# Patient Record
Sex: FEMALE | Race: WHITE | Employment: OTHER | URBAN - METROPOLITAN AREA
[De-identification: names, ages, dates, MRNs, and addresses within clinical notes are randomized per-mention and may not be internally consistent; named-entity substitution may affect disease eponyms.]

---

## 2016-12-06 LAB — PAP SMEAR, EXTERNAL: NORMAL

## 2017-01-17 ENCOUNTER — PATIENT OUTREACH (OUTPATIENT)
Dept: INTERNAL MEDICINE CLINIC | Age: 57
End: 2017-01-17

## 2017-01-17 NOTE — PROGRESS NOTES
[facility]  Patient identified as high risk. Assessed chart for need of chronic complex care management. Being followed by Dr. Curly Severs for pain management; Dr. Amanda Owens for interstitial cystitis. Last PCP visit: 11/28/16   Next PCP appointment: 5/30/17  ED admission in past 6 months: [facility] admissions in past 6 months: 0     No apparent need for complex care management at this time.

## 2017-01-18 ENCOUNTER — PATIENT OUTREACH (OUTPATIENT)
Dept: INTERNAL MEDICINE CLINIC | Age: 57
End: 2017-01-18

## 2017-01-18 ENCOUNTER — DOCUMENTATION ONLY (OUTPATIENT)
Dept: INTERNAL MEDICINE CLINIC | Age: 57
End: 2017-01-18

## 2017-01-18 NOTE — PROGRESS NOTES
Contacted Dr. Katina Yu office to request most recent PAP results. Introduced self and reason for call. Provided 2 patient identifiers. Provided office fax number. Document received . Forwarded to central scanning.

## 2017-01-20 ENCOUNTER — OFFICE VISIT (OUTPATIENT)
Dept: PAIN MANAGEMENT | Age: 57
End: 2017-01-20

## 2017-01-20 ENCOUNTER — TELEPHONE (OUTPATIENT)
Dept: PAIN MANAGEMENT | Age: 57
End: 2017-01-20

## 2017-01-20 VITALS
DIASTOLIC BLOOD PRESSURE: 85 MMHG | HEART RATE: 104 BPM | SYSTOLIC BLOOD PRESSURE: 142 MMHG | WEIGHT: 164 LBS | BODY MASS INDEX: 29.05 KG/M2

## 2017-01-20 DIAGNOSIS — G89.4 CHRONIC PAIN SYNDROME: ICD-10-CM

## 2017-01-20 DIAGNOSIS — Z79.899 ENCOUNTER FOR LONG-TERM (CURRENT) USE OF HIGH-RISK MEDICATION: ICD-10-CM

## 2017-01-20 DIAGNOSIS — K58.9 IRRITABLE BOWEL SYNDROME, UNSPECIFIED TYPE: ICD-10-CM

## 2017-01-20 DIAGNOSIS — M79.7 FIBROMYALGIA: Primary | ICD-10-CM

## 2017-01-20 DIAGNOSIS — F32.A CHRONIC DEPRESSION: ICD-10-CM

## 2017-01-20 DIAGNOSIS — N30.10 CHRONIC INTERSTITIAL CYSTITIS: ICD-10-CM

## 2017-01-20 DIAGNOSIS — M25.50 PAIN IN JOINT, MULTIPLE SITES: ICD-10-CM

## 2017-01-20 DIAGNOSIS — M46.1 SACROILIITIS, NOT ELSEWHERE CLASSIFIED (HCC): ICD-10-CM

## 2017-01-20 DIAGNOSIS — E55.9 VITAMIN D DEFICIENCY: ICD-10-CM

## 2017-01-20 RX ORDER — GABAPENTIN 400 MG/1
800 CAPSULE ORAL 3 TIMES DAILY
Qty: 180 CAP | Refills: 11 | Status: SHIPPED | OUTPATIENT
Start: 2017-01-20 | End: 2017-02-19

## 2017-01-20 RX ORDER — OXCARBAZEPINE 150 MG/1
150 TABLET, FILM COATED ORAL 4 TIMES DAILY
Qty: 120 TAB | Refills: 11 | Status: SHIPPED | OUTPATIENT
Start: 2017-01-20 | End: 2017-02-19

## 2017-01-20 NOTE — PROGRESS NOTES
Nursing Notes    Patient presents to the office today in follow-up. Patient rates her pain at 8/10 on the numerical pain scale. Reviewed medications with counts as follows:    Rx Date filled Qty Dispensed Pill Count Last Dose Short   provigil 200 11/4/16 60 11 1/20/17 no       Comments:     POC UDS was not performed in office today    Any new labs or imaging since last appointment? YES    Have you been to an emergency room (ER) or urgent care clinic since your last visit? NO            Have you been hospitalized since your last visit? NO     If yes, where, when, and reason for visit? Have you seen or consulted any other health care providers outside of the [address] [place] [address]  since your last visit? NO     If yes, where, when, and reason for visit? Ms. Melinda Lawson has a reminder for a \"due or due soon\" health maintenance. I have asked that she contact her primary care provider for follow-up on this health maintenance.

## 2017-01-20 NOTE — PROGRESS NOTES
HISTORY OF PRESENT ILLNESS  Cait Servin is a 55 y.o. female. HPI she returns for follow-up of chronic, severe pain which is widespread and associated with fatigue and related to fibromyalgia, interstitial cystitis, temporomandibular joint dysfunction, as well as bilateral carpal tunnel syndrome and generalized osteoarthritis. Since last seen, she underwent an x-ray of the right foot because of pain localized along the lateral aspect. X-ray was negative and she will be followed up by her podiatrist for further suggestions. She does note that the Mobic seems to alleviate the pain in her foot and that, when she does not take the Mobic, it causes the pain in the foot to flare. She did not increase her Provigil as recommended at the last visit. This will be allowed in 200 mg in the morning and 200 mg in the early afternoon for better alleviation of her fatigue. Neurontin will also be increased to 800 mg 3 times daily for better neuropathic pain control. Pain level today 8 out of 10, outcome 16/28,(The lower the upper number, the better the outcome)  Physical activity and mobility as well as sleep are poor, mood is fair. No reported side effects. Review of Systems   Constitutional: Positive for malaise/fatigue. HENT:        Jaw pain     Respiratory: Positive for cough (occasional) and wheezing (wheezing). Asthma   Gastrointestinal: Positive for diarrhea and heartburn (nexium). Negative for abdominal pain (comes and goes), constipation and vomiting. Genitourinary: Positive for dysuria (Interstitial Cystitis), frequency and urgency. Musculoskeletal: Positive for back pain, falls (trips often/unchanged/balance issues/no recent ED visit or injury requiring medical attention), joint pain, myalgias and neck pain. Skin: Negative. Neurological: Positive for weakness (generalized) and headaches. Issues with balance   Endo/Heme/Allergies: Negative.     Psychiatric/Behavioral: Positive for depression. Negative for suicidal ideas. The patient is nervous/anxious and has insomnia. All other systems reviewed and are negative. Physical Exam   Constitutional: She is oriented to person, place, and time. She appears well-developed and well-nourished. No distress. HENT:   Head: Normocephalic. Painful ROM jaw   Eyes: EOM are normal. Pupils are equal, round, and reactive to light. Neck: No thyromegaly present. Tender throughout/painful ROM/tight musculature   Pulmonary/Chest: Effort normal.   Musculoskeletal: She exhibits tenderness (throughout back/tight musculature). Left shoulder: She exhibits decreased range of motion, tenderness and pain. Right wrist: She exhibits no tenderness. Left wrist: She exhibits no tenderness. Right knee: She exhibits decreased range of motion and swelling. Tenderness found. Left knee: Tenderness found. Cervical back: She exhibits tenderness (tight musculature) and pain. Lumbar back: She exhibits decreased range of motion (painful), tenderness (especially over left SIJ), pain and spasm. Back:         Hands:       Right foot: There is tenderness. Left foot: There is tenderness. Neurological: She is alert and oriented to person, place, and time. Psychiatric: She has a normal mood and affect. Her behavior is normal. Thought content normal.   Nursing note and vitals reviewed. ASSESSMENT and PLAN  Encounter Diagnoses   Name Primary?  Fibromyalgia Yes    Chronic interstitial cystitis     Chronic depression     Irritable bowel syndrome, unspecified type     Sacroiliitis, not elsewhere classified ([facility] [address]     Pain in joint, multiple sites     Encounter for long-term (current) use of high-risk medication     Vitamin D deficiency     Chronic pain syndrome      Treatment plan as noted above. 3 month reassess her    No concerns are raised for misuse, abuse, or diversion.     1. Pain medications are prescribed with the objective of pain relief and improved physical and psychosocial function in this patient. 2. Counseled patient on proper use of prescribed medications and reviewed opioid contract. 3. Counseled patient about chronic medical conditions and their relationship to anxiety and depression and recommended mental health support as needed. 4. Reviewed with patient self-help tools, home exercise, and lifestyle changes to assist the patient in self-management of symptoms. 5. Advised patient to have a primary care provider to continue care for health maintenance and general medical conditions and support for referral to specialty care as needed. 6. Reviewed with patient the treatment plan, goals of treatment plan, and limitations of treatment plan, to include the potential for side effects from medications and procedures. If side effects occur, it is the responsibility of the patient to inform the clinic so that a change in the treatment plan can be made in a safe manner. The patient is advised that stopping prescribed medication may cause an increase in symptoms and possible medication withdrawal symptoms. The patient is informed an emergency room evaluation may be necessary if this occurs. DISPOSITION: The patients condition and plan were discussed at length and all questions were answered. The patient agrees with the plan.     Counseling occupied > 50% of visit:  Total time: 40 minutes

## 2017-01-20 NOTE — MR AVS SNAPSHOT
Visit Information Date & Time Provider Department Dept. Phone Encounter #  
 1/20/2017  9:40 AM Lisa Garvin MD [facility] [phone] Follow-up Instructions Return in about 3 months (around 4/20/2017). Your Appointments 1/24/2017 11:15 AM  
ESTABLISHED PATIENT with Valeria Faustin MD  
[facility] ([address]) Appt Note: 6 month follow up [address] [place] [address]  
[address] [place] [address] 5/22/2017  7:45 AM  
LAB with [facility] NURSE VISIT Internist of [facility] ([facility]) Appt Note: ov lab  
 [address] [facility], [address]or[address] V[address] [place]  
  
   
 [address], [address]  
  
    
 5/30/2017 10:00 AM  
Office Visit with Omar Story MD  
Internist of [address] [place] [address]) Appt Note: 6 mth f/u with lab  
 [address] [facility], [address] Georgen[address] Vick [address] [place]  
  
   
 [address], [address] Upcoming Health Maintenance Date Due DTaP/Tdap/Td series (1 - Tdap) 7/18/1982 BREAST CANCER SCRN MAMMOGRAM 8/24/2017 PAP AKA CERVICAL CYTOLOGY 12/6/2019 COLONOSCOPY 2/19/2023 Allergies as of 1/20/2017  Review Complete On: 1/20/2017 By: Lisa Garvin MD  
  
 Severity Noted Reaction Type Reactions Codeine    Rash, Itching Other reaction(s): other/intolerance Tussionex  01/09/2012   Systemic Itching Current Immunizations  Reviewed on 11/28/2016 Name Date Influenza Vaccine 10/10/2014, 10/17/2013 Influenza Vaccine (Quad) PF 11/28/2016, 10/23/2015 Influenza Vaccine Split 10/23/2012 Influenza Vaccine Whole 9/17/2010 Pneumococcal Conjugate (PCV-13) 11/28/2016 Not reviewed this visit Vitals BP Pulse Weight(growth percentile) BMI OB Status Smoking Status 142/85 (!) 104 164 lb (74.4 kg) 29.05 kg/m2 Postmenopausal Never Smoker BMI and BSA Data Body Mass Index Body Surface Area 29.05 kg/m 2 1.82 m 2 Preferred Pharmacy Pharmacy Name Phone [facility] [address] [phone] Your Updated Medication List  
  
   
This list is accurate as of: 1/20/17  9:57 AM.  Always use your most recent med list.  
  
  
  
  
 albuterol 90 mcg/actuation inhaler Commonly known as:  PROVENTIL HFA, VENTOLIN HFA, PROAIR HFA Take 2 Puffs by inhalation every six (6) hours as needed for Wheezing. b complex vitamins tablet Take 1 Tab by mouth daily. Citracal + D tablet Generic drug:  calcium citrate-vitamin D3 Take  by mouth two (2) times a day. CYMBALTA 60 mg capsule Generic drug:  DULoxetine Take 120 mg by mouth daily. denosumab 60 mg/mL injection Commonly known as:  Renetta Garcia 60 mg by SubCUTAneous route every 6 months. DIGESTIVE ENZYMES (PLANT) PO Take  by mouth.  
  
 esomeprazole 40 mg capsule Commonly known as:  NexIUM Take 1 Cap by mouth daily. Indications: GASTROESOPHAGEAL REFLUX  
  
 gabapentin 400 mg capsule Commonly known as:  NEURONTIN Take 2 Caps by mouth three (3) times daily for 30 days. Taking 5 times a day HYOPHEN tablet Generic drug:  Methen-Hyos-M Blue-BA-PhSal  
take 1 tablet by mouth four times a day LONOX PO Take  by mouth. LORazepam 2 mg tablet Commonly known as:  ATIVAN Take  by mouth every eight (8) hours as needed for Anxiety. meloxicam 15 mg tablet Commonly known as:  MOBIC Take 1 Tab by mouth daily. modafinil 200 mg tablet Commonly known as:  PROVIGIL  
  
 mometasone 220 mcg (60 doses) inhaler Commonly known as:  Marchia Brochure Take 2 Puffs by inhalation two (2) times a day. montelukast 10 mg tablet Commonly known as:  SINGULAIR Take 1 Tab by mouth daily. ondansetron 4 mg disintegrating tablet Commonly known as:  ZOFRAN ODT Take 1 Tab by mouth every eight (8) hours as needed for Nausea. OTHER Indications: Compound cream for inflammation OXcarbazepine 150 mg tablet Commonly known as:  TRILEPTAL Take 1 Tab by mouth four (4) times daily for 30 days. pentosan polysulfate sodium 100 mg capsule Commonly known as:  Karel Chilton Take 1 Cap by mouth two (2) times a day. PHAZYME 180 mg Cap Generic drug:  simethicone Take  by mouth as needed. PROBIOTIC 4X 10-15 mg Tbec Generic drug:  B.infantis-B.ani-B.long-B.bifi Take  by mouth. tiotropium bromide 2.5 mcg/actuation inhaler Commonly known as:  Verlon Corwith Take 2 Puffs by inhalation daily. Indications: PREVENTION OF BRONCHOSPASM WITH CHRONIC BRONCHITIS  
  
 traZODone 100 mg tablet Commonly known as:  Cantrell Emiliano Take 100 mg by mouth nightly. vit B Cmplx 3-FA-Vit C-Biotin 1- mg-mg-mcg tablet Commonly known as:  NEPHRO DEBRA RX Take 1 Tab by mouth daily. VITAMIN D2 50,000 unit capsule Generic drug:  ergocalciferol Take 50,000 Units by mouth every seven (7) days. Prescriptions Sent to Pharmacy Refills  
 gabapentin (NEURONTIN) 400 mg capsule 11 Sig: Take 2 Caps by mouth three (3) times daily for 30 days. Taking 5 times a day Class: Normal  
 Pharmacy: [facility]OU[facility][id number] [address] Ph #: [phone] Route: Oral  
 OXcarbazepine (TRILEPTAL) 150 mg tablet 11 Sig: Take 1 Tab by mouth four (4) times daily for 30 days. Class: Normal  
 Pharmacy: [id number] [address], [address] [place] [address] Ph #: [phone] Route: Oral  
  
Follow-up Instructions Return in about 3 months (around 4/20/2017). Patient Instructions Current health maintenance issues were reviewed and the patient was advised to followup with his/her PCP for completion of these items. Introducing [facility]! Tressa Hidalgo introduces Legend[employer] patient portal. Now you can access parts of your medical record, email your doctor's office, and request medication refills online. 1. In your internet browser, go to [email or web address] 2. Click on the First Time User? Click Here link in the Sign In box. You will see the New Member Sign Up page. 3. Enter your Legend3D Access Code exactly as it appears below. You will not need to use this code after youve completed the sign-up process. If you do not sign up before the expiration date, you must request a new code. · Legend3D Access Code: [email or web address] Expires: 2/26/2017 10:13 AM 
 
4. Enter the last four digits of your Social Security Number (xxxx) and Date of Birth (mm/dd/yyyy) as indicated and click Submit. You will be taken to the next sign-up page. 5. Create a Legend3D ID. This will be your Legend3D login ID and cannot be changed, so think of one that is secure and easy to remember. 6. Create a Legend3D password. You can change your password at any time. 7. Enter your Password Reset Question and Answer. This can be used at a later time if you forget your password. 8. Enter your e-mail address. You will receive e-mail notification when new information is available in [address] 9. Click Sign Up. You can now view and download portions of your medical record. 10. Click the Download Summary menu link to download a portable copy of your medical information. If you have questions, please visit the Frequently Asked Questions section of the Legend3D website. Remember, Legend3D is NOT to be used for urgent needs. For medical emergencies, dial 911. Now available from your iPhone and Android! Please provide this summary of care documentation to your next provider. Your primary care clinician is listed as Zach Haile. If you have any questions after today's visit, please call [phone].

## 2017-01-20 NOTE — TELEPHONE ENCOUNTER
Called [facility] re prolia delivery. Spoke with Mellisa Morin - said delivery was scheduled for today - then put me on hold and came back and said no we have to schedule delivery. Told her it needs to be scheduled asap - delivery date 1/24/17. Maribell Dyson delivery is set up - pt had told her she was told last week prolia would be her for today's appt. Sparkle Conley will let pt know of 1/24/17 delivery.

## 2017-02-13 ENCOUNTER — OFFICE VISIT (OUTPATIENT)
Dept: PAIN MANAGEMENT | Age: 57
End: 2017-02-13

## 2017-02-13 DIAGNOSIS — M81.0 OSTEOPOROSIS: Primary | ICD-10-CM

## 2017-02-13 NOTE — PROGRESS NOTES
Pt received prolia injection to left deltoid. Site free from signs of infection and pt tolerated well. Lot# [id number], exp date of 4/19.

## 2017-02-16 ENCOUNTER — TELEPHONE (OUTPATIENT)
Dept: PULMONOLOGY | Age: 57
End: 2017-02-16

## 2017-02-16 RX ORDER — AZITHROMYCIN 250 MG/1
250 TABLET, FILM COATED ORAL DAILY
Qty: 6 TAB | Refills: 0 | Status: SHIPPED | OUTPATIENT
Start: 2017-02-16 | End: 2017-02-21

## 2017-02-16 RX ORDER — DEXTROMETHORPHAN POLISTIREX 30 MG/5ML
60 SUSPENSION ORAL 2 TIMES DAILY
COMMUNITY
End: 2017-12-08 | Stop reason: ALTCHOICE

## 2017-02-16 NOTE — TELEPHONE ENCOUNTER
Pt states that she's not feeling well that she can't inhale her inhalers and having some SOB.  pls call pt @[phone]

## 2017-02-16 NOTE — TELEPHONE ENCOUNTER
The pt. was exposed to her 's cold. Since Monday she is having chest tightness, non prod. cough, chills one am but, no elevated temp., nasal drainage is clear. Her SOB has increased to the point she cannot inhale fully to use her inhalers with good effect. Using Delsym and Mucinex with little effect. She feels she needs a Prednisone taper.

## 2017-02-20 ENCOUNTER — OFFICE VISIT (OUTPATIENT)
Dept: INTERNAL MEDICINE CLINIC | Age: 57
End: 2017-02-20

## 2017-02-20 VITALS
DIASTOLIC BLOOD PRESSURE: 82 MMHG | TEMPERATURE: 98.2 F | WEIGHT: 165 LBS | OXYGEN SATURATION: 100 % | SYSTOLIC BLOOD PRESSURE: 140 MMHG | BODY MASS INDEX: 29.23 KG/M2 | HEART RATE: 88 BPM | HEIGHT: 63 IN

## 2017-02-20 DIAGNOSIS — J20.9 BRONCHITIS WITH BRONCHOSPASM: Primary | ICD-10-CM

## 2017-02-20 RX ORDER — PREDNISONE 20 MG/1
TABLET ORAL
Qty: 30 TAB | Refills: 0 | Status: SHIPPED | OUTPATIENT
Start: 2017-02-20 | End: 2017-05-30 | Stop reason: ALTCHOICE

## 2017-02-20 NOTE — MR AVS SNAPSHOT
Visit Information Date & Time Provider Department Dept. Phone Encounter #  
 2/20/2017  4:30 PM Xiao Dillon MD Internist of [address] [id number] Your Appointments 3/10/2017  9:30 AM  
Follow Up with Rebekah Fraser MD  
[address] ([facility]) Appt Note: from 8/25/16  
 [address] [place] [address], [address] [address]  
[phone]  
  
   
 [address] [place] [address], [address] [facility] [place] [address]  
  
    
 4/11/2017 10:20 AM  
Follow Up with KANDY Chamorro [address] [facility] (RUPINDER SCHEDULING) Appt Note: Return in about 2 months (around 4/14/2017) per RC. ..mom  
 [address] [facility] [address]  
[phone] [phone] 4/18/2017  9:40 AM  
Follow Up with Codie Polanco MD  
[address] [facility]) Appt Note: return in [address] [facility] [address]  
[phone] [phone] 5/22/2017  7:45 AM  
LAB with [facility] NURSE VISIT Internist of [facility] ([facility]) Appt Note: ov lab  
 [address] [address] [address] [place]  
  
   
 [address], [address]  
  
    
 5/30/2017 10:00 AM  
Office Visit with Xiao Dillon MD  
Internist of [address] [place] [address] Appt Note: 6 mth f/u with lab  
 [address] [address] [address] [place]  
  
   
 [address], [address] Upcoming Health Maintenance Date Due DTaP/Tdap/Td series (1 - Tdap) 7/18/1982 BREAST CANCER SCRN MAMMOGRAM 8/24/2017 PAP AKA CERVICAL CYTOLOGY 12/8/2019 COLONOSCOPY 2/19/2023 Allergies as of 2/20/2017  Review Complete On: 2/20/2017 By: Xiao Dillon MD  
  
 Severity Noted Reaction Type Reactions Codeine    Rash, Itching Other reaction(s): other/intolerance Tussionex  01/09/2012   Systemic Itching Current Immunizations  Reviewed on 11/28/2016 Name Date Influenza Vaccine 10/10/2014, 10/17/2013 Influenza Vaccine (Quad) PF 11/28/2016, 10/23/2015 Influenza Vaccine Split 10/23/2012 Influenza Vaccine Whole 9/17/2010 Pneumococcal Conjugate (PCV-13) 11/28/2016 Not reviewed this visit You Were Diagnosed With   
  
 Codes Comments Bronchitis with bronchospasm    -  Primary ICD-10-CM: J20.9 ICD-9-CM: 567 Vitals BP Pulse Temp Height(growth percentile) Weight(growth percentile) SpO2  
 140/82 88 98.2 °F (36.8 °C) (Oral) 5' 3\" (1.6 m) 165 lb (74.8 kg) 100% BMI OB Status Smoking Status 29.23 kg/m2 Postmenopausal Never Smoker Vitals History BMI and BSA Data Body Mass Index Body Surface Area  
 29.23 kg/m 2 1.82 m 2 Preferred Pharmacy Pharmacy Name Phone [facility] [address] [facility], [address] [place] [address] [phone] Your Updated Medication List  
  
   
This list is accurate as of: 2/20/17  4:37 PM.  Always use your most recent med list.  
  
  
  
  
 albuterol 90 mcg/actuation inhaler Commonly known as:  PROVENTIL HFA, VENTOLIN HFA, PROAIR HFA Take 2 Puffs by inhalation every six (6) hours as needed for Wheezing. azithromycin 250 mg tablet Commonly known as:  Otisco Owen Take 1 Tab by mouth daily for 5 days. b complex vitamins tablet Take 1 Tab by mouth daily. Citracal + D tablet Generic drug:  calcium citrate-vitamin D3 Take  by mouth two (2) times a day. CYMBALTA 60 mg capsule Generic drug:  DULoxetine Take 120 mg by mouth daily. Delsym 30 mg/5 mL liquid Generic drug:  dextromethorphan Take 60 mg by mouth two (2) times a day. denosumab 60 mg/mL injection Commonly known as:  Manda Medrano 60 mg by SubCUTAneous route every 6 months. DIGESTIVE ENZYMES (PLANT) PO Take  by mouth.  
  
 esomeprazole 40 mg capsule Commonly known as:  NexIUM Take 1 Cap by mouth daily. Indications: GASTROESOPHAGEAL REFLUX  
  
 HYOPHEN tablet Generic drug:  Methen-Hyos-M Blue-BA-PhSal  
take 1 tablet by mouth four times a day LONOX PO Take  by mouth. LORazepam 2 mg tablet Commonly known as:  ATIVAN Take  by mouth every eight (8) hours as needed for Anxiety. meloxicam 15 mg tablet Commonly known as:  MOBIC Take 1 Tab by mouth daily. modafinil 200 mg tablet Commonly known as:  PROVIGIL  
  
 mometasone 220 mcg (60 doses) inhaler Commonly known as:  Otis Sovereign Take 2 Puffs by inhalation two (2) times a day. montelukast 10 mg tablet Commonly known as:  SINGULAIR Take 1 Tab by mouth daily. MUCINEX 1,200 mg Ta12 ER tablet Generic drug:  guaiFENesin Take 1,200 mg by mouth two (2) times a day. ondansetron 4 mg disintegrating tablet Commonly known as:  ZOFRAN ODT Take 1 Tab by mouth every eight (8) hours as needed for Nausea. OTHER Indications: Compound cream for inflammation  
  
 pentosan polysulfate sodium 100 mg capsule Commonly known as:  Salvadore Alexis Take 1 Cap by mouth two (2) times a day. PHAZYME 180 mg Cap Generic drug:  simethicone Take  by mouth as needed. PROBIOTIC 4X 10-15 mg Tbec Generic drug:  B.infantis-B.ani-B.long-B.bifi Take  by mouth. tiotropium bromide 2.5 mcg/actuation inhaler Commonly known as:  Alethea Plush Take 2 Puffs by inhalation daily. Indications: PREVENTION OF BRONCHOSPASM WITH CHRONIC BRONCHITIS  
  
 traZODone 100 mg tablet Commonly known as:  Kirt Bump Take 100 mg by mouth nightly. vit B Cmplx 3-FA-Vit C-Biotin 1- mg-mg-mcg tablet Commonly known as:  NEPHRO DEBRA RX Take 1 Tab by mouth daily. VITAMIN D2 50,000 unit capsule Generic drug:  ergocalciferol Take 50,000 Units by mouth every seven (7) days. Introducing [facility]! Edith Kim introduces [employer]ient patient portal. Now you can access parts of your medical record, email your doctor's office, and request medication refills online. 1. In your internet browser, go to [email or web address] 2. Click on the First Time User? Click Here link in the Sign In box. You will see the New Member Sign Up page. 3. Enter your Brainient Access Code exactly as it appears below. You will not need to use this code after youve completed the sign-up process. If you do not sign up before the expiration date, you must request a new code. · Brainient Access Code: [email or web address] Expires: 2/26/2017 10:13 AM 
 
4. Enter the last four digits of your Social Security Number (xxxx) and Date of Birth (mm/dd/yyyy) as indicated and click Submit. You will be taken to the next sign-up page. 5. Create a Brainient ID. This will be your Brainient login ID and cannot be changed, so think of one that is secure and easy to remember. 6. Create a Brainient password. You can change your password at any time. 7. Enter your Password Reset Question and Answer. This can be used at a later time if you forget your password. 8. Enter your e-mail address. You will receive e-mail notification when new information is available in [address] 9. Click Sign Up. You can now view and download portions of your medical record. 10. Click the Download Summary menu link to download a portable copy of your medical information. If you have questions, please visit the Frequently Asked Questions section of the Brainient website. Remember, Brainient is NOT to be used for urgent needs. For medical emergencies, dial 911. Now available from your iPhone and Android! Please provide this summary of care documentation to your next provider. Your primary care clinician is listed as Jennifer Colorado. Patsy Humphrey.  If you have any questions after today's visit, please call [phone].

## 2017-02-20 NOTE — PROGRESS NOTES
Omid Griffin 1960, is a 55 y.o. female, who is seen today for a one-week history of very frequent nonproductive coughing. She started with a scratchy throat a week ago and then soon thereafter developed cough. She has had this scenario about once a year for many years but not for the last few years. She is always required prednisone in the past to knock it out. She called her pulmonary specialist 5 days ago and was given Z-Catarino, she could not see the patient. She is no better today than she was last week coughing day and night. She has been using Delsym and Mucinex without benefit. No fever or chills.     Past Medical History   Diagnosis Date    Allergy     Anxiety     Arthritis     Asthma      mild    Atypical ductal hyperplasia of breast     Atypical hyperplasia of breast 2003     right    Chronic depression     Chronic fatigue syndrome     Chronic interstitial cystitis     Chronic interstitial cystitis     Chronic pain      fibromyalgia    Constipation     Depression     Diarrhea     Difficulty walking     Dizziness     Dry eyes     Dry mouth     Dyspnea     Fibromyalgia     Fibromyalgia     Generalized stiffness     GERD (gastroesophageal reflux disease)     Headache(784.0)     Heartburn     Hyperlipidemia     IBS (irritable bowel syndrome)     IC (interstitial cystitis)     Imbalance     Incoordination     Insomnia     Irregular periods/menstrual cycles     Joint pain     Microscopic hematuria     Muscle pain     Myofascial pain syndrome     Numbness and tingling in hands     Ovarian cyst, left     Painful sex     Pneumonia     Psychiatric disorder      anxiety - depression    RLS (restless legs syndrome)     Stress     TMJ (temporomandibular joint syndrome)     Transient total loss of muscle tone     Uterine fibroid     Weakness generalized      Current Outpatient Prescriptions   Medication Sig Dispense Refill    dextromethorphan (DELSYM) 30 mg/5 mL liquid Take 60 mg by mouth two (2) times a day.  guaiFENesin (MUCINEX) 1,200 mg Ta12 ER tablet Take 1,200 mg by mouth two (2) times a day.  azithromycin (ZITHROMAX) 250 mg tablet Take 1 Tab by mouth daily for 5 days. 6 Tab 0    OTHER Indications: Compound cream for inflammation      b complex vitamins tablet Take 1 Tab by mouth daily.  pentosan polysulfate sodium (ELMIRON) 100 mg capsule Take 1 Cap by mouth two (2) times a day. 90 Cap 3    HYOPHEN tablet take 1 tablet by mouth four times a day 120 Tab 11    vit B Cmplx 3-FA-Vit C-Biotin (NEPHRO DEBRA RX) 1- mg-mg-mcg tablet Take 1 Tab by mouth daily.  B.infantis-B.ani-B.long-B.bifi (PROBIOTIC 4X) 10-15 mg TbEC Take  by mouth.  ENZYMES,DIGESTIVE, PLANT, (DIGESTIVE ENZYMES, PLANT, PO) Take  by mouth.  tiotropium bromide (SPIRIVA RESPIMAT) 2.5 mcg/actuation inhaler Take 2 Puffs by inhalation daily. Indications: PREVENTION OF BRONCHOSPASM WITH CHRONIC BRONCHITIS 3 Inhaler 6    albuterol (PROVENTIL HFA, VENTOLIN HFA, PROAIR HFA) 90 mcg/actuation inhaler Take 2 Puffs by inhalation every six (6) hours as needed for Wheezing. 3 Inhaler 6    esomeprazole (NEXIUM) 40 mg capsule Take 1 Cap by mouth daily. Indications: GASTROESOPHAGEAL REFLUX 90 Cap 3    modafinil (PROVIGIL) 200 mg tablet   0    DIPHENOXYLATE HCL/ATROPINE (LONOX PO) Take  by mouth.  denosumab (PROLIA) 60 mg/mL injection 60 mg by SubCUTAneous route every 6 months.  ondansetron (ZOFRAN ODT) 4 mg disintegrating tablet Take 1 Tab by mouth every eight (8) hours as needed for Nausea. 20 Tab 1    meloxicam (MOBIC) 15 mg tablet Take 1 Tab by mouth daily. 30 Tab 11    mometasone (ASMANEX TWISTHALER) 220 mcg (60 doses) inhaler Take 2 Puffs by inhalation two (2) times a day. 120 Cap 6    montelukast (SINGULAIR) 10 mg tablet Take 1 Tab by mouth daily. 90 Tab 6    calcium citrate-vitamin D3 (CITRACAL + D) tablet Take  by mouth two (2) times a day.       LORazepam (ATIVAN) 2 mg tablet Take  by mouth every eight (8) hours as needed for Anxiety.  ergocalciferol (VITAMIN D2) 50,000 unit capsule Take 50,000 Units by mouth every seven (7) days.  DULoxetine (CYMBALTA) 60 mg capsule Take 120 mg by mouth daily.  simethicone (PHAZYME) 180 mg cap Take  by mouth as needed.  trazodone (DESYREL) 100 mg tablet Take 100 mg by mouth nightly. Visit Vitals    /82    Pulse 88    Temp 98.2 °F (36.8 °C) (Oral)    Ht 5' 3\" (1.6 m)    Wt 165 lb (74.8 kg)    SpO2 100%    BMI 29.23 kg/m2     Pharynx reveals no redness exudate or drainage. Neck reveals no adenopathy. Lungs are clear to percussion. With forced expiration I hear no wheezing and there are no crackles but she does cough briefly with forced expiration. Perhaps very slight prolongation of expiratory phase. Assessment: Bronchitis with bronchospasm, probably viral to start with. We will treat with tapering prednisone. Follow-up as previously planned    Jennifer [place]. Elio Ndiaye MD FACP    Please note: This document has been produced using voice recognition software. Unrecognized errors in transcription may be present.

## 2017-03-03 DIAGNOSIS — J45.40 MODERATE PERSISTENT ASTHMA WITHOUT COMPLICATION: ICD-10-CM

## 2017-03-03 RX ORDER — MONTELUKAST SODIUM 10 MG/1
10 TABLET ORAL DAILY
Qty: 90 TAB | Refills: 6 | Status: SHIPPED | OUTPATIENT
Start: 2017-03-03 | End: 2018-03-01 | Stop reason: SDUPTHER

## 2017-03-06 ENCOUNTER — TELEPHONE (OUTPATIENT)
Dept: PULMONOLOGY | Age: 57
End: 2017-03-06

## 2017-03-10 ENCOUNTER — OFFICE VISIT (OUTPATIENT)
Dept: PULMONOLOGY | Age: 57
End: 2017-03-10

## 2017-03-10 VITALS
OXYGEN SATURATION: 98 % | WEIGHT: 168 LBS | SYSTOLIC BLOOD PRESSURE: 104 MMHG | HEIGHT: 63 IN | TEMPERATURE: 98.6 F | HEART RATE: 106 BPM | BODY MASS INDEX: 29.77 KG/M2 | RESPIRATION RATE: 16 BRPM | DIASTOLIC BLOOD PRESSURE: 62 MMHG

## 2017-03-10 DIAGNOSIS — G47.33 OSA (OBSTRUCTIVE SLEEP APNEA): ICD-10-CM

## 2017-03-10 DIAGNOSIS — K21.00 GASTROESOPHAGEAL REFLUX DISEASE WITH ESOPHAGITIS: ICD-10-CM

## 2017-03-10 DIAGNOSIS — M79.7 FIBROMYALGIA: ICD-10-CM

## 2017-03-10 DIAGNOSIS — J45.40 MODERATE PERSISTENT ASTHMA WITHOUT COMPLICATION: Primary | ICD-10-CM

## 2017-03-10 RX ORDER — OXCARBAZEPINE 150 MG/1
TABLET, FILM COATED ORAL
COMMUNITY
End: 2017-10-30

## 2017-03-10 RX ORDER — ESOMEPRAZOLE MAGNESIUM 40 MG/1
40 CAPSULE, DELAYED RELEASE ORAL DAILY
Qty: 90 CAP | Refills: 3 | Status: SHIPPED | OUTPATIENT
Start: 2017-03-10 | End: 2017-05-30

## 2017-03-10 NOTE — PROGRESS NOTES
[facility]  Pulmonary, Critical Care, and Sleep Medicine      Pulmonary Office Progress Notes    Name: Harry Torres     : 1960     Date: 3/10/2017        Subjective:     Patient is a 55 y.o. female with history of asthma and reactive airways presents for follow up.    03/10/17  Feeling better with breathing. No coughing at present but did have some coughing this season with the change of weather. States that she has a little sore throat. Needs refills:  Astmanex  Nexium Esomprazole    She maria elena while putting on her shoes this am and hit her left chest wall against a dresser- hurts when moves/ coughs. C/o hand arthritis symptoms. -on mobic  She reports increased symptoms of chest discomfort and some cough with the change of weather. Some congestion which improved with claritan. She had started water exercises. -swimming laps but not very regular now. She felt improvement with Respimet . She is under the care of pain management for fibromyalgia.     Past Medical History:   Diagnosis Date    Allergy     Anxiety     Arthritis     Asthma     mild    Atypical ductal hyperplasia of breast     Atypical hyperplasia of breast 2003    right    Chronic depression     Chronic fatigue syndrome     Chronic interstitial cystitis     Chronic interstitial cystitis     Chronic pain     fibromyalgia    Constipation     Depression     Diarrhea     Difficulty walking     Dizziness     Dry eyes     Dry mouth     Dyspnea     Fibromyalgia     Fibromyalgia     Generalized stiffness     GERD (gastroesophageal reflux disease)     Headache(784.0)     Heartburn     Hyperlipidemia     IBS (irritable bowel syndrome)     IC (interstitial cystitis)     Imbalance     Incoordination     Insomnia     Irregular periods/menstrual cycles     Joint pain     Microscopic hematuria     Muscle pain     Myofascial pain syndrome     Numbness and tingling in hands     Ovarian cyst, left     Painful sex     Pneumonia     Psychiatric disorder     anxiety - depression    RLS (restless legs syndrome)     Stress     TMJ (temporomandibular joint syndrome)     Transient total loss of muscle tone     Uterine fibroid     Weakness generalized        Allergies   Allergen Reactions    Codeine Rash and Itching     Other reaction(s): other/intolerance    Tussionex Itching       Current Outpatient Prescriptions   Medication Sig Dispense Refill    OXcarbazepine (TRILEPTAL) 150 mg tablet Take  by mouth.  gabapentin (NEURONTIN) 400 mg capsule Take  by mouth three (3) times daily. Takes 2 TID      pentosan polysulfate sodium (ELMIRON) 100 mg capsule Take 1 Cap by mouth two (2) times a day. Indications: Bladder Pain, Interstitial Cystitis 120 Cap 3    montelukast (SINGULAIR) 10 mg tablet Take 1 Tab by mouth daily. 90 Tab 6    dextromethorphan (DELSYM) 30 mg/5 mL liquid Take 60 mg by mouth two (2) times a day.  OTHER Indications: Compound cream for inflammation      HYOPHEN tablet take 1 tablet by mouth four times a day 120 Tab 11    B.infantis-B.ani-B.long-B.bifi (PROBIOTIC 4X) 10-15 mg TbEC Take  by mouth.  ENZYMES,DIGESTIVE, PLANT, (DIGESTIVE ENZYMES, PLANT, PO) Take  by mouth.  tiotropium bromide (SPIRIVA RESPIMAT) 2.5 mcg/actuation inhaler Take 2 Puffs by inhalation daily. Indications: PREVENTION OF BRONCHOSPASM WITH CHRONIC BRONCHITIS 3 Inhaler 6    albuterol (PROVENTIL HFA, VENTOLIN HFA, PROAIR HFA) 90 mcg/actuation inhaler Take 2 Puffs by inhalation every six (6) hours as needed for Wheezing. 3 Inhaler 6    esomeprazole (NEXIUM) 40 mg capsule Take 1 Cap by mouth daily. Indications: GASTROESOPHAGEAL REFLUX 90 Cap 3    modafinil (PROVIGIL) 200 mg tablet   0    DIPHENOXYLATE HCL/ATROPINE (LONOX PO) Take  by mouth.  denosumab (PROLIA) 60 mg/mL injection 60 mg by SubCUTAneous route every 6 months.       ondansetron (ZOFRAN ODT) 4 mg disintegrating tablet Take 1 Tab by mouth every eight (8) hours as needed for Nausea. 20 Tab 1    meloxicam (MOBIC) 15 mg tablet Take 1 Tab by mouth daily. 30 Tab 11    mometasone (ASMANEX TWISTHALER) 220 mcg (60 doses) inhaler Take 2 Puffs by inhalation two (2) times a day. 120 Cap 6    calcium citrate-vitamin D3 (CITRACAL + D) tablet Take  by mouth two (2) times a day.  LORazepam (ATIVAN) 2 mg tablet Take  by mouth every eight (8) hours as needed for Anxiety.  ergocalciferol (VITAMIN D2) 50,000 unit capsule Take 50,000 Units by mouth every seven (7) days.  DULoxetine (CYMBALTA) 60 mg capsule Take 120 mg by mouth daily.  simethicone (PHAZYME) 180 mg cap Take  by mouth as needed.  trazodone (DESYREL) 100 mg tablet Take 100 mg by mouth nightly.  predniSONE (DELTASONE) 20 mg tablet 3 tablets daily for 5 days, then 2 tablets daily for 5 days, then 1 tablet daily for 5 days. 30 Tab 0    guaiFENesin (MUCINEX) 1,200 mg Ta12 ER tablet Take 1,200 mg by mouth two (2) times a day.  b complex vitamins tablet Take 1 Tab by mouth daily.  vit B Cmplx 3-FA-Vit C-Biotin (NEPHRO DEBRA RX) 1- mg-mg-mcg tablet Take 1 Tab by mouth daily.          Review of Systems:  HEENT: No epistaxis, no nasal drainage, no difficulty in swallowing, no redness in eyes  Respiratory: as above  Cardiovascular: no chest pain, no palpitations, no chronic leg edema, no syncope  Gastrointestinal:  Retrosternal pain+ no abd pain, no vomiting, no diarrhea, no bleeding symptoms  Genitourinary: No urinary symptoms or hematuria  Integument/breast: No ulcers or rashes  Musculoskeletal:Neg  Neurological: No focal weakness, no seizures, no headaches  Behvioral/Psych: No anxiety, no depression  Constitutional: No fever, no chills, no weight loss, no night sweats     Objective:     Visit Vitals    /62 (BP 1 Location: Left arm, BP Patient Position: Sitting)    Pulse (!) 106    Temp 98.6 °F (37 °C) (Oral)    Resp 16    Ht 5' 3\" (1.6 m)    Ami Group 76.2 kg (168 lb)    SpO2 98%  Comment: RA Rest    BMI 29.76 kg/m2        Physical Exam:   General: comfortable, no acute distress  HEENT: pupils reactive, sclera anicteric, EOM intact  Neck: No adenopathy or thyroid swelling, no lymphadenopathy or JVD, supple  CVS: S1S2 no murmurs  RS: Mod AE bilaterally, no tactile fremitus or egophony, no accessory muscle use  Neuro: non focal, awake, alert  Extrm: no leg edema, clubbing or cyanosis  Skin: no rash    Data review:     Office Visit on 03/09/2017   Component Date Value Ref Range Status    Color (UA POC) 03/09/2017 Green/Blue   Final    Clarity (UA POC) 03/09/2017 Cloudy   Final    Glucose (UA POC) 03/09/2017 Negative  Negative Final    Bilirubin (UA POC) 03/09/2017 3+  Negative Final    Ketones (UA POC) 03/09/2017 Trace  Negative Final    Specific gravity (UA POC) 03/09/2017 1.020  1.001 - 1.035 Final    Blood (UA POC) 03/09/2017 Trace  Negative Final    pH (UA POC) 03/09/2017 7.0  4.6 - 8.0 Final    Protein (UA POC) 03/09/2017 1+  Negative mg/dL Final    Urobilinogen (UA POC) 03/09/2017 0.2 mg/dL  0.2 - 1 Final    Nitrites (UA POC) 03/09/2017 Negative  Negative Final    Leukocyte esterase (UA POC) 03/09/2017 Negative  Negative Final   Documentation Only on 01/18/2017   Component Date Value Ref Range Status    Pap Smear, External 12/06/2016 Negative for intraepithelial lesion or malignancy; other findings atrophy   Final   Hospital Outpatient Visit on 11/15/2016   Component Date Value Ref Range Status    Vitamin D 25-Hydroxy 11/15/2016 54.7  30 - 100 ng/mL Final    Comment: (NOTE)  Deficiency               <20 ng/mL  Insufficiency          20-30 ng/mL  Sufficient             ng/mL  Possible toxicity       >100 ng/mL    The Method used is Siemens Advia Alma Johnsaur currently standardized to a   Center of Disease Control and Prevention (CDC) certified reference   method.  Samples containing fluorescein dye can produce falsely   elevated values when tested with the ADVIA Centaur Vitamin D Assay. It is recommended that results in the toxic range, >100 ng/mL, be   retested 72 hours post fluorescein exposure.  WBC 11/15/2016 5.6  4.6 - 13.2 K/uL Final    RBC 11/15/2016 5.04  4.20 - 5.30 M/uL Final    HGB 11/15/2016 14.0  12.0 - 16.0 g/dL Final    HCT 11/15/2016 42.3  35.0 - 45.0 % Final    MCV 11/15/2016 83.9  74.0 - 97.0 FL Final    MCH 11/15/2016 27.8  24.0 - 34.0 PG Final    MCHC 11/15/2016 33.1  31.0 - 37.0 g/dL Final    RDW 11/15/2016 12.9  11.6 - 14.5 % Final    PLATELET 67/84/7230 284  135 - 420 K/uL Final    MPV 11/15/2016 9.4  9.2 - 11.8 FL Final    NEUTROPHILS 11/15/2016 67  40 - 73 % Final    LYMPHOCYTES 11/15/2016 26  21 - 52 % Final    MONOCYTES 11/15/2016 5  3 - 10 % Final    EOSINOPHILS 11/15/2016 2  0 - 5 % Final    BASOPHILS 11/15/2016 0  0 - 2 % Final    ABS. NEUTROPHILS 11/15/2016 3.7  1.8 - 8.0 K/UL Final    ABS. LYMPHOCYTES 11/15/2016 1.5  0.9 - 3.6 K/UL Final    ABS. MONOCYTES 11/15/2016 0.3  0.05 - 1.2 K/UL Final    ABS. EOSINOPHILS 11/15/2016 0.1  0.0 - 0.4 K/UL Final    ABS.  BASOPHILS 11/15/2016 0.0  0.0 - 0.06 K/UL Final    DF 11/15/2016 AUTOMATED    Final    LIPID PROFILE 11/15/2016        Final    Cholesterol, total 11/15/2016 349* <200 MG/DL Final    Triglyceride 11/15/2016 154* <150 MG/DL Final    HDL Cholesterol 11/15/2016 75* 40 - 60 MG/DL Final    LDL, calculated 11/15/2016 243.2* 0 - 100 MG/DL Final    VLDL, calculated 11/15/2016 30.8  MG/DL Final    CHOL/HDL Ratio 11/15/2016 4.7  0 - 5.0   Final    Sodium 11/15/2016 134* 136 - 145 mmol/L Final    Potassium 11/15/2016 4.0  3.5 - 5.5 mmol/L Final    Chloride 11/15/2016 96* 100 - 108 mmol/L Final    CO2 11/15/2016 26  21 - 32 mmol/L Final    Anion gap 11/15/2016 12  3.0 - 18 mmol/L Final    Glucose 11/15/2016 96  74 - 99 mg/dL Final    BUN 11/15/2016 12  7.0 - 18 MG/DL Final    Creatinine 11/15/2016 0.67  0.6 - 1.3 MG/DL Final    BUN/Creatinine ratio 11/15/2016 18  12 - 20   Final    GFR est AA 11/15/2016 >60  >60 ml/min/1.73m2 Final    GFR est non-AA 11/15/2016 >60  >60 ml/min/1.73m2 Final    Comment: (NOTE)  Estimated GFR is calculated using the Modification of Diet in Renal   Disease (MDRD) Study equation, reported for both  Americans   (GFRAA) and non- Americans (GFRNA), and normalized to 1.73m2   body surface area. The physician must decide which value applies to   the patient. The MDRD study equation should only be used in   individuals age 25 or older. It has not been validated for the   following: pregnant women, patients with serious comorbid conditions,   or on certain medications, or persons with extremes of body size,   muscle mass, or nutritional status.  Calcium 11/15/2016 8.8  8.5 - 10.1 MG/DL Final    Bilirubin, total 11/15/2016 0.4  0.2 - 1.0 MG/DL Final    ALT (SGPT) 11/15/2016 57* 13 - 56 U/L Final    AST (SGOT) 11/15/2016 32  15 - 37 U/L Final    Alk. phosphatase 11/15/2016 45  45 - 117 U/L Final    Protein, total 11/15/2016 7.0  6.4 - 8.2 g/dL Final    Albumin 11/15/2016 4.2  3.4 - 5.0 g/dL Final    Globulin 11/15/2016 2.8  2.0 - 4.0 g/dL Final    A-G Ratio 11/15/2016 1.5  0.8 - 1.7   Final    TSH 11/15/2016 2.12  0.36 - 3.74 uIU/mL Final       Imaging:  I have personally reviewed the patients radiographs and have reviewed the reports:  No recent CXR     IMPRESSION:   Asthma- fairly well controlled with recent symptomatic exacerbations relate to environmental temperature and also some increased GERD symptoms  Reactive airways- periodic flare ups related to environmental changes  Left chest wall pain- after falling and hitting chest wall against a dresser.   Anxiety/Depression  Stress  IBS  GERD- on Nexium        RECOMMENDATIONS:   · Pain control for blunt chest wall injury- instructed to brace while coughing  · Continue Stepped up GERD treatment- encouraged to take antacids when symptomatic  · Continue Spiriva Respimet, Asmanex  · Will continue current treatment and encourage airway humidification , ambient air temp control and continue antiinflammatory control  · If there are any intentions to work in the yard to use a facial mask to help prevent triggering of asthmathic episodes.   · Continue bronchodilators and Singulair   · Prescriptions reviewed and renewed  · Continue treatment per pain management  · Reassurance provided  · Encouraged activity        Lora Medina MD

## 2017-03-10 NOTE — PROGRESS NOTES
Ms. Mitesh Rausch has a reminder for a \"due or due soon\" health maintenance. I have asked that she contact her primary care provider for follow-up on this health maintenance.

## 2017-03-10 NOTE — MR AVS SNAPSHOT
Visit Information Date & Time Provider Department Dept. Phone Encounter #  
 3/10/2017  9:30 AM Kash Espinal  [facility] [id number] Your Appointments 4/11/2017 10:20 AM  
Follow Up with KANDY Cavanaugh [facility] ([facility] SCHEDULING) Appt Note: Return in about 2 months (around 4/14/2017) per RC. ..mom  
 [address] [facility] [address]  
[phone] [id number] 4/18/2017  9:40 AM  
Follow Up with Lisa Garvin MD  
[facility]) Appt Note: return in [address] [place] [address]  
[phone] [id number] 5/22/2017  7:45 AM  
LAB with [facility] NURSE VISIT Internist of [facility] ([facility]) Appt Note: ov lab  
 [address] [facility], [address] [address] [address] [place]  
  
   
 [address], [address]  
  
    
 5/30/2017 10:00 AM  
Office Visit with Omar Story MD  
Internist of [address] [place] [address]) Appt Note: 6 mth f/u with lab  
 [address] [facility], [address] [address] [address] [place]  
  
   
 [address], [address] 9/12/2017 10:00 AM  
Any with Valeria Faustin MD  
Urology of [facility] ([facility]) Appt Note: 6m fu  
 [address] 
[address] [facility] [address]  
[address] [facility] [address] [facility] [address] Upcoming Health Maintenance Date Due DTaP/Tdap/Td series (1 - Tdap) 7/18/1982 BREAST CANCER SCRN MAMMOGRAM 8/24/2017 PAP AKA CERVICAL CYTOLOGY 12/8/2019 COLONOSCOPY 2/19/2023 Allergies as of 3/10/2017  Review Complete On: 3/10/2017 By: Yvette Ochoa LPN Severity Noted Reaction Type Reactions Codeine    Rash, Itching Other reaction(s): other/intolerance Tussionex  01/09/2012   Systemic Itching Current Immunizations  Reviewed on 11/28/2016 Name Date Influenza Vaccine 10/10/2014, 10/17/2013 Influenza Vaccine (Quad) PF 11/28/2016, 10/23/2015 Influenza Vaccine Split 10/23/2012 Influenza Vaccine Whole 9/17/2010 Pneumococcal Conjugate (PCV-13) 11/28/2016 Not reviewed this visit Vitals BP Pulse Temp Resp Height(growth percentile) Weight(growth percentile) 104/62 (BP 1 Location: Left arm, BP Patient Position: Sitting) (!) 106 98.6 °F (37 °C) (Oral) 16 5' 3\" (1.6 m) 168 lb (76.2 kg) SpO2 BMI OB Status Smoking Status 98% 29.76 kg/m2 Postmenopausal Never Smoker BMI and BSA Data Body Mass Index Body Surface Area  
 29.76 kg/m 2 1.84 m 2 Preferred Pharmacy Pharmacy Name Phone [facility], [address] [place] [address] [phone] Your Updated Medication List  
  
   
This list is accurate as of: 3/10/17 10:01 AM.  Always use your most recent med list.  
  
  
  
  
 albuterol 90 mcg/actuation inhaler Commonly known as:  PROVENTIL HFA, VENTOLIN HFA, PROAIR HFA Take 2 Puffs by inhalation every six (6) hours as needed for Wheezing. b complex vitamins tablet Take 1 Tab by mouth daily. Citracal + D tablet Generic drug:  calcium citrate-vitamin D3 Take  by mouth two (2) times a day. CYMBALTA 60 mg capsule Generic drug:  DULoxetine Take 120 mg by mouth daily. Delsym 30 mg/5 mL liquid Generic drug:  dextromethorphan Take 60 mg by mouth two (2) times a day. denosumab 60 mg/mL injection Commonly known as:  Dollene Caulk 60 mg by SubCUTAneous route every 6 months. DIGESTIVE ENZYMES (PLANT) PO Take  by mouth.  
  
 esomeprazole 40 mg capsule Commonly known as:  NexIUM Take 1 Cap by mouth daily. Indications: GASTROESOPHAGEAL REFLUX gabapentin 400 mg capsule Commonly known as:  NEURONTIN Take  by mouth three (3) times daily. Takes 2 TID HYOPHEN tablet Generic drug:  Methen-Hyos-M Blue-BA-PhSal  
take 1 tablet by mouth four times a day LONOX PO Take  by mouth. LORazepam 2 mg tablet Commonly known as:  ATIVAN Take  by mouth every eight (8) hours as needed for Anxiety. meloxicam 15 mg tablet Commonly known as:  MOBIC Take 1 Tab by mouth daily. modafinil 200 mg tablet Commonly known as:  PROVIGIL  
  
 mometasone 220 mcg (60 doses) inhaler Commonly known as:  Garcia Stephanie Take 2 Puffs by inhalation two (2) times a day. montelukast 10 mg tablet Commonly known as:  SINGULAIR Take 1 Tab by mouth daily. MUCINEX 1,200 mg Ta12 ER tablet Generic drug:  guaiFENesin Take 1,200 mg by mouth two (2) times a day. ondansetron 4 mg disintegrating tablet Commonly known as:  ZOFRAN ODT Take 1 Tab by mouth every eight (8) hours as needed for Nausea. OTHER Indications: Compound cream for inflammation  
  
 pentosan polysulfate sodium 100 mg capsule Commonly known as:  Jamari Blush Take 1 Cap by mouth two (2) times a day. Indications: Bladder Pain, Interstitial Cystitis PHAZYME 180 mg Cap Generic drug:  simethicone Take  by mouth as needed. predniSONE 20 mg tablet Commonly known as:  DELTASONE  
3 tablets daily for 5 days, then 2 tablets daily for 5 days, then 1 tablet daily for 5 days. PROBIOTIC 4X 10-15 mg Tbec Generic drug:  B.infantis-B.ani-B.long-B.bifi Take  by mouth. tiotropium bromide 2.5 mcg/actuation inhaler Commonly known as:  Belle Gayer Take 2 Puffs by inhalation daily. Indications: PREVENTION OF BRONCHOSPASM WITH CHRONIC BRONCHITIS  
  
 traZODone 100 mg tablet Commonly known as:  Jemal Irvine Take 100 mg by mouth nightly. TRILEPTAL 150 mg tablet Generic drug:  OXcarbazepine Take  by mouth. vit B Cmplx 3-FA-Vit C-Biotin 1- mg-mg-mcg tablet Commonly known as:  NEPHRO DEBRA RX Take 1 Tab by mouth daily. VITAMIN D2 50,000 unit capsule Generic drug:  ergocalciferol Take 50,000 Units by mouth every seven (7) days. Introducing [facility] & HEALTH SERVICES! Romayne Duster introduces [employer] patient portal. Now you can access parts of your medical record, email your doctor's office, and request medication refills online. 1. In your internet browser, go to [email or web address] 2. Click on the First Time User? Click Here link in the Sign In box. You will see the New Member Sign Up page. 3. Enter your [employer] Access Code exactly as it appears below. You will not need to use this code after youve completed the sign-up process. If you do not sign up before the expiration date, you must request a new code. · [employer] Access Code: [email or web address] Expires: 6/8/2017 10:01 AM 
 
4. Enter the last four digits of your Social Security Number (xxxx) and Date of Birth (mm/dd/yyyy) as indicated and click Submit. You will be taken to the next sign-up page. 5. Create a flux [employer] ID. This will be your flux [employer] login ID and cannot be changed, so think of one that is secure and easy to remember. 6. Create a [employer] password. You can change your password at any time. 7. Enter your Password Reset Question and Answer. This can be used at a later time if you forget your password. 8. Enter your e-mail address. You will receive e-mail notification when new information is available in [address] 9. Click Sign Up. You can now view and download portions of your medical record. 10. Click the Download Summary menu link to download a portable copy of your medical information. If you have questions, please visit the Frequently Asked Questions section of the flux [employer] website. Remember, [employer] is NOT to be used for urgent needs. For medical emergencies, dial 911. Now available from your iPhone and Android! Please provide this summary of care documentation to your next provider. Your primary care clinician is listed as Bean Melo. Kae Taylor. If you have any questions after today's visit, please call [phone].

## 2017-04-18 ENCOUNTER — OFFICE VISIT (OUTPATIENT)
Dept: PAIN MANAGEMENT | Age: 57
End: 2017-04-18

## 2017-04-18 VITALS
HEART RATE: 101 BPM | DIASTOLIC BLOOD PRESSURE: 84 MMHG | SYSTOLIC BLOOD PRESSURE: 136 MMHG | WEIGHT: 168 LBS | BODY MASS INDEX: 29.76 KG/M2

## 2017-04-18 DIAGNOSIS — G89.4 CHRONIC PAIN SYNDROME: ICD-10-CM

## 2017-04-18 DIAGNOSIS — M25.50 PAIN IN JOINT, MULTIPLE SITES: ICD-10-CM

## 2017-04-18 DIAGNOSIS — M46.1 SACROILIITIS, NOT ELSEWHERE CLASSIFIED (HCC): ICD-10-CM

## 2017-04-18 DIAGNOSIS — F32.A CHRONIC DEPRESSION: ICD-10-CM

## 2017-04-18 DIAGNOSIS — M79.7 FIBROMYALGIA: Primary | ICD-10-CM

## 2017-04-18 DIAGNOSIS — K58.9 IRRITABLE BOWEL SYNDROME, UNSPECIFIED TYPE: ICD-10-CM

## 2017-04-18 DIAGNOSIS — N30.10 CHRONIC INTERSTITIAL CYSTITIS: ICD-10-CM

## 2017-04-18 DIAGNOSIS — E55.9 VITAMIN D DEFICIENCY: ICD-10-CM

## 2017-04-18 DIAGNOSIS — G47.33 OSA (OBSTRUCTIVE SLEEP APNEA): ICD-10-CM

## 2017-04-18 DIAGNOSIS — Z79.899 ENCOUNTER FOR LONG-TERM (CURRENT) USE OF HIGH-RISK MEDICATION: ICD-10-CM

## 2017-04-18 RX ORDER — MODAFINIL 200 MG/1
200 TABLET ORAL 2 TIMES DAILY
Qty: 60 TAB | Refills: 5 | Status: SHIPPED | OUTPATIENT
Start: 2017-04-18 | End: 2017-05-08 | Stop reason: SDUPTHER

## 2017-04-18 RX ORDER — ERGOCALCIFEROL 1.25 MG/1
CAPSULE ORAL
Qty: 8 CAP | Refills: 11 | Status: SHIPPED | OUTPATIENT
Start: 2017-04-18 | End: 2022-08-02 | Stop reason: ALTCHOICE

## 2017-04-18 RX ORDER — GABAPENTIN 800 MG/1
800 TABLET ORAL 3 TIMES DAILY
Qty: 90 TAB | Refills: 11 | Status: SHIPPED | OUTPATIENT
Start: 2017-04-18 | End: 2017-10-30

## 2017-04-18 NOTE — PROGRESS NOTES
Nursing Notes    Patient presents to the office today in follow-up. Patient rates her pain at 8/10 on the numerical pain scale. Reviewed medications with counts as follows:    Rx Date filled Qty Dispensed Pill Count Last Dose Short   provigil 200 3/16/17 60 35 4/18/17 no         Comments:     POC UDS was not performed in office today    Any new labs or imaging since last appointment? NO    Have you been to an emergency room (ER) or urgent care clinic since your last visit? NO            Have you been hospitalized since your last visit? NO     If yes, where, when, and reason for visit? Have you seen or consulted any other health care providers outside of the [address] [place] [address]  since your last visit? NO     If yes, where, when, and reason for visit? Ms. Dru Wood has a reminder for a \"due or due soon\" health maintenance. I have asked that she contact her primary care provider for follow-up on this health maintenance.

## 2017-04-18 NOTE — MR AVS SNAPSHOT
Visit Information Date & Time Provider Department Dept. Phone Encounter #  
 4/18/2017  9:40 AM Mirta Rosario MD [facility] 21  Follow-up Instructions Return in about 3 months (around 7/18/2017). Your Appointments 5/22/2017  7:45 AM  
LAB with [facility] NURSE VISIT Internist of [facility] ([facility]) Appt Note: ov lab  
 [address] [facility], [address] [address] [address] [place]  
  
   
 [address], [facility]  
  
    
 5/30/2017 10:00 AM  
Office Visit with Meghan Robles MD  
Internist of [facility]) Appt Note: 6 mth f/u with lab  
 [address] [facility], [address] [address] [address] [place]  
  
   
 [address], [facility] 9/12/2017 10:00 AM  
Any with Alessandro Simmons MD  
Urology of [facility] ([facility]) Appt Note: 6m fu  
 [address] 
[address] [facility] [address]  
[address] [facility] [address] [place] [address] Upcoming Health Maintenance Date Due DTaP/Tdap/Td series (1 - Tdap) 7/18/1982 BREAST CANCER SCRN MAMMOGRAM 8/24/2017 PAP AKA CERVICAL CYTOLOGY 12/8/2019 COLONOSCOPY 2/19/2023 Allergies as of 4/18/2017  Review Complete On: 4/18/2017 By: Mirta Rosario MD  
  
 Severity Noted Reaction Type Reactions Codeine    Rash, Itching Other reaction(s): other/intolerance Tussionex  01/09/2012   Systemic Itching Current Immunizations  Reviewed on 11/28/2016 Name Date Influenza Vaccine 10/10/2014, 10/17/2013 Influenza Vaccine (Quad) PF 11/28/2016, 10/23/2015 Influenza Vaccine Split 10/23/2012 Influenza Vaccine Whole 9/17/2010 Pneumococcal Conjugate (PCV-13) 11/28/2016 Not reviewed this visit You Were Diagnosed With   
  
 Codes Comments LUCRECIA (obstructive sleep apnea)     ICD-10-CM: G47.33 
ICD-9-CM: 327.23 Vitals BP Pulse Weight(growth percentile) BMI OB Status Smoking Status 136/84 (!) 101 168 lb (76.2 kg) 29.76 kg/m2 Postmenopausal Never Smoker Vitals History BMI and BSA Data Body Mass Index Body Surface Area  
 29.76 kg/m 2 1.84 m 2 Preferred Pharmacy Pharmacy Name Phone [facility] [address] [phone] Your Updated Medication List  
  
   
This list is accurate as of: 4/18/17 10:10 AM.  Always use your most recent med list.  
  
  
  
  
 albuterol 90 mcg/actuation inhaler Commonly known as:  PROVENTIL HFA, VENTOLIN HFA, PROAIR HFA Take 2 Puffs by inhalation every six (6) hours as needed for Wheezing. b complex vitamins tablet Take 1 Tab by mouth daily. Citracal + D tablet Generic drug:  calcium citrate-vitamin D3 Take  by mouth two (2) times a day. CYMBALTA 60 mg capsule Generic drug:  DULoxetine Take 120 mg by mouth daily. Delsym 30 mg/5 mL liquid Generic drug:  dextromethorphan Take 60 mg by mouth two (2) times a day. denosumab 60 mg/mL injection Commonly known as:  Yumiko Croon 60 mg by SubCUTAneous route every 6 months. DIGESTIVE ENZYMES (PLANT) PO Take  by mouth.  
  
 esomeprazole 40 mg capsule Commonly known as:  NexIUM Take 1 Cap by mouth daily. Indications: GASTROESOPHAGEAL REFLUX  
  
 * gabapentin 400 mg capsule Commonly known as:  NEURONTIN Take  by mouth three (3) times daily. Takes 2 TID * gabapentin 800 mg tablet Commonly known as:  NEURONTIN Take 1 Tab by mouth three (3) times daily for 30 days. Indications: NEUROPATHIC PAIN  
  
 HYOPHEN tablet Generic drug:  Methen-Hyos-M Blue-BA-PhSal  
take 1 tablet by mouth four times a day LONOX PO Take  by mouth. LORazepam 2 mg tablet Commonly known as:  ATIVAN  
 Take  by mouth every eight (8) hours as needed for Anxiety. meloxicam 15 mg tablet Commonly known as:  MOBIC Take 1 Tab by mouth daily. * modafinil 200 mg tablet Commonly known as:  PROVIGIL  
  
 * modafinil 200 mg tablet Commonly known as:  PROVIGIL Take 1 Tab by mouth two (2) times a day for 30 days. Max Daily Amount: 400 mg. Indications: SLEEPINESS DUE TO OBSTRUCTIVE SLEEP APNEA  
  
 mometasone 220 mcg (60 doses) inhaler Commonly known as:  Erasto Kitty Take 2 Puffs by inhalation two (2) times a day. montelukast 10 mg tablet Commonly known as:  SINGULAIR Take 1 Tab by mouth daily. MUCINEX 1,200 mg Ta12 ER tablet Generic drug:  guaiFENesin Take 1,200 mg by mouth two (2) times a day. ondansetron 4 mg disintegrating tablet Commonly known as:  ZOFRAN ODT Take 1 Tab by mouth every eight (8) hours as needed for Nausea. OTHER Indications: Compound cream for inflammation  
  
 pentosan polysulfate sodium 100 mg capsule Commonly known as:  Kermit Parks Take 1 Cap by mouth two (2) times a day. Indications: Bladder Pain, Interstitial Cystitis PHAZYME 180 mg Cap Generic drug:  simethicone Take  by mouth as needed. predniSONE 20 mg tablet Commonly known as:  DELTASONE  
3 tablets daily for 5 days, then 2 tablets daily for 5 days, then 1 tablet daily for 5 days. PROBIOTIC 4X 10-15 mg Tbec Generic drug:  B.infantis-B.ani-B.long-B.bifi Take  by mouth. tiotropium bromide 2.5 mcg/actuation inhaler Commonly known as:  [place] Livers Take 2 Puffs by inhalation daily. Indications: PREVENTION OF BRONCHOSPASM WITH CHRONIC BRONCHITIS  
  
 traZODone 100 mg tablet Commonly known as:  Luellen Loft Take 100 mg by mouth nightly. TRILEPTAL 150 mg tablet Generic drug:  OXcarbazepine Take  by mouth. vit B Cmplx 3-FA-Vit C-Biotin 1- mg-mg-mcg tablet Commonly known as:  NEPHRO DEBRA RX  
 Take 1 Tab by mouth daily. * VITAMIN D2 50,000 unit capsule Generic drug:  ergocalciferol Take 50,000 Units by mouth every seven (7) days. * ergocalciferol 50,000 unit capsule Commonly known as:  ERGOCALCIFEROL  
take 1 capsule by mouth TWO (2) TIMES A WEEK  Indications: PREVENTION OF VITAMIN D DEFICIENCY * Notice: This list has 6 medication(s) that are the same as other medications prescribed for you. Read the directions carefully, and ask your doctor or other care provider to review them with you. Prescriptions Printed Refills  
 modafinil (PROVIGIL) 200 mg tablet 5 Sig: Take 1 Tab by mouth two (2) times a day for 30 days. Max Daily Amount: 400 mg. Indications: SLEEPINESS DUE TO OBSTRUCTIVE SLEEP APNEA Class: Print Route: Oral  
  
Prescriptions Sent to Pharmacy Refills  
 ergocalciferol (ERGOCALCIFEROL) 50,000 unit capsule 11 Sig: take 1 capsule by mouth TWO (2) TIMES A WEEK  Indications: PREVENTION OF VITAMIN D DEFICIENCY Class: Normal  
 [facility] [address] [place]ia Moser Ph #: [phone]  
 gabapentin (NEURONTIN) 800 mg tablet 11 Sig: Take 1 Tab by mouth three (3) times daily for 30 days. Indications: NEUROPATHIC PAIN Class: Normal  
 Pharmacy: [id number] [address] [place] [address] Ph #: [phone] Route: Oral  
  
Follow-up Instructions Return in about 3 months (around 7/18/2017). Patient Instructions Current health maintenance issues were reviewed and the patient was advised to followup with his/her PCP for completion of these items. Introducing [facility] & HEALTH SERVICES! Wojciech Shirley introduces [employer] patient portal. Now you can access parts of your medical record, email your doctor's office, and request medication refills online. 1. In your internet browser, go to [email or web address] 2. Click on the First Time User? Click Here link in the Sign In box. You will see the New Member Sign Up page. 3. Enter your [employer] Access Code exactly as it appears below. You will not need to use this code after youve completed the sign-up process. If you do not sign up before the expiration date, you must request a new code. · [employer] Access Code: [email or web address] Expires: 6/8/2017 11:01 AM 
 
4. Enter the last four digits of your Social Security Number (xxxx) and Date of Birth (mm/dd/yyyy) as indicated and click Submit. You will be taken to the next sign-up page. 5. Create a [employer] ID. This will be your [employer] login ID and cannot be changed, so think of one that is secure and easy to remember. 6. Create a [employer] password. You can change your password at any time. 7. Enter your Password Reset Question and Answer. This can be used at a later time if you forget your password. 8. Enter your e-mail address. You will receive e-mail notification when new information is available in [address] 9. Click Sign Up. You can now view and download portions of your medical record. 10. Click the Download Summary menu link to download a portable copy of your medical information. If you have questions, please visit the Frequently Asked Questions section of the [employer] website. Remember, [employer] is NOT to be used for urgent needs. For medical emergencies, dial 911. Now available from your iPhone and Android! Please provide this summary of care documentation to your next provider. Your primary care clinician is listed as Echo Cole. If you have any questions after today's visit, please call [phone].

## 2017-04-18 NOTE — PROGRESS NOTES
HISTORY OF PRESENT ILLNESS  Cait Martin is a 55 y.o. female. HPI  she returns for follow-up of chronic, severe pain which is widespread and associated with fatigue and related to fibromyalgia, interstitial cystitis, temporomandibular joint dysfunction, as well as bilateral carpal tunnel syndrome and generalized osteoarthritis. Since last seen, she has tried to increase her Provigil but has noted that even with half a pill in the afternoon it is interfering with her sleep. She is going to try to back down the time that she takes her second dose to see if this will maximize her alertness during the day which she did find was occurring. She has been doing quite well with the increased Neurontin and a prescription for the 800 mg tablets will be provided. Pain level today 8 out of 10, outcome 15/28,(The lower the upper number, the better the outcome)    Physical activity and mobility as well as mood are fair, sleep continues to be poor  No reported side effects    She reports beginning the Nutrisystem diet in an attempt to lose weight. She is just completing the first week. Review of Systems   Constitutional: Positive for malaise/fatigue. HENT:        Jaw pain     Respiratory: Positive for cough (occasional) and wheezing (wheezing). Asthma   Gastrointestinal: Positive for diarrhea and heartburn (nexium). Negative for abdominal pain (comes and goes), constipation and vomiting. Genitourinary: Positive for dysuria (Interstitial Cystitis), frequency and urgency. Musculoskeletal: Positive for back pain, falls (trips often/unchanged/balance issues/no recent ED visit or injury requiring medical attention), joint pain, myalgias and neck pain. Skin: Negative. Neurological: Positive for weakness (generalized) and headaches. Issues with balance   Endo/Heme/Allergies: Negative. Psychiatric/Behavioral: Positive for depression. Negative for suicidal ideas.  The patient is nervous/anxious and has insomnia. All other systems reviewed and are negative. Physical Exam   Constitutional: She is oriented to person, place, and time. She appears well-developed and well-nourished. No distress. HENT:   Head: Normocephalic. Painful ROM jaw   Eyes: EOM are normal. Pupils are equal, round, and reactive to light. Neck: No thyromegaly present. Tender throughout/painful ROM/tight musculature   Pulmonary/Chest: Effort normal.   Musculoskeletal: She exhibits tenderness (throughout back/tight musculature). Left shoulder: She exhibits decreased range of motion, tenderness and pain. Right wrist: She exhibits no tenderness. Left wrist: She exhibits no tenderness. Right knee: She exhibits decreased range of motion and swelling. Tenderness found. Left knee: Tenderness found. Cervical back: She exhibits tenderness (tight musculature) and pain. Lumbar back: She exhibits decreased range of motion (painful), tenderness (especially over left SIJ), pain and spasm. Back:         Hands:       Right foot: There is tenderness. Left foot: There is tenderness. Neurological: She is alert and oriented to person, place, and time. Psychiatric: She has a normal mood and affect. Her behavior is normal. Thought content normal.   Nursing note and vitals reviewed. ASSESSMENT and PLAN  Encounter Diagnoses   Name Primary?  Fibromyalgia Yes    LUCRECIA (obstructive sleep apnea)     Chronic interstitial cystitis     Chronic depression     Irritable bowel syndrome, unspecified type     Sacroiliitis, not elsewhere classified ([facility] [address]     Pain in joint, multiple sites     Encounter for long-term (current) use of high-risk medication     Chronic pain syndrome     Vitamin D deficiency      She will continue on her current regimen as this is providing improved functionality with minimal side effects.   3 month reassess her    Counseling occupied > 50% of visit:  Total time: 40 minutes

## 2017-05-08 DIAGNOSIS — G47.33 OSA (OBSTRUCTIVE SLEEP APNEA): ICD-10-CM

## 2017-05-08 RX ORDER — MODAFINIL 200 MG/1
TABLET ORAL
Qty: 60 TAB | Refills: 5 | Status: SHIPPED | OUTPATIENT
Start: 2017-05-08

## 2017-05-17 RX ORDER — MELOXICAM 15 MG/1
TABLET ORAL
Qty: 30 TAB | Refills: 11 | Status: SHIPPED | OUTPATIENT
Start: 2017-05-17 | End: 2021-04-07 | Stop reason: CLARIF

## 2017-05-22 ENCOUNTER — HOSPITAL ENCOUNTER (OUTPATIENT)
Dept: LAB | Age: 57
Discharge: HOME | End: 2017-05-22
Payer: MEDICARE

## 2017-05-22 DIAGNOSIS — E78.5 HYPERLIPIDEMIA LDL GOAL <160: ICD-10-CM

## 2017-05-22 LAB
ALBUMIN SERPL BCP-MCNC: 4.1 G/DL (ref 3.4–5)
ALBUMIN/GLOB SERPL: 1.6 {RATIO} (ref 0.8–1.7)
ALP SERPL-CCNC: 29 U/L (ref 45–117)
ALT SERPL-CCNC: 35 U/L (ref 13–56)
ANION GAP BLD CALC-SCNC: 7 MMOL/L (ref 3–18)
AST SERPL W P-5'-P-CCNC: 20 U/L (ref 15–37)
BILIRUB SERPL-MCNC: 0.3 MG/DL (ref 0.2–1)
BUN SERPL-MCNC: 15 MG/DL (ref 7–18)
BUN/CREAT SERPL: 22 (ref 12–20)
CALCIUM SERPL-MCNC: 8.7 MG/DL (ref 8.5–10.1)
CHLORIDE SERPL-SCNC: 99 MMOL/L (ref 100–108)
CHOLEST SERPL-MCNC: 287 MG/DL
CO2 SERPL-SCNC: 29 MMOL/L (ref 21–32)
CREAT SERPL-MCNC: 0.67 MG/DL (ref 0.6–1.3)
GLOBULIN SER CALC-MCNC: 2.6 G/DL (ref 2–4)
GLUCOSE SERPL-MCNC: 94 MG/DL (ref 74–99)
HDLC SERPL-MCNC: 64 MG/DL (ref 40–60)
HDLC SERPL: 4.5 {RATIO} (ref 0–5)
LDLC SERPL CALC-MCNC: 199.2 MG/DL (ref 0–100)
LIPID PROFILE,FLP: ABNORMAL
POTASSIUM SERPL-SCNC: 4.1 MMOL/L (ref 3.5–5.5)
PROT SERPL-MCNC: 6.7 G/DL (ref 6.4–8.2)
SODIUM SERPL-SCNC: 135 MMOL/L (ref 136–145)
TRIGL SERPL-MCNC: 119 MG/DL (ref ?–150)
VLDLC SERPL CALC-MCNC: 23.8 MG/DL

## 2017-05-22 PROCEDURE — 80061 LIPID PANEL: CPT | Performed by: INTERNAL MEDICINE

## 2017-05-22 PROCEDURE — 80053 COMPREHEN METABOLIC PANEL: CPT | Performed by: INTERNAL MEDICINE

## 2017-05-22 PROCEDURE — 36415 COLL VENOUS BLD VENIPUNCTURE: CPT | Performed by: INTERNAL MEDICINE

## 2017-05-30 ENCOUNTER — OFFICE VISIT (OUTPATIENT)
Dept: INTERNAL MEDICINE CLINIC | Age: 57
End: 2017-05-30

## 2017-05-30 VITALS
DIASTOLIC BLOOD PRESSURE: 76 MMHG | WEIGHT: 158.2 LBS | TEMPERATURE: 98.4 F | OXYGEN SATURATION: 98 % | HEIGHT: 63 IN | HEART RATE: 100 BPM | SYSTOLIC BLOOD PRESSURE: 124 MMHG | RESPIRATION RATE: 14 BRPM | BODY MASS INDEX: 28.03 KG/M2

## 2017-05-30 DIAGNOSIS — G89.4 CHRONIC PAIN SYNDROME: ICD-10-CM

## 2017-05-30 DIAGNOSIS — J45.20 MILD INTERMITTENT ASTHMA WITHOUT COMPLICATION: Primary | ICD-10-CM

## 2017-05-30 DIAGNOSIS — F32.A CHRONIC DEPRESSION: ICD-10-CM

## 2017-05-30 DIAGNOSIS — E55.9 HYPOVITAMINOSIS D: ICD-10-CM

## 2017-05-30 DIAGNOSIS — E78.5 HYPERLIPIDEMIA LDL GOAL <160: ICD-10-CM

## 2017-05-30 DIAGNOSIS — Z12.31 SCREENING MAMMOGRAM, ENCOUNTER FOR: ICD-10-CM

## 2017-05-30 RX ORDER — PANTOPRAZOLE SODIUM 40 MG/1
40 TABLET, DELAYED RELEASE ORAL DAILY
Qty: 90 TAB | Refills: 3 | Status: SHIPPED | OUTPATIENT
Start: 2017-05-30 | End: 2018-07-27 | Stop reason: SDUPTHER

## 2017-05-30 NOTE — PROGRESS NOTES
Carlos Leger 1960, is a 55 y.o. female, who is seen today for reevaluation of asthma, hyperlipidemia, chronic pain, chronic depression, GERD and overweight. She has been unable to lose weight so she started Nutrisystem's 2 months ago and has lost 8 pounds by her scales. She is getting used to the diet, eating lots more vegetables and drinking a little more water, 5-6 glasses per day. She no longer sees the breast surgeon and will get her next mammogram at [facility]. She says she is due now for mammogram.  She sees pain management. Her depression seems to be doing quite well on current medication. She is very high cholesterol as well and tries to eat a healthy diet and now is on Nutrisystem's.     Past Medical History:   Diagnosis Date    Allergy     Anxiety     Arthritis     Asthma     mild    Atypical ductal hyperplasia of breast     Atypical hyperplasia of breast 2003    right    Chronic depression     Chronic fatigue syndrome     Chronic interstitial cystitis     Chronic interstitial cystitis     Chronic pain     fibromyalgia    Constipation     Depression     Diarrhea     Difficulty walking     Dizziness     Dry eyes     Dry mouth     Dyspnea     Fibromyalgia     Fibromyalgia     Generalized stiffness     GERD (gastroesophageal reflux disease)     Headache     Heartburn     Hyperlipidemia     IBS (irritable bowel syndrome)     IC (interstitial cystitis)     Imbalance     Incoordination     Insomnia     Irregular periods/menstrual cycles     Joint pain     Microscopic hematuria     Muscle pain     Myofascial pain syndrome     Numbness and tingling in hands     Ovarian cyst, left     Painful sex     Pneumonia     Psychiatric disorder     anxiety - depression    RLS (restless legs syndrome)     Stress     TMJ (temporomandibular joint syndrome)     Transient total loss of muscle tone     Uterine fibroid     Weakness generalized      Current Outpatient Prescriptions   Medication Sig Dispense Refill    calcium glycerophosphate (PRELIEF) 65 mg tab Take  by mouth.  pantoprazole (PROTONIX) 40 mg tablet Take 1 Tab by mouth daily. 90 Tab 3    meloxicam (MOBIC) 15 mg tablet take 1 tablet by mouth once daily 30 Tab 11    modafinil (PROVIGIL) 200 mg tablet take 1 tablet by mouth twice a day for 30 DAYS maximum daily dose of 2 tablets 60 Tab 5    ergocalciferol (ERGOCALCIFEROL) 50,000 unit capsule take 1 capsule by mouth TWO (2) TIMES A WEEK  Indications: PREVENTION OF VITAMIN D DEFICIENCY 8 Cap 11    OXcarbazepine (TRILEPTAL) 150 mg tablet Take  by mouth.  mometasone (ASMANEX TWISTHALER) 220 mcg (60 doses) inhaler Take 2 Puffs by inhalation two (2) times a day. 120 Cap 6    gabapentin (NEURONTIN) 400 mg capsule Take  by mouth three (3) times daily. Takes 2 TID      pentosan polysulfate sodium (ELMIRON) 100 mg capsule Take 1 Cap by mouth two (2) times a day. Indications: Bladder Pain, Interstitial Cystitis 120 Cap 3    montelukast (SINGULAIR) 10 mg tablet Take 1 Tab by mouth daily. 90 Tab 6    dextromethorphan (DELSYM) 30 mg/5 mL liquid Take 60 mg by mouth two (2) times a day.  guaiFENesin (MUCINEX) 1,200 mg Ta12 ER tablet Take 1,200 mg by mouth two (2) times a day.  OTHER Indications: Compound cream for inflammation      b complex vitamins tablet Take 1 Tab by mouth daily.  HYOPHEN tablet take 1 tablet by mouth four times a day 120 Tab 11    vit B Cmplx 3-FA-Vit C-Biotin (NEPHRO DEBRA RX) 1- mg-mg-mcg tablet Take 1 Tab by mouth daily.  B.infantis-B.ani-B.long-B.bifi (PROBIOTIC 4X) 10-15 mg TbEC Take  by mouth.  ENZYMES,DIGESTIVE, PLANT, (DIGESTIVE ENZYMES, PLANT, PO) Take  by mouth.  tiotropium bromide (SPIRIVA RESPIMAT) 2.5 mcg/actuation inhaler Take 2 Puffs by inhalation daily.  Indications: PREVENTION OF BRONCHOSPASM WITH CHRONIC BRONCHITIS 3 Inhaler 6    albuterol (PROVENTIL HFA, VENTOLIN HFA, PROAIR HFA) 90 mcg/actuation inhaler Take 2 Puffs by inhalation every six (6) hours as needed for Wheezing. 3 Inhaler 6    modafinil (PROVIGIL) 200 mg tablet   0    DIPHENOXYLATE HCL/ATROPINE (LONOX PO) Take  by mouth.  denosumab (PROLIA) 60 mg/mL injection 60 mg by SubCUTAneous route every 6 months.  ondansetron (ZOFRAN ODT) 4 mg disintegrating tablet Take 1 Tab by mouth every eight (8) hours as needed for Nausea. 20 Tab 1    calcium citrate-vitamin D3 (CITRACAL + D) tablet Take  by mouth two (2) times a day.  LORazepam (ATIVAN) 2 mg tablet Take  by mouth every eight (8) hours as needed for Anxiety.  ergocalciferol (VITAMIN D2) 50,000 unit capsule Take 50,000 Units by mouth every seven (7) days.  DULoxetine (CYMBALTA) 60 mg capsule Take 120 mg by mouth daily.  simethicone (PHAZYME) 180 mg cap Take  by mouth as needed.  trazodone (DESYREL) 100 mg tablet Take 100 mg by mouth nightly. Visit Vitals    /76    Pulse 100    Temp 98.4 °F (36.9 °C) (Oral)    Resp 14    Ht 5' 3\" (1.6 m)    Wt 158 lb 3.2 oz (71.8 kg)    SpO2 98%    BMI 28.02 kg/m2     Carotids are 2+ without bruits. No adenopathy or thyromegaly. Lungs are clear to percussion. Good breath sounds with no wheezing or crackles. Heart reveals regular rhythm with normal S1 and S2 no murmur gallop click or rub. Apical impulse is nonpalpable. Abdomen is soft and nontender with no hepatosplenomegaly or masses and no bruits. Extremities reveal no clubbing cyanosis or edema. Pulses are 2+. Breasts reveal no masses no skin or nipple abnormalities and no axillary adenopathy.     Results for orders placed or performed during the hospital encounter of 05/22/17   LIPID PANEL   Result Value Ref Range    LIPID PROFILE          Cholesterol, total 287 (H) <200 MG/DL    Triglyceride 119 <150 MG/DL    HDL Cholesterol 64 (H) 40 - 60 MG/DL    LDL, calculated 199.2 (H) 0 - 100 MG/DL    VLDL, calculated 23.8 MG/DL CHOL/HDL Ratio 4.5 0 - 5.0     METABOLIC PANEL, COMPREHENSIVE   Result Value Ref Range    Sodium 135 (L) 136 - 145 mmol/L    Potassium 4.1 3.5 - 5.5 mmol/L    Chloride 99 (L) 100 - 108 mmol/L    CO2 29 21 - 32 mmol/L    Anion gap 7 3.0 - 18 mmol/L    Glucose 94 74 - 99 mg/dL    BUN 15 7.0 - 18 MG/DL    Creatinine 0.67 0.6 - 1.3 MG/DL    BUN/Creatinine ratio 22 (H) 12 - 20      GFR est AA >60 >60 ml/min/1.73m2    GFR est non-AA >60 >60 ml/min/1.73m2    Calcium 8.7 8.5 - 10.1 MG/DL    Bilirubin, total 0.3 0.2 - 1.0 MG/DL    ALT (SGPT) 35 13 - 56 U/L    AST (SGOT) 20 15 - 37 U/L    Alk. phosphatase 29 (L) 45 - 117 U/L    Protein, total 6.7 6.4 - 8.2 g/dL    Albumin 4.1 3.4 - 5.0 g/dL    Globulin 2.6 2.0 - 4.0 g/dL    A-G Ratio 1.6 0.8 - 1.7       Assessment: #1.  Marked hyperlipidemia improved with change in diet and weight loss. She will continue Nutrisystem's. #2.  Asthma doing well. Continue current medication. #3.  GERD particularly with symptoms during the night doing well but her insurance company so she needs to change to generic. She has failed omeprazole previously and at this point she will try pantoprazole 40 mg each morning or if she is having symptoms at night she could take 40 mg before supper instead. #4.  Asthma doing well. Will continue current medication. Follow-up in 6 months for reevaluation and I will schedule mammography for now. Gabriel Lopez MD FACP    Please note: This document has been produced using voice recognition software. Unrecognized errors in transcription may be present.

## 2017-05-30 NOTE — MR AVS SNAPSHOT
Visit Information Date & Time Provider Department Dept. Phone Encounter #  
 5/30/2017 10:00 AM Maria Fernanda Woo MD [facility] [id number] Your Appointments 6/14/2017 10:50 AM  
New Patient with Meme San MD  
[address] [facility], [address] [facility] ([address]) Appt Note: COURT WRIST CTS/THUMB PAIN/WILL SEND OVER RECORDS FROM DR. CARRASQUILLO/MEDICARE/BCBS FED  
 [facility] [address] [facility] [address]  
[phone] [address] [facility], [address] [id number]  
  
    
 7/3/2017 12:40 PM  
Follow Up with Sherice Magaña MD  
[address] for Pain Management [address] [place] [address]) Appt Note: return in 3 months [address] [address]  
[phone] [id number] 9/12/2017 10:00 AM  
Any with Мария Velazquez MD  
[facility] ([address]) Appt Note: 6m fu  
 [address] 
[address] [facility] [address]  
[address] [facility] [address] [facility] [address] Upcoming Health Maintenance Date Due DTaP/Tdap/Td series (1 - Tdap) 7/18/1982 BREAST CANCER SCRN MAMMOGRAM 8/24/2017 INFLUENZA AGE 9 TO ADULT 8/1/2017 PAP AKA CERVICAL CYTOLOGY 12/8/2019 COLONOSCOPY 2/19/2023 Allergies as of 5/30/2017  Review Complete On: 5/30/2017 By: Maria Fernanda Woo MD  
  
 Severity Noted Reaction Type Reactions Codeine    Rash, Itching Other reaction(s): other/intolerance Tussionex  01/09/2012   Systemic Itching Current Immunizations  Reviewed on 11/28/2016 Name Date Influenza Vaccine 10/10/2014, 10/17/2013 Influenza Vaccine (Quad) PF 11/28/2016, 10/23/2015 Influenza Vaccine Split 10/23/2012 Influenza Vaccine Whole 9/17/2010 Pneumococcal Conjugate (PCV-13) 11/28/2016 Not reviewed this visit You Were Diagnosed With   
  
 Codes Comments Mild intermittent asthma without complication    -  Primary ICD-10-CM: J45.20 ICD-9-CM: 493.90 Chronic pain syndrome     ICD-10-CM: G89.4 ICD-9-CM: 338.4 Hyperlipidemia LDL goal <160     ICD-10-CM: E78.5 ICD-9-CM: 272.4 Chronic depression     ICD-10-CM: F32.9 ICD-9-CM: 285 Screening mammogram, encounter for     ICD-10-CM: Z12.31 
ICD-9-CM: V76.12 Vitals BP Pulse Temp Resp Height(growth percentile) Weight(growth percentile) 124/76 100 98.4 °F (36.9 °C) (Oral) 14 5' 3\" (1.6 m) 158 lb 3.2 oz (71.8 kg) SpO2 BMI OB Status Smoking Status 98% 28.02 kg/m2 Postmenopausal Never Smoker Vitals History BMI and BSA Data Body Mass Index Body Surface Area 28.02 kg/m 2 1.79 m 2 Preferred Pharmacy Pharmacy Name [facility] [address] [facility], [address] [place] [address] [phone] Your Updated Medication List  
  
   
This list is accurate as of: 5/30/17 10:47 AM.  Always use your most recent med list.  
  
  
  
  
 albuterol 90 mcg/actuation inhaler Commonly known as:  PROVENTIL HFA, VENTOLIN HFA, PROAIR HFA Take 2 Puffs by inhalation every six (6) hours as needed for Wheezing. b complex vitamins tablet Take 1 Tab by mouth daily. Citracal + D tablet Generic drug:  calcium citrate-vitamin D3 Take  by mouth two (2) times a day. CYMBALTA 60 mg capsule Generic drug:  DULoxetine Take 120 mg by mouth daily. Delsym 30 mg/5 mL liquid Generic drug:  dextromethorphan Take 60 mg by mouth two (2) times a day. denosumab 60 mg/mL injection Commonly known as:  William Sella 60 mg by SubCUTAneous route every 6 months. DIGESTIVE ENZYMES (PLANT) PO Take  by mouth.  
  
 gabapentin 400 mg capsule Commonly known as:  NEURONTIN Take  by mouth three (3) times daily. Takes 2 TID HYOPHEN tablet Generic drug:  Methen-Hyos-M Blue-BA-PhSal  
take 1 tablet by mouth four times a day LONOX PO Take  by mouth. LORazepam 2 mg tablet Commonly known as:  ATIVAN Take  by mouth every eight (8) hours as needed for Anxiety. meloxicam 15 mg tablet Commonly known as:  MOBIC  
take 1 tablet by mouth once daily * modafinil 200 mg tablet Commonly known as:  PROVIGIL  
  
 * modafinil 200 mg tablet Commonly known as:  PROVIGIL  
take 1 tablet by mouth twice a day for 30 DAYS maximum daily dose of 2 tablets  
  
 mometasone 220 mcg (60 doses) inhaler Commonly known as:  Yadira Trenton Take 2 Puffs by inhalation two (2) times a day. montelukast 10 mg tablet Commonly known as:  SINGULAIR Take 1 Tab by mouth daily. MUCINEX 1,200 mg Ta12 ER tablet Generic drug:  guaiFENesin Take 1,200 mg by mouth two (2) times a day. ondansetron 4 mg disintegrating tablet Commonly known as:  ZOFRAN ODT Take 1 Tab by mouth every eight (8) hours as needed for Nausea. OTHER Indications: Compound cream for inflammation  
  
 pantoprazole 40 mg tablet Commonly known as:  PROTONIX Take 1 Tab by mouth daily. pentosan polysulfate sodium 100 mg capsule Commonly known as:  Terri Cone Take 1 Cap by mouth two (2) times a day. Indications: Bladder Pain, Interstitial Cystitis PHAZYME 180 mg Cap Generic drug:  simethicone Take  by mouth as needed. PRELIEF 65 mg Tab Generic drug:  calcium glycerophosphate Take  by mouth. PROBIOTIC 4X 10-15 mg Tbec Generic drug:  B.infantis-B.ani-B.long-B.bifi Take  by mouth. tiotropium bromide 2.5 mcg/actuation inhaler Commonly known as:  Asa Favor Take 2 Puffs by inhalation daily. Indications: PREVENTION OF BRONCHOSPASM WITH CHRONIC BRONCHITIS  
  
 traZODone 100 mg tablet Commonly known as:  Kathi Foil Take 100 mg by mouth nightly. TRILEPTAL 150 mg tablet Generic drug:  OXcarbazepine Take  by mouth. vit B Cmplx 3-FA-Vit C-Biotin 1- mg-mg-mcg tablet Commonly known as:  NEPHRO DEBRA RX Take 1 Tab by mouth daily. * VITAMIN D2 50,000 unit capsule Generic drug:  ergocalciferol Take 50,000 Units by mouth every seven (7) days. * ergocalciferol 50,000 unit capsule Commonly known as:  ERGOCALCIFEROL  
take 1 capsule by mouth TWO (2) TIMES A WEEK  Indications: PREVENTION OF VITAMIN D DEFICIENCY * Notice: This list has 4 medication(s) that are the same as other medications prescribed for you. Read the directions carefully, and ask your doctor or other care provider to review them with you. Prescriptions Printed Refills  
 pantoprazole (PROTONIX) 40 mg tablet 3 Sig: Take 1 Tab by mouth daily. Class: Print Route: Oral  
  
To-Do List   
 Around 05/30/2017 Imaging:  [facility] MAMMO BI SCREENING INCL CAD Introducing [facility] SERVICES! James De Jesus introduces [employer] patient portal. Now you can access parts of your medical record, email your doctor's office, and request medication refills online. 1. In your internet browser, go to [email or web address] 2. Click on the First Time User? Click Here link in the Sign In box. You will see the New Member Sign Up page. 3. Enter your [employer] Access Code exactly as it appears below. You will not need to use this code after youve completed the sign-up process. If you do not sign up before the expiration date, you must request a new code. · [employer] Access Code: [email or web address] Expires: 6/8/2017 11:01 AM 
 
4. Enter the last four digits of your Social Security Number (xxxx) and Date of Birth (mm/dd/yyyy) as indicated and click Submit. You will be taken to the next sign-up page. 5. Create a [employer] ID. This will be your [employer] login ID and cannot be changed, so think of one that is secure and easy to remember. 6. Create a [place] password. You can change your password at any time. 7. Enter your Password Reset Question and Answer. This can be used at a later time if you forget your password. 8. Enter your e-mail address. You will receive e-mail notification when new information is available in [address] 9. Click Sign Up. You can now view and download portions of your medical record. 10. Click the Download Summary menu link to download a portable copy of your medical information. If you have questions, please visit the Frequently Asked Questions section of the [employer] website. Remember, [employer] is NOT to be used for urgent needs. For medical emergencies, dial 911. Now available from your iPhone and Android! Please provide this summary of care documentation to your next provider. Your primary care clinician is listed as Tamiko Pena. Dave Javed. If you have any questions after today's visit, please call [phone].

## 2017-06-05 ENCOUNTER — TELEPHONE (OUTPATIENT)
Dept: PAIN MANAGEMENT | Age: 57
End: 2017-06-05

## 2017-06-05 NOTE — TELEPHONE ENCOUNTER
Had submitted demographics part of pa for prolia through cover my meds - the questions they sent back asked if medication being prescribed is a ppi. Called [employer] - spoke with Michael King - explained the problem - did pa over the phone. Prolia approved from 4/6/17 to 6/5/18 (2 doses).

## 2017-06-14 ENCOUNTER — OFFICE VISIT (OUTPATIENT)
Dept: ORTHOPEDIC SURGERY | Age: 57
End: 2017-06-14

## 2017-06-14 VITALS
WEIGHT: 158 LBS | SYSTOLIC BLOOD PRESSURE: 143 MMHG | TEMPERATURE: 98.4 F | HEIGHT: 63 IN | BODY MASS INDEX: 28 KG/M2 | HEART RATE: 91 BPM | DIASTOLIC BLOOD PRESSURE: 80 MMHG

## 2017-06-14 DIAGNOSIS — M19.041 PRIMARY OSTEOARTHRITIS OF RIGHT HAND: ICD-10-CM

## 2017-06-14 DIAGNOSIS — M19.042 PRIMARY OSTEOARTHRITIS OF LEFT HAND: ICD-10-CM

## 2017-06-14 DIAGNOSIS — M25.532 BILATERAL WRIST PAIN: ICD-10-CM

## 2017-06-14 DIAGNOSIS — M79.642 BILATERAL HAND PAIN: ICD-10-CM

## 2017-06-14 DIAGNOSIS — M79.7 FIBROMYALGIA: ICD-10-CM

## 2017-06-14 DIAGNOSIS — M65.4 DE QUERVAIN'S TENOSYNOVITIS, RIGHT: ICD-10-CM

## 2017-06-14 DIAGNOSIS — F32.A CHRONIC DEPRESSION: ICD-10-CM

## 2017-06-14 DIAGNOSIS — F41.9 ANXIETY: ICD-10-CM

## 2017-06-14 DIAGNOSIS — M25.531 BILATERAL WRIST PAIN: ICD-10-CM

## 2017-06-14 DIAGNOSIS — M81.0 OSTEOPOROSIS, UNSPECIFIED OSTEOPOROSIS TYPE, UNSPECIFIED PATHOLOGICAL FRACTURE PRESENCE: ICD-10-CM

## 2017-06-14 DIAGNOSIS — R20.0 NUMBNESS AND TINGLING IN BOTH HANDS: Primary | ICD-10-CM

## 2017-06-14 DIAGNOSIS — M18.11 PRIMARY OSTEOARTHRITIS OF FIRST CARPOMETACARPAL JOINT OF RIGHT HAND: ICD-10-CM

## 2017-06-14 DIAGNOSIS — M17.11 PRIMARY OSTEOARTHRITIS OF RIGHT KNEE: ICD-10-CM

## 2017-06-14 DIAGNOSIS — M79.641 BILATERAL HAND PAIN: ICD-10-CM

## 2017-06-14 DIAGNOSIS — M25.461 KNEE EFFUSION, RIGHT: ICD-10-CM

## 2017-06-14 DIAGNOSIS — R20.2 NUMBNESS AND TINGLING IN BOTH HANDS: Primary | ICD-10-CM

## 2017-06-14 NOTE — MR AVS SNAPSHOT
Visit Information Date & Time Provider Department Dept. Phone Encounter #  
 6/14/2017 10:50 AM Josefina Mccall MD [facility] [id number] Follow-up Instructions Return if symptoms worsen or fail to improve. Your Appointments 7/3/2017 12:40 PM  
Follow Up with Sarika Fernández MD  
[address] for Pain Management [address] [place] [address]) Appt Note: return in 3 months 30 [facility] [address]  
[phone] [address]  
  
    
 11/30/2017  7:55 AM  
LAB with [facility] NURSE VISIT Internist of [facility] ([address] [place] [address]) Appt Note: labs; labs [address], [facility] [address] [place]  
  
   
 [address], [address]  
  
    
 12/8/2017  9:15 AM  
PHYSICAL with Alley Fowler MD  
Internist of [address]) Appt Note: rpe rm  
 5445 [facility] [address] [address] [place]  
  
   
 [address], [address] 9/12/2017 10:00 AM  
Any with Tanner Walker MD  
Urology of [facility] ([address] [place] [address]) Appt Note: 6m fu  
 [address] 
[address] [facility] [address]  
[address] [facility] [address] [facility] [address] Upcoming Health Maintenance Date Due DTaP/Tdap/Td series (1 - Tdap) 7/18/1982 BREAST CANCER SCRN MAMMOGRAM 8/24/2017 INFLUENZA AGE 9 TO ADULT 8/1/2017 PAP AKA CERVICAL CYTOLOGY 12/8/2019 COLONOSCOPY 2/19/2023 Allergies as of 6/14/2017  Review Complete On: 6/14/2017 By: Darnell Edmond Severity Noted Reaction Type Reactions Codeine    Rash, Itching Other reaction(s): other/intolerance Tussionex  01/09/2012   Systemic Itching Current Immunizations  Reviewed on 11/28/2016 Name Date Influenza Vaccine 10/10/2014, 10/17/2013 Influenza Vaccine (Quad) PF 11/28/2016, 10/23/2015 Influenza Vaccine Split 10/23/2012 Influenza Vaccine Whole 9/17/2010 Pneumococcal Conjugate (PCV-13) 11/28/2016 Not reviewed this visit You Were Diagnosed With   
  
 Codes Comments Numbness and tingling in both hands    -  Primary ICD-10-CM: R20.0, R20.2 ICD-9-CM: 782.0 De Quervain's tenosynovitis, right     ICD-10-CM: M65.4 ICD-9-CM: 727.04 Bilateral hand pain     ICD-10-CM: M79.641, [id number] ICD-9-CM: 729.5 Bilateral wrist pain     ICD-10-CM: M25.531, M25.532 ICD-9-CM: 719.43 Osteoporosis, unspecified osteoporosis type, unspecified pathological fracture presence     ICD-10-CM: M81.0 ICD-9-CM: 733.00 Fibromyalgia     ICD-10-CM: M79.7 ICD-9-CM: 729.1 Chronic depression     ICD-10-CM: F32.9 ICD-9-CM: 809 Anxiety     ICD-10-CM: F41.9 ICD-9-CM: 300.00 Primary osteoarthritis of left hand     ICD-10-CM: M19.042 ICD-9-CM: 715.14 Fourth and fifth MCP joints Primary osteoarthritis of right hand     ICD-10-CM: M19.041 ICD-9-CM: 715.14 Fifth MCP joint Primary osteoarthritis of first carpometacarpal joint of right hand     ICD-10-CM: M18.11 ICD-9-CM: 715.14 Vitals BP Pulse Temp Height(growth percentile) Weight(growth percentile) BMI  
 143/80 91 98.4 °F (36.9 °C) (Oral) 5' 3\" (1.6 m) 158 lb (71.7 kg) 27.99 kg/m2 OB Status Smoking Status Postmenopausal Never Smoker BMI and BSA Data Body Mass Index Body Surface Area  
 27.99 kg/m 2 1.79 m 2 Preferred Pharmacy Pharmacy Name Phone [facility] [address] [facility], [facility] [phone] Your Updated Medication List  
  
   
This list is accurate as of: 6/14/17 11:27 AM.  Always use your most recent med list.  
  
  
  
  
 albuterol 90 mcg/actuation inhaler Commonly known as:  PROVENTIL HFA, VENTOLIN HFA, PROAIR HFA  
 Take 2 Puffs by inhalation every six (6) hours as needed for Wheezing. b complex vitamins tablet Take 1 Tab by mouth daily. Citracal + D tablet Generic drug:  calcium citrate-vitamin D3 Take  by mouth two (2) times a day. CYMBALTA 60 mg capsule Generic drug:  DULoxetine Take 120 mg by mouth daily. Delsym 30 mg/5 mL liquid Generic drug:  dextromethorphan Take 60 mg by mouth two (2) times a day. denosumab 60 mg/mL injection Commonly known as:  Ismael Blacksmith 60 mg by SubCUTAneous route every 6 months. DIGESTIVE ENZYMES (PLANT) PO Take  by mouth.  
  
 gabapentin 400 mg capsule Commonly known as:  NEURONTIN Take  by mouth three (3) times daily. Takes 2 TID HYOPHEN tablet Generic drug:  Methen-Hyos-M Blue-BA-PhSal  
take 1 tablet by mouth four times a day LONOX PO Take  by mouth. LORazepam 2 mg tablet Commonly known as:  ATIVAN Take  by mouth every eight (8) hours as needed for Anxiety. meloxicam 15 mg tablet Commonly known as:  MOBIC  
take 1 tablet by mouth once daily * modafinil 200 mg tablet Commonly known as:  PROVIGIL  
  
 * modafinil 200 mg tablet Commonly known as:  PROVIGIL  
take 1 tablet by mouth twice a day for 30 DAYS maximum daily dose of 2 tablets  
  
 mometasone 220 mcg (60 doses) inhaler Commonly known as:  [place] Coyer Take 2 Puffs by inhalation two (2) times a day. montelukast 10 mg tablet Commonly known as:  SINGULAIR Take 1 Tab by mouth daily. MUCINEX 1,200 mg Ta12 ER tablet Generic drug:  guaiFENesin Take 1,200 mg by mouth two (2) times a day. ondansetron 4 mg disintegrating tablet Commonly known as:  ZOFRAN ODT Take 1 Tab by mouth every eight (8) hours as needed for Nausea. OTHER Indications: Compound cream for inflammation  
  
 pantoprazole 40 mg tablet Commonly known as:  PROTONIX Take 1 Tab by mouth daily. pentosan polysulfate sodium 100 mg capsule Commonly known as:  Dorothyann Backer Take 1 Cap by mouth two (2) times a day. Indications: Bladder Pain, Interstitial Cystitis PHAZYME 180 mg Cap Generic drug:  simethicone Take  by mouth as needed. PRELIEF 65 mg Tab Generic drug:  calcium glycerophosphate Take  by mouth. PROBIOTIC 4X 10-15 mg Tbec Generic drug:  B.infantis-B.ani-B.long-B.bifi Take  by mouth. tiotropium bromide 2.5 mcg/actuation inhaler Commonly known as:  Arita Yosef Take 2 Puffs by inhalation daily. Indications: PREVENTION OF BRONCHOSPASM WITH CHRONIC BRONCHITIS  
  
 traZODone 100 mg tablet Commonly known as:  Guerra Horse Take 100 mg by mouth nightly. TRILEPTAL 150 mg tablet Generic drug:  OXcarbazepine Take  by mouth. vit B Cmplx 3-FA-Vit C-Biotin 1- mg-mg-mcg tablet Commonly known as:  NEPHRO DEBRA RX Take 1 Tab by mouth daily. * VITAMIN D2 50,000 unit capsule Generic drug:  ergocalciferol Take 50,000 Units by mouth every seven (7) days. * ergocalciferol 50,000 unit capsule Commonly known as:  ERGOCALCIFEROL  
take 1 capsule by mouth TWO (2) TIMES A WEEK  Indications: PREVENTION OF VITAMIN D DEFICIENCY * Notice: This list has 4 medication(s) that are the same as other medications prescribed for you. Read the directions carefully, and ask your doctor or other care provider to review them with you. Follow-up Instructions Return if symptoms worsen or fail to improve. To-Do List   
 09/01/2017 10:30 AM  
  Appointment with JEAN ALBARRAN at [address] [place] [address] ([phone]) PAYMENT  For Non-Medicare patients - $15.00 will be collected from you at the time of your exam.  You will be billed $35.00 from the reading [facility].   OUTSIDE FILMS  - Any outside films related to the study being scheduled should be brought with you on the day of the exam. If this cannot be done there may be a delay in the reading of the study. MEDICATIONS  - Patient must bring a complete list of all medications currently taking to include prescriptions, over-the-counter meds, herbals, vitamins & any dietary supplements  GENERAL INSTRUCTIONS  - On the day of your exam do not use any bath powder, deodorant or lotions on the armpit area. -Tenderness of breasts may cause an increase of discomfort during procedure. If you are experiencing breast tenderness on the day of your appointment and would like to reschedule, please call [phone]. Patient Instructions Hand Arthritis: Exercises Your Care Instructions Here are some examples of exercises for hand arthritis. Start each exercise slowly. Ease off the exercise if you start to have pain. Your doctor or your physical or occupational therapist will tell you when you can start these exercises and which ones will work best for you. How to do the exercises Tendon glides In this exercise, the steps follow one another to a make a continuous movement. 1. With your affected hand, point your fingers and thumb straight up. Your wrist should be relaxed, following the line of your fingers and thumb. 2. Curl your fingers so that the top two joints in them are bent, and your fingers wrap down. Your fingertips should touch or be near the base of your fingers. Your fingers will look like a hook. 3. Make a fist by bending your knuckles. Your thumb can gently rest against your index (pointing) finger. 4. Unwind your fingers slightly so that your fingertips can touch the base of your palm. Your thumb can rest against your index finger. 5. Move back to your starting position, with your fingers and thumb pointing up. 6. Repeat the series of motions 8 to 12 times. 7. Switch hands and repeat steps 1 through 6, even if only one hand is sore. Intrinsic flexion 1. Rest your affected hand on a table and bend the large joints where your fingers connect to your hand. Keep your thumb and the other joints in your fingers straight. 2. Slowly straighten your fingers. Your wrist should be relaxed, following the line of your fingers and thumb. 3. Move back to your starting position, with your hand bent. 4. Repeat 8 to 12 times. 5. Switch hands and repeat steps 1 through 4, even if only one hand is sore. Finger extension 1. Place your affected hand flat on a table. 2. Lift and then lower one finger at a time off the table. 3. Repeat 8 to 12 times. 4. Switch hands and repeat steps 1 through 3, even if only one hand is sore. MP extension 1. Place your good hand on a table, palm up. Put your affected hand on top of your good hand with your fingers wrapped around the thumb of your good hand like you are making a fist. 
2. Slowly uncurl the joints of your affected hand where your fingers connect to your hand so that only the top two joints of your fingers are bent. Your fingers will look like a hook. 3. Move back to your starting position, with your fingers wrapped around your good thumb. 4. Repeat 8 to 12 times. 5. Switch hands and repeat steps 1 through 4, even if only one hand is sore. PIP extension (with MP extension) 1. Place your good hand on a table, palm up. Put your affected hand on top of your good hand, palm up. 2. Use the thumb and fingers of your good hand to grasp below the middle joint of one finger of your affected hand. 3. Straighten the last two joints of that finger. 4. Repeat 8 to 12 times. 5. Repeat steps 1 through 4 with each finger. 6. Switch hands and repeat steps 1 through 5, even if only one hand is sore. DIP flexion 1. With your good hand, grasp one finger of your affected hand. Your thumb will be on the top side of your finger just below the joint that is closest to your fingernail. 2. Slowly bend your affected finger only at the joint closest to your fingernail. 3. Repeat 8 to 12 times. 4. Repeat steps 1 through 3 with each finger. 5. Switch hands and repeat steps 1 through 4, even if only one hand is sore. Follow-up care is a key part of your treatment and safety. Be sure to make and go to all appointments, and call your doctor if you are having problems. It's also a good idea to know your test results and keep a list of the medicines you take. Where can you learn more? Go to http://linus-rosemarie.info/. Enter N294 in the search box to learn more about \"Hand Arthritis: Exercises. \" Current as of: May 23, 2016 Content Version: 11.2 © 8239-1564 [employer]. Care instructions adapted under license by [employer] (which disclaims liability or warranty for this information). If you have questions about a medical condition or this instruction, always ask your healthcare professional. [place] [address] any warranty or liability for your use of this information. Numbness and Tingling: Care Instructions Your Care Instructions Many things can cause numbness or tingling. Swelling may put pressure on a nerve. This could cause you to lose feeling or have a pins-and-needles sensation on part of your body. Nerves may be damaged from trauma, toxins, or diseases, such as diabetes or multiple sclerosis (MS). Sometimes, though, the cause is not clear. If there is no clear reason for your symptoms, and you are not having any other symptoms, your doctor may suggest watching and waiting for a while to see if the numbness or tingling goes away on its own. Your doctor may want you to have blood or nerve tests to find the cause of your symptoms. Follow-up care is a key part of your treatment and safety.  Be sure to make and go to all appointments, and call your doctor if you are having problems. It's also a good idea to know your test results and keep a list of the medicines you take. How can you care for yourself at home? · If your doctor prescribes medicine, take it exactly as directed. Call your doctor if you think you are having a problem with your medicine. · If you have any swelling, put ice or a cold pack on the area for 10 to 20 minutes at a time. Put a thin cloth between the ice and your skin. When should you call for help? Call 911 anytime you think you may need emergency care. For example, call if: 
· You have weakness, numbness, or tingling in both legs. · You lose bowel or bladder control. · You have symptoms of a stroke. These may include: 
¨ Sudden numbness, tingling, weakness, or loss of movement in your face, arm, or leg, especially on only one side of your body. ¨ Sudden vision changes. ¨ Sudden trouble speaking. ¨ Sudden confusion or trouble understanding simple statements. ¨ Sudden problems with walking or balance. ¨ A sudden, severe headache that is different from past headaches. Watch closely for changes in your health, and be sure to contact your doctor if you have any problems, or if: 
· You do not get better as expected. Where can you learn more? Go to http://linus-rosemarie.info/. Enter V005 in the search box to learn more about \"Numbness and Tingling: Care Instructions. \" Current as of: October 14, 2016 Content Version: 11.2 © 7443-5259 [employer]. Care instructions adapted under license by Streyner (which disclaims liability or warranty for this information). If you have questions about a medical condition or this instruction, always ask your healthcare professional. [place] [address] any warranty or liability for your use of this information. Introducing [facility]!    
 Page Cleverly introduces Marco Vasco patient portal. Now you can access parts of your medical record, email your doctor's office, and request medication refills online. 1. In your internet browser, go to [email or web address] 2. Click on the First Time User? Click Here link in the Sign In box. You will see the New Member Sign Up page. 3. Enter your [employer] Access Code exactly as it appears below. You will not need to use this code after youve completed the sign-up process. If you do not sign up before the expiration date, you must request a new code. · [employer] Access Code: [email or web address] Expires: 9/12/2017 11:27 AM 
 
4. Enter the last four digits of your Social Security Number (xxxx) and Date of Birth (mm/dd/yyyy) as indicated and click Submit. You will be taken to the next sign-up page. 5. Create a [employer] ID. This will be your [employer] login ID and cannot be changed, so think of one that is secure and easy to remember. 6. Create a [employer] password. You can change your password at any time. 7. Enter your Password Reset Question and Answer. This can be used at a later time if you forget your password. 8. Enter your e-mail address. You will receive e-mail notification when new information is available in [address] 9. Click Sign Up. You can now view and download portions of your medical record. 10. Click the Download Summary menu link to download a portable copy of your medical information. If you have questions, please visit the Frequently Asked Questions section of the [employer] website. Remember, [employer] is NOT to be used for urgent needs. For medical emergencies, dial 911. Now available from your iPhone and Android! Please provide this summary of care documentation to your next provider. Your primary care clinician is listed as Sara Fuller. If you have any questions after today's visit, please call [phone].

## 2017-06-14 NOTE — PATIENT INSTRUCTIONS
Hand Arthritis: Exercises  Your Care Instructions  Here are some examples of exercises for hand arthritis. Start each exercise slowly. Ease off the exercise if you start to have pain. Your doctor or your physical or occupational therapist will tell you when you can start these exercises and which ones will work best for you. How to do the exercises  Tendon glibentley    In this exercise, the steps follow one another to a make a continuous movement. 1. With your affected hand, point your fingers and thumb straight up. Your wrist should be relaxed, following the line of your fingers and thumb. 2. Curl your fingers so that the top two joints in them are bent, and your fingers wrap down. Your fingertips should touch or be near the base of your fingers. Your fingers will look like a hook. 3. Make a fist by bending your knuckles. Your thumb can gently rest against your index (pointing) finger. 4. Unwind your fingers slightly so that your fingertips can touch the base of your palm. Your thumb can rest against your index finger. 5. Move back to your starting position, with your fingers and thumb pointing up. 6. Repeat the series of motions 8 to 12 times. 7. Switch hands and repeat steps 1 through 6, even if only one hand is sore. Intrinsic flexion    1. Rest your affected hand on a table and bend the large joints where your fingers connect to your hand. Keep your thumb and the other joints in your fingers straight. 2. Slowly straighten your fingers. Your wrist should be relaxed, following the line of your fingers and thumb. 3. Move back to your starting position, with your hand bent. 4. Repeat 8 to 12 times. 5. Switch hands and repeat steps 1 through 4, even if only one hand is sore. Finger extension    1. Place your affected hand flat on a table. 2. Lift and then lower one finger at a time off the table. 3. Repeat 8 to 12 times.   4. Switch hands and repeat steps 1 through 3, even if only one hand is sore.  MP extension    1. Place your good hand on a table, palm up. Put your affected hand on top of your good hand with your fingers wrapped around the thumb of your good hand like you are making a fist.  2. Slowly uncurl the joints of your affected hand where your fingers connect to your hand so that only the top two joints of your fingers are bent. Your fingers will look like a hook. 3. Move back to your starting position, with your fingers wrapped around your good thumb. 4. Repeat 8 to 12 times. 5. Switch hands and repeat steps 1 through 4, even if only one hand is sore. PIP extension (with MP extension)    1. Place your good hand on a table, palm up. Put your affected hand on top of your good hand, palm up. 2. Use the thumb and fingers of your good hand to grasp below the middle joint of one finger of your affected hand. 3. Straighten the last two joints of that finger. 4. Repeat 8 to 12 times. 5. Repeat steps 1 through 4 with each finger. 6. Switch hands and repeat steps 1 through 5, even if only one hand is sore. DIP flexion    1. With your good hand, grasp one finger of your affected hand. Your thumb will be on the top side of your finger just below the joint that is closest to your fingernail. 2. Slowly bend your affected finger only at the joint closest to your fingernail. 3. Repeat 8 to 12 times. 4. Repeat steps 1 through 3 with each finger. 5. Switch hands and repeat steps 1 through 4, even if only one hand is sore. Follow-up care is a key part of your treatment and safety. Be sure to make and go to all appointments, and call your doctor if you are having problems. It's also a good idea to know your test results and keep a list of the medicines you take. Where can you learn more? Go to http://linus-rosemarie.info/. Enter V285 in the search box to learn more about \"Hand Arthritis: Exercises. \"  Current as of: May 23, 2016  Content Version: 11.2  © 0026-0791 Healthwise, Incorporated. Care instructions adapted under license by Novel SuperTV (which disclaims liability or warranty for this information). If you have questions about a medical condition or this instruction, always ask your healthcare professional. [address] any warranty or liability for your use of this information. Numbness and Tingling: Care Instructions  Your Care Instructions  Many things can cause numbness or tingling. Swelling may put pressure on a nerve. This could cause you to lose feeling or have a pins-and-needles sensation on part of your body. Nerves may be damaged from trauma, toxins, or diseases, such as diabetes or multiple sclerosis (MS). Sometimes, though, the cause is not clear. If there is no clear reason for your symptoms, and you are not having any other symptoms, your doctor may suggest watching and waiting for a while to see if the numbness or tingling goes away on its own. Your doctor may want you to have blood or nerve tests to find the cause of your symptoms. Follow-up care is a key part of your treatment and safety. Be sure to make and go to all appointments, and call your doctor if you are having problems. It's also a good idea to know your test results and keep a list of the medicines you take. How can you care for yourself at home? · If your doctor prescribes medicine, take it exactly as directed. Call your doctor if you think you are having a problem with your medicine. · If you have any swelling, put ice or a cold pack on the area for 10 to 20 minutes at a time. Put a thin cloth between the ice and your skin. When should you call for help? Call 911 anytime you think you may need emergency care. For example, call if:  · You have weakness, numbness, or tingling in both legs. · You lose bowel or bladder control. · You have symptoms of a stroke.  These may include:  ¨ Sudden numbness, tingling, weakness, or loss of movement in your face, arm, or leg, especially on only one side of your body. ¨ Sudden vision changes. ¨ Sudden trouble speaking. ¨ Sudden confusion or trouble understanding simple statements. ¨ Sudden problems with walking or balance. ¨ A sudden, severe headache that is different from past headaches. Watch closely for changes in your health, and be sure to contact your doctor if you have any problems, or if:  · You do not get better as expected. Where can you learn more? Go to http://linus-rosemarie.info/. Enter T297 in the search box to learn more about \"Numbness and Tingling: Care Instructions. \"  Current as of: October 14, 2016  Content Version: 11.2  © 2175-9377 Truminim. Care instructions adapted under license by [employer] In Person (which disclaims liability or warranty for this information). If you have questions about a medical condition or this instruction, always ask your healthcare professional. [place] [address] any warranty or liability for your use of this information.

## 2017-06-14 NOTE — PROGRESS NOTES
Patient: Cordelia De                MRN: [id number]       SSN: [id number]  YOB: 1960        AGE: 55 y.o. SEX: female    PCP: Rajesh Johnson MD  06/14/17    Chief Complaint   Patient presents with    Hand Pain     Bilateral     HISTORY:  Cordelia De is a 55 y.o. female who is seen for bilateral hand (R>L) and wrist pain. She was previously seen by Dr. Rad Powell for bilateral hand numbness and tingling in September of 2015. She has worn carpal tunnel splints in the past with benefit. She states that her hand numbness and tingling is increased at night and in the mornings. She reports that she underwent an EMG/NCV study by Dr. Thuan Anaya several years ago. She underwent a previous course of hand therapy without benefit. She states that she drops objects occasionally due to her hand numbness and tingling. She reports a h/o right basal joint OA. She was previously seen for right knee pain on 2/22/12. She notes some pain with ambulated and she has been walking with a very slight limp. She responded to a previous right knee aspiration and cortisone injection on 2/8/12. Occupation, etc:  Ms. David Batres has received social security disability benefits for fibromyalgia since 2012. She has helped to take care of her sister who she states has Eosinophilic gastroenteritis for the past year. Current weight is 158 pounds. She is 5'3\" tall. She reports a h/o fibromyalgia for which she takes Meloxicam daily. She notes increased hand pains while gardening. She previously exercised at the [facility] regularly but states that she has not recently. 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  She states that she monitored overall health and safety of workers. She lives in the [address] [place] [address] area of [place] with her  and part [place] Salcedo, part [place] dog.       Lab Results   Component Value Date/Time    Hemoglobin A1c 5.4 07/10/2012 12:00 PM     Weight Metrics 6/14/2017 5/30/2017 4/18/2017 3/10/2017 3/9/2017 2/20/2017 1/20/2017   Weight 158 lb 158 lb 3.2 oz 168 lb 168 lb 161 lb 165 lb 164 lb   BMI 27.99 kg/m2 28.02 kg/m2 29.76 kg/m2 29.76 kg/m2 28.52 kg/m2 29.23 kg/m2 29.05 kg/m2     Patient Active Problem List   Diagnosis Code    Chronic interstitial cystitis N30.10    Chronic depression F32.9    Anxiety F41.9    Stress F43.9    Asthma J45.909    IBS (irritable bowel syndrome) K58.9    Fibromyalgia M79.7    Lumbago M54.5    Sacroiliitis, not elsewhere classified ([facility] [address] M46.1    Pain in joint, multiple sites M25.50    Encounter for long-term (current) use of high-risk medication Z79.899    Vitamin D deficiency E55.9    Dyspnea R06.00    Anorgasmia of female F54.26    LUCRECIA (obstructive sleep apnea) G47.33    Osteoporosis M81.0    De Quervain's tenosynovitis, right M65.4    Mild intermittent asthma without complication Z97.38    Chronic pain syndrome G89.4    Hyperlipidemia LDL goal <160 E78.5    Bronchitis with bronchospasm J20.9     REVIEW OF SYSTEMS: All Below are Negative except: See HPI   Constitutional: negative for fever, chills, and weight loss. Cardiovascular: negative for chest pain, claudication, leg swelling, SOB, FRAMER   Gastrointestinal: Negative for pain, N/V/C/D, Blood in stool or urine, dysuria,  hematuria, incontinence, pelvic pain. Musculoskeletal: See HPI   Neurological: Negative for dizziness and weakness. Negative for headaches, Visual changes, confusion, seizures   Phychiatric/Behavioral: Negative for depression, memory loss, substance  abuse. Extremities: Negative for hair changes, rash, or skin lesion changes. Hematologic: Negative for bleeding problems, bruising, pallor or swollen lymph  nodes   Peripheral Vascular: No calf pain, no circulation deficits.     Social History     Social History    Marital status:      Spouse name: N/A    Number of children: N/A    Years of education: N/A     Occupational History    Not on file. Social History Main Topics    Smoking status: Never Smoker    Smokeless tobacco: Never Used    Alcohol use 0.5 oz/week     1 Cans of beer per week      Comment: socially    Drug use: No    Sexual activity: Not on file     Other Topics Concern    Not on file     Social History Narrative      Allergies   Allergen Reactions    Codeine Rash and Itching     Other reaction(s): other/intolerance    Tussionex Itching      Current Outpatient Prescriptions   Medication Sig    calcium glycerophosphate (PRELIEF) 65 mg tab Take  by mouth.  pantoprazole (PROTONIX) 40 mg tablet Take 1 Tab by mouth daily.  meloxicam (MOBIC) 15 mg tablet take 1 tablet by mouth once daily    modafinil (PROVIGIL) 200 mg tablet take 1 tablet by mouth twice a day for 30 DAYS maximum daily dose of 2 tablets    ergocalciferol (ERGOCALCIFEROL) 50,000 unit capsule take 1 capsule by mouth TWO (2) TIMES A WEEK  Indications: PREVENTION OF VITAMIN D DEFICIENCY    OXcarbazepine (TRILEPTAL) 150 mg tablet Take  by mouth.  mometasone (ASMANEX TWISTHALER) 220 mcg (60 doses) inhaler Take 2 Puffs by inhalation two (2) times a day.  gabapentin (NEURONTIN) 400 mg capsule Take  by mouth three (3) times daily. Takes 2 TID    pentosan polysulfate sodium (ELMIRON) 100 mg capsule Take 1 Cap by mouth two (2) times a day. Indications: Bladder Pain, Interstitial Cystitis    montelukast (SINGULAIR) 10 mg tablet Take 1 Tab by mouth daily.  dextromethorphan (DELSYM) 30 mg/5 mL liquid Take 60 mg by mouth two (2) times a day.  guaiFENesin (MUCINEX) 1,200 mg Ta12 ER tablet Take 1,200 mg by mouth two (2) times a day.  OTHER Indications: Compound cream for inflammation    b complex vitamins tablet Take 1 Tab by mouth daily.  HYOPHEN tablet take 1 tablet by mouth four times a day    vit B Cmplx 3-FA-Vit C-Biotin (NEPHRO DEBRA RX) 1- mg-mg-mcg tablet Take 1 Tab by mouth daily.     B.infantis-B.ani-B.long-B.bifi (PROBIOTIC 4X) 10-15 mg TbEC Take  by mouth.  ENZYMES,DIGESTIVE, PLANT, (DIGESTIVE ENZYMES, PLANT, PO) Take  by mouth.  tiotropium bromide (SPIRIVA RESPIMAT) 2.5 mcg/actuation inhaler Take 2 Puffs by inhalation daily. Indications: PREVENTION OF BRONCHOSPASM WITH CHRONIC BRONCHITIS    albuterol (PROVENTIL HFA, VENTOLIN HFA, PROAIR HFA) 90 mcg/actuation inhaler Take 2 Puffs by inhalation every six (6) hours as needed for Wheezing.  modafinil (PROVIGIL) 200 mg tablet     DIPHENOXYLATE HCL/ATROPINE (LONOX PO) Take  by mouth.  denosumab (PROLIA) 60 mg/mL injection 60 mg by SubCUTAneous route every 6 months.  ondansetron (ZOFRAN ODT) 4 mg disintegrating tablet Take 1 Tab by mouth every eight (8) hours as needed for Nausea.  calcium citrate-vitamin D3 (CITRACAL + D) tablet Take  by mouth two (2) times a day.  LORazepam (ATIVAN) 2 mg tablet Take  by mouth every eight (8) hours as needed for Anxiety.  ergocalciferol (VITAMIN D2) 50,000 unit capsule Take 50,000 Units by mouth every seven (7) days.  DULoxetine (CYMBALTA) 60 mg capsule Take 120 mg by mouth daily.  simethicone (PHAZYME) 180 mg cap Take  by mouth as needed.  trazodone (DESYREL) 100 mg tablet Take 100 mg by mouth nightly. No current facility-administered medications for this visit.        PHYSICAL EXAMINATION:  Visit Vitals    /80    Pulse 91    Temp 98.4 °F (36.9 °C) (Oral)    Ht 5' 3\" (1.6 m)    Wt 158 lb (71.7 kg)    BMI 27.99 kg/m2      ORTHO EXAMINATION:  Examination Right Wrist Left Wrist   Skin Intact Intact   Tenderness - -   Flexion 40 40   Extension 30 30   Deformity - -   Effusion - -   Finger flexion Full Full   Finger extension Full Full   Tinel's sign - -   Phalen's test - -   Finklestein maneuver - -   Pain with thumb abduction - -   Capillary refill - -     Examination Right Hand Left Hand   Skin Intact Intact   Deformity - -   Swelling - -   Tenderness - -   Finger flexion Full Full   Finger extension Full Full   Sensation Normal Normal   Capillary refill Normal Normal   Heberden's nodes - -   Dupuytren's - -   Mild bilateral thenar atrophy    EMG/NCV STUDY  [facility] 1/27/16  IMPRESSION:  This is a normal electrodiagnostic study of both arms. There is no electrodiagnostic evidence of carpal tunnel syndrome, generalized peripheral neuropathy or cervical radiculopathy. RADIOGRAPHS:  XR RIGHT HAND 3/21/15  IMPRESSION:  1. Decreased bone mineralization/osteopenia. 2. Nonspecific unchanged joint space narrowing of the fifth MCP joint. No radiographic finding for specific arthropathy. XR LEFT HAND 3/21/15  IMPRESSION:  1. Decreased bone mineralization/osteopenia. 2. Joint space narrowing and subchondral cysts at the fourth MCP joint, likely secondary degenerative joint disease. Joint space narrowing at the fifth MCP joint, nonspecific. IMPRESSION:      ICD-10-CM ICD-9-CM    1. Numbness and tingling in both hands R20.0 782.0     R20.2     2. De Quervain's tenosynovitis, right M65.4 727.04    3. Bilateral hand pain M79.641 729.5     M79.642     4. Bilateral wrist pain M25.531 719.43     M25.532     5. Osteoporosis, unspecified osteoporosis type, unspecified pathological fracture presence M81.0 733.00    6. Fibromyalgia M79.7 729.1    7. Chronic depression F32.9 311    8. Anxiety F41.9 300.00    9. Primary osteoarthritis of left hand M19.042 715.14     Fourth and fifth MCP joints   10. Primary osteoarthritis of right hand M19.041 715.14     Fifth MCP joint   11. Primary osteoarthritis of first carpometacarpal joint of right hand M18.11 715.14    12. Primary osteoarthritis of right knee M17.11 715.16     Mild   13. Knee effusion, right M25.461 719.06      PLAN:  She will continue hand exercises on her own at home. She will follow up as needed. She will follow up with her primary care physician for high blood pressure.   She is not currently interested in pursuing CTR surgery. We discussed possible CTR in the future if numbness and tingling continues pending EMG/NCV study results. She will take B-complex vitamins as recommended.       Scribed by [employer] ([address] [place] [address] [address]) as dictated by Karma Clark MD

## 2017-07-03 ENCOUNTER — OFFICE VISIT (OUTPATIENT)
Dept: PAIN MANAGEMENT | Age: 57
End: 2017-07-03

## 2017-07-03 VITALS
BODY MASS INDEX: 28 KG/M2 | HEART RATE: 106 BPM | DIASTOLIC BLOOD PRESSURE: 82 MMHG | HEIGHT: 63 IN | SYSTOLIC BLOOD PRESSURE: 140 MMHG | WEIGHT: 158 LBS

## 2017-07-03 DIAGNOSIS — N30.10 CHRONIC INTERSTITIAL CYSTITIS: ICD-10-CM

## 2017-07-03 DIAGNOSIS — G89.4 CHRONIC PAIN SYNDROME: ICD-10-CM

## 2017-07-03 DIAGNOSIS — M81.0 AGE-RELATED OSTEOPOROSIS WITHOUT CURRENT PATHOLOGICAL FRACTURE: ICD-10-CM

## 2017-07-03 DIAGNOSIS — M46.1 SACROILIITIS, NOT ELSEWHERE CLASSIFIED (HCC): ICD-10-CM

## 2017-07-03 DIAGNOSIS — K58.9 IRRITABLE BOWEL SYNDROME, UNSPECIFIED TYPE: ICD-10-CM

## 2017-07-03 DIAGNOSIS — M79.7 FIBROMYALGIA: ICD-10-CM

## 2017-07-03 DIAGNOSIS — F32.A CHRONIC DEPRESSION: ICD-10-CM

## 2017-07-03 DIAGNOSIS — Z79.899 ENCOUNTER FOR LONG-TERM (CURRENT) USE OF HIGH-RISK MEDICATION: ICD-10-CM

## 2017-07-03 DIAGNOSIS — M25.50 PAIN IN JOINT, MULTIPLE SITES: Primary | ICD-10-CM

## 2017-07-03 NOTE — MR AVS SNAPSHOT
Visit Information Date & Time Provider Department Dept. Phone Encounter #  
 7/3/2017 12:40 PM Cherelle Clinton MD [address] [place] [address] Pain Management [phone] [id number] Follow-up Instructions Return in about 3 months (around 10/3/2017). Your Appointments 11/30/2017  7:55 AM  
LAB with IOC NURSE VISIT Internist of [facility] ([address] [place] [address] Road) Appt Note: labs; labs [address], [address] [facility] [address]  
  
   
 [address], [address]  
  
    
 12/8/2017  9:15 AM  
PHYSICAL with Suki Chowdhury MD  
Internist of [address]) Appt Note: rpe rm  
 [address] [facility], [address] [facility] [address]  
  
   
 [address], [address] 9/12/2017 10:00 AM  
Any with Claire Hayes MD  
[facility] ([facility]) Appt Note: 6m fu  
 [address] 
[address] [place] [address]  
[address] [facility] [address] [facility] [address] Upcoming Health Maintenance Date Due DTaP/Tdap/Td series (1 - Tdap) 7/18/1982 BREAST CANCER SCRN MAMMOGRAM 8/24/2017 INFLUENZA AGE 9 TO ADULT 8/1/2017 PAP AKA CERVICAL CYTOLOGY 12/8/2019 COLONOSCOPY 2/19/2023 Allergies as of 7/3/2017  Review Complete On: 7/3/2017 By: Cherelle Clinton MD  
  
 Severity Noted Reaction Type Reactions Codeine    Rash, Itching Other reaction(s): other/intolerance Tussionex  01/09/2012   Systemic Itching Current Immunizations  Reviewed on 11/28/2016 Name Date Influenza Vaccine 10/10/2014, 10/17/2013 Influenza Vaccine (Quad) PF 11/28/2016, 10/23/2015 Influenza Vaccine Split 10/23/2012 Influenza Vaccine Whole 9/17/2010 Pneumococcal Conjugate (PCV-13) 11/28/2016 Not reviewed this visit Vitals BP Pulse Height(growth percentile) Weight(growth percentile) BMI OB Status 140/82 (!) 106 5' 3\" (1.6 m) 158 lb (71.7 kg) 27.99 kg/m2 Postmenopausal  
 Smoking Status Never Smoker Vitals History BMI and BSA Data Body Mass Index Body Surface Area  
 27.99 kg/m 2 1.79 m 2 Preferred Pharmacy Pharmacy Name Phone [facility] [address] [phone] Your Updated Medication List  
  
   
This list is accurate as of: 7/3/17 12:58 PM.  Always use your most recent med list.  
  
  
  
  
 albuterol 90 mcg/actuation inhaler Commonly known as:  PROVENTIL HFA, VENTOLIN HFA, PROAIR HFA Take 2 Puffs by inhalation every six (6) hours as needed for Wheezing. b complex vitamins tablet Take 1 Tab by mouth daily. Citracal + D tablet Generic drug:  calcium citrate-vitamin D3 Take  by mouth two (2) times a day. CYMBALTA 60 mg capsule Generic drug:  DULoxetine Take 120 mg by mouth daily. Delsym 30 mg/5 mL liquid Generic drug:  dextromethorphan Take 60 mg by mouth two (2) times a day. DIGESTIVE ENZYMES (PLANT) PO Take  by mouth.  
  
 gabapentin 400 mg capsule Commonly known as:  NEURONTIN Take  by mouth three (3) times daily. Takes 2 TID HYOPHEN tablet Generic drug:  Methen-Hyos-M Blue-BA-PhSal  
take 1 tablet by mouth four times a day LONOX PO Take  by mouth. LORazepam 2 mg tablet Commonly known as:  ATIVAN Take  by mouth every eight (8) hours as needed for Anxiety. meloxicam 15 mg tablet Commonly known as:  MOBIC  
take 1 tablet by mouth once daily * modafinil 200 mg tablet Commonly known as:  PROVIGIL  
  
 * modafinil 200 mg tablet Commonly known as:  PROVIGIL  
take 1 tablet by mouth twice a day for 30 DAYS maximum daily dose of 2 tablets  
  
 mometasone 220 mcg (60 doses) inhaler Commonly known as:  Angélica Desai  
 Take 2 Puffs by inhalation two (2) times a day. montelukast 10 mg tablet Commonly known as:  SINGULAIR Take 1 Tab by mouth daily. MUCINEX 1,200 mg Ta12 ER tablet Generic drug:  guaiFENesin Take 1,200 mg by mouth two (2) times a day. ondansetron 4 mg disintegrating tablet Commonly known as:  ZOFRAN ODT Take 1 Tab by mouth every eight (8) hours as needed for Nausea. OTHER Indications: Compound cream for inflammation  
  
 pantoprazole 40 mg tablet Commonly known as:  PROTONIX Take 1 Tab by mouth daily. pentosan polysulfate sodium 100 mg capsule Commonly known as:  Ginette Buffalo Take 1 Cap by mouth two (2) times a day. Indications: Bladder Pain, Interstitial Cystitis PHAZYME 180 mg Cap Generic drug:  simethicone Take  by mouth as needed. PRELIEF 65 mg Tab Generic drug:  calcium glycerophosphate Take  by mouth. PROBIOTIC 4X 10-15 mg Tbec Generic drug:  B.infantis-B.ani-B.long-B.bifi Take  by mouth. PROLIA 60 mg/mL injection Generic drug:  denosumab  
TO BE ADMINISTERED IN PHYSICIAN'S OFFICE. INJECT 1ML (60MG) SUBCUTANEOUSLY ONCE EVERY 6 MONTHS. REFRIGERATE. USE WITHIN 14 DAYS ONCE AT ROOM  
  
 tiotropium bromide 2.5 mcg/actuation inhaler Commonly known as:  Kusum Nondenominational Take 2 Puffs by inhalation daily. Indications: PREVENTION OF BRONCHOSPASM WITH CHRONIC BRONCHITIS  
  
 traZODone 100 mg tablet Commonly known as:  Samule Grills Take 100 mg by mouth nightly. TRILEPTAL 150 mg tablet Generic drug:  OXcarbazepine Take  by mouth. vit B Cmplx 3-FA-Vit C-Biotin 1- mg-mg-mcg tablet Commonly known as:  NEPHRO DEBRA RX Take 1 Tab by mouth daily. * VITAMIN D2 50,000 unit capsule Generic drug:  ergocalciferol Take 50,000 Units by mouth every seven (7) days. * ergocalciferol 50,000 unit capsule Commonly known as:  ERGOCALCIFEROL take 1 capsule by mouth TWO (2) TIMES A WEEK  Indications: PREVENTION OF VITAMIN D DEFICIENCY * Notice: This list has 4 medication(s) that are the same as other medications prescribed for you. Read the directions carefully, and ask your doctor or other care provider to review them with you. Follow-up Instructions Return in about 3 months (around 10/3/2017). To-Do List   
 11/20/2017 9:30 AM  
  Appointment with JEAN ALBARRAN at [address] [place] [address] ([phone]) PAYMENT  For Non-Medicare patients - $15.00 will be collected from you at the time of your exam.  You will be billed $35.00 from the reading [facility]. OUTSIDE FILMS  - Any outside films related to the study being scheduled should be brought with you on the day of the exam.  If this cannot be done there may be a delay in the reading of the study. MEDICATIONS  - Patient must bring a complete list of all medications currently taking to include prescriptions, over-the-counter meds, herbals, vitamins & any dietary supplements  GENERAL INSTRUCTIONS  - On the day of your exam do not use any bath powder, deodorant or lotions on the armpit area. -Tenderness of breasts may cause an increase of discomfort during procedure. If you are experiencing breast tenderness on the day of your appointment and would like to reschedule, please call [phone]. Patient Instructions Current health maintenance issues were reviewed and the patient was advised to followup with his/her PCP for completion of these items. Introducing [facility]! [facility] introduces [facility] patient portal. Now you can access parts of your medical record, email your doctor's office, and request medication refills online. 1. In your internet browser, go to [email or web address] 2. Click on the First Time User? Click Here link in the Sign In box. You will see the New Member Sign Up page. 3. Enter your pic5 Access Code exactly as it appears below. You will not need to use this code after youve completed the sign-up process. If you do not sign up before the expiration date, you must request a new code. · pic5 Access Code: [email or web address] Expires: 9/12/2017 11:27 AM 
 
4. Enter the last four digits of your Social Security Number (xxxx) and Date of Birth (mm/dd/yyyy) as indicated and click Submit. You will be taken to the next sign-up page. 5. Create a pic5 ID. This will be your pic5 login ID and cannot be changed, so think of one that is secure and easy to remember. 6. Create a pic5 password. You can change your password at any time. 7. Enter your Password Reset Question and Answer. This can be used at a later time if you forget your password. 8. Enter your e-mail address. You will receive e-mail notification when new information is available in [address] 9. Click Sign Up. You can now view and download portions of your medical record. 10. Click the Download Summary menu link to download a portable copy of your medical information. If you have questions, please visit the Frequently Asked Questions section of the pic5 website. Remember, pic5 is NOT to be used for urgent needs. For medical emergencies, dial 911. Now available from your iPhone and Android! Please provide this summary of care documentation to your next provider. Your primary care clinician is listed as Antonette Araya. If you have any questions after today's visit, please call [phone].

## 2017-07-03 NOTE — PROGRESS NOTES
HISTORY OF PRESENT ILLNESS  Cait Meek is a 55 y.o. female. HPI  she returns for follow-up of chronic, severe pain which is widespread and associated with fatigue and related to fibromyalgia, interstitial cystitis, temporomandibular joint dysfunction, as well as bilateral carpal tunnel syndrome and generalized osteoarthritis. She also suffers from generalized osteoporosis for which she has been receiving Prolia injections every 6 months. Pain continues under adequate control averaging 7 out of 10  Physical activity and mobility remain curtailed, mood and sleep are fair  No reported side effects. A current review of the  does not identify any inconsistency. Review of Systems   Constitutional: Positive for malaise/fatigue. HENT:        Jaw pain     Respiratory: Positive for cough (occasional) and wheezing (wheezing). Asthma   Gastrointestinal: Positive for diarrhea and heartburn (nexium). Negative for abdominal pain (comes and goes), constipation and vomiting. Genitourinary: Positive for dysuria (Interstitial Cystitis), frequency and urgency. Musculoskeletal: Positive for back pain, falls (trips often/unchanged/balance issues/no recent ED visit or injury requiring medical attention), joint pain, myalgias and neck pain. Skin: Negative. Neurological: Positive for weakness (generalized) and headaches. Issues with balance   Endo/Heme/Allergies: Negative. Psychiatric/Behavioral: Positive for depression. Negative for suicidal ideas. The patient is nervous/anxious and has insomnia. All other systems reviewed and are negative. Physical Exam   Constitutional: She is oriented to person, place, and time. She appears well-developed and well-nourished. No distress. HENT:   Head: Normocephalic. Painful ROM jaw   Eyes: EOM are normal. Pupils are equal, round, and reactive to light. Neck: No thyromegaly present.    Tender throughout/painful ROM/tight musculature Pulmonary/Chest: Effort normal.   Musculoskeletal: She exhibits tenderness (throughout back/tight musculature). Left shoulder: She exhibits decreased range of motion, tenderness and pain. Right wrist: She exhibits no tenderness. Left wrist: She exhibits no tenderness. Right knee: She exhibits decreased range of motion and swelling. Tenderness found. Left knee: Tenderness found. Cervical back: She exhibits tenderness (tight musculature) and pain. Lumbar back: She exhibits decreased range of motion (painful), tenderness (especially over left SIJ), pain and spasm. Back:         Hands:       Right foot: There is tenderness. Left foot: There is tenderness. Neurological: She is alert and oriented to person, place, and time. Psychiatric: She has a normal mood and affect. Her behavior is normal. Thought content normal.   Nursing note and vitals reviewed. ASSESSMENT and PLAN  Encounter Diagnoses   Name Primary?  Pain in joint, multiple sites Yes    Chronic interstitial cystitis     Chronic depression     Irritable bowel syndrome, unspecified type     Fibromyalgia     [facility]     Encounter for long-term (current) use of high-risk medication     Age-related osteoporosis without current pathological fracture     Chronic pain syndrome      She will continue on her current analgesic regimen as this is providing good pain control with improved functionality and minimal side effects.   3 month reassess her    Counseling occupied > 50% of visit:  Total time: 30 minutes

## 2017-07-03 NOTE — PROGRESS NOTES
Nursing Notes    Patient presents to the office today in follow-up. Patient rates her pain at 8/10 on the numerical pain scale. Reviewed medications with counts as follows:    Rx Date filled Qty Dispensed Pill Count Last Dose Short   The pt is not on any controlled medications                                          POC UDS was not performed in office today    Any new labs or imaging since last appointment? NO    Have you been to an emergency room (ER) or urgent care clinic since your last visit? NO            Have you been hospitalized since your last visit? NO     If yes, where, when, and reason for visit? Have you seen or consulted any other health care providers outside of the [address] [place] [address]  since your last visit? NO     If yes, where, when, and reason for visit? HM deferred to pcp.

## 2017-07-25 ENCOUNTER — TELEPHONE (OUTPATIENT)
Dept: PAIN MANAGEMENT | Age: 57
End: 2017-07-25

## 2017-07-27 ENCOUNTER — OFFICE VISIT (OUTPATIENT)
Dept: PAIN MANAGEMENT | Age: 57
End: 2017-07-27

## 2017-07-27 VITALS
HEIGHT: 63 IN | WEIGHT: 158 LBS | RESPIRATION RATE: 20 BRPM | SYSTOLIC BLOOD PRESSURE: 127 MMHG | TEMPERATURE: 98 F | HEART RATE: 95 BPM | BODY MASS INDEX: 28 KG/M2 | DIASTOLIC BLOOD PRESSURE: 80 MMHG

## 2017-07-27 DIAGNOSIS — M81.0 AGE-RELATED OSTEOPOROSIS WITHOUT CURRENT PATHOLOGICAL FRACTURE: Primary | ICD-10-CM

## 2017-08-22 RX ORDER — TRAZODONE HYDROCHLORIDE 100 MG/1
100 TABLET ORAL
Status: CANCELLED | OUTPATIENT
Start: 2017-08-22

## 2017-08-22 NOTE — TELEPHONE ENCOUNTER
This is normally filled by her psychiatrist Dr. Juan M Mendez. She is currently out of the medication and they are giving her the run around. It was supposed to be filled yesterday but it was over looked. She is hoping we can call it in to [address] [place] [address] for her.

## 2017-09-13 ENCOUNTER — TELEPHONE (OUTPATIENT)
Dept: PAIN MANAGEMENT | Age: 57
End: 2017-09-13

## 2017-10-30 ENCOUNTER — OFFICE VISIT (OUTPATIENT)
Dept: PAIN MANAGEMENT | Age: 57
End: 2017-10-30

## 2017-10-30 VITALS
RESPIRATION RATE: 20 BRPM | TEMPERATURE: 98.2 F | DIASTOLIC BLOOD PRESSURE: 78 MMHG | HEART RATE: 101 BPM | SYSTOLIC BLOOD PRESSURE: 138 MMHG | WEIGHT: 146 LBS | BODY MASS INDEX: 25.86 KG/M2

## 2017-10-30 DIAGNOSIS — N30.10 CHRONIC INTERSTITIAL CYSTITIS: ICD-10-CM

## 2017-10-30 DIAGNOSIS — F32.A CHRONIC DEPRESSION: ICD-10-CM

## 2017-10-30 DIAGNOSIS — N30.10 INTERSTITIAL CYSTITIS: ICD-10-CM

## 2017-10-30 DIAGNOSIS — M25.50 PAIN IN JOINT, MULTIPLE SITES: ICD-10-CM

## 2017-10-30 DIAGNOSIS — G89.4 CHRONIC PAIN SYNDROME: ICD-10-CM

## 2017-10-30 DIAGNOSIS — F41.9 ANXIETY: ICD-10-CM

## 2017-10-30 DIAGNOSIS — M79.7 FIBROMYALGIA: Primary | ICD-10-CM

## 2017-10-30 DIAGNOSIS — M54.40 MIDLINE LOW BACK PAIN WITH SCIATICA, SCIATICA LATERALITY UNSPECIFIED, UNSPECIFIED CHRONICITY: ICD-10-CM

## 2017-10-30 DIAGNOSIS — G47.33 OSA (OBSTRUCTIVE SLEEP APNEA): ICD-10-CM

## 2017-10-30 RX ORDER — OXCARBAZEPINE 150 MG/1
150 TABLET, FILM COATED ORAL 3 TIMES DAILY
Qty: 90 TAB | Refills: 2 | Status: SHIPPED | OUTPATIENT
Start: 2017-10-30

## 2017-10-30 RX ORDER — MELOXICAM 15 MG/1
15 TABLET ORAL DAILY
Qty: 30 TAB | Refills: 2 | Status: SHIPPED | OUTPATIENT
Start: 2017-10-30 | End: 2017-12-08 | Stop reason: SDUPTHER

## 2017-10-30 RX ORDER — GABAPENTIN 800 MG/1
800 TABLET ORAL 3 TIMES DAILY
Qty: 90 TAB | Refills: 11 | Status: SHIPPED | OUTPATIENT
Start: 2017-10-30 | End: 2017-11-29

## 2017-10-30 RX ORDER — MODAFINIL 200 MG/1
200 TABLET ORAL 2 TIMES DAILY
Qty: 60 TAB | Refills: 1 | Status: SHIPPED | OUTPATIENT
Start: 2017-10-30 | End: 2017-11-29

## 2017-10-30 NOTE — PROGRESS NOTES
Nursing Notes    Patient presents to the office today in follow-up. Patient rates her pain at 8/10 on the numerical pain scale. Reviewed medications with counts as follows:    Rx Date filled Qty Dispensed Pill Count Last Dose Short     provigil 200mg 09/05/17 60 15 This am No                                             Comments:     POC UDS was not performed in office today    Any new labs or imaging since last appointment? NO    Have you been to an emergency room (ER) or urgent care clinic since your last visit? NO            Have you been hospitalized since your last visit? NO     If yes, where, when, and reason for visit? Have you seen or consulted any other health care providers outside of the [address] [place] [address]  since your last visit? YES     If yes, where, when, and reason for visit? Urology, psychiatry       HM deferred to pcp.

## 2017-10-30 NOTE — PROGRESS NOTES
HISTORY OF PRESENT ILLNESS  Cait Miller is a 65 y.o. female. HPI Comments: Visit survey reviewed  Chief complaint- chronic pain syndrome- pain all over, fibromyalgia  Neck pain, back pain  Pain to different joints  She is listed as having a diagnosis of obstructive sleep apnea. I have explained to the patient today, we will not be able to continue her Provigil on a long-term basis. I will prescribe 2 more months and in the future if she feels she needs to continue with Provigil she should have this prescribed by her primary care physician or one of her specialist.  Also, I explained that our clinic will no longer prescribe the injections, Prolia. We will continue the prescription was for her usual Mobic, Trileptal, Neurontin. Ativan, Cymbalta, trazodone are prescribed by her psychiatrist.  She of course should continue to keep follow-up with psychiatry.  -Pain is a complex sensory and emotional experience intimately related to the concept of suffering and the life experiences and psychological makeup of the individual. There is a host of psychosocial issues,including depression,anxiety,fear,altered social roles,and loss of activities which have a strong scientific basis for contributing to the chronic pain state. The effective treatment of chronic pain involves understanding the individual with pain. Issues of fear avoidance,catastrophizing,belief systems about exercise and injury,sleep disturbance,family dynamics,and other factors may play a role in the patient's experience of pain. No new significant side effects reported  Medication continues to help improve quality of life. Medications reviewed including risk and benefits. Recent average level of pain-8          Review of Systems   Constitutional: Positive for malaise/fatigue. HENT:        Jaw pain     Respiratory: Positive for cough (occasional) and wheezing (wheezing).          Asthma   Gastrointestinal: Positive for diarrhea and heartburn (nexium). Negative for abdominal pain (comes and goes), constipation and vomiting. Genitourinary: Positive for dysuria (Interstitial Cystitis), frequency and urgency. Musculoskeletal: Positive for back pain, falls (trips often/unchanged/balance issues/no recent ED visit or injury requiring medical attention), joint pain, myalgias and neck pain. Skin: Negative. Neurological: Positive for weakness (generalized) and headaches. Issues with balance   Endo/Heme/Allergies: Negative. Psychiatric/Behavioral: Positive for depression. Negative for suicidal ideas. The patient is nervous/anxious and has insomnia. All other systems reviewed and are negative. Physical Exam   Constitutional: She appears well-developed and well-nourished. She is cooperative. She does not have a sickly appearance. HENT:   Head: Normocephalic and atraumatic. Right Ear: External ear normal. No drainage. Left Ear: External ear normal. No drainage. Nose: Nose normal.   Eyes: Lids are normal. Right eye exhibits no discharge. Left eye exhibits no discharge. Right conjunctiva has no hemorrhage. Left conjunctiva has no hemorrhage. Neck: Neck supple. No tracheal deviation present. No thyroid mass present. Pulmonary/Chest: Effort normal. No respiratory distress. Neurological: She is alert. No cranial nerve deficit. Skin: Skin is intact. No rash noted. Psychiatric: Her speech is normal. Her affect is not angry. She does not express inappropriate judgment. Nursing note and vitals reviewed. ASSESSMENT and PLAN  Encounter Diagnoses   Name Primary?     Fibromyalgia Yes    Chronic depression     Anxiety     Midline low back pain with sciatica, sciatica laterality unspecified, unspecified chronicity     Pain in joint, multiple sites     Chronic pain syndrome     Chronic interstitial cystitis     Interstitial cystitis     LUCRECIA (obstructive sleep apnea)    Medications including changes as reviewed above  Follow-up 3 months  She is usually seen by Dr. Yudith Toro. Administration spoke to the patient today. I spoke to the patient today as well about recent changes with our clinic. Dr. Yudith Toro is no longer with our clinic. I have reviewed progress notes. This has included review of past treatments, past medications, current medications, diagnoses.   Prescription monitoring program reviewed today

## 2017-10-30 NOTE — PROGRESS NOTES
I was asked to speak with patient, since she was a patient of Dr. Prakash Damico and he is no longer with our Clinic. Explained to patient going forward our guidelines for prescribing medication has changed due to recommendations from Sheri Hollis and the Board of Medicine. Patient's may be asked to slowly and safely taper down some medications due to these changes, and at the provider's discretion. Our clinic will not prescribe sleep, psychiatric, or anxiety medications. The patient was also given a list of area Pain Clinics and Neurologists. . Patient was also informed they may choose another provider if they wish.

## 2017-10-30 NOTE — MR AVS SNAPSHOT
Visit Information Date & Time Provider Department Dept. Phone Encounter #  
 10/30/2017 10:25 AM Damaris Ramon MD [facility] [id number] Follow-up Instructions Return in about 3 months (around 1/30/2018). Your Appointments 11/3/2017  2:00 PM  
IMMUNIZATIONS/INJECTIONS with [facility] [place] NURSE VISIT [facility] ([facility]) Appt Note: FLU SHOT  
 [address] [facility], [facility] [address] [place]  
  
   
 [address], [address]  
  
    
 11/28/2017  9:30 AM  
Follow Up with Nikita Lutz MD  
[address] ([facility]) Appt Note: 6MFU FROM 8/25/16  
 [address] [place] [address] [address] [address]  
[phone]  
  
   
 [address] [place] [address], [address] [place] [address] Drive [address]  
  
    
 11/30/2017  7:55 AM  
LAB with [facility] NURSE VISIT [facility] ([facility]) Appt Note: labs; labs [address], [facility] [address] [place]  
  
   
 [address], [address]  
  
    
 12/8/2017  9:15 AM  
PHYSICAL with Brian Hodge MD  
[facility]) Appt Note: rpe rm  
 [address] [facility] [place] [address] [place]  
  
   
 [address], [address] 9/12/2018  8:30 AM  
Any with Padmini Silver MD  
[facility] ([facility]) Appt Note: 1 yr f/u . PT PREFER AM APPT  
 [address] 
[address] [place] [address]  
[address] [facility] [address] [facility] [address] Upcoming Health Maintenance Date Due DTaP/Tdap/Td series (1 - Tdap) 7/18/1982 INFLUENZA AGE 9 TO ADULT 8/1/2017 BREAST CANCER SCRN MAMMOGRAM 8/24/2017 PAP AKA CERVICAL CYTOLOGY 12/8/2019 COLONOSCOPY 2/19/2023 Allergies as of 10/30/2017  Review Complete On: 10/30/2017 By: Chance Reardon LPN Severity Noted Reaction Type Reactions Codeine    Rash, Itching Other reaction(s): other/intolerance Tussionex  01/09/2012   Systemic Itching Current Immunizations  Reviewed on 11/28/2016 Name Date Influenza Vaccine 10/10/2014, 10/17/2013 Influenza Vaccine (Quad) PF 11/28/2016, 10/23/2015 Influenza Vaccine Split 10/23/2012 Influenza Vaccine Whole 9/17/2010 Pneumococcal Conjugate (PCV-13) 11/28/2016 Not reviewed this visit You Were Diagnosed With   
  
 Codes Comments Fibromyalgia    -  Primary ICD-10-CM: M79.7 ICD-9-CM: 729.1 Chronic depression     ICD-10-CM: F32.9 ICD-9-CM: 812 Anxiety     ICD-10-CM: F41.9 ICD-9-CM: 300.00 Midline low back pain with sciatica, sciatica laterality unspecified, unspecified chronicity     ICD-10-CM: M54.40 ICD-9-CM: 724.3 Pain in joint, multiple sites     ICD-10-CM: M25.50 ICD-9-CM: 719.49 Chronic pain syndrome     ICD-10-CM: G89.4 ICD-9-CM: 338. 4 Chronic interstitial cystitis     ICD-10-CM: N30.10 ICD-9-CM: 595.1 Interstitial cystitis     ICD-10-CM: N30.10 ICD-9-CM: 595.1 LUCRECIA (obstructive sleep apnea)     ICD-10-CM: G47.33 
ICD-9-CM: 327.23 Vitals BP Pulse Temp Resp Weight(growth percentile) BMI  
 138/78 (!) 101 98.2 °F (36.8 °C) 20 146 lb (66.2 kg) 25.86 kg/m2 OB Status Smoking Status Postmenopausal Never Smoker Vitals History BMI and BSA Data Body Mass Index Body Surface Area  
 25.86 kg/m 2 1.72 m 2 Preferred Pharmacy Pharmacy Name Phone [facility] [address] [facility], [address] [facility] [phone] Your Updated Medication List  
  
   
This list is accurate as of: 10/30/17 10:54 AM.  Always use your most recent med list.  
  
  
  
  
 albuterol 90 mcg/actuation inhaler Commonly known as:  PROVENTIL HFA, VENTOLIN HFA, PROAIR HFA Take 2 Puffs by inhalation every six (6) hours as needed for Wheezing. b complex vitamins tablet Take 1 Tab by mouth daily. Citracal + D tablet Generic drug:  calcium citrate-vitamin D3 Take  by mouth two (2) times a day. CYMBALTA 60 mg capsule Generic drug:  DULoxetine Take 120 mg by mouth daily. Delsym 30 mg/5 mL liquid Generic drug:  dextromethorphan Take 60 mg by mouth two (2) times a day. DIGESTIVE ENZYMES (PLANT) PO Take  by mouth. * gabapentin 400 mg capsule Commonly known as:  NEURONTIN Take  by mouth three (3) times daily. Takes 2 TID * gabapentin 800 mg tablet Commonly known as:  NEURONTIN Take 1 Tab by mouth three (3) times daily for 30 days. Indications: NEUROPATHIC PAIN  
  
 LONOX PO Take  by mouth. LORazepam 2 mg tablet Commonly known as:  ATIVAN Take  by mouth every eight (8) hours as needed for Anxiety. * meloxicam 15 mg tablet Commonly known as:  MOBIC  
take 1 tablet by mouth once daily * meloxicam 15 mg tablet Commonly known as:  MOBIC Take 1 Tab by mouth daily. Methen-Hyos-M Blue-BA-PhSal tablet Commonly known as:  HYOPHEN Take 1 Tab by mouth four (4) times daily. * modafinil 200 mg tablet Commonly known as:  PROVIGIL  
  
 * modafinil 200 mg tablet Commonly known as:  PROVIGIL  
take 1 tablet by mouth twice a day for 30 DAYS maximum daily dose of 2 tablets * modafinil 200 mg tablet Commonly known as:  PROVIGIL Take 1 Tab by mouth two (2) times a day for 30 days. Max Daily Amount: 400 mg. Indications: SLEEPINESS DUE TO OBSTRUCTIVE SLEEP APNEA  
  
 mometasone 220 mcg (60 doses) inhaler Commonly known as:  Raymond Pritchett Take 2 Puffs by inhalation two (2) times a day. montelukast 10 mg tablet Commonly known as:  SINGULAIR Take 1 Tab by mouth daily. MUCINEX 1,200 mg Ta12 ER tablet Generic drug:  guaiFENesin Take 1,200 mg by mouth two (2) times a day. ondansetron 4 mg disintegrating tablet Commonly known as:  ZOFRAN ODT Take 1 Tab by mouth every eight (8) hours as needed for Nausea. OTHER Indications: Compound cream for inflammation OXcarbazepine 150 mg tablet Commonly known as:  TRILEPTAL Take 1 Tab by mouth three (3) times daily. pantoprazole 40 mg tablet Commonly known as:  PROTONIX Take 1 Tab by mouth daily. pentosan polysulfate sodium 100 mg capsule Commonly known as:  Bria Diana Take 1 Cap by mouth two (2) times a day. Indications: Bladder Pain, Interstitial Cystitis PHAZYME 180 mg Cap Generic drug:  simethicone Take  by mouth as needed. PRELIEF 65 mg Tab Generic drug:  calcium glycerophosphate Take  by mouth. PROBIOTIC 4X 10-15 mg Tbec Generic drug:  B.infantis-B.ani-B.long-B.bifi Take  by mouth. PROLIA 60 mg/mL injection Generic drug:  denosumab  
TO BE ADMINISTERED IN PHYSICIAN'S OFFICE. INJECT 1ML (60MG) SUBCUTANEOUSLY ONCE EVERY 6 MONTHS. REFRIGERATE. USE WITHIN 14 DAYS ONCE AT ROOM  
  
 tiotropium bromide 2.5 mcg/actuation inhaler Commonly known as:  Hussain Xuan Take 2 Puffs by inhalation daily. Indications: PREVENTION OF BRONCHOSPASM WITH CHRONIC BRONCHITIS  
  
 traZODone 100 mg tablet Commonly known as:  Socrates Handy Take 100 mg by mouth nightly. vit B Cmplx 3-FA-Vit C-Biotin 1- mg-mg-mcg tablet Commonly known as:  NEPHRO DEBRA RX Take 1 Tab by mouth daily. * VITAMIN D2 50,000 unit capsule Generic drug:  ergocalciferol Take 50,000 Units by mouth every seven (7) days. * ergocalciferol 50,000 unit capsule Commonly known as:  ERGOCALCIFEROL  
take 1 capsule by mouth TWO (2) TIMES A WEEK  Indications: PREVENTION OF VITAMIN D DEFICIENCY * Notice:   This list has 9 medication(s) that are the same as other medications prescribed for you. Read the directions carefully, and ask your doctor or other care provider to review them with you. Prescriptions Printed Refills  
 modafinil (PROVIGIL) 200 mg tablet 1 Sig: Take 1 Tab by mouth two (2) times a day for 30 days. Max Daily Amount: 400 mg. Indications: SLEEPINESS DUE TO OBSTRUCTIVE SLEEP APNEA Class: Print Route: Oral  
  
Prescriptions Sent to Pharmacy Refills Methen-Hyos-M Blue-BA-PhSal (HYOPHEN) tablet 3 Sig: Take 1 Tab by mouth four (4) times daily. Class: Normal  
 Pharmacy: [id number] [address] [facility], [address] [place] [address] Ph #: [phone] Route: Oral  
 gabapentin (NEURONTIN) 800 mg tablet 11 Sig: Take 1 Tab by mouth three (3) times daily for 30 days. Indications: NEUROPATHIC PAIN Class: Normal  
 Pharmacy: [id number] [address] [facility] [address] Ph #: [phone] Route: Oral  
 OXcarbazepine (TRILEPTAL) 150 mg tablet 2 Sig: Take 1 Tab by mouth three (3) times daily. Class: Normal  
 Pharmacy: [id number] [address] [facility], [address] [place] [address] Ph #: [phone] Route: Oral  
 meloxicam (MOBIC) 15 mg tablet 2 Sig: Take 1 Tab by mouth daily. Class: Normal  
 Pharmacy: [id number] [address] [facility] [address] Ph #: [phone] Route: Oral  
  
Follow-up Instructions Return in about 3 months (around 1/30/2018). To-Do List   
 11/20/2017 9:30 AM  
  Appointment with JEAN ALBARRAN at [address] [place] [address] ([phone]) PAYMENT  For Non-Medicare patients - $15.00 will be collected from you at the time of your exam.  You will be billed $35.00 from the reading [facility].   OUTSIDE FILMS  - Any outside films related to the study being scheduled should be brought with you on the day of the exam. If this cannot be done there may be a delay in the reading of the study. MEDICATIONS  - Patient must bring a complete list of all medications currently taking to include prescriptions, over-the-counter meds, herbals, vitamins & any dietary supplements  GENERAL INSTRUCTIONS  - On the day of your exam do not use any bath powder, deodorant or lotions on the armpit area. -Tenderness of breasts may cause an increase of discomfort during procedure. If you are experiencing breast tenderness on the day of your appointment and would like to reschedule, please call [phone]. Introducing [facility]! Buck Farooq introduces [employer] patient portal. Now you can access parts of your medical record, email your doctor's office, and request medication refills online. 1. In your internet browser, go to [email or web address] 2. Click on the First Time User? Click Here link in the Sign In box. You will see the New Member Sign Up page. 3. Enter your [employer] Access Code exactly as it appears below. You will not need to use this code after youve completed the sign-up process. If you do not sign up before the expiration date, you must request a new code. · [employer] Access Code: [email or web address] Expires: 12/11/2017 12:01 PM 
 
4. Enter the last four digits of your Social Security Number (xxxx) and Date of Birth (mm/dd/yyyy) as indicated and click Submit. You will be taken to the next sign-up page. 5. Create a [employer] ID. This will be your [employer] login ID and cannot be changed, so think of one that is secure and easy to remember. 6. Create a [employer] password. You can change your password at any time. 7. Enter your Password Reset Question and Answer. This can be used at a later time if you forget your password. 8. Enter your e-mail address. You will receive e-mail notification when new information is available in [address]. 9. Click Sign Up.  You can now view and download portions of your medical record. 10. Click the Download Summary menu link to download a portable copy of your medical information. If you have questions, please visit the Frequently Asked Questions section of the [employer] website. Remember, [employer] is NOT to be used for urgent needs. For medical emergencies, dial 911. Now available from your iPhone and Android! Please provide this summary of care documentation to your next provider. Your primary care clinician is listed as Nilson Salinas. If you have any questions after today's visit, please call [phone].

## 2017-10-31 ENCOUNTER — TELEPHONE (OUTPATIENT)
Dept: INTERNAL MEDICINE CLINIC | Age: 57
End: 2017-10-31

## 2017-10-31 NOTE — TELEPHONE ENCOUNTER
We do but it looks like pain management usually gives it to her?  They did the prior auth with her insurance through their office, im not sure how it would work to try to switch it to us

## 2017-10-31 NOTE — TELEPHONE ENCOUNTER
Raymundo Alba DR.  AT PAIN MANAGEMENT WOULD BE LEAVING AND SHE WASN'T SURE IF SHE WOULD BE ABLE TO GO WITH HIM

## 2017-11-03 ENCOUNTER — CLINICAL SUPPORT (OUTPATIENT)
Dept: INTERNAL MEDICINE CLINIC | Age: 57
End: 2017-11-03

## 2017-11-03 DIAGNOSIS — Z23 ENCOUNTER FOR IMMUNIZATION: Primary | ICD-10-CM

## 2017-11-03 NOTE — PROGRESS NOTES
Patient given influenza vaccine, Fluarix Quadrivalent, in left deltoid. Instructed patient to sit and wait 10-20 minutes before leaving the premises so that we can watch for any complications or adverse reactions. Patient given vaccine information statement handout before vaccine was given. Patient tolerated well without adverse reactions or complications.

## 2017-11-16 ENCOUNTER — OFFICE VISIT (OUTPATIENT)
Dept: PULMONOLOGY | Age: 57
End: 2017-11-16

## 2017-11-16 VITALS
HEART RATE: 96 BPM | TEMPERATURE: 99.2 F | SYSTOLIC BLOOD PRESSURE: 110 MMHG | BODY MASS INDEX: 27.11 KG/M2 | WEIGHT: 153 LBS | HEIGHT: 63 IN | OXYGEN SATURATION: 99 % | DIASTOLIC BLOOD PRESSURE: 72 MMHG | RESPIRATION RATE: 16 BRPM

## 2017-11-16 DIAGNOSIS — J45.40 MODERATE PERSISTENT ASTHMA WITHOUT COMPLICATION: Primary | ICD-10-CM

## 2017-11-16 DIAGNOSIS — J20.9 BRONCHITIS WITH BRONCHOSPASM: ICD-10-CM

## 2017-11-16 DIAGNOSIS — G47.33 OSA (OBSTRUCTIVE SLEEP APNEA): ICD-10-CM

## 2017-11-16 DIAGNOSIS — M79.7 FIBROMYALGIA: ICD-10-CM

## 2017-11-16 DIAGNOSIS — R06.09 DYSPNEA ON EXERTION: ICD-10-CM

## 2017-11-16 RX ORDER — ALBUTEROL SULFATE 90 UG/1
2 AEROSOL, METERED RESPIRATORY (INHALATION)
Qty: 3 INHALER | Refills: 6 | Status: SHIPPED | OUTPATIENT
Start: 2017-11-16 | End: 2018-10-18 | Stop reason: SDUPTHER

## 2017-11-16 NOTE — MR AVS SNAPSHOT
Visit Information Date & Time Provider Department Dept. Phone Encounter #  
 11/16/2017 10:30 AM Andrew Gibson MD [facility] [place] [phone] [id number] Follow-up Instructions Return in about 4 months (around 3/16/2018). Your Appointments 11/30/2017  7:55 AM  
LAB with [facility] NURSE VISIT [facility] ([address] [place] [address]) Appt Note: labs; labs [address], [facility] [address] [place]  
  
   
 [address], [facility]  
  
    
 12/8/2017  9:15 AM  
PHYSICAL with Paramjit Jacobsen MD  
[facility] [address]) Appt Note: rpe rm  
 [address] [facility] [address] [address] [place]  
  
   
 [address], [facility]  
  
    
 1/23/2018 10:30 AM  
Office Visit with Chris Poe MD  
[address] [place] [address] for Pain Management [address] [place] [address]) Appt Note: return in 3 months [address] [facility] [address]  
[phone] [facility] [address] [id number] 9/12/2018  8:30 AM  
Any with Luz Merino MD  
[facility] ([address] [place] [address]) Appt Note: 1 yr f/u . PT PREFER AM APPT  
 [address] [facility] 
[address] [facility] [address]  
[address] [facility] [address] [place] [address] [facility] [address] Upcoming Health Maintenance Date Due DTaP/Tdap/Td series (1 - Tdap) 7/18/1982 BREAST CANCER SCRN MAMMOGRAM 8/24/2017 PAP AKA CERVICAL CYTOLOGY 12/8/2019 COLONOSCOPY 2/19/2023 Allergies as of 11/16/2017  Review Complete On: 11/16/2017 By: Andrew Gibson MD  
  
 Severity Noted Reaction Type Reactions Codeine    Rash, Itching Other reaction(s): other/intolerance Tussionex  01/09/2012   Systemic Itching Current Immunizations  Reviewed on 11/3/2017 Name Date Influenza Vaccine 10/10/2014, 10/17/2013 Influenza Vaccine (Quad) PF 11/3/2017  1:56 PM, 11/28/2016, 10/23/2015 Influenza Vaccine Split 10/23/2012 Influenza Vaccine Whole 9/17/2010 Pneumococcal Conjugate (PCV-13) 11/28/2016 Not reviewed this visit You Were Diagnosed With   
  
 Codes Comments Moderate persistent asthma without complication    -  Primary ICD-10-CM: J45.40 ICD-9-CM: 493.90 Dyspnea on exertion     ICD-10-CM: R06.09 
ICD-9-CM: 786.09   
 LUCRECIA (obstructive sleep apnea)     ICD-10-CM: G47.33 
ICD-9-CM: 327.23 Bronchitis with bronchospasm     ICD-10-CM: J20.9 ICD-9-CM: 659 Fibromyalgia     ICD-10-CM: M79.7 ICD-9-CM: 729.1 Vitals BP Pulse Temp Resp Height(growth percentile) Weight(growth percentile) 110/72 (BP 1 Location: Left arm, BP Patient Position: Sitting) 96 99.2 °F (37.3 °C) (Oral) 16 5' 3\" (1.6 m) 153 lb (69.4 kg) SpO2 BMI OB Status Smoking Status 99% 27.1 kg/m2 Postmenopausal Never Smoker BMI and BSA Data Body Mass Index Body Surface Area  
 27.1 kg/m 2 1.76 m 2 Preferred Pharmacy Pharmacy Name Phone [facility], [address] [place] [address] [phone] Your Updated Medication List  
  
   
This list is accurate as of: 11/16/17 10:52 AM.  Always use your most recent med list.  
  
  
  
  
 albuterol 90 mcg/actuation inhaler Commonly known as:  PROVENTIL HFA, VENTOLIN HFA, PROAIR HFA Take 2 Puffs by inhalation every six (6) hours as needed for Wheezing. b complex vitamins tablet Take 1 Tab by mouth daily. Citracal + D tablet Generic drug:  calcium citrate-vitamin D3 Take  by mouth two (2) times a day. CYMBALTA 60 mg capsule Generic drug:  DULoxetine Take 120 mg by mouth daily. Delsym 30 mg/5 mL liquid Generic drug:  dextromethorphan Take 60 mg by mouth two (2) times a day. DIGESTIVE ENZYMES (PLANT) PO Take  by mouth. * gabapentin 400 mg capsule Commonly known as:  NEURONTIN Take  by mouth three (3) times daily. Takes 2 TID * gabapentin 800 mg tablet Commonly known as:  NEURONTIN Take 1 Tab by mouth three (3) times daily for 30 days. Indications: NEUROPATHIC PAIN  
  
 LONOX PO Take  by mouth. LORazepam 2 mg tablet Commonly known as:  ATIVAN Take  by mouth every eight (8) hours as needed for Anxiety. * meloxicam 15 mg tablet Commonly known as:  MOBIC  
take 1 tablet by mouth once daily * meloxicam 15 mg tablet Commonly known as:  MOBIC Take 1 Tab by mouth daily. Methen-Hyos-M Blue-BA-PhSal tablet Commonly known as:  HYOPHEN Take 1 Tab by mouth four (4) times daily. * modafinil 200 mg tablet Commonly known as:  PROVIGIL  
  
 * modafinil 200 mg tablet Commonly known as:  PROVIGIL  
take 1 tablet by mouth twice a day for 30 DAYS maximum daily dose of 2 tablets * modafinil 200 mg tablet Commonly known as:  PROVIGIL Take 1 Tab by mouth two (2) times a day for 30 days. Max Daily Amount: 400 mg. Indications: SLEEPINESS DUE TO OBSTRUCTIVE SLEEP APNEA  
  
 mometasone 220 mcg (60 doses) inhaler Commonly known as:  Jaguar Loop Take 2 Puffs by inhalation two (2) times a day. montelukast 10 mg tablet Commonly known as:  SINGULAIR Take 1 Tab by mouth daily. MUCINEX 1,200 mg Ta12 ER tablet Generic drug:  guaiFENesin Take 1,200 mg by mouth two (2) times a day. ondansetron 4 mg disintegrating tablet Commonly known as:  ZOFRAN ODT Take 1 Tab by mouth every eight (8) hours as needed for Nausea. OTHER Indications: Compound cream for inflammation OXcarbazepine 150 mg tablet Commonly known as:  TRILEPTAL Take 1 Tab by mouth three (3) times daily. pantoprazole 40 mg tablet Commonly known as:  PROTONIX Take 1 Tab by mouth daily. pentosan polysulfate sodium 100 mg capsule Commonly known as:  Amber Turk  
 Take 1 Cap by mouth two (2) times a day. Indications: Bladder Pain, Interstitial Cystitis PHAZYME 180 mg Cap Generic drug:  simethicone Take  by mouth as needed. PRELIEF 65 mg Tab Generic drug:  calcium glycerophosphate Take  by mouth. PROBIOTIC 4X 10-15 mg Tbec Generic drug:  B.infantis-B.ani-B.long-B.bifi Take  by mouth. PROLIA 60 mg/mL injection Generic drug:  denosumab  
TO BE ADMINISTERED IN PHYSICIAN'S OFFICE. INJECT 1ML (60MG) SUBCUTANEOUSLY ONCE EVERY 6 MONTHS. REFRIGERATE. USE WITHIN 14 DAYS ONCE AT ROOM  
  
 tiotropium bromide 2.5 mcg/actuation inhaler Commonly known as:  Relda Grime Take 2 Puffs by inhalation daily. Indications: PREVENTION OF BRONCHOSPASM WITH CHRONIC BRONCHITIS  
  
 traZODone 100 mg tablet Commonly known as:  [place] Take 100 mg by mouth nightly. vit B Cmplx 3-FA-Vit C-Biotin 1- mg-mg-mcg tablet Commonly known as:  NEPHRO DEBRA RX Take 1 Tab by mouth daily. * VITAMIN D2 50,000 unit capsule Generic drug:  ergocalciferol Take 50,000 Units by mouth every seven (7) days. * ergocalciferol 50,000 unit capsule Commonly known as:  ERGOCALCIFEROL  
take 1 capsule by mouth TWO (2) TIMES A WEEK  Indications: PREVENTION OF VITAMIN D DEFICIENCY * Notice: This list has 9 medication(s) that are the same as other medications prescribed for you. Read the directions carefully, and ask your doctor or other care provider to review them with you. Prescriptions Sent to Pharmacy Refills  
 tiotropium bromide (SPIRIVA RESPIMAT) 2.5 mcg/actuation inhaler 6 Sig: Take 2 Puffs by inhalation daily. Indications: PREVENTION OF BRONCHOSPASM WITH CHRONIC BRONCHITIS Class: Normal  
 [facility] [address] [facility], [address] [facility] Ph #: [phone]  Route: Inhalation  
 albuterol (PROVENTIL HFA, VENTOLIN HFA, PROAIR HFA) 90 mcg/actuation inhaler 6  
 Sig: Take 2 Puffs by inhalation every six (6) hours as needed for Wheezing. Class: Normal  
 Pharmacy: [id number] [address] [facility], [address] [place] [address] #: [phone] Route: Inhalation Follow-up Instructions Return in about 4 months (around 3/16/2018). To-Do List   
 11/20/2017 9:30 AM  
  Appointment with JEAN ALBARRAN at [address] [place] [address] ([phone]) PAYMENT  For Non-Medicare patients - $15.00 will be collected from you at the time of your exam.  You will be billed $35.00 from the reading [facility]. OUTSIDE FILMS  - Any outside films related to the study being scheduled should be brought with you on the day of the exam.  If this cannot be done there may be a delay in the reading of the study. MEDICATIONS  - Patient must bring a complete list of all medications currently taking to include prescriptions, over-the-counter meds, herbals, vitamins & any dietary supplements  GENERAL INSTRUCTIONS  - On the day of your exam do not use any bath powder, deodorant or lotions on the armpit area. -Tenderness of breasts may cause an increase of discomfort during procedure. If you are experiencing breast tenderness on the day of your appointment and would like to reschedule, please call [phone]. Introducing [facility]! [facility] [facility] introduces veriCAR patient portal. Now you can access parts of your medical record, email your doctor's office, and request medication refills online. 1. In your internet browser, go to [email or web address] 2. Click on the First Time User? Click Here link in the Sign In box. You will see the New Member Sign Up page. 3. Enter your veriCAR Access Code exactly as it appears below. You will not need to use this code after youve completed the sign-up process. If you do not sign up before the expiration date, you must request a new code. · Dnevnik Access Code: [email or web address] Expires: 12/11/2017 11:01 AM 
 
4. Enter the last four digits of your Social Security Number (xxxx) and Date of Birth (mm/dd/yyyy) as indicated and click Submit. You will be taken to the next sign-up page. 5. Create a Dnevnik ID. This will be your Dnevnik login ID and cannot be changed, so think of one that is secure and easy to remember. 6. Create a Dnevnik password. You can change your password at any time. 7. Enter your Password Reset Question and Answer. This can be used at a later time if you forget your password. 8. Enter your e-mail address. You will receive e-mail notification when new information is available in [address] 9. Click Sign Up. You can now view and download portions of your medical record. 10. Click the Download Summary menu link to download a portable copy of your medical information. If you have questions, please visit the Frequently Asked Questions section of the Dnevnik website. Remember, Dnevnik is NOT to be used for urgent needs. For medical emergencies, dial 911. Now available from your iPhone and Android! Please provide this summary of care documentation to your next provider. Your primary care clinician is listed as Jenni Schmitz. Bustre Myles. If you have any questions after today's visit, please call [phone].

## 2017-11-16 NOTE — PROGRESS NOTES
[facility]  Pulmonary, Critical Care, and Sleep Medicine      Pulmonary Office Progress Notes    Name: Diane Massey     : 1960     Date: 2017        Subjective:     Patient is a 65 y.o. female with history of asthma and reactive airways presents for follow up.    17  Feeling better with breathing. No coughing at present but did have some coughing this season with the change of weather. States that she has a little sore throat. Needs refills: Singulair and Albuterol      C/o hand arthritis symptoms. -on mobic  She reports increased symptoms of chest discomfort and some cough with the change of weather. Some congestion which improved with claritan. She had started water exercises. -swimming laps but not very regular now. She felt improvement with Respimet . She is under the care of pain management for fibromyalgia.     Past Medical History:   Diagnosis Date    Allergy     Anxiety     Arthritis     Asthma     mild    Atypical ductal hyperplasia of breast     Atypical hyperplasia of breast 2003    right    Chronic depression     Chronic fatigue syndrome     Chronic interstitial cystitis     Chronic interstitial cystitis     Chronic pain     fibromyalgia    Constipation     Depression     Diarrhea     Difficulty walking     Dizziness     Dry eyes     Dry mouth     Dyspnea     Fibromyalgia     Fibromyalgia     Generalized stiffness     GERD (gastroesophageal reflux disease)     Headache(784.0)     Heartburn     Hyperlipidemia     IBS (irritable bowel syndrome)     IC (interstitial cystitis)     Imbalance     Incoordination     Insomnia     Irregular periods/menstrual cycles     Joint pain     Microscopic hematuria     Muscle pain     Myofascial pain syndrome     Numbness and tingling in hands     Ovarian cyst, left     Painful sex     Pneumonia     Psychiatric disorder     anxiety - depression    RLS (restless legs syndrome)  Stress     TMJ (temporomandibular joint syndrome)     Transient total loss of muscle tone     Uterine fibroid     Weakness generalized        Allergies   Allergen Reactions    Codeine Rash and Itching     Other reaction(s): other/intolerance    Tussionex Itching       Current Outpatient Prescriptions   Medication Sig Dispense Refill    tiotropium bromide (SPIRIVA RESPIMAT) 2.5 mcg/actuation inhaler Take 2 Puffs by inhalation daily. Indications: PREVENTION OF BRONCHOSPASM WITH CHRONIC BRONCHITIS 3 Inhaler 6    albuterol (PROVENTIL HFA, VENTOLIN HFA, PROAIR HFA) 90 mcg/actuation inhaler Take 2 Puffs by inhalation every six (6) hours as needed for Wheezing. 3 Inhaler 6    Methen-Hyos-M Blue-BA-PhSal (HYOPHEN) tablet Take 1 Tab by mouth four (4) times daily. 120 Tab 3    gabapentin (NEURONTIN) 800 mg tablet Take 1 Tab by mouth three (3) times daily for 30 days. Indications: NEUROPATHIC PAIN 90 Tab 11    modafinil (PROVIGIL) 200 mg tablet Take 1 Tab by mouth two (2) times a day for 30 days. Max Daily Amount: 400 mg. Indications: SLEEPINESS DUE TO OBSTRUCTIVE SLEEP APNEA 60 Tab 1    OXcarbazepine (TRILEPTAL) 150 mg tablet Take 1 Tab by mouth three (3) times daily. 90 Tab 2    meloxicam (MOBIC) 15 mg tablet Take 1 Tab by mouth daily. 30 Tab 2    pentosan polysulfate sodium (ELMIRON) 100 mg capsule Take 1 Cap by mouth two (2) times a day. Indications: Bladder Pain, Interstitial Cystitis 120 Cap 3    PROLIA 60 mg/mL injection TO BE ADMINISTERED IN PHYSICIAN'S OFFICE. INJECT 1ML (60MG) SUBCUTANEOUSLY ONCE EVERY 6 MONTHS. REFRIGERATE. USE WITHIN 14 DAYS ONCE AT ROOM 1 Syringe 1    calcium glycerophosphate (PRELIEF) 65 mg tab Take  by mouth.  pantoprazole (PROTONIX) 40 mg tablet Take 1 Tab by mouth daily.  90 Tab 3    meloxicam (MOBIC) 15 mg tablet take 1 tablet by mouth once daily 30 Tab 11    modafinil (PROVIGIL) 200 mg tablet take 1 tablet by mouth twice a day for 30 DAYS maximum daily dose of 2 tablets 60 Tab 5    ergocalciferol (ERGOCALCIFEROL) 50,000 unit capsule take 1 capsule by mouth TWO (2) TIMES A WEEK  Indications: PREVENTION OF VITAMIN D DEFICIENCY 8 Cap 11    mometasone (ASMANEX TWISTHALER) 220 mcg (60 doses) inhaler Take 2 Puffs by inhalation two (2) times a day. 120 Cap 6    gabapentin (NEURONTIN) 400 mg capsule Take  by mouth three (3) times daily. Takes 2 TID      montelukast (SINGULAIR) 10 mg tablet Take 1 Tab by mouth daily. 90 Tab 6    OTHER Indications: Compound cream for inflammation      B.infantis-B.ani-B.long-B.bifi (PROBIOTIC 4X) 10-15 mg TbEC Take  by mouth.  ENZYMES,DIGESTIVE, PLANT, (DIGESTIVE ENZYMES, PLANT, PO) Take  by mouth.  modafinil (PROVIGIL) 200 mg tablet   0    DIPHENOXYLATE HCL/ATROPINE (LONOX PO) Take  by mouth.  ondansetron (ZOFRAN ODT) 4 mg disintegrating tablet Take 1 Tab by mouth every eight (8) hours as needed for Nausea. 20 Tab 1    calcium citrate-vitamin D3 (CITRACAL + D) tablet Take  by mouth two (2) times a day.  LORazepam (ATIVAN) 2 mg tablet Take  by mouth every eight (8) hours as needed for Anxiety.  ergocalciferol (VITAMIN D2) 50,000 unit capsule Take 50,000 Units by mouth every seven (7) days.  DULoxetine (CYMBALTA) 60 mg capsule Take 120 mg by mouth daily.  simethicone (PHAZYME) 180 mg cap Take  by mouth as needed.  trazodone (DESYREL) 100 mg tablet Take 100 mg by mouth nightly.  dextromethorphan (DELSYM) 30 mg/5 mL liquid Take 60 mg by mouth two (2) times a day.  guaiFENesin (MUCINEX) 1,200 mg Ta12 ER tablet Take 1,200 mg by mouth two (2) times a day.  b complex vitamins tablet Take 1 Tab by mouth daily.  vit B Cmplx 3-FA-Vit C-Biotin (NEPHRO DEBRA RX) 1- mg-mg-mcg tablet Take 1 Tab by mouth daily.          Review of Systems:  HEENT: No epistaxis, no nasal drainage, no difficulty in swallowing, no redness in eyes  Respiratory: as above  Cardiovascular: no chest pain, no palpitations, no chronic leg edema, no syncope  Gastrointestinal:  Retrosternal pain+ no abd pain, no vomiting, no diarrhea, no bleeding symptoms  Genitourinary: No urinary symptoms or hematuria  Integument/breast: No ulcers or rashes  Musculoskeletal:Neg  Neurological: No focal weakness, no seizures, no headaches  Behvioral/Psych: No anxiety, no depression  Constitutional: No fever, no chills, no weight loss, no night sweats     Objective:     Visit Vitals    /72 (BP 1 Location: Left arm, BP Patient Position: Sitting)    Pulse 96    Temp 99.2 °F (37.3 °C) (Oral)    Resp 16    Ht 5' 3\" (1.6 m)    Wt 69.4 kg (153 lb)    SpO2 99%  Comment: [email or web address]    BMI 27.1 kg/m2        Physical Exam:   General: comfortable, no acute distress  HEENT: pupils reactive, sclera anicteric, EOM intact  Neck: No adenopathy or thyroid swelling, no lymphadenopathy or JVD, supple  CVS: S1S2 no murmurs  RS: Mod AE bilaterally, no tactile fremitus or egophony, no accessory muscle use  Neuro: non focal, awake, alert  Extrm: no leg edema, clubbing or cyanosis  Skin: no rash    Data review:     Office Visit on 09/12/2017   Component Date Value Ref Range Status    Color (UA POC) 09/12/2017 Colorless   Final    green    Clarity (UA POC) 09/12/2017 Clear   Final    Glucose (UA POC) 09/12/2017 Negative  Negative Final    Bilirubin (UA POC) 09/12/2017 2+  Negative Final    Ketones (UA POC) 09/12/2017 Negative  Negative Final    Specific gravity (UA POC) 09/12/2017 1.020  1.001 - 1.035 Final    Blood (UA POC) 09/12/2017 Trace  Negative Final    pH (UA POC) 09/12/2017 6.5  4.6 - 8.0 Final    Protein (UA POC) 09/12/2017 1+  Negative mg/dL Final    Urobilinogen (UA POC) 09/12/2017 0.2 mg/dL  0.2 - 1 Final    Nitrites (UA POC) 09/12/2017 Negative  Negative Final    Leukocyte esterase (UA POC) 09/12/2017 Negative  Negative Final   Hospital Outpatient Visit on 05/22/2017   Component Date Value Ref Range Status    LIPID PROFILE 05/22/2017        Final    Cholesterol, total 05/22/2017 287* <200 MG/DL Final    Triglyceride 05/22/2017 119  <150 MG/DL Final    Comment: The drugs N-acetylcysteine (NAC) and  Metamiszole have been found to cause falsely  low results in this chemical assay. Please  be sure to submit blood samples obtained  BEFORE administration of either of these  drugs to assure correct results.  HDL Cholesterol 05/22/2017 64* 40 - 60 MG/DL Final    LDL, calculated 05/22/2017 199.2* 0 - 100 MG/DL Final    VLDL, calculated 05/22/2017 23.8  MG/DL Final    CHOL/HDL Ratio 05/22/2017 4.5  0 - 5.0   Final    Sodium 05/22/2017 135* 136 - 145 mmol/L Final    Potassium 05/22/2017 4.1  3.5 - 5.5 mmol/L Final    Chloride 05/22/2017 99* 100 - 108 mmol/L Final    CO2 05/22/2017 29  21 - 32 mmol/L Final    Anion gap 05/22/2017 7  3.0 - 18 mmol/L Final    Glucose 05/22/2017 94  74 - 99 mg/dL Final    BUN 05/22/2017 15  7.0 - 18 MG/DL Final    Creatinine 05/22/2017 0.67  0.6 - 1.3 MG/DL Final    BUN/Creatinine ratio 05/22/2017 22* 12 - 20   Final    GFR est AA 05/22/2017 >60  >60 ml/min/1.73m2 Final    GFR est non-AA 05/22/2017 >60  >60 ml/min/1.73m2 Final    Comment: (NOTE)  Estimated GFR is calculated using the Modification of Diet in Renal   Disease (MDRD) Study equation, reported for both  Americans   (GFRAA) and non- Americans (GFRNA), and normalized to 1.73m2   body surface area. The physician must decide which value applies to   the patient. The MDRD study equation should only be used in   individuals age 25 or older. It has not been validated for the   following: pregnant women, patients with serious comorbid conditions,   or on certain medications, or persons with extremes of body size,   muscle mass, or nutritional status.       Calcium 05/22/2017 8.7  8.5 - 10.1 MG/DL Final    Bilirubin, total 05/22/2017 0.3  0.2 - 1.0 MG/DL Final    ALT (SGPT) 05/22/2017 35  13 - 56 U/L Final    AST (SGOT) 05/22/2017 20  15 - 37 U/L Final    Alk. phosphatase 05/22/2017 29* 45 - 117 U/L Final    Protein, total 05/22/2017 6.7  6.4 - 8.2 g/dL Final    Albumin 05/22/2017 4.1  3.4 - 5.0 g/dL Final    Globulin 05/22/2017 2.6  2.0 - 4.0 g/dL Final    A-G Ratio 05/22/2017 1.6  0.8 - 1.7   Final       Imaging:  I have personally reviewed the patients radiographs and have reviewed the reports:  No recent CXR     IMPRESSION:   Asthma- fairly well controlled with recent symptomatic exacerbations relate to environmental temperature and also some increased GERD symptoms  Reactive airways- periodic flare ups related to environmental changes  Anxiety/Depression  Stress  IBS  GERD- on Nexium        RECOMMENDATIONS:   · Continue current treatment  · Continue Stepped up GERD treatment- encouraged to take antacids when symptomatic  · Continue Spiriva Respimet, Asmanex  · Will continue current treatment and encourage airway humidification , ambient air temp control and continue antiinflammatory control  · If there are any intentions to work in the yard to use a facial mask to help prevent triggering of asthmathic episodes.   · Continue bronchodilators and Singulair   · Prescriptions reviewed and renewed  · Continue treatment per pain management  · Reassurance provided  · Encouraged activity        Merlene Hernandez MD

## 2017-11-20 ENCOUNTER — HOSPITAL ENCOUNTER (OUTPATIENT)
Dept: MAMMOGRAPHY | Age: 57
Discharge: HOME | End: 2017-11-20
Attending: INTERNAL MEDICINE
Payer: MEDICARE

## 2017-11-20 DIAGNOSIS — Z12.31 VISIT FOR SCREENING MAMMOGRAM: ICD-10-CM

## 2017-11-20 PROCEDURE — 77063 BREAST TOMOSYNTHESIS BI: CPT

## 2017-11-22 DIAGNOSIS — R92.8 ABNORMAL MAMMOGRAM OF LEFT BREAST: ICD-10-CM

## 2017-11-22 NOTE — PROGRESS NOTES
Please notify the patient that there is some asymmetry in the left breast, nothing definite but the radiologist wants more pictures so I will order those.

## 2017-11-23 DIAGNOSIS — E55.9 HYPOVITAMINOSIS D: ICD-10-CM

## 2017-11-23 DIAGNOSIS — E78.5 HYPERLIPIDEMIA LDL GOAL <160: ICD-10-CM

## 2017-11-23 DIAGNOSIS — G89.4 CHRONIC PAIN SYNDROME: ICD-10-CM

## 2017-11-23 DIAGNOSIS — F32.A CHRONIC DEPRESSION: ICD-10-CM

## 2017-11-24 ENCOUNTER — TELEPHONE (OUTPATIENT)
Dept: INTERNAL MEDICINE CLINIC | Age: 57
End: 2017-11-24

## 2017-11-24 NOTE — TELEPHONE ENCOUNTER
Mable Frye MD   [facility] Nurses 2 hours ago (11:51 AM)                 The radiologist is concerned about a small area involving her left breast and would like further pictures in that area        Patient aware of message from Chun

## 2017-11-24 NOTE — TELEPHONE ENCOUNTER
Patient calling wanting to know why she was called to schedule to get a diagnostic mammo of the left breast and a ultrasound. Patient just had regular mammo on Monday.    Pls Advise

## 2017-11-30 ENCOUNTER — HOSPITAL ENCOUNTER (OUTPATIENT)
Dept: LAB | Age: 57
Discharge: HOME | End: 2017-11-30
Payer: MEDICARE

## 2017-11-30 LAB
ALBUMIN SERPL-MCNC: 4.1 G/DL (ref 3.4–5)
ALBUMIN/GLOB SERPL: 1.4 {RATIO} (ref 0.8–1.7)
ALP SERPL-CCNC: 33 U/L (ref 45–117)
ALT SERPL-CCNC: 39 U/L (ref 13–56)
ANION GAP SERPL CALC-SCNC: 12 MMOL/L (ref 3–18)
AST SERPL-CCNC: 21 U/L (ref 15–37)
BASOPHILS # BLD: 0 K/UL (ref 0–0.06)
BASOPHILS NFR BLD: 0 % (ref 0–2)
BILIRUB SERPL-MCNC: 0.5 MG/DL (ref 0.2–1)
BUN SERPL-MCNC: 17 MG/DL (ref 7–18)
BUN/CREAT SERPL: 24 (ref 12–20)
CALCIUM SERPL-MCNC: 8.5 MG/DL (ref 8.5–10.1)
CHLORIDE SERPL-SCNC: 98 MMOL/L (ref 100–108)
CHOLEST SERPL-MCNC: 321 MG/DL
CO2 SERPL-SCNC: 24 MMOL/L (ref 21–32)
CREAT SERPL-MCNC: 0.72 MG/DL (ref 0.6–1.3)
DIFFERENTIAL METHOD BLD: NORMAL
EOSINOPHIL # BLD: 0.1 K/UL (ref 0–0.4)
EOSINOPHIL NFR BLD: 2 % (ref 0–5)
ERYTHROCYTE [DISTWIDTH] IN BLOOD BY AUTOMATED COUNT: 12.7 % (ref 11.6–14.5)
GLOBULIN SER CALC-MCNC: 3 G/DL (ref 2–4)
GLUCOSE SERPL-MCNC: 92 MG/DL (ref 74–99)
HCT VFR BLD AUTO: 43.1 % (ref 35–45)
HDLC SERPL-MCNC: 73 MG/DL (ref 40–60)
HDLC SERPL: 4.4 {RATIO} (ref 0–5)
HGB BLD-MCNC: 14.3 G/DL (ref 12–16)
LDLC SERPL CALC-MCNC: 218.4 MG/DL (ref 0–100)
LIPID PROFILE,FLP: ABNORMAL
LYMPHOCYTES # BLD: 1.5 K/UL (ref 0.9–3.6)
LYMPHOCYTES NFR BLD: 27 % (ref 21–52)
MCH RBC QN AUTO: 28 PG (ref 24–34)
MCHC RBC AUTO-ENTMCNC: 33.2 G/DL (ref 31–37)
MCV RBC AUTO: 84.3 FL (ref 74–97)
MONOCYTES # BLD: 0.3 K/UL (ref 0.05–1.2)
MONOCYTES NFR BLD: 6 % (ref 3–10)
NEUTS SEG # BLD: 3.4 K/UL (ref 1.8–8)
NEUTS SEG NFR BLD: 65 % (ref 40–73)
PLATELET # BLD AUTO: 250 K/UL (ref 135–420)
PMV BLD AUTO: 9.7 FL (ref 9.2–11.8)
POTASSIUM SERPL-SCNC: 4 MMOL/L (ref 3.5–5.5)
PROT SERPL-MCNC: 7.1 G/DL (ref 6.4–8.2)
RBC # BLD AUTO: 5.11 M/UL (ref 4.2–5.3)
SODIUM SERPL-SCNC: 134 MMOL/L (ref 136–145)
T4 FREE SERPL-MCNC: 1 NG/DL (ref 0.7–1.5)
TRIGL SERPL-MCNC: 148 MG/DL (ref ?–150)
TSH SERPL DL<=0.05 MIU/L-ACNC: 3.11 UIU/ML (ref 0.36–3.74)
VLDLC SERPL CALC-MCNC: 29.6 MG/DL
WBC # BLD AUTO: 5.3 K/UL (ref 4.6–13.2)

## 2017-11-30 PROCEDURE — 82306 VITAMIN D 25 HYDROXY: CPT | Performed by: INTERNAL MEDICINE

## 2017-11-30 PROCEDURE — 80061 LIPID PANEL: CPT | Performed by: INTERNAL MEDICINE

## 2017-11-30 PROCEDURE — 80053 COMPREHEN METABOLIC PANEL: CPT | Performed by: INTERNAL MEDICINE

## 2017-11-30 PROCEDURE — 84439 ASSAY OF FREE THYROXINE: CPT | Performed by: INTERNAL MEDICINE

## 2017-11-30 PROCEDURE — 85025 COMPLETE CBC W/AUTO DIFF WBC: CPT | Performed by: INTERNAL MEDICINE

## 2017-11-30 PROCEDURE — 36415 COLL VENOUS BLD VENIPUNCTURE: CPT | Performed by: INTERNAL MEDICINE

## 2017-11-30 PROCEDURE — 84443 ASSAY THYROID STIM HORMONE: CPT | Performed by: INTERNAL MEDICINE

## 2017-12-01 LAB — 25(OH)D3 SERPL-MCNC: 75.8 NG/ML (ref 30–100)

## 2017-12-05 ENCOUNTER — HOSPITAL ENCOUNTER (OUTPATIENT)
Dept: ULTRASOUND IMAGING | Age: 57
Discharge: HOME | End: 2017-12-05
Attending: INTERNAL MEDICINE
Payer: MEDICARE

## 2017-12-05 ENCOUNTER — HOSPITAL ENCOUNTER (OUTPATIENT)
Dept: MAMMOGRAPHY | Age: 57
Discharge: HOME | End: 2017-12-05
Attending: INTERNAL MEDICINE
Payer: MEDICARE

## 2017-12-05 DIAGNOSIS — R92.8 ABNORMAL MAMMOGRAM OF LEFT BREAST: ICD-10-CM

## 2017-12-05 PROCEDURE — 76642 ULTRASOUND BREAST LIMITED: CPT

## 2017-12-05 PROCEDURE — 77065 DX MAMMO INCL CAD UNI: CPT

## 2017-12-08 ENCOUNTER — OFFICE VISIT (OUTPATIENT)
Dept: INTERNAL MEDICINE CLINIC | Age: 57
End: 2017-12-08

## 2017-12-08 ENCOUNTER — TELEPHONE (OUTPATIENT)
Dept: INTERNAL MEDICINE CLINIC | Age: 57
End: 2017-12-08

## 2017-12-08 VITALS
RESPIRATION RATE: 14 BRPM | DIASTOLIC BLOOD PRESSURE: 72 MMHG | WEIGHT: 150.2 LBS | HEART RATE: 80 BPM | SYSTOLIC BLOOD PRESSURE: 110 MMHG | BODY MASS INDEX: 27.64 KG/M2 | OXYGEN SATURATION: 98 % | TEMPERATURE: 98.5 F | HEIGHT: 62 IN

## 2017-12-08 DIAGNOSIS — M79.7 FIBROMYALGIA: ICD-10-CM

## 2017-12-08 DIAGNOSIS — J45.20 MILD INTERMITTENT ASTHMA WITHOUT COMPLICATION: Primary | ICD-10-CM

## 2017-12-08 DIAGNOSIS — M81.0 AGE-RELATED OSTEOPOROSIS WITHOUT CURRENT PATHOLOGICAL FRACTURE: ICD-10-CM

## 2017-12-08 DIAGNOSIS — N30.10 CHRONIC INTERSTITIAL CYSTITIS: ICD-10-CM

## 2017-12-08 DIAGNOSIS — E78.5 HYPERLIPIDEMIA LDL GOAL <160: ICD-10-CM

## 2017-12-08 DIAGNOSIS — F32.A CHRONIC DEPRESSION: ICD-10-CM

## 2017-12-08 RX ORDER — VITAMIN E (DL,TOCOPHERYL ACET) 45 MG/0.25
DROPS ORAL
COMMUNITY
End: 2019-06-12 | Stop reason: ALTCHOICE

## 2017-12-08 RX ORDER — LANOLIN ALCOHOL/MO/W.PET/CERES
400 CREAM (GRAM) TOPICAL DAILY
COMMUNITY
End: 2019-06-12 | Stop reason: ALTCHOICE

## 2017-12-08 NOTE — MR AVS SNAPSHOT
Visit Information Date & Time Provider Department Dept. Phone Encounter #  
 12/8/2017  9:15 AM Tereza Burrell MD [facility] [address] [place] [address] [phone] [id number] Your Appointments 1/23/2018 10:30 AM  
Office Visit with Tony Rachel MD  
[address] for Pain Management [facility]) Appt Note: return in 3 months 30 [facility] [address]  
[phone] [facility] [id number] 6/11/2018 10:00 AM  
Office Visit with Tereza Burrell MD  
[facility] of [address] [place] [address]) Appt Note: 6 month f/u  
 [address] [facility], [address] [address] [address]  
  
   
 [address], [address] 9/12/2018  8:30 AM  
Any with Amanda Woo MD  
[facility] ([facility]) Appt Note: 1 yr f/u . PT PREFER AM APPT  
 [address] 
[address] [place] [address]  
[address] [facility] [address] [facility] [address] Upcoming Health Maintenance Date Due DTaP/Tdap/Td series (1 - Tdap) 7/18/1982 BREAST CANCER SCRN MAMMOGRAM 12/5/2019 PAP AKA CERVICAL CYTOLOGY 12/8/2019 COLONOSCOPY 2/19/2023 Allergies as of 12/8/2017  Review Complete On: 12/8/2017 By: Tereza Burrell MD  
  
 Severity Noted Reaction Type Reactions Codeine    Rash, Itching Other reaction(s): other/intolerance Tussionex  01/09/2012   Systemic Itching Current Immunizations  Reviewed on 11/3/2017 Name Date Influenza Vaccine 10/10/2014, 10/17/2013 Influenza Vaccine (Quad) PF 11/3/2017  1:56 PM, 11/28/2016, 10/23/2015 Influenza Vaccine Split 10/23/2012 Influenza Vaccine Whole 9/17/2010 Pneumococcal Conjugate (PCV-13) 11/28/2016 Not reviewed this visit You Were Diagnosed With   
  
 Codes Comments Mild intermittent asthma without complication    -  Primary ICD-10-CM: J45.20 ICD-9-CM: 493.90 Chronic depression     ICD-10-CM: F32.9 ICD-9-CM: 601 Fibromyalgia     ICD-10-CM: M79.7 ICD-9-CM: 729.1 Age-related osteoporosis without current pathological fracture     ICD-10-CM: M81.0 ICD-9-CM: 733.01 Hyperlipidemia LDL goal <160     ICD-10-CM: E78.5 ICD-9-CM: 272.4 Vitals BP Pulse Temp Resp Height(growth percentile) Weight(growth percentile) 110/72 (BP 1 Location: Left arm, BP Patient Position: Sitting) 80 98.5 °F (36.9 °C) (Oral) 14 5' 2\" (1.575 m) 150 lb 3.2 oz (68.1 kg) SpO2 BMI OB Status Smoking Status 98% 27.47 kg/m2 Postmenopausal Never Smoker Vitals History BMI and BSA Data Body Mass Index Body Surface Area  
 27.47 kg/m 2 1.73 m 2 Preferred Pharmacy Pharmacy Name Phone [facility] [address] [facility], [address] [place] [address] [phone] Your Updated Medication List  
  
   
This list is accurate as of: 12/8/17 10:16 AM.  Always use your most recent med list.  
  
  
  
  
 albuterol 90 mcg/actuation inhaler Commonly known as:  PROVENTIL HFA, VENTOLIN HFA, PROAIR HFA Take 2 Puffs by inhalation every six (6) hours as needed for Wheezing. b complex vitamins tablet Take 1 Tab by mouth daily. CITRACAL + D SLOW RELEASE 600 mg calcium- 500 unit Tber Generic drug:  calcium combo no.2-vitamin D3 Take  by mouth. Citracal + D tablet Generic drug:  calcium citrate-vitamin D3 Take  by mouth two (2) times a day. CYMBALTA 60 mg capsule Generic drug:  DULoxetine Take 120 mg by mouth daily. DIGESTIVE ENZYMES (PLANT) PO Take  by mouth.  
  
 ergocalciferol 50,000 unit capsule Commonly known as:  ERGOCALCIFEROL  
take 1 capsule by mouth TWO (2) TIMES A WEEK  Indications: PREVENTION OF VITAMIN D DEFICIENCY  
  
 gabapentin 400 mg capsule Commonly known as:  NEURONTIN Take  by mouth three (3) times daily. Takes 2 TID  
  
 LONOX PO Take  by mouth. LORazepam 2 mg tablet Commonly known as:  ATIVAN Take  by mouth every eight (8) hours as needed for Anxiety. MAGOX 400 mg tablet Generic drug:  magnesium oxide Take 400 mg by mouth daily. meloxicam 15 mg tablet Commonly known as:  MOBIC  
take 1 tablet by mouth once daily Methen-Hyos-M Blue-BA-PhSal tablet Commonly known as:  HYOPHEN Take 1 Tab by mouth four (4) times daily. modafinil 200 mg tablet Commonly known as:  PROVIGIL  
take 1 tablet by mouth twice a day for 30 DAYS maximum daily dose of 2 tablets  
  
 mometasone 220 mcg (60 doses) inhaler Commonly known as:  Jacqulin Sers Take 2 Puffs by inhalation two (2) times a day. montelukast 10 mg tablet Commonly known as:  SINGULAIR Take 1 Tab by mouth daily. OTHER Indications: Compound cream for inflammation OXcarbazepine 150 mg tablet Commonly known as:  TRILEPTAL Take 1 Tab by mouth three (3) times daily. pantoprazole 40 mg tablet Commonly known as:  PROTONIX Take 1 Tab by mouth daily. pentosan polysulfate sodium 100 mg capsule Commonly known as:  Brena Lambing Take 1 Cap by mouth two (2) times a day. Indications: Bladder Pain, Interstitial Cystitis PHAZYME 180 mg Cap Generic drug:  simethicone Take  by mouth as needed. pramoxine-hydrocortisone 1-1 % topical cream  
Commonly known as:  ANALPRAM HC Apply  to affected area three (3) times daily. PRELIEF 65 mg Tab Generic drug:  calcium glycerophosphate Take  by mouth. PROBIOTIC 4X 10-15 mg Tbec Generic drug:  B.infantis-B.ani-B.long-B.bifi Take  by mouth. PROLIA 60 mg/mL injection Generic drug:  denosumab  
TO BE ADMINISTERED IN PHYSICIAN'S OFFICE. INJECT 1ML (60MG) SUBCUTANEOUSLY ONCE EVERY 6 MONTHS. REFRIGERATE. USE WITHIN 14 DAYS ONCE AT ROOM  
  
 tiotropium bromide 2.5 mcg/actuation inhaler Commonly known as:  Sil Fuentes Take 2 Puffs by inhalation daily. Indications: PREVENTION OF BRONCHOSPASM WITH CHRONIC BRONCHITIS  
  
 traZODone 100 mg tablet Commonly known as:  Socrates Handy Take 100 mg by mouth nightly. WOMEN'S 50 PLUS DAILY FORMULA PO Take  by mouth. Prescriptions Sent to Pharmacy Refills  
 pramoxine-hydrocortisone (ANALPRAM HC) 1-1 % topical cream 2 Sig: Apply  to affected area three (3) times daily. Class: Normal  
 Pharmacy: [id number] [facility], [address] [place] [address] #: [phone] Route: Topical  
  
To-Do List   
 Around 06/01/2018 Lab:  METABOLIC PANEL, COMPREHENSIVE Introducing [facility]! Jose De La Cruz introduces [employer] patient portal. Now you can access parts of your medical record, email your doctor's office, and request medication refills online. 1. In your internet browser, go to [email or web address]. [employer] 2. Click on the First Time User? Click Here link in the Sign In box. You will see the New Member Sign Up page. 3. Enter your Active Media Access Code exactly as it appears below. You will not need to use this code after youve completed the sign-up process. If you do not sign up before the expiration date, you must request a new code. · [employer] Access Code: [email or web address] Expires: 12/11/2017 11:01 AM 
 
4. Enter the last four digits of your Social Security Number (xxxx) and Date of Birth (mm/dd/yyyy) as indicated and click Submit. You will be taken to the next sign-up page. 5. Create a BIO-NEMSt ID. This will be your Active Media login ID and cannot be changed, so think of one that is secure and easy to remember. 6. Create a [employer] password. You can change your password at any time. 7. Enter your Password Reset Question and Answer. This can be used at a later time if you forget your password. 8. Enter your e-mail address. You will receive e-mail notification when new information is available in [address] 9. Click Sign Up. You can now view and download portions of your medical record. 10. Click the Download Summary menu link to download a portable copy of your medical information. If you have questions, please visit the Frequently Asked Questions section of the [employer] website. Remember, [employer] is NOT to be used for urgent needs. For medical emergencies, dial 911. Now available from your iPhone and Android! Please provide this summary of care documentation to your next provider. Your primary care clinician is listed as Juan Manuel Castrejon. Gadiel Burciaga. If you have any questions after today's visit, please call [phone].

## 2017-12-08 NOTE — PROGRESS NOTES
Sariah Oro 1960, is a 65 y.o. female, who is seen today for reevaluation of osteoporosis, hyperlipidemia, fibromyalgia, depression, asthma and other issues. Her asthma has been doing well and she does see a pulmonologist periodically. She is not coughing or wheezing or dyspneic. She was diagnosed with osteoporosis and started on Prolia almost 2 years ago by her pain management doctor because her bone density was low with a T score of -2.6. Her next injection will be due in January if she continues on that therapy. Her pain management doctor did move his practice to [place] but she plans to continue to see him. She knows of no reason why she was started on Prolia rather than alendronate. She has never had a fracture. She is on calcium and vitamin D. She has fibromyalgia and is doing fairly well, energy level not too bad she continues medication. She has hyperlipidemia but her 10 year risk is always been quite low so we have not considered using statin therapy. She continues low-fat diet and continues to try to keep her weight down. Her chronic depression is about the same as she continues on medication. She has a history of breast biopsy showing ductal hyperplasia but has had normal mammograms since then. Her most recent mammogram was done locally rather than in [place] and they did call her back for further films a few days ago but it turns out that that is a BI-RADS 2 reading so she does not need another mammogram for a year. Her last Pap smear was a year ago when she is having to switch gynecologist, hers is retiring.     Past Medical History:   Diagnosis Date    Allergy     Anxiety     Arthritis     Asthma     mild    Atypical ductal hyperplasia of breast     Atypical hyperplasia of breast 2003    right    Chronic depression     Chronic fatigue syndrome     Chronic interstitial cystitis     Chronic interstitial cystitis     Chronic pain     fibromyalgia    Constipation     Depression     Diarrhea     Difficulty walking     Dizziness     Dry eyes     Dry mouth     Dyspnea     Fibromyalgia     Fibromyalgia     Generalized stiffness     GERD (gastroesophageal reflux disease)     Headache(784.0)     Heartburn     Hyperlipidemia     IBS (irritable bowel syndrome)     IC (interstitial cystitis)     Imbalance     Incoordination     Insomnia     Irregular periods/menstrual cycles     Joint pain     Microscopic hematuria     Muscle pain     Myofascial pain syndrome     Numbness and tingling in hands     Ovarian cyst, left     Painful sex     Pneumonia     Psychiatric disorder     anxiety - depression    RLS (restless legs syndrome)     Stress     TMJ (temporomandibular joint syndrome)     Transient total loss of muscle tone     Uterine fibroid     Weakness generalized      Past Surgical History:   Procedure Laterality Date    ENDOSCOPY, COLON, DIAGNOSTIC  2013    10 years    HX BREAST BIOPSY  2003    right    HX COLONOSCOPY  2012    donohue    HX GYN      left ovarian cystectomy    HX GYN      laparoscopy, laparotomy, lysis of adhesions.  HX MYOMECTOMY      HX OVARIAN CYST REMOVAL  2003    HX UROLOGICAL  05/24/10    cystoscopy and hydrodilation     Current Outpatient Prescriptions   Medication Sig Dispense Refill    calcium combo no.2-vitamin D3 (CITRACAL + D SLOW RELEASE) 600 mg calcium- 500 unit TbER Take  by mouth.  magnesium oxide (MAGOX) 400 mg tablet Take 400 mg by mouth daily.  MV-MN/FOLIC ACID/CALCIUM/VIT K (WOMEN'S 50 PLUS DAILY FORMULA PO) Take  by mouth.  pramoxine-hydrocortisone (ANALPRAM HC) 1-1 % topical cream Apply  to affected area three (3) times daily. 30 g 2    tiotropium bromide (SPIRIVA RESPIMAT) 2.5 mcg/actuation inhaler Take 2 Puffs by inhalation daily.  Indications: PREVENTION OF BRONCHOSPASM WITH CHRONIC BRONCHITIS 3 Inhaler 6    albuterol (PROVENTIL HFA, VENTOLIN HFA, PROAIR HFA) 90 mcg/actuation inhaler Take 2 Puffs by inhalation every six (6) hours as needed for Wheezing. 3 Inhaler 6    Methen-Hyos-M Blue-BA-PhSal (HYOPHEN) tablet Take 1 Tab by mouth four (4) times daily. 120 Tab 3    OXcarbazepine (TRILEPTAL) 150 mg tablet Take 1 Tab by mouth three (3) times daily. 90 Tab 2    pentosan polysulfate sodium (ELMIRON) 100 mg capsule Take 1 Cap by mouth two (2) times a day. Indications: Bladder Pain, Interstitial Cystitis 120 Cap 3    PROLIA 60 mg/mL injection TO BE ADMINISTERED IN PHYSICIAN'S OFFICE. INJECT 1ML (60MG) SUBCUTANEOUSLY ONCE EVERY 6 MONTHS. REFRIGERATE. USE WITHIN 14 DAYS ONCE AT ROOM 1 Syringe 1    calcium glycerophosphate (PRELIEF) 65 mg tab Take  by mouth.  pantoprazole (PROTONIX) 40 mg tablet Take 1 Tab by mouth daily. 90 Tab 3    meloxicam (MOBIC) 15 mg tablet take 1 tablet by mouth once daily 30 Tab 11    modafinil (PROVIGIL) 200 mg tablet take 1 tablet by mouth twice a day for 30 DAYS maximum daily dose of 2 tablets 60 Tab 5    ergocalciferol (ERGOCALCIFEROL) 50,000 unit capsule take 1 capsule by mouth TWO (2) TIMES A WEEK  Indications: PREVENTION OF VITAMIN D DEFICIENCY (Patient taking differently: take 1 capsule by mouth once a week  Indications: PREVENTION OF VITAMIN D DEFICIENCY) 8 Cap 11    mometasone (ASMANEX TWISTHALER) 220 mcg (60 doses) inhaler Take 2 Puffs by inhalation two (2) times a day. 120 Cap 6    gabapentin (NEURONTIN) 400 mg capsule Take  by mouth three (3) times daily. Takes 2 TID      montelukast (SINGULAIR) 10 mg tablet Take 1 Tab by mouth daily. 90 Tab 6    OTHER Indications: Compound cream for inflammation      B.infantis-B.ani-B.long-B.bifi (PROBIOTIC 4X) 10-15 mg TbEC Take  by mouth.  ENZYMES,DIGESTIVE, PLANT, (DIGESTIVE ENZYMES, PLANT, PO) Take  by mouth.  DIPHENOXYLATE HCL/ATROPINE (LONOX PO) Take  by mouth.  calcium citrate-vitamin D3 (CITRACAL + D) tablet Take  by mouth two (2) times a day.       LORazepam (ATIVAN) 2 mg tablet Take  by mouth every eight (8) hours as needed for Anxiety.  DULoxetine (CYMBALTA) 60 mg capsule Take 120 mg by mouth daily.  simethicone (PHAZYME) 180 mg cap Take  by mouth as needed.  trazodone (DESYREL) 100 mg tablet Take 100 mg by mouth nightly.  b complex vitamins tablet Take 1 Tab by mouth daily. Allergies   Allergen Reactions    Codeine Rash and Itching     Other reaction(s): other/intolerance    Tussionex Itching     Social History     Social History    Marital status:      Spouse name: N/A    Number of children: N/A    Years of education: N/A     Social History Main Topics    Smoking status: Never Smoker    Smokeless tobacco: Never Used    Alcohol use 0.5 oz/week     1 Cans of beer per week      Comment: socially    Drug use: No    Sexual activity: Yes     Partners: Male     Other Topics Concern    None     Social History Narrative     Visit Vitals    /72 (BP 1 Location: Left arm, BP Patient Position: Sitting)    Pulse 80    Temp 98.5 °F (36.9 °C) (Oral)    Resp 14    Ht 5' 2\" (1.575 m)    Wt 150 lb 3.2 oz (68.1 kg)    SpO2 98%    BMI 27.47 kg/m2     The patient is a well-developed well-nourished female in no apparent distress. HEENT: Pupils are equal and react to light and extraocular movements are full. Ear canals and tympanic membranes appear normal. Oral cavity appears normal with no oral lesions. Neck: Carotids are 2+ without bruits. No adenopathy or thyromegaly. Lungs are clear to percussion. I hear no wheezing, rales or rhonchi. Heart reveals a regular rhythm with no murmur, gallop, click or rub. The apical impulse is in the fifth interspace at the midclavicular line. Abdomen is soft and nontender with no hepatosplenomegaly or masses. Bowel sounds are normoactive and there is no distention or tympany. Extremities reveal no clubbing cyanosis or edema. Pulses are 2+. Skin reveals no suspicious skin growths.  Breasts reveal no masses, skin or nipple abnormalities. No axillary adenopathy. Results for orders placed or performed during the hospital encounter of 11/30/17   CBC WITH AUTOMATED DIFF   Result Value Ref Range    WBC 5.3 4.6 - 13.2 K/uL    RBC 5.11 4.20 - 5.30 M/uL    HGB 14.3 12.0 - 16.0 g/dL    HCT 43.1 35.0 - 45.0 %    MCV 84.3 74.0 - 97.0 FL    MCH 28.0 24.0 - 34.0 PG    MCHC 33.2 31.0 - 37.0 g/dL    RDW 12.7 11.6 - 14.5 %    PLATELET 660 647 - 543 K/uL    MPV 9.7 9.2 - 11.8 FL    NEUTROPHILS 65 40 - 73 %    LYMPHOCYTES 27 21 - 52 %    MONOCYTES 6 3 - 10 %    EOSINOPHILS 2 0 - 5 %    BASOPHILS 0 0 - 2 %    ABS. NEUTROPHILS 3.4 1.8 - 8.0 K/UL    ABS. LYMPHOCYTES 1.5 0.9 - 3.6 K/UL    ABS. MONOCYTES 0.3 0.05 - 1.2 K/UL    ABS. EOSINOPHILS 0.1 0.0 - 0.4 K/UL    ABS. BASOPHILS 0.0 0.0 - 0.06 K/UL    DF AUTOMATED     T4, FREE   Result Value Ref Range    T4, Free 1.0 0.7 - 1.5 NG/DL   LIPID PANEL   Result Value Ref Range    LIPID PROFILE          Cholesterol, total 321 (H) <200 MG/DL    Triglyceride 148 <150 MG/DL    HDL Cholesterol 73 (H) 40 - 60 MG/DL    LDL, calculated 218.4 (H) 0 - 100 MG/DL    VLDL, calculated 29.6 MG/DL    CHOL/HDL Ratio 4.4 0 - 5.0     METABOLIC PANEL, COMPREHENSIVE   Result Value Ref Range    Sodium 134 (L) 136 - 145 mmol/L    Potassium 4.0 3.5 - 5.5 mmol/L    Chloride 98 (L) 100 - 108 mmol/L    CO2 24 21 - 32 mmol/L    Anion gap 12 3.0 - 18 mmol/L    Glucose 92 74 - 99 mg/dL    BUN 17 7.0 - 18 MG/DL    Creatinine 0.72 0.6 - 1.3 MG/DL    BUN/Creatinine ratio 24 (H) 12 - 20      GFR est AA >60 >60 ml/min/1.73m2    GFR est non-AA >60 >60 ml/min/1.73m2    Calcium 8.5 8.5 - 10.1 MG/DL    Bilirubin, total 0.5 0.2 - 1.0 MG/DL    ALT (SGPT) 39 13 - 56 U/L    AST (SGOT) 21 15 - 37 U/L    Alk.  phosphatase 33 (L) 45 - 117 U/L    Protein, total 7.1 6.4 - 8.2 g/dL    Albumin 4.1 3.4 - 5.0 g/dL    Globulin 3.0 2.0 - 4.0 g/dL    A-G Ratio 1.4 0.8 - 1.7     TSH 3RD GENERATION   Result Value Ref Range    TSH 3.11 0.36 - 3. 74 uIU/mL   VITAMIN D, 25 HYDROXY   Result Value Ref Range    Vitamin D 25-Hydroxy 75.8 30 - 100 ng/mL     Assessment: #1.  Very mild osteoporosis but her FRAX calculation shows her risk of major osteoporotic fracture over the next 10 years to be 7.8% and hip fracture 0.9%, as I have told her with this situation she really should not be on Prolia or alendronate, but should continue weightbearing and calcium supplements and vitamin D supplement. We will plan to do another bone density in several years probably, she likely should not have had a bone density checked before age 73 but perhaps at 62 or 62 at this point to be sure there is not a significant deterioration in her FRAX calculation. #2.  Marked hyperlipidemia little changed on a healthy diet. Her risk of developing symptomatic atherosclerotic disease over the next 10 years is about 2%. She will continue very healthy diet and avoidance of significant weight gain but is not a candidate for statin therapy with this low risk. #3.  Fibromyalgia unchanged, she is going to follow-up with Dr. Yetta Angelucci in [facility]. #4.  Mild intermittent asthma doing well, she will continue Spiriva. #5.  Depression is stabilized, she is minimally depressed currently just fatigued as usual.  She will continue Trileptal 150 mg 3 times daily and she will follow-up with her psychiatrist.  #6. Interstitial cystitis. That is followed elsewhere and we did not do a urinalysis but she would like one done next visit so that will be arranged. I have told the patient she does not need another pelvic and Pap smear for 2 more years per current national guidelines. We will find her a gynecologist in this area in 2 years when that is due. This visit lasted 40 minutes, greater than 50% of the time was spent counseling on osteoporosis, hyperlipidemia, proper frequency for Pap smears, proper follow-up for her history of ductal hyperplasia which she will continue.   We discussed her other symptoms as well at great length. We discussed follow-up with her other doctors at some length. Follow-up in 6 months with CMP and urinalysis. Gabriel Chowdhury MD FACP    Please note: This document has been produced using voice recognition software. Unrecognized errors in transcription may be present.

## 2017-12-08 NOTE — PROGRESS NOTES
Chief Complaint   Patient presents with    Anxiety     Yearly physical with lab results. ROOM 10       Health Maintenance Due   Topic Date Due    DTaP/Tdap/Td series (1 - Tdap) 07/18/1982       1. Have you been to the ER, urgent care clinic or hospitalized since your last visit? NO.     2. Have you seen or consulted any other health care providers outside of the [address] [place] [address] since your last visit (Include any pap smears or colon screening)? YES, Patient states she went to her GYN last year and had a mammogram this week Monday. Do you have an Advanced Directive? YES, Patient notified to bring her medical directive and she verbalized understanding.

## 2018-01-29 ENCOUNTER — HOSPITAL ENCOUNTER (OUTPATIENT)
Dept: BONE DENSITY | Age: 58
Discharge: HOME | End: 2018-01-29
Attending: PSYCHIATRY & NEUROLOGY
Payer: MEDICARE

## 2018-01-29 DIAGNOSIS — M81.0 OSTEOPOROSIS: ICD-10-CM

## 2018-01-29 PROCEDURE — 77080 DXA BONE DENSITY AXIAL: CPT

## 2018-03-01 ENCOUNTER — OFFICE VISIT (OUTPATIENT)
Dept: PULMONOLOGY | Age: 58
End: 2018-03-01

## 2018-03-01 VITALS
RESPIRATION RATE: 18 BRPM | HEART RATE: 101 BPM | BODY MASS INDEX: 27.6 KG/M2 | OXYGEN SATURATION: 97 % | SYSTOLIC BLOOD PRESSURE: 110 MMHG | HEIGHT: 62 IN | WEIGHT: 150 LBS | DIASTOLIC BLOOD PRESSURE: 66 MMHG | TEMPERATURE: 98.6 F

## 2018-03-01 DIAGNOSIS — J45.40 MODERATE PERSISTENT ASTHMA WITHOUT COMPLICATION: Primary | ICD-10-CM

## 2018-03-01 DIAGNOSIS — R06.09 DYSPNEA ON EXERTION: ICD-10-CM

## 2018-03-01 DIAGNOSIS — J20.9 BRONCHITIS WITH BRONCHOSPASM: ICD-10-CM

## 2018-03-01 DIAGNOSIS — G47.33 OSA (OBSTRUCTIVE SLEEP APNEA): ICD-10-CM

## 2018-03-01 RX ORDER — MONTELUKAST SODIUM 10 MG/1
10 TABLET ORAL DAILY
Qty: 90 TAB | Refills: 6 | Status: SHIPPED | OUTPATIENT
Start: 2018-03-01 | End: 2019-03-04 | Stop reason: SDUPTHER

## 2018-03-01 NOTE — PROGRESS NOTES
[facility]  Pulmonary, Critical Care, and Sleep Medicine      Pulmonary Office Progress Notes    Name: Jasson Paula     : 1960     Date: 3/1/2018        Subjective:     Patient is a 65 y.o. female with history of asthma and reactive airways presents for follow up.    18  C/o increased SOB- 10 days. At rest and describes as heavy breathing and having to take deep breaths. Has been weeding in the garden. Does not wear mask- she usually is out walking her dog and starts pulling. Denies any wheezing. No increase in nasal congestion. Not coughing at present but did have some coughing this season with the change of weather. Denies  sore throat. Needs refills: Singulair and Asmanex. C/o hand arthritis symptoms. -on mobic  She reports increased symptoms of chest discomfort and some cough with the change of weather. Some congestion which improved with claritan. She had started water exercises. -swimming laps but not very regular now. She felt improvement with Respimet . She is under the care of pain management for fibromyalgia.     Past Medical History:   Diagnosis Date    Allergy     Anxiety     Arthritis     Asthma     mild    Atypical ductal hyperplasia of breast     Atypical hyperplasia of breast 2003    right    Chronic depression     Chronic fatigue syndrome     Chronic interstitial cystitis     Chronic interstitial cystitis     Chronic pain     fibromyalgia    Constipation     Depression     Diarrhea     Difficulty walking     Dizziness     Dry eyes     Dry mouth     Dyspnea     Fibromyalgia     Fibromyalgia     Generalized stiffness     GERD (gastroesophageal reflux disease)     Headache(784.0)     Heartburn     Hyperlipidemia     IBS (irritable bowel syndrome)     IC (interstitial cystitis)     Imbalance     Incoordination     Insomnia     Irregular periods/menstrual cycles     Joint pain     Microscopic hematuria     Muscle pain     Myofascial pain syndrome     Numbness and tingling in hands     Ovarian cyst, left     Painful sex     Pneumonia     Psychiatric disorder     anxiety - depression    RLS (restless legs syndrome)     Stress     TMJ (temporomandibular joint syndrome)     Transient total loss of muscle tone     Uterine fibroid     Weakness generalized        Allergies   Allergen Reactions    Codeine Rash and Itching     Other reaction(s): other/intolerance    Tussionex Itching       Current Outpatient Prescriptions   Medication Sig Dispense Refill    denosumab (PROLIA) 60 mg/mL injection 60 mg by SubCUTAneous route.  pentosan polysulfate sodium (ELMIRON) 100 mg capsule Take 1 Cap by mouth two (2) times a day. Indications: Bladder Pain, Interstitial Cystitis 120 Cap 3    calcium combo no.2-vitamin D3 (CITRACAL + D SLOW RELEASE) 600 mg calcium- 500 unit TbER Take  by mouth.  magnesium oxide (MAGOX) 400 mg tablet Take 400 mg by mouth daily.  pramoxine-hydrocortisone (ANALPRAM HC) 1-1 % topical cream Apply  to affected area three (3) times daily. 30 g 2    tiotropium bromide (SPIRIVA RESPIMAT) 2.5 mcg/actuation inhaler Take 2 Puffs by inhalation daily. Indications: PREVENTION OF BRONCHOSPASM WITH CHRONIC BRONCHITIS 3 Inhaler 6    albuterol (PROVENTIL HFA, VENTOLIN HFA, PROAIR HFA) 90 mcg/actuation inhaler Take 2 Puffs by inhalation every six (6) hours as needed for Wheezing. 3 Inhaler 6    Methen-Hyos-M Blue-BA-PhSal (HYOPHEN) tablet Take 1 Tab by mouth four (4) times daily. 120 Tab 3    OXcarbazepine (TRILEPTAL) 150 mg tablet Take 1 Tab by mouth three (3) times daily. 90 Tab 2    calcium glycerophosphate (PRELIEF) 65 mg tab Take  by mouth.  pantoprazole (PROTONIX) 40 mg tablet Take 1 Tab by mouth daily.  90 Tab 3    meloxicam (MOBIC) 15 mg tablet take 1 tablet by mouth once daily 30 Tab 11    modafinil (PROVIGIL) 200 mg tablet take 1 tablet by mouth twice a day for 30 DAYS maximum daily dose of 2 tablets 60 Tab 5    ergocalciferol (ERGOCALCIFEROL) 50,000 unit capsule take 1 capsule by mouth TWO (2) TIMES A WEEK  Indications: PREVENTION OF VITAMIN D DEFICIENCY (Patient taking differently: take 1 capsule by mouth once a week  Indications: PREVENTION OF VITAMIN D DEFICIENCY) 8 Cap 11    mometasone (ASMANEX TWISTHALER) 220 mcg (60 doses) inhaler Take 2 Puffs by inhalation two (2) times a day. 120 Cap 6    gabapentin (NEURONTIN) 400 mg capsule Take  by mouth three (3) times daily. Takes 2 TID      montelukast (SINGULAIR) 10 mg tablet Take 1 Tab by mouth daily. 90 Tab 6    B.infantis-B.ani-B.long-B.bifi (PROBIOTIC 4X) 10-15 mg TbEC Take  by mouth.  ENZYMES,DIGESTIVE, PLANT, (DIGESTIVE ENZYMES, PLANT, PO) Take  by mouth.  DIPHENOXYLATE HCL/ATROPINE (LONOX PO) Take  by mouth.  calcium citrate-vitamin D3 (CITRACAL + D) tablet Take  by mouth two (2) times a day.  LORazepam (ATIVAN) 2 mg tablet Take  by mouth every eight (8) hours as needed for Anxiety.  DULoxetine (CYMBALTA) 60 mg capsule Take 120 mg by mouth daily.  simethicone (PHAZYME) 180 mg cap Take  by mouth as needed.  trazodone (DESYREL) 100 mg tablet Take 100 mg by mouth nightly.  [facility] Blue-Sod Phos-PhSal-Hyo (URIBEL) 118-10-40.8-36 mg cap capsule Take 1 Cap by mouth four (4) times daily. 120 Cap 3    MV-MN/FOLIC ACID/CALCIUM/VIT K (WOMEN'S 50 PLUS DAILY FORMULA PO) Take  by mouth.  OTHER Indications: Compound cream for inflammation      b complex vitamins tablet Take 1 Tab by mouth daily.          Review of Systems:  HEENT: No epistaxis, no nasal drainage, no difficulty in swallowing, no redness in eyes  Respiratory: as above  Cardiovascular: no chest pain, no palpitations, no chronic leg edema, no syncope  Gastrointestinal:  Retrosternal pain+ no abd pain, no vomiting, no diarrhea, no bleeding symptoms  Genitourinary: No urinary symptoms or hematuria  Integument/breast: No ulcers or rashes  Musculoskeletal:Neg  Neurological: No focal weakness, no seizures, no headaches  Behvioral/Psych: No anxiety, no depression  Constitutional: No fever, no chills, no weight loss, no night sweats     Objective:     Visit Vitals    /66 (BP 1 Location: Left arm, BP Patient Position: Sitting)    Pulse (!) 101    Temp 98.6 °F (37 °C) (Oral)    Resp 18    Ht 5' 2\" (1.575 m)    Wt 68 kg (150 lb)    SpO2 97%  Comment: RA Rest    BMI 27.44 kg/m2        Physical Exam:   General: comfortable, no acute distress  HEENT: pupils reactive, sclera anicteric, EOM intact  Neck: No adenopathy or thyroid swelling, no lymphadenopathy or JVD, supple  CVS: S1S2 no murmurs  RS: Mod AE bilaterally, no tactile fremitus or egophony, no accessory muscle use  Neuro: non focal, awake, alert  Extrm: no leg edema, clubbing or cyanosis  Skin: no rash    Data review:     Hospital Outpatient Visit on 11/30/2017   Component Date Value Ref Range Status    WBC 11/30/2017 5.3  4.6 - 13.2 K/uL Final    RBC 11/30/2017 5.11  4.20 - 5.30 M/uL Final    HGB 11/30/2017 14.3  12.0 - 16.0 g/dL Final    HCT 11/30/2017 43.1  35.0 - 45.0 % Final    MCV 11/30/2017 84.3  74.0 - 97.0 FL Final    MCH 11/30/2017 28.0  24.0 - 34.0 PG Final    MCHC 11/30/2017 33.2  31.0 - 37.0 g/dL Final    RDW 11/30/2017 12.7  11.6 - 14.5 % Final    PLATELET 10/20/2597 590  135 - 420 K/uL Final    MPV 11/30/2017 9.7  9.2 - 11.8 FL Final    NEUTROPHILS 11/30/2017 65  40 - 73 % Final    LYMPHOCYTES 11/30/2017 27  21 - 52 % Final    MONOCYTES 11/30/2017 6  3 - 10 % Final    EOSINOPHILS 11/30/2017 2  0 - 5 % Final    BASOPHILS 11/30/2017 0  0 - 2 % Final    ABS. NEUTROPHILS 11/30/2017 3.4  1.8 - 8.0 K/UL Final    ABS. LYMPHOCYTES 11/30/2017 1.5  0.9 - 3.6 K/UL Final    ABS. MONOCYTES 11/30/2017 0.3  0.05 - 1.2 K/UL Final    ABS. EOSINOPHILS 11/30/2017 0.1  0.0 - 0.4 K/UL Final    ABS.  BASOPHILS 11/30/2017 0.0  0.0 - 0.06 K/UL Final    DF 11/30/2017 AUTOMATED    Final    T4, Free 11/30/2017 1.0  0.7 - 1.5 NG/DL Final    LIPID PROFILE 11/30/2017        Final    Cholesterol, total 11/30/2017 321* <200 MG/DL Final    Triglyceride 11/30/2017 148  <150 MG/DL Final    Comment: The drugs N-acetylcysteine (NAC) and  Metamiszole have been found to cause falsely  low results in this chemical assay. Please  be sure to submit blood samples obtained  BEFORE administration of either of these  drugs to assure correct results.  HDL Cholesterol 11/30/2017 73* 40 - 60 MG/DL Final    LDL, calculated 11/30/2017 218.4* 0 - 100 MG/DL Final    VLDL, calculated 11/30/2017 29.6  MG/DL Final    CHOL/HDL Ratio 11/30/2017 4.4  0 - 5.0   Final    Sodium 11/30/2017 134* 136 - 145 mmol/L Final    Potassium 11/30/2017 4.0  3.5 - 5.5 mmol/L Final    Chloride 11/30/2017 98* 100 - 108 mmol/L Final    CO2 11/30/2017 24  21 - 32 mmol/L Final    Anion gap 11/30/2017 12  3.0 - 18 mmol/L Final    Glucose 11/30/2017 92  74 - 99 mg/dL Final    BUN 11/30/2017 17  7.0 - 18 MG/DL Final    Creatinine 11/30/2017 0.72  0.6 - 1.3 MG/DL Final    BUN/Creatinine ratio 11/30/2017 24* 12 - 20   Final    GFR est AA 11/30/2017 >60  >60 ml/min/1.73m2 Final    GFR est non-AA 11/30/2017 >60  >60 ml/min/1.73m2 Final    Comment: (NOTE)  Estimated GFR is calculated using the Modification of Diet in Renal   Disease (MDRD) Study equation, reported for both  Americans   (GFRAA) and non- Americans (GFRNA), and normalized to 1.73m2   body surface area. The physician must decide which value applies to   the patient. The MDRD study equation should only be used in   individuals age 25 or older. It has not been validated for the   following: pregnant women, patients with serious comorbid conditions,   or on certain medications, or persons with extremes of body size,   muscle mass, or nutritional status.       Calcium 11/30/2017 8.5  8.5 - 10.1 MG/DL Final    Bilirubin, total 11/30/2017 0.5 0.2 - 1.0 MG/DL Final    ALT (SGPT) 11/30/2017 39  13 - 56 U/L Final    AST (SGOT) 11/30/2017 21  15 - 37 U/L Final    Alk. phosphatase 11/30/2017 33* 45 - 117 U/L Final    Protein, total 11/30/2017 7.1  6.4 - 8.2 g/dL Final    Albumin 11/30/2017 4.1  3.4 - 5.0 g/dL Final    Globulin 11/30/2017 3.0  2.0 - 4.0 g/dL Final    A-G Ratio 11/30/2017 1.4  0.8 - 1.7   Final    TSH 11/30/2017 3.11  0.36 - 3.74 uIU/mL Final    Vitamin D 25-Hydroxy 11/30/2017 75.8  30 - 100 ng/mL Final    Comment: (NOTE)  Deficiency               <20 ng/mL  Insufficiency          20-30 ng/mL  Sufficient             ng/mL  Possible toxicity       >100 ng/mL    The Method used is Siemens Advia Centaur currently standardized to a   Center of Disease Control and Prevention (CDC) certified reference   [facility]. Samples containing fluorescein dye can produce falsely   elevated values when tested with the ADVIA Centaur Vitamin D Assay. It is recommended that results in the toxic range, >100 ng/mL, be   retested 72 hours post fluorescein exposure.      Office Visit on 09/12/2017   Component Date Value Ref Range Status    Color (UA POC) 09/12/2017 Colorless   Final    green    Clarity (UA POC) 09/12/2017 Clear   Final    Glucose (UA POC) 09/12/2017 Negative  Negative Final    Bilirubin (UA POC) 09/12/2017 2+  Negative Final    Ketones (UA POC) 09/12/2017 Negative  Negative Final    Specific gravity (UA POC) 09/12/2017 1.020  1.001 - 1.035 Final    Blood (UA POC) 09/12/2017 Trace  Negative Final    pH (UA POC) 09/12/2017 6.5  4.6 - 8.0 Final    Protein (UA POC) 09/12/2017 1+  Negative mg/dL Final    Urobilinogen (UA POC) 09/12/2017 0.2 mg/dL  0.2 - 1 Final    Nitrites (UA POC) 09/12/2017 Negative  Negative Final    Leukocyte esterase (UA POC) 09/12/2017 Negative  Negative Final       Imaging:  I have personally reviewed the patients radiographs and have reviewed the reports:  No recent CXR     IMPRESSION:   Asthma- fairly well controlled with recent symptomatic exacerbations related to environmental exposure to temp change, pollen- weeding and also some increased GERD symptoms  Reactive airways- periodic flare ups related to environmental changes  Anxiety/Depression  Stress  IBS  GERD- on Nexium        RECOMMENDATIONS:   · Continue current treatment  · Discussed need for preventing trigger exposure- wearing mask when weeding  · Continue Stepped up GERD treatment- encouraged to take antacids when symptomatic  · Continue Spiriva Respimet, Asmanex  · Will continue current treatment and encourage airway humidification , ambient air temp control and continue antiinflammatory control  · If there are any intentions to work in the yard to use a facial mask to help prevent triggering of asthmathic episodes.   · Continue bronchodilators and Singulair   · Prescriptions reviewed and renewed  · Continue treatment per pain management  · Reassurance provided  · Encouraged activity        Carolyn Rios MD

## 2018-03-01 NOTE — PROGRESS NOTES
Ms. Olu Marrufo has a reminder for a \"due or due soon\" health maintenance. I have asked that she contact her primary care provider for follow-up on this health maintenance. Chief Complaint   Patient presents with    Shortness of Breath     1. Have you been to the ER, urgent care clinic since your last visit? Hospitalized since your last visit? No    2. Have you seen or consulted any other health care providers outside of the [address] [place] [address] since your last visit? Include any pap smears or colon screening.  Yes When: 9/2017 Where: [facility] Reason for visit: Follow-up

## 2018-03-01 NOTE — MR AVS SNAPSHOT
[address] [facility], [address] [address] 
[phone] Patient: Liza Lira MRN: [id number] :1960 Visit Information Date & Time Provider Department Dept. Phone Encounter #  
 3/1/2018  9:00 AM MD [facility] Roslyn Negrete [id number] Follow-up Instructions Return in about 6 months (around 2018). Your Appointments 2018  8:45 AM  
LAB with [facility] NURSE VISIT [facility] ([facility]) Appt Note: lab  
 [address], [address] [facility] access hospital [address] [place]  
  
   
 [address], [address]  
  
    
 2018 10:00 AM  
Office Visit with Carmen Cortez MD  
[facility] Appt Note: 6 month f/u  
 [address] [facility], [facility] [phone] [address] [place]  
  
   
 [address], [address] 2018  8:30 AM  
Any with Vishnu Nowak MD  
[facility] ([facility]) Appt Note: 1 yr f/u . PT PREFER AM APPT; 10/26/2017- appt. tickler mailed. [facility] [address] [facility]  
  
   
 [address] [facility] [address] [address] [address] Upcoming Health Maintenance Date Due DTaP/Tdap/Td series (1 - Tdap) 1982 BREAST CANCER SCRN MAMMOGRAM 2019 PAP AKA CERVICAL CYTOLOGY 2019 COLONOSCOPY 2023 Allergies as of 3/1/2018  Review Complete On: 3/1/2018 By: Yarely Hurst MD  
  
 Severity Noted Reaction Type Reactions Codeine    Rash, Itching Other reaction(s): other/intolerance Tussionex  2012   Systemic Itching Current Immunizations  Reviewed on 11/3/2017 Name Date Influenza Vaccine 10/10/2014, 10/17/2013 Influenza Vaccine (Quad) PF 11/3/2017  1:56 PM, 2016, 10/23/2015 Influenza Vaccine Split 10/23/2012 Influenza Vaccine Whole 9/17/2010 Pneumococcal Conjugate (PCV-13) 11/28/2016 Not reviewed this visit You Were Diagnosed With   
  
 Codes Comments LUCRECIA (obstructive sleep apnea)    -  Primary ICD-10-CM: G47.33 
ICD-9-CM: 327.23 Moderate persistent asthma without complication     XHI-19-ANDRES: J45.40 ICD-9-CM: 493.90 Vitals BP Pulse Temp Resp Height(growth percentile) Weight(growth percentile) 110/66 (BP 1 Location: Left arm, BP Patient Position: Sitting) (!) 101 98.6 °F (37 °C) (Oral) 18 5' 2\" (1.575 m) 150 lb (68 kg) SpO2 BMI OB Status Smoking Status 97% 27.44 kg/m2 Postmenopausal Never Smoker BMI and BSA Data Body Mass Index Body Surface Area  
 27.44 kg/m 2 1.72 m 2 Preferred Pharmacy Pharmacy Name Phone [facility] [address] [facility], [facility] [phone] Your Updated Medication List  
  
   
This list is accurate as of 3/1/18  9:34 AM.  Always use your most recent med list.  
  
  
  
  
 albuterol 90 mcg/actuation inhaler Commonly known as:  PROVENTIL HFA, VENTOLIN HFA, PROAIR HFA Take 2 Puffs by inhalation every six (6) hours as needed for Wheezing. b complex vitamins tablet Take 1 Tab by mouth daily. CITRACAL + D SLOW RELEASE 600 mg calcium- 500 unit Tber Generic drug:  calcium combo no.2-vitamin D3 Take  by mouth. Citracal + D tablet Generic drug:  calcium citrate-vitamin D3 Take  by mouth two (2) times a day. CYMBALTA 60 mg capsule Generic drug:  DULoxetine Take 120 mg by mouth daily. DIGESTIVE ENZYMES (PLANT) PO Take  by mouth.  
  
 ergocalciferol 50,000 unit capsule Commonly known as:  ERGOCALCIFEROL  
take 1 capsule by mouth TWO (2) TIMES A WEEK  Indications: PREVENTION OF VITAMIN D DEFICIENCY  
  
 gabapentin 400 mg capsule Commonly known as:  NEURONTIN  
 Take  by mouth three (3) times daily. Takes 2 TID  
  
 LONOX PO Take  by mouth. LORazepam 2 mg tablet Commonly known as:  ATIVAN Take  by mouth every eight (8) hours as needed for Anxiety. MAGOX 400 mg tablet Generic drug:  magnesium oxide Take 400 mg by mouth daily. meloxicam 15 mg tablet Commonly known as:  MOBIC  
take 1 tablet by mouth once daily Methen-Hyos-M Blue-BA-PhSal tablet Commonly known as:  HYOPHEN Take 1 Tab by mouth four (4) times daily. modafinil 200 mg tablet Commonly known as:  PROVIGIL  
take 1 tablet by mouth twice a day for 30 DAYS maximum daily dose of 2 tablets  
  
 mometasone 220 mcg (60 doses) inhaler Commonly known as:  Kinnie Gosling Take 2 Puffs by inhalation two (2) times a day. montelukast 10 mg tablet Commonly known as:  SINGULAIR Take 1 Tab by mouth daily. Mth-Me Blue-Sod Phos-PhSal-Hyo 118-10-40.8-36 mg Cap capsule Commonly known as:  Nicol Pu Take 1 Cap by mouth four (4) times daily. OTHER Indications: Compound cream for inflammation OXcarbazepine 150 mg tablet Commonly known as:  TRILEPTAL Take 1 Tab by mouth three (3) times daily. pantoprazole 40 mg tablet Commonly known as:  PROTONIX Take 1 Tab by mouth daily. pentosan polysulfate sodium 100 mg capsule Commonly known as:  Monroe Angelucci Take 1 Cap by mouth two (2) times a day. Indications: Bladder Pain, Interstitial Cystitis PHAZYME 180 mg Cap Generic drug:  simethicone Take  by mouth as needed. pramoxine-hydrocortisone 1-1 % topical cream  
Commonly known as:  ANALPRAM HC Apply  to affected area three (3) times daily. PRELIEF 65 mg Tab Generic drug:  calcium glycerophosphate Take  by mouth. PROBIOTIC 4X 10-15 mg Tbec Generic drug:  B.infantis-B.ani-B.long-B.bifi Take  by mouth. PROLIA 60 mg/mL injection Generic drug:  denosumab 60 mg by SubCUTAneous route. tiotropium bromide 2.5 mcg/actuation inhaler Commonly known as:  Claudeen Ban Take 2 Puffs by inhalation daily. Indications: PREVENTION OF BRONCHOSPASM WITH CHRONIC BRONCHITIS  
  
 traZODone 100 mg tablet Commonly known as:  Shola Modiing Take 100 mg by mouth nightly. WOMEN'S 50 PLUS DAILY FORMULA PO Take  by mouth. Follow-up Instructions Return in about 6 months (around 2018). Patient Instructions Allergies: Care Instructions Your Care Instructions Allergies occur when your body's defense system (immune system) overreacts to certain substances. The immune system treats a harmless substance as if it were a harmful germ or virus. Many things can cause this overreaction, including pollens, medicine, food, dust, animal dander, and mold. Allergies can be mild or severe. Mild allergies can be managed with home treatment. But medicine may be needed to prevent problems. Managing your allergies is an important part of staying healthy. Your doctor may suggest that you have allergy testing to help find out what is causing your allergies. When you know what things trigger your symptoms, you can avoid them. This can prevent allergy symptoms and other health problems. For severe allergies that cause reactions that affect your whole body (anaphylactic reactions), your doctor may prescribe a shot of epinephrine to carry with you in case you have a severe reaction. Learn how to give yourself the shot and keep it with you at all times. Make sure it is not . Follow-up care is a key part of your treatment and safety. Be sure to make and go to all appointments, and call your doctor if you are having problems. It's also a good idea to know your test results and keep a list of the medicines you take. How can you care for yourself at home? · If you have been told by your doctor that dust or dust mites are causing your allergy, decrease the dust around your bed: ¨ Wash sheets, pillowcases, and other bedding in hot water every week. ¨ Use dust-proof covers for pillows, duvets, and mattresses. Avoid plastic covers because they tear easily and do not \"breathe. \" Wash as instructed on the label. ¨ Do not use any blankets and pillows that you do not need. ¨ Use blankets that you can wash in your washing machine. ¨ Consider removing drapes and carpets, which attract and hold dust, from your bedroom. · If you are allergic to house dust and mites, do not use home humidifiers. Your doctor can suggest ways you can control dust and mites. · Look for signs of cockroaches. Cockroaches cause allergic reactions. Use cockroach baits to get rid of them. Then, clean your home well. Cockroaches like areas where grocery bags, newspapers, empty bottles, or cardboard boxes are stored. Do not keep these inside your home, and keep trash and food containers sealed. Seal off any spots where cockroaches might enter your home. · If you are allergic to mold, get rid of furniture, rugs, and drapes that smell musty. Check for mold in the bathroom. · If you are allergic to outdoor pollen or mold spores, use air-conditioning. Change or clean all filters every month. Keep windows closed. · If you are allergic to pollen, stay inside when pollen counts are high. Use a vacuum  with a HEPA filter or a double-thickness filter at least two times each week. · Stay inside when air pollution is bad. Avoid paint fumes, perfumes, and other strong odors. · Avoid conditions that make your allergies worse. Stay away from smoke. Do not smoke or let anyone else smoke in your house. Do not use fireplaces or wood-burning stoves. · If you are allergic to your pets, change the air filter in your furnace every month. Use high-efficiency filters. · If you are allergic to pet dander, keep pets outside or out of your bedroom. Old carpet and cloth furniture can hold a lot of animal dander. You may need to replace them. When should you call for help? Give an epinephrine shot if: 
? · You think you are having a severe allergic reaction. ? · You have symptoms in more than one body area, such as mild nausea and an itchy mouth. ? After giving an epinephrine shot call 911, even if you feel better. ?Call 911 if: 
? · You have symptoms of a severe allergic reaction. These may include: 
¨ Sudden raised, red areas (hives) all over your body. ¨ Swelling of the throat, mouth, lips, or tongue. ¨ Trouble breathing. ¨ Passing out (losing consciousness). Or you may feel very lightheaded or suddenly feel weak, confused, or restless. ? · You have been given an epinephrine shot, even if you feel better. ?Call your doctor now or seek immediate medical care if: 
? · You have symptoms of an allergic reaction, such as: ¨ A rash or hives (raised, red areas on the skin). ¨ Itching. ¨ Swelling. ¨ Belly pain, nausea, or vomiting. ? Watch closely for changes in your health, and be sure to contact your doctor if: 
? · You do not get better as expected. Where can you learn more? Go to http://linus-rosemarie.info/. Enter E958 in the search box to learn more about \"Allergies: Care Instructions. \" Current as of: September 29, 2016 Content Version: 11.4 © 8232-6620 [employer]. Care instructions adapted under license by [employer] (which disclaims liability or warranty for this information). If you have questions about a medical condition or this instruction, always ask your healthcare professional. [place] [address] any warranty or liability for your use of this information. Introducing [address]! Dayna Danielle introduces [employer] patient portal. Now you can access parts of your medical record, email your doctor's office, and request medication refills online. 1. In your internet browser, go to https://[employer]. [employer] 2. Click on the First Time User? Click Here link in the Sign In box. You will see the New Member Sign Up page. 3. Enter your [employer] Access Code exactly as it appears below. You will not need to use this code after youve completed the sign-up process. If you do not sign up before the expiration date, you must request a new code. · [employer] Access Code: [email or web address] Expires: 4/19/2018  8:57 AM 
 
4. Enter the last four digits of your Social Security Number (xxxx) and Date of Birth (mm/dd/yyyy) as indicated and click Submit. You will be taken to the next sign-up page. 5. Create a [employer] ID. This will be your [employer] login ID and cannot be changed, so think of one that is secure and easy to remember. 6. Create a [employer] password. You can change your password at any time. 7. Enter your Password Reset Question and Answer. This can be used at a later time if you forget your password. 8. Enter your e-mail address. You will receive e-mail notification when new information is available in [address] 9. Click Sign Up. You can now view and download portions of your medical record. 10. Click the Download Summary menu link to download a portable copy of your medical information. If you have questions, please visit the Frequently Asked Questions section of the [employer] website. Remember, [employer] is NOT to be used for urgent needs. For medical emergencies, dial 911. Now available from your iPhone and Android! Please provide this summary of care documentation to your next provider. Your primary care clinician is listed as Reyna Oropeza. If you have any questions after today's visit, please call [phone].

## 2018-03-01 NOTE — PATIENT INSTRUCTIONS
Allergies: Care Instructions  Your Care Instructions    Allergies occur when your body's defense system (immune system) overreacts to certain substances. The immune system treats a harmless substance as if it were a harmful germ or virus. Many things can cause this overreaction, including pollens, medicine, food, dust, animal dander, and mold. Allergies can be mild or severe. Mild allergies can be managed with home treatment. But medicine may be needed to prevent problems. Managing your allergies is an important part of staying healthy. Your doctor may suggest that you have allergy testing to help find out what is causing your allergies. When you know what things trigger your symptoms, you can avoid them. This can prevent allergy symptoms and other health problems. For severe allergies that cause reactions that affect your whole body (anaphylactic reactions), your doctor may prescribe a shot of epinephrine to carry with you in case you have a severe reaction. Learn how to give yourself the shot and keep it with you at all times. Make sure it is not . Follow-up care is a key part of your treatment and safety. Be sure to make and go to all appointments, and call your doctor if you are having problems. It's also a good idea to know your test results and keep a list of the medicines you take. How can you care for yourself at home? · If you have been told by your doctor that dust or dust mites are causing your allergy, decrease the dust around your bed:  [facility] sheets, pillowcases, and other bedding in hot water every week. ¨ Use dust-proof covers for pillows, duvets, and mattresses. Avoid plastic covers because they tear easily and do not \"breathe. \" Wash as instructed on the label. ¨ Do not use any blankets and pillows that you do not need. ¨ Use blankets that you can wash in your washing machine. ¨ Consider removing drapes and carpets, which attract and hold dust, from your bedroom.   · If you are allergic to house dust and mites, do not use home humidifiers. Your doctor can suggest ways you can control dust and mites. · Look for signs of cockroaches. Cockroaches cause allergic reactions. Use cockroach baits to get rid of them. Then, clean your home well. Cockroaches like areas where grocery bags, newspapers, empty bottles, or cardboard boxes are stored. Do not keep these inside your home, and keep trash and food containers sealed. Seal off any spots where cockroaches might enter your home. · If you are allergic to mold, get rid of furniture, rugs, and drapes that smell musty. Check for mold in the bathroom. · If you are allergic to outdoor pollen or mold spores, use air-conditioning. Change or clean all filters every month. Keep windows closed. · If you are allergic to pollen, stay inside when pollen counts are high. Use a vacuum  with a HEPA filter or a double-thickness filter at least two times each week. · Stay inside when air pollution is bad. Avoid paint fumes, perfumes, and other strong odors. · Avoid conditions that make your allergies worse. Stay away from smoke. Do not smoke or let anyone else smoke in your house. Do not use fireplaces or wood-burning stoves. · If you are allergic to your pets, change the air filter in your furnace every month. Use high-efficiency filters. · If you are allergic to pet dander, keep pets outside or out of your bedroom. Old carpet and cloth furniture can hold a lot of animal dander. You may need to replace them. When should you call for help? Give an epinephrine shot if:  ? · You think you are having a severe allergic reaction. ? · You have symptoms in more than one body area, such as mild nausea and an itchy mouth. ? After giving an epinephrine shot call 911, even if you feel better. ?Call 911 if:  ? · You have symptoms of a severe allergic reaction. These may include:  ¨ Sudden raised, red areas (hives) all over your body.   ¨ Swelling of the throat, mouth, lips, or tongue. ¨ Trouble breathing. ¨ Passing out (losing consciousness). Or you may feel very lightheaded or suddenly feel weak, confused, or restless. ? · You have been given an epinephrine shot, even if you feel better. ?Call your doctor now or seek immediate medical care if:  ? · You have symptoms of an allergic reaction, such as:  ¨ A rash or hives (raised, red areas on the skin). ¨ Itching. ¨ Swelling. ¨ Belly pain, nausea, or vomiting. ? Watch closely for changes in your health, and be sure to contact your doctor if:  ? · You do not get better as expected. Where can you learn more? Go to http://linus-rosemarie.info/. Enter B407 in the search box to learn more about \"Allergies: Care Instructions. \"  Current as of: September 29, 2016  Content Version: 11.4  © 1514-7902 [employer]. Care instructions adapted under license by [employer] (which disclaims liability or warranty for this information). If you have questions about a medical condition or this instruction, always ask your healthcare professional. [place] [address] any warranty or liability for your use of this information.

## 2018-05-14 ENCOUNTER — DOCUMENTATION ONLY (OUTPATIENT)
Dept: INTERNAL MEDICINE CLINIC | Age: 58
End: 2018-05-14

## 2018-06-01 DIAGNOSIS — N30.10 CHRONIC INTERSTITIAL CYSTITIS: ICD-10-CM

## 2018-06-01 DIAGNOSIS — E78.5 HYPERLIPIDEMIA LDL GOAL <160: ICD-10-CM

## 2018-06-04 ENCOUNTER — HOSPITAL ENCOUNTER (OUTPATIENT)
Dept: LAB | Age: 58
Discharge: HOME | End: 2018-06-04
Payer: MEDICARE

## 2018-06-04 ENCOUNTER — APPOINTMENT (OUTPATIENT)
Dept: INTERNAL MEDICINE CLINIC | Age: 58
End: 2018-06-04

## 2018-06-04 LAB
ALBUMIN SERPL-MCNC: 4 G/DL (ref 3.4–5)
ALBUMIN/GLOB SERPL: 1.5 {RATIO} (ref 0.8–1.7)
ALP SERPL-CCNC: 35 U/L (ref 45–117)
ALT SERPL-CCNC: 47 U/L (ref 13–56)
ANION GAP SERPL CALC-SCNC: 8 MMOL/L (ref 3–18)
APPEARANCE UR: CLEAR
AST SERPL-CCNC: 30 U/L (ref 15–37)
BACTERIA URNS QL MICRO: ABNORMAL /HPF
BILIRUB SERPL-MCNC: 0.3 MG/DL (ref 0.2–1)
BILIRUB UR QL: NEGATIVE
BUN SERPL-MCNC: 18 MG/DL (ref 7–18)
BUN/CREAT SERPL: 24 (ref 12–20)
CALCIUM SERPL-MCNC: 8.9 MG/DL (ref 8.5–10.1)
CHLORIDE SERPL-SCNC: 103 MMOL/L (ref 100–108)
CO2 SERPL-SCNC: 29 MMOL/L (ref 21–32)
COLOR UR: ABNORMAL
CREAT SERPL-MCNC: 0.75 MG/DL (ref 0.6–1.3)
EPITH CASTS URNS QL MICRO: ABNORMAL /LPF (ref 0–5)
GLOBULIN SER CALC-MCNC: 2.7 G/DL (ref 2–4)
GLUCOSE SERPL-MCNC: 93 MG/DL (ref 74–99)
GLUCOSE UR STRIP.AUTO-MCNC: NEGATIVE MG/DL
HGB UR QL STRIP: NEGATIVE
KETONES UR QL STRIP.AUTO: NEGATIVE MG/DL
LEUKOCYTE ESTERASE UR QL STRIP.AUTO: ABNORMAL
MUCOUS THREADS URNS QL MICRO: ABNORMAL /LPF
NITRITE UR QL STRIP.AUTO: NEGATIVE
PH UR STRIP: 7 [PH] (ref 5–8)
POTASSIUM SERPL-SCNC: 4 MMOL/L (ref 3.5–5.5)
PROT SERPL-MCNC: 6.7 G/DL (ref 6.4–8.2)
PROT UR STRIP-MCNC: NEGATIVE MG/DL
RBC #/AREA URNS HPF: ABNORMAL /HPF (ref 0–5)
SODIUM SERPL-SCNC: 140 MMOL/L (ref 136–145)
SP GR UR REFRACTOMETRY: 1.03 (ref 1–1.03)
UROBILINOGEN UR QL STRIP.AUTO: 0.2 EU/DL (ref 0.2–1)
WBC URNS QL MICRO: ABNORMAL /HPF (ref 0–4)

## 2018-06-04 PROCEDURE — 80053 COMPREHEN METABOLIC PANEL: CPT | Performed by: INTERNAL MEDICINE

## 2018-06-04 PROCEDURE — 36415 COLL VENOUS BLD VENIPUNCTURE: CPT | Performed by: INTERNAL MEDICINE

## 2018-06-04 PROCEDURE — 81001 URINALYSIS AUTO W/SCOPE: CPT | Performed by: INTERNAL MEDICINE

## 2018-06-11 ENCOUNTER — OFFICE VISIT (OUTPATIENT)
Dept: INTERNAL MEDICINE CLINIC | Age: 58
End: 2018-06-11

## 2018-06-11 VITALS
TEMPERATURE: 98.3 F | OXYGEN SATURATION: 97 % | BODY MASS INDEX: 27.6 KG/M2 | WEIGHT: 150 LBS | RESPIRATION RATE: 12 BRPM | HEART RATE: 107 BPM | DIASTOLIC BLOOD PRESSURE: 64 MMHG | HEIGHT: 62 IN | SYSTOLIC BLOOD PRESSURE: 110 MMHG

## 2018-06-11 DIAGNOSIS — Z00.00 ROUTINE GENERAL MEDICAL EXAMINATION AT A HEALTH CARE FACILITY: ICD-10-CM

## 2018-06-11 DIAGNOSIS — F41.9 ANXIETY: ICD-10-CM

## 2018-06-11 DIAGNOSIS — M85.80 OSTEOPENIA, UNSPECIFIED LOCATION: ICD-10-CM

## 2018-06-11 DIAGNOSIS — M79.7 FIBROMYALGIA: Primary | ICD-10-CM

## 2018-06-11 NOTE — MR AVS SNAPSHOT
[address] [facility] 
 
 
 [address], [facility] 706 [facility] ([place], [place]) 
[phone] Patient: Ismale Gupta MRN: [id number] :[id number] Visit Information Date & Time Provider Department Dept. Phone Encounter #  
 2018 10:00 AM Diamond Joya MD [facility]  Your Appointments 2018  7:45 AM  
LAB with [facility] NURSE VISIT [facility] ([facility]) [address], [facility] [address] [place]  
  
   
 [address], [address]  
  
    
 2018  9:15 AM  
PHYSICAL with Diaomnd Joya MD  
[facility]) Appt Note: rpe  
 [address] [facility] [address] [address] [place]  
  
   
 [address], [address] 2018  8:30 AM  
Any with Freddie Chapman MD  
[facility] ([facility]) Appt Note: 1 yr f/u . PT PREFER AM APPT; 10/26/2017- appt. tickler mailed. [address] [address] [address] 1097 [facility]  
  
   
 [address] [place] [address] 2301 [facility] [address] 706 [facility] ([place], [place]) Upcoming Health Maintenance Date Due DTaP/Tdap/Td series (1 - Tdap) 1982 MEDICARE YEARLY EXAM 3/14/2018 Influenza Age 5 to Adult 2018 BREAST CANCER SCRN MAMMOGRAM 2019 PAP AKA CERVICAL CYTOLOGY 2019 COLONOSCOPY 2023 Allergies as of 2018  Review Complete On: 2018 By: Diamond Joya MD  
  
 Severity Noted Reaction Type Reactions Codeine    Rash, Itching Other reaction(s): other/intolerance Tussionex  2012   Systemic Itching Current Immunizations  Reviewed on 11/3/2017 Name Date Influenza Vaccine 10/10/2014, 10/17/2013 Influenza Vaccine (Quad) PF 11/3/2017  1:56 PM, 2016, 10/23/2015 Influenza Vaccine Split 10/23/2012 Influenza Vaccine Whole 9/17/2010 Pneumococcal Conjugate (PCV-13) 11/28/2016 Tdap 7/1/2008 Not reviewed this visit You Were Diagnosed With   
  
 Codes Comments Fibromyalgia    -  Primary ICD-10-CM: M79.7 ICD-9-CM: 729.1 Osteopenia, unspecified location     ICD-10-CM: M85.80 ICD-9-CM: 733.90 Anxiety     ICD-10-CM: F41.9 ICD-9-CM: 300.00 Routine general medical examination at a health care facility     ICD-10-CM: Z00.00 ICD-9-CM: V70.0 Vitals BP Pulse Temp Resp Height(growth percentile) Weight(growth percentile) 110/64 (!) 107 98.3 °F (36.8 °C) (Oral) 12 5' 2\" (1.575 m) 150 lb (68 kg) SpO2 BMI OB Status Smoking Status 97% 27.44 kg/m2 Postmenopausal Never Smoker Vitals History BMI and BSA Data Body Mass Index Body Surface Area  
 27.44 kg/m 2 1.72 m 2 Preferred Pharmacy Pharmacy Name Phone [facility], [address] [place] [address] [phone] Your Updated Medication List  
  
   
This list is accurate as of 6/11/18 10:41 AM.  Always use your most recent med list.  
  
  
  
  
 albuterol 90 mcg/actuation inhaler Commonly known as:  PROVENTIL HFA, VENTOLIN HFA, PROAIR HFA Take 2 Puffs by inhalation every six (6) hours as needed for Wheezing. b complex vitamins tablet Take 1 Tab by mouth daily. CITRACAL + D SLOW RELEASE 600 mg calcium- 500 unit Tber Generic drug:  calcium combo no.2-vitamin D3 Take  by mouth. Citracal + D tablet Generic drug:  calcium citrate-vitamin D3 Take  by mouth two (2) times a day. CYMBALTA 60 mg capsule Generic drug:  DULoxetine Take 120 mg by mouth daily. DIGESTIVE ENZYMES (PLANT) PO Take  by mouth.  
  
 ergocalciferol 50,000 unit capsule Commonly known as:  ERGOCALCIFEROL  
take 1 capsule by mouth TWO (2) TIMES A WEEK  Indications: PREVENTION OF VITAMIN D DEFICIENCY  
  
 gabapentin 400 mg capsule Commonly known as:  NEURONTIN Take  by mouth three (3) times daily. Takes 2 TID  
  
 LONOX PO Take  by mouth. LORazepam 2 mg tablet Commonly known as:  ATIVAN Take  by mouth every eight (8) hours as needed for Anxiety. MAGOX 400 mg tablet Generic drug:  magnesium oxide Take 400 mg by mouth daily. meloxicam 15 mg tablet Commonly known as:  MOBIC  
take 1 tablet by mouth once daily Methen-Hyos-M Blue-BA-PhSal tablet Commonly known as:  HYOPHEN Take 1 Tab by mouth four (4) times daily. modafinil 200 mg tablet Commonly known as:  PROVIGIL  
take 1 tablet by mouth twice a day for 30 DAYS maximum daily dose of 2 tablets  
  
 mometasone 220 mcg (60 doses) inhaler Commonly known as:  Starlet Cava Take 2 Puffs by inhalation two (2) times a day. montelukast 10 mg tablet Commonly known as:  SINGULAIR Take 1 Tab by mouth daily. Mth-Me Blue-Sod Phos-PhSal-Hyo 118-10-40.8-36 mg Cap capsule Commonly known as:  Mardel Balling Take 1 Cap by mouth four (4) times daily. OTHER Indications: Compound cream for inflammation OXcarbazepine 150 mg tablet Commonly known as:  TRILEPTAL Take 1 Tab by mouth three (3) times daily. pantoprazole 40 mg tablet Commonly known as:  PROTONIX Take 1 Tab by mouth daily. pentosan polysulfate sodium 100 mg capsule Commonly known as:  Ofilia Rias Take 1 Cap by mouth two (2) times a day. Indications: Bladder Pain, Interstitial Cystitis PHAZYME 180 mg Cap Generic drug:  simethicone Take  by mouth as needed. pramoxine-hydrocortisone 1-1 % topical cream  
Commonly known as:  ANALPRAM HC Apply  to affected area three (3) times daily. PRELIEF 65 mg Tab Generic drug:  calcium glycerophosphate Take  by mouth. PROBIOTIC 4X 10-15 mg Tbec Generic drug:  B.infantis-B.ani-B.long-B.bifi Take  by mouth. PROLIA 60 mg/mL injection Generic drug:  denosumab 60 mg by SubCUTAneous route. tiotropium bromide 2.5 mcg/actuation inhaler Commonly known as:  Jennyzabryce Talaveraut Take 2 Puffs by inhalation daily. Indications: PREVENTION OF BRONCHOSPASM WITH CHRONIC BRONCHITIS  
  
 traZODone 100 mg tablet Commonly known as:  Gigi Barge Take 100 mg by mouth nightly. WOMEN'S 50 PLUS DAILY FORMULA PO Take  by mouth. Introducing [facility] SERVICES! Yue Santoro introduces [employer] patient portal. Now you can access parts of your medical record, email your doctor's office, and request medication refills online. 1. In your internet browser, go to [email or web address] 2. Click on the First Time User? Click Here link in the Sign In box. You will see the New Member Sign Up page. 3. Enter your [employer] Access Code exactly as it appears below. You will not need to use this code after youve completed the sign-up process. If you do not sign up before the expiration date, you must request a new code. · [employer] Access Code: [email or web address] Expires: 9/9/2018 10:41 AM 
 
4. Enter the last four digits of your Social Security Number (xxxx) and Date of Birth (mm/dd/yyyy) as indicated and click Submit. You will be taken to the next sign-up page. 5. Create a [employer] ID. This will be your [employer] login ID and cannot be changed, so think of one that is secure and easy to remember. 6. Create a [employer] password. You can change your password at any time. 7. Enter your Password Reset Question and Answer. This can be used at a later time if you forget your password. 8. Enter your e-mail address. You will receive e-mail notification when new information is available in [address] 9. Click Sign Up. You can now view and download portions of your medical record. 10. Click the Download Summary menu link to download a portable copy of your medical information. If you have questions, please visit the Frequently Asked Questions section of the [employer] website. Remember, [employer] is NOT to be used for urgent needs. For medical emergencies, dial 911. Now available from your iPhone and Android! Please provide this summary of care documentation to your next provider. Your primary care clinician is listed as Tonya Langford. Elsy Dudley. If you have any questions after today's visit, please call [phone].

## 2018-06-11 NOTE — PROGRESS NOTES
Scott Sanchez 1960, is a 65 y.o. female, who is seen today for reevaluation of fibromyalgia, impaired fasting glucose, anxiety, overweight, GERD, chronic insomnia. She feels about the same, when she tried cleaning behind her sister's refrigerator recently she was extremely fatigued and hurt a lot that evening. Took her couple days to get better. She takes all of her medicine correctly. She has been watching her diet and losing some weight. Anxiety is about the same. She is on Prolia under the direction of her pain management doctor, most recent DEXA showed a T score of -1.9 and she has been continued on Prolia. She thinks it is probably because she has had multiple falls over the years.     Past Medical History:   Diagnosis Date    Allergy     Anxiety     Arthritis     Asthma     mild    Atypical ductal hyperplasia of breast     Atypical hyperplasia of breast 2003    right    Chronic depression     Chronic fatigue syndrome     Chronic interstitial cystitis     Chronic interstitial cystitis     Chronic pain     fibromyalgia    Constipation     Depression     Diarrhea     Difficulty walking     Dizziness     Dry eyes     Dry mouth     Dyspnea     Fibromyalgia     Fibromyalgia     Generalized stiffness     GERD (gastroesophageal reflux disease)     Headache(784.0)     Heartburn     Hyperlipidemia     IBS (irritable bowel syndrome)     IC (interstitial cystitis)     Imbalance     Incoordination     Insomnia     Irregular periods/menstrual cycles     Joint pain     Microscopic hematuria     Muscle pain     Myofascial pain syndrome     Numbness and tingling in hands     Ovarian cyst, left     Painful sex     Pneumonia     Psychiatric disorder     anxiety - depression    RLS (restless legs syndrome)     Stress     TMJ (temporomandibular joint syndrome)     Transient total loss of muscle tone     Uterine fibroid     Weakness generalized      Current Outpatient Prescriptions   Medication Sig Dispense Refill    denosumab (PROLIA) 60 mg/mL injection 60 mg by SubCUTAneous route.  montelukast (SINGULAIR) 10 mg tablet Take 1 Tab by mouth daily. 90 Tab 6    mometasone (ASMANEX TWISTHALER) 220 mcg (60 doses) inhaler Take 2 Puffs by inhalation two (2) times a day. 120 Cap 6    Mth-Me Blue-Sod Phos-PhSal-Hyo (URIBEL) 118-10-40.8-36 mg cap capsule Take 1 Cap by mouth four (4) times daily. 120 Cap 3    pentosan polysulfate sodium (ELMIRON) 100 mg capsule Take 1 Cap by mouth two (2) times a day. Indications: Bladder Pain, Interstitial Cystitis 120 Cap 3    calcium combo no.2-vitamin D3 (CITRACAL + D SLOW RELEASE) 600 mg calcium- 500 unit TbER Take  by mouth.  magnesium oxide (MAGOX) 400 mg tablet Take 400 mg by mouth daily.  MV-MN/FOLIC ACID/CALCIUM/VIT K (WOMEN'S 50 PLUS DAILY FORMULA PO) Take  by mouth.  pramoxine-hydrocortisone (ANALPRAM HC) 1-1 % topical cream Apply  to affected area three (3) times daily. 30 g 2    tiotropium bromide (SPIRIVA RESPIMAT) 2.5 mcg/actuation inhaler Take 2 Puffs by inhalation daily. Indications: PREVENTION OF BRONCHOSPASM WITH CHRONIC BRONCHITIS 3 Inhaler 6    albuterol (PROVENTIL HFA, VENTOLIN HFA, PROAIR HFA) 90 mcg/actuation inhaler Take 2 Puffs by inhalation every six (6) hours as needed for Wheezing. 3 Inhaler 6    Methen-Hyos-M Blue-BA-PhSal (HYOPHEN) tablet Take 1 Tab by mouth four (4) times daily. 120 Tab 3    OXcarbazepine (TRILEPTAL) 150 mg tablet Take 1 Tab by mouth three (3) times daily. 90 Tab 2    calcium glycerophosphate (PRELIEF) 65 mg tab Take  by mouth.  pantoprazole (PROTONIX) 40 mg tablet Take 1 Tab by mouth daily.  90 Tab 3    meloxicam (MOBIC) 15 mg tablet take 1 tablet by mouth once daily 30 Tab 11    modafinil (PROVIGIL) 200 mg tablet take 1 tablet by mouth twice a day for 30 DAYS maximum daily dose of 2 tablets 60 Tab 5    ergocalciferol (ERGOCALCIFEROL) 50,000 unit capsule take 1 capsule by mouth TWO (2) TIMES A WEEK  Indications: PREVENTION OF VITAMIN D DEFICIENCY (Patient taking differently: take 1 capsule by mouth once a week  Indications: PREVENTION OF VITAMIN D DEFICIENCY) 8 Cap 11    gabapentin (NEURONTIN) 400 mg capsule Take  by mouth three (3) times daily. Takes 2 TID      OTHER Indications: Compound cream for inflammation      b complex vitamins tablet Take 1 Tab by mouth daily.  B.infantis-B.ani-B.long-B.bifi (PROBIOTIC 4X) 10-15 mg TbEC Take  by mouth.  ENZYMES,DIGESTIVE, PLANT, (DIGESTIVE ENZYMES, PLANT, PO) Take  by mouth.  DIPHENOXYLATE HCL/ATROPINE (LONOX PO) Take  by mouth.  calcium citrate-vitamin D3 (CITRACAL + D) tablet Take  by mouth two (2) times a day.  LORazepam (ATIVAN) 2 mg tablet Take  by mouth every eight (8) hours as needed for Anxiety.  DULoxetine (CYMBALTA) 60 mg capsule Take 120 mg by mouth daily.  simethicone (PHAZYME) 180 mg cap Take  by mouth as needed.  trazodone (DESYREL) 100 mg tablet Take 100 mg by mouth nightly. Visit Vitals    /64    Pulse (!) 107    Temp 98.3 °F (36.8 °C) (Oral)    Resp 12    Ht 5' 2\" (1.575 m)    Wt 150 lb (68 kg)    SpO2 97%    BMI 27.44 kg/m2     Carotids are 2+ without bruits. No adenopathy or thyromegaly. Lungs are clear to percussion. Good breath sounds with no wheezing or crackles. Heart reveals a regular rhythm with normal S1 and S2 no murmur gallop click or rub. Apical impulse is not palpable. Abdomen is soft and nontender with no hepatosplenomegaly or masses and no bruits. Extremities reveal no clubbing cyanosis or edema. Pulses are 2+. Breasts reveal no masses no skin or nipple abnormalities and no axillary adenopathy.     Results for orders placed or performed during the hospital encounter of 01/85/28   METABOLIC PANEL, COMPREHENSIVE   Result Value Ref Range    Sodium 140 136 - 145 mmol/L    Potassium 4.0 3.5 - 5.5 mmol/L    Chloride 103 100 - 108 mmol/L    CO2 29 21 - 32 mmol/L    Anion gap 8 3.0 - 18 mmol/L    Glucose 93 74 - 99 mg/dL    BUN 18 7.0 - 18 MG/DL    Creatinine 0.75 0.6 - 1.3 MG/DL    BUN/Creatinine ratio 24 (H) 12 - 20      GFR est AA >60 >60 ml/min/1.73m2    GFR est non-AA >60 >60 ml/min/1.73m2    Calcium 8.9 8.5 - 10.1 MG/DL    Bilirubin, total 0.3 0.2 - 1.0 MG/DL    ALT (SGPT) 47 13 - 56 U/L    AST (SGOT) 30 15 - 37 U/L    Alk. phosphatase 35 (L) 45 - 117 U/L    Protein, total 6.7 6.4 - 8.2 g/dL    Albumin 4.0 3.4 - 5.0 g/dL    Globulin 2.7 2.0 - 4.0 g/dL    A-G Ratio 1.5 0.8 - 1.7     URINALYSIS W/ RFLX MICROSCOPIC   Result Value Ref Range    Color GREEN      Appearance CLEAR      Specific gravity 1.028 1.003 - 1.030      pH (UA) 7.0 5.0 - 8.0      Protein NEGATIVE  NEG mg/dL    Glucose NEGATIVE  NEG mg/dL    Ketone NEGATIVE  NEG mg/dL    Bilirubin NEGATIVE  NEG      Blood NEGATIVE  NEG      Urobilinogen 0.2 0.2 - 1.0 EU/dL    Nitrites NEGATIVE  NEG      Leukocyte Esterase SMALL (A) NEG     URINE MICROSCOPIC ONLY   Result Value Ref Range    WBC 4 to 10 0 - 4 /hpf    RBC 0 to 3 0 - 5 /hpf    Epithelial cells 2+ 0 - 5 /lpf    Bacteria 2+ (A) NEG /hpf    Mucus 1+ (A) NEG /lpf     Assessment: #1. Impaired fasting glucose improved. She will continue to do the best she can to keep her weight down and lose a few more pounds in the next 6 months. She will cut out the sweets she has been taking again including Coca-Cola. #2.  Osteopenia on Prolia, at her young age with osteopenia probably her fracture score is quite low and I told her I did not really recommend staying on Prolia, that is not the drug I would use in the first place if antiresorptive therapy was indicated. I would be going by FRAX criteria. #3. Anxiety stable, she will continue Cymbalta and lorazepam prescribed elsewhere. #4.  GERD asymptomatic. She will continue pantoprazole 40 mg daily.   #5.  Fibromyalgia, she will continue current medication under the direction of her pain .    Follow-up in 6 months for physical    Gabriel Santizo MD FACP    Please note: This document has been produced using voice recognition software. Unrecognized errors in transcription may be present.

## 2018-07-27 NOTE — TELEPHONE ENCOUNTER
Last Visit: 06/11/2018 with MD Patsy Humphrey    Next Appointment: 12/11/2018 with MD Patsy Humphrey   Previous Refill Encounters: 05/30/2017 per MD Patsy Humphrey #90 with 3 refills     Requested Prescriptions     Pending Prescriptions Disp Refills    pantoprazole (PROTONIX) 40 mg tablet 90 Tab 3     Sig: Take 1 Tab by mouth daily.

## 2018-07-30 RX ORDER — PANTOPRAZOLE SODIUM 40 MG/1
40 TABLET, DELAYED RELEASE ORAL DAILY
Qty: 90 TAB | Refills: 3 | Status: SHIPPED | OUTPATIENT
Start: 2018-07-30 | End: 2019-04-05 | Stop reason: SDUPTHER

## 2018-10-18 ENCOUNTER — OFFICE VISIT (OUTPATIENT)
Dept: PULMONOLOGY | Age: 58
End: 2018-10-18

## 2018-10-18 VITALS
BODY MASS INDEX: 26.22 KG/M2 | WEIGHT: 148 LBS | RESPIRATION RATE: 16 BRPM | HEIGHT: 63 IN | SYSTOLIC BLOOD PRESSURE: 102 MMHG | TEMPERATURE: 97.4 F | HEART RATE: 96 BPM | DIASTOLIC BLOOD PRESSURE: 76 MMHG | OXYGEN SATURATION: 97 %

## 2018-10-18 DIAGNOSIS — J45.40 MODERATE PERSISTENT ASTHMA WITHOUT COMPLICATION: Primary | ICD-10-CM

## 2018-10-18 DIAGNOSIS — R06.09 DYSPNEA ON EXERTION: ICD-10-CM

## 2018-10-18 DIAGNOSIS — K21.9 GASTROESOPHAGEAL REFLUX DISEASE WITHOUT ESOPHAGITIS: ICD-10-CM

## 2018-10-18 RX ORDER — ALBUTEROL SULFATE 90 UG/1
2 AEROSOL, METERED RESPIRATORY (INHALATION)
Qty: 3 INHALER | Refills: 6 | Status: SHIPPED | OUTPATIENT
Start: 2018-10-18 | End: 2020-03-18

## 2018-10-18 NOTE — PROGRESS NOTES
Chief Complaint Patient presents with  Asthma  
  follow up  Shortness of Breath  Sleep Apnea 1. Have you been to the ER, urgent care clinic since your last visit? Hospitalized since your last visit? No 
 
2. Have you seen or consulted any other health care providers outside of the [address] [place] [address] since your last visit? Include any pap smears or colon screening. Yes Where: Dr. Millan Matters, Pain Management

## 2018-10-18 NOTE — PROGRESS NOTES
[facility] Pulmonary, Critical Care, and Sleep Medicine Pulmonary Office Progress Notes Name: Jerome Colon : 1960 Date: 10/18/2018 Subjective:  
 
Patient is a 63 y.o. female with history of asthma and reactive airways presents for follow up. 
 
10/18/18 No new concerns with SOB, cough, wheezing- asking if she can use any inhaler that has both her inhaler medications in one. Overall better after starting Spiriva Denies any wheezing. No increase in nasal congestion. Not coughing at present but did have some coughing this season with the change of weather. Denies sore throat. Needs refills: Singulair and Asmanex , Spiriva respimet C/o hand arthritis symptoms. -on mobic She reports increased symptoms of chest discomfort and some cough with the change of weather. Some congestion which improved with claritan. She had started water exercises. -swimming laps but not very regular now. She is under the care of pain management for fibromyalgia. Past Medical History:  
Diagnosis Date  Allergy  Anxiety  Arthritis  Asthma   
 mild  Atypical ductal hyperplasia of breast   
 Atypical hyperplasia of breast 2003  
 right  Chronic depression  Chronic fatigue syndrome  Chronic interstitial cystitis  Chronic interstitial cystitis  Chronic pain   
 fibromyalgia  Constipation  Depression  Diarrhea  Difficulty walking  Dizziness  Dry eyes  Dry mouth  Dyspnea  Fibromyalgia  Fibromyalgia  Generalized stiffness  GERD (gastroesophageal reflux disease)  Headache(784.0)  Heartburn  Hyperlipidemia  IBS (irritable bowel syndrome)  IC (interstitial cystitis)  Imbalance  Incoordination  Insomnia  Irregular periods/menstrual cycles  Joint pain  Microscopic hematuria  Muscle pain  Myofascial pain syndrome  Numbness and tingling in hands  Ovarian cyst, left  Painful sex  Pneumonia  Psychiatric disorder   
 anxiety - depression  RLS (restless legs syndrome)  Stress  TMJ (temporomandibular joint syndrome)  Transient total loss of muscle tone  Uterine fibroid  Weakness generalized Allergies Allergen Reactions  Codeine Rash and Itching Other reaction(s): other/intolerance  Tussionex Itching Current Outpatient Medications Medication Sig Dispense Refill  tiotropium bromide (SPIRIVA RESPIMAT) 2.5 mcg/actuation inhaler Take 2 Puffs by inhalation daily. 3 Inhaler 6  
 albuterol (PROVENTIL HFA, VENTOLIN HFA, PROAIR HFA) 90 mcg/actuation inhaler Take 2 Puffs by inhalation every six (6) hours as needed for Wheezing. 3 Inhaler 6  
 gabapentin (NEURONTIN) 800 mg tablet take 1 tablet by mouth three times a day  0  
 pentosan polysulfate sodium (ELMIRON) 100 mg capsule Take 1 Cap by mouth two (2) times a day. Indications: Bladder Pain, Interstitial Cystitis 120 Cap 3  pantoprazole (PROTONIX) 40 mg tablet Take 1 Tab by mouth daily. 90 Tab 3  
 denosumab (PROLIA) 60 mg/mL injection 60 mg by SubCUTAneous route.  montelukast (SINGULAIR) 10 mg tablet Take 1 Tab by mouth daily. 90 Tab 6  
 mometasone (ASMANEX TWISTHALER) 220 mcg (60 doses) inhaler Take 2 Puffs by inhalation two (2) times a day. 120 Cap 6  calcium combo no.2-vitamin D3 (CITRACAL + D SLOW RELEASE) 600 mg calcium- 500 unit TbER Take  by mouth.  magnesium oxide (MAGOX) 400 mg tablet Take 400 mg by mouth daily.  MV-MN/FOLIC ACID/CALCIUM/VIT K (WOMEN'S 50 PLUS DAILY FORMULA PO) Take  by mouth.  pramoxine-hydrocortisone (ANALPRAM HC) 1-1 % topical cream Apply  to affected area three (3) times daily. 30 g 2  
 OXcarbazepine (TRILEPTAL) 150 mg tablet Take 1 Tab by mouth three (3) times daily. 90 Tab 2  
 calcium glycerophosphate (PRELIEF) 65 mg tab Take  by mouth.  meloxicam (MOBIC) 15 mg tablet take 1 tablet by mouth once daily 30 Tab 11  
 modafinil (PROVIGIL) 200 mg tablet take 1 tablet by mouth twice a day for 30 DAYS maximum daily dose of 2 tablets 60 Tab 5  
 ergocalciferol (ERGOCALCIFEROL) 50,000 unit capsule take 1 capsule by mouth TWO (2) TIMES A WEEK  Indications: PREVENTION OF VITAMIN D DEFICIENCY (Patient taking differently: take 1 capsule by mouth once a week  Indications: PREVENTION OF VITAMIN D DEFICIENCY) 8 Cap 11  
 OTHER Indications: Compound cream for inflammation  B.infantis-B.ani-B.long-B.bifi (PROBIOTIC 4X) 10-15 mg TbEC Take  by mouth.  ENZYMES,DIGESTIVE, PLANT, (DIGESTIVE ENZYMES, PLANT, PO) Take  by mouth.  DIPHENOXYLATE HCL/ATROPINE (LONOX PO) Take  by mouth.  calcium citrate-vitamin D3 (CITRACAL + D) tablet Take  by mouth two (2) times a day.  LORazepam (ATIVAN) 2 mg tablet Take  by mouth every eight (8) hours as needed for Anxiety.  DULoxetine (CYMBALTA) 60 mg capsule Take 120 mg by mouth daily.  simethicone (PHAZYME) 180 mg cap Take  by mouth as needed.  trazodone (DESYREL) 100 mg tablet Take 100 mg by mouth nightly.  Mth-Me Blue-Sod Phos-PhSal-Hyo (URO-MP) 118-10-40.8-36 mg cap capsule Take 1 Cap by mouth four (4) times daily. 120 Cap 1  Methen-Hyos-M Blue-BA-PhSal (HYOPHEN) tablet Take 1 Tab by mouth four (4) times daily. 120 Tab 3  
 b complex vitamins tablet Take 1 Tab by mouth daily. Review of Systems: 
HEENT: No epistaxis, no nasal drainage, no difficulty in swallowing, no redness in eyes Respiratory: as above Cardiovascular: no chest pain, no palpitations, no chronic leg edema, no syncope Gastrointestinal:  Retrosternal pain+ no abd pain, no vomiting, no diarrhea, no bleeding symptoms Genitourinary: No urinary symptoms or hematuria Integument/breast: No ulcers or rashes Musculoskeletal:Neg 
Neurological: No focal weakness, no seizures, no headaches Behvioral/Psych: No anxiety, no depression Constitutional: No fever, no chills, no weight loss, no night sweats Objective:  
 
Visit Vitals /76 (BP 1 Location: Left arm, BP Patient Position: Sitting) Pulse 96 Temp 97.4 °F (36.3 °C) (Oral) Resp 16 Ht 5' 3\" (1.6 m) Wt 67.1 kg (148 lb) SpO2 97% BMI 26.22 kg/m² Physical Exam:  
General: comfortable, no acute distress HEENT: pupils reactive, sclera anicteric, EOM intact Neck: No adenopathy or thyroid swelling, no lymphadenopathy or JVD, supple CVS: S1S2 no murmurs RS: Mod AE bilaterally, no tactile fremitus or egophony, no accessory muscle use Neuro: non focal, awake, alert Extrm: no leg edema, clubbing or cyanosis Skin: no rash Data review:  
 
Office Visit on 10/03/2018 Component Date Value Ref Range Status  Color (UA POC) 10/03/2018 Green/Blue   Final  
 Clarity (UA POC) 10/03/2018 Clear   Final  
 Glucose (UA POC) 10/03/2018 Negative  Negative Final  
 Bilirubin (UA POC) 10/03/2018 3+  Negative Corrected  Ketones (UA POC) 10/03/2018 Trace  Negative Corrected  Specific gravity (UA POC) 10/03/2018 1.020  1.001 - 1.035 Final  
 Blood (UA POC) 10/03/2018 Trace  Negative Corrected  pH (UA POC) 10/03/2018 6.5  4.6 - 8.0 Corrected  Protein (UA POC) 10/03/2018 2+  Negative Corrected  Urobilinogen (UA POC) 10/03/2018 0.2 mg/dL  0.2 - 1 Corrected  Nitrites (UA POC) 10/03/2018 Negative  Negative Final  
 Leukocyte esterase (UA POC) 10/03/2018 Negative  Negative Final  
 FINAL REPORT 10/03/2018 Microbiology results   Final  
 Comment: ANTIBIOTIC ALLERGIES No Known Antibiotic Allergies URINE SOURCE: 
Clean Catch COLONY COUNT / RESULT:  
10,000 cfu/ml  Mixed Culture, 3 or more organisms:  
Urogenital / Skin Humera and Gram Negative Contaminants. Resubmission of a new specimen is recommended. Clinical Support on 07/31/2018 Component Date Value Ref Range Status  Color (UA POC) 07/31/2018 Green/Blue   Final  
 Clarity (UA POC) 07/31/2018 Clear   Final  
 Glucose (UA POC) 07/31/2018 Negative  Negative Final  
 Bilirubin (UA POC) 07/31/2018 Negative  Negative Final  
 Ketones (UA POC) 07/31/2018 Negative  Negative Final  
 Specific gravity (UA POC) 07/31/2018 1.020  1.001 - 1.035 Final  
 Blood (UA POC) 07/31/2018 Negative  Negative Final  
 pH (UA POC) 07/31/2018 7.0  4.6 - 8.0 Final  
 Protein (UA POC) 07/31/2018 Negative  Negative Final  
 Urobilinogen (UA POC) 07/31/2018 0.2 mg/dL  0.2 - 1 Final  
 Nitrites (UA POC) 07/31/2018 Negative  Negative Final  
 Leukocyte esterase (UA POC) 07/31/2018 Negative  Negative Final  
 FINAL REPORT 07/31/2018 Microbiology results   Final  
 Comment: ANTIBIOTIC ALLERGIES No Known Antibiotic Allergies URINE SOURCE: 
Straight Cath In/Out COLONY COUNT / RESULT:  
No Growth at 48 Hours Clinical Support on 07/26/2018 Component Date Value Ref Range Status  Color (UA POC) 07/26/2018 Green/Blue   Final  
 Clarity (UA POC) 07/26/2018 Clear   Final  
 Glucose (UA POC) 07/26/2018 Negative  Negative Final  
 Bilirubin (UA POC) 07/26/2018 4+  Negative Final  
 Ketones (UA POC) 07/26/2018 Trace  Negative Final  
 Specific gravity (UA POC) 07/26/2018 1.025  1.001 - 1.035 Final  
 Blood (UA POC) 07/26/2018 2+  Negative Final  
 pH (UA POC) 07/26/2018 6.5  4.6 - 8.0 Final  
 Protein (UA POC) 07/26/2018 2+  Negative Final  
 100mg/dl  Urobilinogen (UA POC) 07/26/2018 0.2 mg/dL  0.2 - 1 Final  
 Nitrites (UA POC) 07/26/2018 Negative  Negative Final  
 Leukocyte esterase (UA POC) 07/26/2018 Negative  Negative Final  
 FINAL REPORT 07/26/2018 Microbiology results   Final  
  
  
 PVR 07/26/2018 0  cc Final  
Hospital Outpatient Visit on 06/04/2018 Component Date Value Ref Range Status  Sodium 06/04/2018 140  136 - 145 mmol/L Final  
 Potassium 06/04/2018 4.0  3.5 - 5.5 mmol/L Final  
  Chloride 06/04/2018 103  100 - 108 mmol/L Final  
 CO2 06/04/2018 29  21 - 32 mmol/L Final  
 Anion gap 06/04/2018 8  3.0 - 18 mmol/L Final  
 Glucose 06/04/2018 93  74 - 99 mg/dL Final  
 BUN 06/04/2018 18  7.0 - 18 MG/DL Final  
 Creatinine 06/04/2018 0.75  0.6 - 1.3 MG/DL Final  
 BUN/Creatinine ratio 06/04/2018 24* 12 - 20   Final  
 GFR est AA 06/04/2018 >60  >60 ml/min/1.73m2 Final  
 GFR est non-AA 06/04/2018 >60  >60 ml/min/1.73m2 Final  
 Comment: (NOTE) Estimated GFR is calculated using the Modification of Diet in Renal  
Disease (MDRD) Study equation, reported for both  Americans [facility]) and non- Americans (GFRNA), and normalized to 1.73m2  
body surface area. The physician must decide which value applies to  
the patient. The MDRD study equation should only be used in  
individuals age 25 or older. It has not been validated for the  
following: pregnant women, patients with serious comorbid conditions,  
or on certain medications, or persons with extremes of body size,  
muscle mass, or nutritional status.  Calcium 06/04/2018 8.9  8.5 - 10.1 MG/DL Final  
 Bilirubin, total 06/04/2018 0.3  0.2 - 1.0 MG/DL Final  
 ALT (SGPT) 06/04/2018 47  13 - 56 U/L Final  
 AST (SGOT) 06/04/2018 30  15 - 37 U/L Final  
 Alk.  phosphatase 06/04/2018 35* 45 - 117 U/L Final  
 Protein, total 06/04/2018 6.7  6.4 - 8.2 g/dL Final  
 Albumin 06/04/2018 4.0  3.4 - 5.0 g/dL Final  
 Globulin 06/04/2018 2.7  2.0 - 4.0 g/dL Final  
 A-G Ratio 06/04/2018 1.5  0.8 - 1.7   Final  
 Color 06/04/2018 GREEN    Final  
 Appearance 06/04/2018 CLEAR    Final  
 Specific gravity 06/04/2018 1.028  1.003 - 1.030   Final  
 pH (UA) 06/04/2018 7.0  5.0 - 8.0   Final  
 Protein 06/04/2018 NEGATIVE   NEG mg/dL Final  
 Glucose 06/04/2018 NEGATIVE   NEG mg/dL Final  
 Ketone 06/04/2018 NEGATIVE   NEG mg/dL Final  
 Bilirubin 06/04/2018 NEGATIVE   NEG   Final  
  Blood 06/04/2018 NEGATIVE   NEG   Final  
 Urobilinogen 06/04/2018 0.2  0.2 - 1.0 EU/dL Final  
 Nitrites 06/04/2018 NEGATIVE   NEG   Final  
 Leukocyte Esterase 06/04/2018 SMALL* NEG   Final  
 WBC 06/04/2018 4 to 10  0 - 4 /hpf Final  
 RBC 06/04/2018 0 to 3  0 - 5 /hpf Final  
 Epithelial cells 06/04/2018 2+  0 - 5 /lpf Final  
 Bacteria 06/04/2018 2+* NEG /hpf Final  
 Mucus 06/04/2018 1+* NEG /lpf Final  
 
 
Imaging: 
I have personally reviewed the patients radiographs and have reviewed the reports: No recent CXR IMPRESSION:  
Asthma- fairly well controlled with recent symptomatic exacerbations related to environmental exposure to temp change, pollen- weeding and also some increased GERD symptoms Reactive airways- periodic flare ups related to environmental changes Anxiety/Depression Stress IBS GERD- on Nexium RECOMMENDATIONS:  
· Continue current treatment · Discussed need for preventing trigger exposure- wearing mask · Continue Stepped up GERD treatment- encouraged to take antacids when symptomatic · Continue Spiriva Respimet, Asmanex. Discussed options for single agent but limited due to LABA component ( palpitations) · Will continue current treatment and encourage airway humidification , ambient air temp control and continue antiinflammatory control · Continue bronchodilators and Singulair · Prescriptions reviewed and renewed · Continue treatment per pain management · Reassurance provided · Encouraged activity Sha Chambers MD

## 2018-10-25 ENCOUNTER — CLINICAL SUPPORT (OUTPATIENT)
Dept: INTERNAL MEDICINE CLINIC | Age: 58
End: 2018-10-25

## 2018-10-25 DIAGNOSIS — Z23 ENCOUNTER FOR IMMUNIZATION: ICD-10-CM

## 2018-10-25 NOTE — PROGRESS NOTES
Chief Complaint   Patient presents with    Immunization/Injection     influenza vaccine     Patient given influenza vaccine, FLUARIX Quadrivalent, in left deltoid. Instructed patient to sit and wait 10-20 minutes before leaving the premises so that we can watch for any complications or adverse reactions. Patient given vaccine information statement handout before vaccine was given. Patient tolerated well without adverse reactions or complications.

## 2018-10-30 ENCOUNTER — TELEPHONE (OUTPATIENT)
Dept: INTERNAL MEDICINE CLINIC | Age: 58
End: 2018-10-30

## 2018-10-30 DIAGNOSIS — Z12.31 SCREENING MAMMOGRAM, ENCOUNTER FOR: Primary | ICD-10-CM

## 2018-11-26 ENCOUNTER — HOSPITAL ENCOUNTER (OUTPATIENT)
Dept: MAMMOGRAPHY | Age: 58
Discharge: HOME | End: 2018-11-26
Attending: INTERNAL MEDICINE
Payer: MEDICARE

## 2018-11-26 DIAGNOSIS — Z12.31 VISIT FOR SCREENING MAMMOGRAM: ICD-10-CM

## 2018-11-26 PROCEDURE — 77063 BREAST TOMOSYNTHESIS BI: CPT

## 2018-11-30 ENCOUNTER — HOSPITAL ENCOUNTER (OUTPATIENT)
Dept: LAB | Age: 58
Discharge: HOME | End: 2018-11-30
Payer: MEDICARE

## 2018-11-30 ENCOUNTER — APPOINTMENT (OUTPATIENT)
Dept: INTERNAL MEDICINE CLINIC | Age: 58
End: 2018-11-30

## 2018-11-30 DIAGNOSIS — Z00.00 ROUTINE GENERAL MEDICAL EXAMINATION AT A HEALTH CARE FACILITY: ICD-10-CM

## 2018-11-30 DIAGNOSIS — F41.9 ANXIETY: ICD-10-CM

## 2018-11-30 LAB
ALBUMIN SERPL-MCNC: 3.9 G/DL (ref 3.4–5)
ALBUMIN/GLOB SERPL: 1.4 {RATIO} (ref 0.8–1.7)
ALP SERPL-CCNC: 36 U/L (ref 45–117)
ALT SERPL-CCNC: 32 U/L (ref 13–56)
ANION GAP SERPL CALC-SCNC: 8 MMOL/L (ref 3–18)
AST SERPL-CCNC: 19 U/L (ref 15–37)
BASOPHILS # BLD: 0 K/UL (ref 0–0.1)
BASOPHILS NFR BLD: 0 % (ref 0–2)
BILIRUB SERPL-MCNC: 0.3 MG/DL (ref 0.2–1)
BUN SERPL-MCNC: 20 MG/DL (ref 7–18)
BUN/CREAT SERPL: 28 (ref 12–20)
CALCIUM SERPL-MCNC: 8.6 MG/DL (ref 8.5–10.1)
CHLORIDE SERPL-SCNC: 105 MMOL/L (ref 100–108)
CHOLEST SERPL-MCNC: 341 MG/DL
CO2 SERPL-SCNC: 25 MMOL/L (ref 21–32)
CREAT SERPL-MCNC: 0.71 MG/DL (ref 0.6–1.3)
DIFFERENTIAL METHOD BLD: NORMAL
EOSINOPHIL # BLD: 0.1 K/UL (ref 0–0.4)
EOSINOPHIL NFR BLD: 2 % (ref 0–5)
ERYTHROCYTE [DISTWIDTH] IN BLOOD BY AUTOMATED COUNT: 13 % (ref 11.6–14.5)
GLOBULIN SER CALC-MCNC: 2.8 G/DL (ref 2–4)
GLUCOSE SERPL-MCNC: 96 MG/DL (ref 74–99)
HCT VFR BLD AUTO: 43.3 % (ref 35–45)
HDLC SERPL-MCNC: 76 MG/DL (ref 40–60)
HDLC SERPL: 4.5 {RATIO} (ref 0–5)
HGB BLD-MCNC: 14.1 G/DL (ref 12–16)
LDLC SERPL CALC-MCNC: 243.4 MG/DL (ref 0–100)
LIPID PROFILE,FLP: ABNORMAL
LYMPHOCYTES # BLD: 1.4 K/UL (ref 0.9–3.6)
LYMPHOCYTES NFR BLD: 27 % (ref 21–52)
MCH RBC QN AUTO: 27.6 PG (ref 24–34)
MCHC RBC AUTO-ENTMCNC: 32.6 G/DL (ref 31–37)
MCV RBC AUTO: 84.9 FL (ref 74–97)
MONOCYTES # BLD: 0.4 K/UL (ref 0.05–1.2)
MONOCYTES NFR BLD: 7 % (ref 3–10)
NEUTS SEG # BLD: 3.4 K/UL (ref 1.8–8)
NEUTS SEG NFR BLD: 64 % (ref 40–73)
PLATELET # BLD AUTO: 260 K/UL (ref 135–420)
PMV BLD AUTO: 9.8 FL (ref 9.2–11.8)
POTASSIUM SERPL-SCNC: 4 MMOL/L (ref 3.5–5.5)
PROT SERPL-MCNC: 6.7 G/DL (ref 6.4–8.2)
RBC # BLD AUTO: 5.1 M/UL (ref 4.2–5.3)
SODIUM SERPL-SCNC: 138 MMOL/L (ref 136–145)
T4 FREE SERPL-MCNC: 0.8 NG/DL (ref 0.7–1.5)
TRIGL SERPL-MCNC: 108 MG/DL (ref ?–150)
TSH SERPL DL<=0.05 MIU/L-ACNC: 2.5 UIU/ML (ref 0.36–3.74)
VLDLC SERPL CALC-MCNC: 21.6 MG/DL
WBC # BLD AUTO: 5.3 K/UL (ref 4.6–13.2)

## 2018-11-30 PROCEDURE — 85025 COMPLETE CBC W/AUTO DIFF WBC: CPT

## 2018-11-30 PROCEDURE — 84439 ASSAY OF FREE THYROXINE: CPT

## 2018-11-30 PROCEDURE — 84443 ASSAY THYROID STIM HORMONE: CPT

## 2018-11-30 PROCEDURE — 80061 LIPID PANEL: CPT

## 2018-11-30 PROCEDURE — 36415 COLL VENOUS BLD VENIPUNCTURE: CPT

## 2018-11-30 PROCEDURE — 80053 COMPREHEN METABOLIC PANEL: CPT

## 2018-12-11 ENCOUNTER — OFFICE VISIT (OUTPATIENT)
Dept: INTERNAL MEDICINE CLINIC | Age: 58
End: 2018-12-11

## 2018-12-11 ENCOUNTER — HOSPITAL ENCOUNTER (OUTPATIENT)
Dept: LAB | Age: 58
Discharge: HOME | End: 2018-12-11
Payer: MEDICARE

## 2018-12-11 VITALS
HEART RATE: 100 BPM | DIASTOLIC BLOOD PRESSURE: 72 MMHG | OXYGEN SATURATION: 96 % | WEIGHT: 147.8 LBS | SYSTOLIC BLOOD PRESSURE: 110 MMHG | HEIGHT: 63 IN | BODY MASS INDEX: 26.19 KG/M2 | RESPIRATION RATE: 14 BRPM | TEMPERATURE: 98.3 F

## 2018-12-11 DIAGNOSIS — J45.20 MILD INTERMITTENT ASTHMA WITHOUT COMPLICATION: ICD-10-CM

## 2018-12-11 DIAGNOSIS — E53.8 VITAMIN B12 DEFICIENCY: ICD-10-CM

## 2018-12-11 DIAGNOSIS — M79.7 FIBROMYALGIA: ICD-10-CM

## 2018-12-11 DIAGNOSIS — Z23 ENCOUNTER FOR IMMUNIZATION: Primary | ICD-10-CM

## 2018-12-11 DIAGNOSIS — E78.5 HYPERLIPIDEMIA LDL GOAL <160: ICD-10-CM

## 2018-12-11 DIAGNOSIS — S81.812A LACERATION OF LEFT LOWER EXTREMITY, INITIAL ENCOUNTER: ICD-10-CM

## 2018-12-11 DIAGNOSIS — F32.A CHRONIC DEPRESSION: ICD-10-CM

## 2018-12-11 DIAGNOSIS — K58.0 IRRITABLE BOWEL SYNDROME WITH DIARRHEA: ICD-10-CM

## 2018-12-11 DIAGNOSIS — M81.0 AGE-RELATED OSTEOPOROSIS WITHOUT CURRENT PATHOLOGICAL FRACTURE: ICD-10-CM

## 2018-12-11 LAB — VIT B12 SERPL-MCNC: 546 PG/ML (ref 211–911)

## 2018-12-11 PROCEDURE — 82607 VITAMIN B-12: CPT

## 2018-12-11 NOTE — PROGRESS NOTES
1. Have you been to the ER, urgent care clinic or hospitalized since your last visit? NO 
      
 
2. Have you seen or consulted any other health care providers outside of the [address] [place] [address] since your last visit (Include any pap smears or colon screening)? YES Do you have an Advanced Directive? YES Would you like information on Advanced Directives?  NO

## 2018-12-11 NOTE — PROGRESS NOTES
Seema Tay 1960, is a 63 y.o. female, who is seen today for reevaluation of fibromyalgia, hyperlipidemia, depression, insomnia, osteoporosis, cystitis, GERD, carpal tunnel syndrome, asthma, and irritable bowel syndrome. Irritable bowel is gotten worse over the last several months where she has diarrhea frequently and then after several weeks may be constipated for a couple days and then back to diarrhea. She is try to eliminate various types of foods including dairy with limited success. Her asthma is doing quite well and she sees Dr. Luci Flores for that and takes her medication regularly. She has chronic back pain and other pains including fibromyalgia and is doing quite well with current medicine that includes meloxicam and gabapentin and duloxetine. For her osteoporosis she is on Prolia, I had previously recommended she not be on that but another of her doctors through pain management think she should be on it despite its shortcomings and her low risk of fracture. She is getting a second opinion from Dr. Marilynn Brunner, her rheumatologist but has not heard back from her final recommendation yet. She also was found to have elevated parathyroid hormone recently and was referred to an endocrinologist whom she will see soon. She is sleeping quite well with the help of trazodone. Depression is quite well treated with Trileptal.  She has hyperlipidemia but is at low risk for coronary disease so we have not use statin therapy. Her anxiety is controlled but she requires lorazepam 2 mg 3 times a day, prescribed elsewhere. Gynecologist is retiring soon and she will need a new gynecologist. 
 
Past Medical History:  
Diagnosis Date  Allergy  Anxiety  Arthritis  Asthma   
 mild  Atypical ductal hyperplasia of breast   
 Atypical hyperplasia of breast 2003  
 right  Chronic depression  Chronic fatigue syndrome  Chronic interstitial cystitis  Chronic interstitial cystitis  Chronic pain   
 fibromyalgia  Constipation  Depression  Diarrhea  Difficulty walking  Dizziness  Dry eyes  Dry mouth  Dyspnea  Fibromyalgia  Fibromyalgia  Generalized stiffness  GERD (gastroesophageal reflux disease)  Headache(784.0)  Heartburn  Hyperlipidemia  IBS (irritable bowel syndrome)  IC (interstitial cystitis)  Imbalance  Incoordination  Insomnia  Irregular periods/menstrual cycles  Joint pain  Microscopic hematuria  Muscle pain  Myofascial pain syndrome  Numbness and tingling in hands  Ovarian cyst, left  Painful sex  Pneumonia  Psychiatric disorder   
 anxiety - depression  RLS (restless legs syndrome)  Stress  TMJ (temporomandibular joint syndrome)  Transient total loss of muscle tone  Uterine fibroid  Weakness generalized Past Surgical History:  
Procedure Laterality Date  ENDOSCOPY, COLON, DIAGNOSTIC  2013  
 10 years  HX BREAST BIOPSY  2003  
 right  HX COLONOSCOPY  2012  
 Charlette Flores  HX GYN    
 left ovarian cystectomy  HX GYN    
 laparoscopy, laparotomy, lysis of adhesions.  HX MYOMECTOMY  HX OVARIAN CYST REMOVAL  2003  HX UROLOGICAL  05/24/10  
 cystoscopy and hydrodilation Current Outpatient Medications Medication Sig Dispense Refill  tiotropium bromide (SPIRIVA RESPIMAT) 2.5 mcg/actuation inhaler Take 2 Puffs by inhalation daily. 3 Inhaler 6  
 albuterol (PROVENTIL HFA, VENTOLIN HFA, PROAIR HFA) 90 mcg/actuation inhaler Take 2 Puffs by inhalation every six (6) hours as needed for Wheezing. 3 Inhaler 6  Mth-Me Blue-Sod Phos-PhSal-Hyo (URO-MP) 118-10-40.8-36 mg cap capsule Take 1 Cap by mouth four (4) times daily.  120 Cap 1  
 gabapentin (NEURONTIN) 800 mg tablet take 1 tablet by mouth three times a day  0  
  pentosan polysulfate sodium (ELMIRON) 100 mg capsule Take 1 Cap by mouth two (2) times a day. Indications: Bladder Pain, Interstitial Cystitis 120 Cap 3  pantoprazole (PROTONIX) 40 mg tablet Take 1 Tab by mouth daily. 90 Tab 3  
 denosumab (PROLIA) 60 mg/mL injection 60 mg by SubCUTAneous route.  montelukast (SINGULAIR) 10 mg tablet Take 1 Tab by mouth daily. 90 Tab 6  
 mometasone (ASMANEX TWISTHALER) 220 mcg (60 doses) inhaler Take 2 Puffs by inhalation two (2) times a day. 120 Cap 6  calcium combo no.2-vitamin D3 (CITRACAL + D SLOW RELEASE) 600 mg calcium- 500 unit TbER Take  by mouth.  magnesium oxide (MAGOX) 400 mg tablet Take 400 mg by mouth daily.  MV-MN/FOLIC ACID/CALCIUM/VIT K (WOMEN'S 50 PLUS DAILY FORMULA PO) Take  by mouth.  pramoxine-hydrocortisone (ANALPRAM HC) 1-1 % topical cream Apply  to affected area three (3) times daily. 30 g 2  
 Methen-Hyos-M Blue-BA-PhSal (HYOPHEN) tablet Take 1 Tab by mouth four (4) times daily. 120 Tab 3  
 OXcarbazepine (TRILEPTAL) 150 mg tablet Take 1 Tab by mouth three (3) times daily. 90 Tab 2  
 calcium glycerophosphate (PRELIEF) 65 mg tab Take  by mouth.  meloxicam (MOBIC) 15 mg tablet take 1 tablet by mouth once daily 30 Tab 11  
 modafinil (PROVIGIL) 200 mg tablet take 1 tablet by mouth twice a day for 30 DAYS maximum daily dose of 2 tablets 60 Tab 5  
 ergocalciferol (ERGOCALCIFEROL) 50,000 unit capsule take 1 capsule by mouth TWO (2) TIMES A WEEK  Indications: PREVENTION OF VITAMIN D DEFICIENCY (Patient taking differently: take 1 capsule by mouth once a week  Indications: PREVENTION OF VITAMIN D DEFICIENCY) 8 Cap 11  
 OTHER Indications: Compound cream for inflammation  b complex vitamins tablet Take 1 Tab by mouth daily.  B.infantis-B.ani-B.long-B.bifi (PROBIOTIC 4X) 10-15 mg TbEC Take  by mouth.  ENZYMES,DIGESTIVE, PLANT, (DIGESTIVE ENZYMES, PLANT, PO) Take  by mouth.  DIPHENOXYLATE HCL/ATROPINE (LONOX PO) Take  by mouth.  calcium citrate-vitamin D3 (CITRACAL + D) tablet Take  by mouth two (2) times a day.  LORazepam (ATIVAN) 2 mg tablet Take  by mouth every eight (8) hours as needed for Anxiety.  DULoxetine (CYMBALTA) 60 mg capsule Take 120 mg by mouth daily.  simethicone (PHAZYME) 180 mg cap Take  by mouth as needed.  trazodone (DESYREL) 100 mg tablet Take 100 mg by mouth nightly. Allergies Allergen Reactions  Codeine Rash and Itching Other reaction(s): other/intolerance  Tussionex Itching Social History Socioeconomic History  Marital status:  Spouse name: Not on file  Number of children: Not on file  Years of education: Not on file  Highest education level: Not on file Tobacco Use  Smoking status: Never Smoker  Smokeless tobacco: Never Used Substance and Sexual Activity  Alcohol use: Yes Alcohol/week: 0.5 oz Types: 1 Cans of beer per week Comment: socially  Drug use: No  
 Sexual activity: Yes  
  Partners: Male Visit Vitals /72 (BP 1 Location: Left arm, BP Patient Position: Sitting) Pulse 100 Temp 98.3 °F (36.8 °C) (Oral) Resp 14 Ht 5' 3\" (1.6 m) Wt 147 lb 12.8 oz (67 kg) SpO2 96% BMI 26.18 kg/m² The patient is a well-developed well-nourished female in no apparent distress. HEENT: Pupils are equal and react to light and extraocular movements are full. Ear canals and tympanic membranes appear normal. Oral cavity appears normal with no oral lesions. Neck: Carotids are 2+ without bruits. No adenopathy or thyromegaly. Lungs are clear to percussion. I hear no wheezing, rales or rhonchi. Heart reveals a regular rhythm with no murmur, gallop, click or rub. The apical impulse is in the fifth interspace at the midclavicular line. Abdomen is soft and nontender with no hepatosplenomegaly or masses.  Bowel sounds are normoactive and there is no distention or tympany. Extremities reveal no clubbing cyanosis or edema. Pulses are 2+. Skin reveals no suspicious skin growths. Breasts reveal no masses, skin or nipple abnormalities. No axillary adenopathy. Results for orders placed or performed during the hospital encounter of 11/30/18 CBC WITH AUTOMATED DIFF Result Value Ref Range WBC 5.3 4.6 - 13.2 K/uL  
 RBC 5.10 4.20 - 5.30 M/uL  
 HGB 14.1 12.0 - 16.0 g/dL HCT 43.3 35.0 - 45.0 % MCV 84.9 74.0 - 97.0 FL  
 MCH 27.6 24.0 - 34.0 PG  
 MCHC 32.6 31.0 - 37.0 g/dL  
 RDW 13.0 11.6 - 14.5 % PLATELET 037 332 - 256 K/uL MPV 9.8 9.2 - 11.8 FL  
 NEUTROPHILS 64 40 - 73 % LYMPHOCYTES 27 21 - 52 % MONOCYTES 7 3 - 10 % EOSINOPHILS 2 0 - 5 % BASOPHILS 0 0 - 2 %  
 ABS. NEUTROPHILS 3.4 1.8 - 8.0 K/UL  
 ABS. LYMPHOCYTES 1.4 0.9 - 3.6 K/UL  
 ABS. MONOCYTES 0.4 0.05 - 1.2 K/UL  
 ABS. EOSINOPHILS 0.1 0.0 - 0.4 K/UL  
 ABS. BASOPHILS 0.0 0.0 - 0.1 K/UL  
 DF AUTOMATED    
LIPID PANEL Result Value Ref Range LIPID PROFILE Cholesterol, total 341 (H) <200 MG/DL Triglyceride 108 <150 MG/DL  
 HDL Cholesterol 76 (H) 40 - 60 MG/DL  
 LDL, calculated 243.4 (H) 0 - 100 MG/DL VLDL, calculated 21.6 MG/DL  
 CHOL/HDL Ratio 4.5 0 - 5.0 METABOLIC PANEL, COMPREHENSIVE Result Value Ref Range Sodium 138 136 - 145 mmol/L Potassium 4.0 3.5 - 5.5 mmol/L Chloride 105 100 - 108 mmol/L  
 CO2 25 21 - 32 mmol/L Anion gap 8 3.0 - 18 mmol/L Glucose 96 74 - 99 mg/dL BUN 20 (H) 7.0 - 18 MG/DL Creatinine 0.71 0.6 - 1.3 MG/DL  
 BUN/Creatinine ratio 28 (H) 12 - 20 GFR est AA >60 >60 ml/min/1.73m2 GFR est non-AA >60 >60 ml/min/1.73m2 Calcium 8.6 8.5 - 10.1 MG/DL Bilirubin, total 0.3 0.2 - 1.0 MG/DL  
 ALT (SGPT) 32 13 - 56 U/L  
 AST (SGOT) 19 15 - 37 U/L Alk. phosphatase 36 (L) 45 - 117 U/L Protein, total 6.7 6.4 - 8.2 g/dL Albumin 3.9 3.4 - 5.0 g/dL Globulin 2.8 2.0 - 4.0 g/dL A-G Ratio 1.4 0.8 - 1.7 T4, FREE Result Value Ref Range T4, Free 0.8 0.7 - 1.5 NG/DL  
TSH 3RD GENERATION Result Value Ref Range TSH 2.50 0.36 - 3.74 uIU/mL Assessment: #1. IBS with diarrhea has worsened, she would like to see Dr. Ailyn Tyler but not until after the first of the year. I made that recommendation since she was not having with her prior gastroenterologist in [place]. #2.  Depression is doing fairly well, she will continue Trileptal 150 mg 3 times daily prescribed elsewhere. #3.  Fibromyalgia and DJD, she will continue meloxicam 15 mg daily and gabapentin 800 mg 3 times a day and duloxetine 120 mg daily. #4.  Hyperlipidemia, she will continue low-fat diet and avoid weight gain, she is not a good candidate for statin therapy for lots of reasons but her risk of developing coronary disease over the next 10 years is only about 2.2% and would be improved only to about 1.7% with therapy. #5.  Mild intermittent asthma with lungs clear, she will continue current inhalers. #6.  Elevated PTH, unclear why that was even drawn, she will see the endocrinologist as recommended elsewhere. #7.  GERD not doing quite as well with pantoprazole as she did with Nexium, I recommended she continue pantoprazole but she will use simethicone at bedtime and that helps her with reflux of nonacidic material.  Stronger PPI is unlikely to do that for her. #8.  Chronic cystitis, she will continue elmiron 100 mg twice a day as recommended by her urologist.  #9. Body mass index 26, this is down 3 pounds from a year ago and healthy weight for this lady. She will avoid significant weight gain. #10. Chronic anxiety, she will continue lorazepam 2 mg 3 times a day prescribed elsewhere. #11. She does have a small laceration on her lower leg and will receive tetanus booster today. For new gynecologist I recommended Dr. Chelsea Plummer or associate. Follow-up 6 months with no lab Gabriel Lacey, [address] [facility] Please note: This document has been produced using voice recognition software. Unrecognized errors in transcription may be present.

## 2018-12-12 ENCOUNTER — TELEPHONE (OUTPATIENT)
Dept: INTERNAL MEDICINE CLINIC | Age: 58
End: 2018-12-12

## 2018-12-12 NOTE — TELEPHONE ENCOUNTER
Patient verified with two identifiers (name and ). Patient notified of providers comments on results.

## 2018-12-12 NOTE — TELEPHONE ENCOUNTER
----- Message from Chelsie Camp MD sent at 12/12/2018  7:18 AM EST -----  Please notify the patient that her B12 level is normal.

## 2018-12-12 NOTE — TELEPHONE ENCOUNTER
Please call the patient. She is asking to speak to a nurse. She was already given the info below when she called in. She stated she has other questions about her shots and what the shots mean and stand for.

## 2018-12-12 NOTE — TELEPHONE ENCOUNTER
Patient also advised on the type of vaccine she received and when she is due for the next TDAP. Patient verbalizes understanding.

## 2018-12-12 NOTE — TELEPHONE ENCOUNTER
Pt calling, wants nurse to call her about the tdap shot she got yesterday. Says her summary is saying she needs a tdap 2 and she doesn't understand it. Wants to know exactly what she is due on shots.

## 2018-12-12 NOTE — TELEPHONE ENCOUNTER
The summary just didn't update because the shot probably wasn't entered by the time she left the office.  She is good until 2028

## 2019-01-09 ENCOUNTER — TELEPHONE (OUTPATIENT)
Dept: INTERNAL MEDICINE CLINIC | Age: 59
End: 2019-01-09

## 2019-01-09 DIAGNOSIS — E78.5 HYPERLIPIDEMIA LDL GOAL <160: Primary | ICD-10-CM

## 2019-01-09 NOTE — TELEPHONE ENCOUNTER
Deepika wants to know why she is not having labs done in June. She requests that she continue to have them done every 6 months.

## 2019-03-04 DIAGNOSIS — J45.40 MODERATE PERSISTENT ASTHMA WITHOUT COMPLICATION: ICD-10-CM

## 2019-03-04 RX ORDER — MONTELUKAST SODIUM 10 MG/1
10 TABLET ORAL DAILY
Qty: 90 TAB | Refills: 3 | Status: SHIPPED | OUTPATIENT
Start: 2019-03-04 | End: 2020-02-24 | Stop reason: SDUPTHER

## 2019-03-04 NOTE — TELEPHONE ENCOUNTER
PT [id number]([phone]). PT NEEDS REFILL FOR GENERIC SINGULAIR SENT TO [facility] [id number].

## 2019-04-05 RX ORDER — PANTOPRAZOLE SODIUM 40 MG/1
40 TABLET, DELAYED RELEASE ORAL DAILY
Qty: 90 TAB | Refills: 3 | Status: SHIPPED | OUTPATIENT
Start: 2019-04-05 | End: 2019-06-12 | Stop reason: CLARIF

## 2019-04-11 ENCOUNTER — OFFICE VISIT (OUTPATIENT)
Dept: PULMONOLOGY | Age: 59
End: 2019-04-11

## 2019-04-11 VITALS
DIASTOLIC BLOOD PRESSURE: 70 MMHG | BODY MASS INDEX: 25.16 KG/M2 | OXYGEN SATURATION: 99 % | SYSTOLIC BLOOD PRESSURE: 120 MMHG | HEIGHT: 63 IN | WEIGHT: 142 LBS | RESPIRATION RATE: 18 BRPM | TEMPERATURE: 98.6 F | HEART RATE: 101 BPM

## 2019-04-11 DIAGNOSIS — G47.33 OSA (OBSTRUCTIVE SLEEP APNEA): ICD-10-CM

## 2019-04-11 DIAGNOSIS — J20.9 BRONCHITIS WITH BRONCHOSPASM: ICD-10-CM

## 2019-04-11 DIAGNOSIS — J45.20 MILD INTERMITTENT ASTHMA WITHOUT COMPLICATION: Primary | ICD-10-CM

## 2019-04-11 NOTE — PROGRESS NOTES
[facility]  Pulmonary, Critical Care, and Sleep Medicine      Pulmonary Office Progress Notes    Name: Tod Canela     : 1960     Date: 2019        Subjective:     Patient is a 63 y.o. female with history of asthma and reactive airways presents for follow up.    19     No new concerns with SOB, cough, wheezing-she states that winter is the worst for her and she usually does good in the spring time. Overall better after starting Spiriva  Denies any wheezing. No increase in nasal congestion. Denies sore throat. Needs refills:  Asmanex   Denies any other interval problems    C/o hand arthritis symptoms. -on mobic  She reports increased symptoms of chest discomfort and some cough with the change of weather. Some congestion which improved with claritan. She had started water exercises. -swimming laps but not very regular now. She is under the care of pain management for fibromyalgia.     Past Medical History:   Diagnosis Date    Allergy     Anxiety     Arthritis     Asthma     mild    Atypical ductal hyperplasia of breast     Atypical hyperplasia of breast 2003    right    Chronic depression     Chronic fatigue syndrome     Chronic interstitial cystitis     Chronic interstitial cystitis     Chronic pain     fibromyalgia    Constipation     Depression     Diarrhea     Difficulty walking     Dizziness     Dry eyes     Dry mouth     Dyspnea     Fibromyalgia     Fibromyalgia     Generalized stiffness     GERD (gastroesophageal reflux disease)     Headache(784.0)     Heartburn     Hyperlipidemia     IBS (irritable bowel syndrome)     IC (interstitial cystitis)     Imbalance     Incoordination     Insomnia     Irregular periods/menstrual cycles     Joint pain     Microscopic hematuria     Muscle pain     Myofascial pain syndrome     Numbness and tingling in hands     Ovarian cyst, left     Painful sex     Pneumonia     Psychiatric disorder     anxiety - depression    RLS (restless legs syndrome)     Stress     TMJ (temporomandibular joint syndrome)     Transient total loss of muscle tone     Uterine fibroid     Weakness generalized        Allergies   Allergen Reactions    Codeine Rash and Itching     Other reaction(s): other/intolerance    Tussionex Itching       Current Outpatient Medications   Medication Sig Dispense Refill    mometasone (ASMANEX TWISTHALER) 220 mcg (30 doses) inhaler Take 1 Puff by inhalation daily. 90 Cap 3    pantoprazole (PROTONIX) 40 mg tablet Take 1 Tab by mouth daily. 90 Tab 3    montelukast (SINGULAIR) 10 mg tablet Take 1 Tab by mouth daily. 90 Tab 3    mth-me blue-sod phos-phsal-hyo (URO-MP) 118-10-40.8-36 mg cap capsule Take 1 Cap by mouth four (4) times daily. 120 Cap 6    tiotropium bromide (SPIRIVA RESPIMAT) 2.5 mcg/actuation inhaler Take 2 Puffs by inhalation daily. 3 Inhaler 6    albuterol (PROVENTIL HFA, VENTOLIN HFA, PROAIR HFA) 90 mcg/actuation inhaler Take 2 Puffs by inhalation every six (6) hours as needed for Wheezing. 3 Inhaler 6    gabapentin (NEURONTIN) 800 mg tablet take 1 tablet by mouth three times a day  0    pentosan polysulfate sodium (ELMIRON) 100 mg capsule Take 1 Cap by mouth two (2) times a day. Indications: Bladder Pain, Interstitial Cystitis 120 Cap 3    denosumab (PROLIA) 60 mg/mL injection 60 mg by SubCUTAneous route.  mometasone (ASMANEX TWISTHALER) 220 mcg (60 doses) inhaler Take 2 Puffs by inhalation two (2) times a day. 120 Cap 6    Methen-Hyos-M Blue-BA-PhSal (HYOPHEN) tablet Take 1 Tab by mouth four (4) times daily. 120 Tab 3    OXcarbazepine (TRILEPTAL) 150 mg tablet Take 1 Tab by mouth three (3) times daily.  90 Tab 2    meloxicam (MOBIC) 15 mg tablet take 1 tablet by mouth once daily 30 Tab 11    modafinil (PROVIGIL) 200 mg tablet take 1 tablet by mouth twice a day for 30 DAYS maximum daily dose of 2 tablets 60 Tab 5    ergocalciferol (ERGOCALCIFEROL) 50,000 unit capsule take 1 capsule by mouth TWO (2) TIMES A WEEK  Indications: PREVENTION OF VITAMIN D DEFICIENCY (Patient taking differently: take 1 capsule by mouth once a week  Indications: PREVENTION OF VITAMIN D DEFICIENCY) 8 Cap 11    OTHER Indications: Compound cream for inflammation      B.infantis-B.ani-B.long-B.bifi (PROBIOTIC 4X) 10-15 mg TbEC Take  by mouth.  ENZYMES,DIGESTIVE, PLANT, (DIGESTIVE ENZYMES, PLANT, PO) Take  by mouth.  DIPHENOXYLATE HCL/ATROPINE (LONOX PO) Take  by mouth. Using as needed for diarrhea      calcium citrate-vitamin D3 (CITRACAL + D) tablet Take  by mouth two (2) times a day.  LORazepam (ATIVAN) 2 mg tablet Take  by mouth every eight (8) hours as needed for Anxiety.  DULoxetine (CYMBALTA) 60 mg capsule Take 120 mg by mouth daily.  simethicone (PHAZYME) 180 mg cap Take  by mouth as needed.  trazodone (DESYREL) 100 mg tablet Take 100 mg by mouth nightly.  calcium combo no.2-vitamin D3 (CITRACAL + D SLOW RELEASE) 600 mg calcium- 500 unit TbER Take  by mouth.  magnesium oxide (MAGOX) 400 mg tablet Take 400 mg by mouth daily.  MV-MN/FOLIC ACID/CALCIUM/VIT K (WOMEN'S 50 PLUS DAILY FORMULA PO) Take  by mouth.  pramoxine-hydrocortisone (ANALPRAM HC) 1-1 % topical cream Apply  to affected area three (3) times daily. 30 g 2    calcium glycerophosphate (PRELIEF) 65 mg tab Take  by mouth.  b complex vitamins tablet Take 1 Tab by mouth daily.          Review of Systems:  HEENT: No epistaxis, no nasal drainage, no difficulty in swallowing, no redness in eyes  Respiratory: as above  Cardiovascular: no chest pain, no palpitations, no chronic leg edema, no syncope  Gastrointestinal:  Retrosternal pain+ no abd pain, no vomiting, no diarrhea, no bleeding symptoms  Genitourinary: No urinary symptoms or hematuria  Integument/breast: No ulcers or rashes  Musculoskeletal:Neg  Neurological: No focal weakness, no seizures, no headaches  Behvioral/Psych: No anxiety, no depression  Constitutional: No fever, no chills, no weight loss, no night sweats     Objective:     Visit Vitals  /70 (BP 1 Location: Left arm, BP Patient Position: Sitting)   Pulse (!) 101   Temp 98.6 °F (37 °C)   Resp 18   Ht 5' 3\" (1.6 m)   Wt 64.4 kg (142 lb)   SpO2 99%   BMI 25.15 kg/m²        Physical Exam:   General: comfortable, no acute distress  HEENT: pupils reactive, sclera anicteric, EOM intact  Neck: No adenopathy or thyroid swelling, no lymphadenopathy or JVD, supple  CVS: S1S2 no murmurs  RS: Mod AE bilaterally, no tactile fremitus or egophony, no accessory muscle use  Neuro: non focal, awake, alert  Extrm: no leg edema, clubbing or cyanosis  Skin: no rash    Data review:     Office Visit on 10/03/2018   Component Date Value Ref Range Status    Color (UA POC) 10/03/2018 Green/Blue   Final    Clarity (UA POC) 10/03/2018 Clear   Final    Glucose (UA POC) 10/03/2018 Negative  Negative Final    Bilirubin (UA POC) 10/03/2018 3+  Negative Corrected    Ketones (UA POC) 10/03/2018 Trace  Negative Corrected    Specific gravity (UA POC) 10/03/2018 1.020  1.001 - 1.035 Final    Blood (UA POC) 10/03/2018 Trace  Negative Corrected    pH (UA POC) 10/03/2018 6.5  4.6 - 8.0 Corrected    Protein (UA POC) 10/03/2018 2+  Negative Corrected    Urobilinogen (UA POC) 10/03/2018 0.2 mg/dL  0.2 - 1 Corrected    Nitrites (UA POC) 10/03/2018 Negative  Negative Final    Leukocyte esterase (UA POC) 10/03/2018 Negative  Negative Final    FINAL REPORT 10/03/2018 Microbiology results   Final    Comment: ANTIBIOTIC ALLERGIES  No Known Antibiotic Allergies    URINE SOURCE:  Clean Catch    COLONY COUNT / RESULT:   10,000 cfu/ml  Mixed Culture, 3 or more organisms:   Urogenital / Skin Humera and Gram Negative Contaminants. Resubmission of a new specimen is recommended.      Clinical Support on 07/31/2018   Component Date Value Ref Range Status    Color (UA POC) 07/31/2018 Green/Blue   Final    Clarity (UA POC) 07/31/2018 Clear   Final    Glucose (UA POC) 07/31/2018 Negative  Negative Final    Bilirubin (UA POC) 07/31/2018 Negative  Negative Final    Ketones (UA POC) 07/31/2018 Negative  Negative Final    Specific gravity (UA POC) 07/31/2018 1.020  1.001 - 1.035 Final    Blood (UA POC) 07/31/2018 Negative  Negative Final    pH (UA POC) 07/31/2018 7.0  4.6 - 8.0 Final    Protein (UA POC) 07/31/2018 Negative  Negative Final    Urobilinogen (UA POC) 07/31/2018 0.2 mg/dL  0.2 - 1 Final    Nitrites (UA POC) 07/31/2018 Negative  Negative Final    Leukocyte esterase (UA POC) 07/31/2018 Negative  Negative Final    FINAL REPORT 07/31/2018 Microbiology results   Final    Comment: ANTIBIOTIC ALLERGIES  No Known Antibiotic Allergies    URINE SOURCE:  Straight Cath In/Out    COLONY COUNT / RESULT:   No Growth at 48 Hours     Clinical Support on 07/26/2018   Component Date Value Ref Range Status    Color (UA POC) 07/26/2018 Green/Blue   Final    Clarity (UA POC) 07/26/2018 Clear   Final    Glucose (UA POC) 07/26/2018 Negative  Negative Final    Bilirubin (UA POC) 07/26/2018 4+  Negative Final    Ketones (UA POC) 07/26/2018 Trace  Negative Final    Specific gravity (UA POC) 07/26/2018 1.025  1.001 - 1.035 Final    Blood (UA POC) 07/26/2018 2+  Negative Final    pH (UA POC) 07/26/2018 6.5  4.6 - 8.0 Final    Protein (UA POC) 07/26/2018 2+  Negative Final    100mg/dl    Urobilinogen (UA POC) 07/26/2018 0.2 mg/dL  0.2 - 1 Final    Nitrites (UA POC) 07/26/2018 Negative  Negative Final    Leukocyte esterase (UA POC) 07/26/2018 Negative  Negative Final    FINAL REPORT 07/26/2018 Microbiology results   Final          PVR 07/26/2018 0  cc Final   Hospital Outpatient Visit on 06/04/2018   Component Date Value Ref Range Status    Sodium 06/04/2018 140  136 - 145 mmol/L Final    Potassium 06/04/2018 4.0  3.5 - 5.5 mmol/L Final    Chloride 06/04/2018 103  100 - 108 mmol/L Final    CO2 06/04/2018 29  21 - 32 mmol/L Final    Anion gap 06/04/2018 8  3.0 - 18 mmol/L Final    Glucose 06/04/2018 93  74 - 99 mg/dL Final    BUN 06/04/2018 18  7.0 - 18 MG/DL Final    Creatinine 06/04/2018 0.75  0.6 - 1.3 MG/DL Final    BUN/Creatinine ratio 06/04/2018 24* 12 - 20   Final    GFR est AA 06/04/2018 >60  >60 ml/min/1.73m2 Final    GFR est non-AA 06/04/2018 >60  >60 ml/min/1.73m2 Final    Comment: (NOTE)  Estimated GFR is calculated using the Modification of Diet in Renal   Disease (MDRD) Study equation, reported for both  Americans   (GFRAA) and non- Americans (GFRNA), and normalized to 1.73m2   body surface area. The physician must decide which value applies to   the patient. The MDRD study equation should only be used in   individuals age 25 or older. It has not been validated for the   following: pregnant women, patients with serious comorbid conditions,   or on certain medications, or persons with extremes of body size,   muscle mass, or nutritional status.  Calcium 06/04/2018 8.9  8.5 - 10.1 MG/DL Final    Bilirubin, total 06/04/2018 0.3  0.2 - 1.0 MG/DL Final    ALT (SGPT) 06/04/2018 47  13 - 56 U/L Final    AST (SGOT) 06/04/2018 30  15 - 37 U/L Final    Alk.  phosphatase 06/04/2018 35* 45 - 117 U/L Final    Protein, total 06/04/2018 6.7  6.4 - 8.2 g/dL Final    Albumin 06/04/2018 4.0  3.4 - 5.0 g/dL Final    Globulin 06/04/2018 2.7  2.0 - 4.0 g/dL Final    A-G Ratio 06/04/2018 1.5  0.8 - 1.7   Final    Color 06/04/2018 GREEN    Final    Appearance 06/04/2018 CLEAR    Final    Specific gravity 06/04/2018 1.028  1.003 - 1.030   Final    pH (UA) 06/04/2018 7.0  5.0 - 8.0   Final    Protein 06/04/2018 NEGATIVE   NEG mg/dL Final    Glucose 06/04/2018 NEGATIVE   NEG mg/dL Final    Ketone 06/04/2018 NEGATIVE   NEG mg/dL Final    Bilirubin 06/04/2018 NEGATIVE   NEG   Final    Blood 06/04/2018 NEGATIVE   NEG   Final    Urobilinogen 06/04/2018 0.2  0.2 - 1.0 EU/dL Final    Nitrites 06/04/2018 NEGATIVE   NEG   Final    Leukocyte Esterase 06/04/2018 SMALL* NEG   Final    WBC 06/04/2018 4 to 10  0 - 4 /hpf Final    RBC 06/04/2018 0 to 3  0 - 5 /hpf Final    Epithelial cells 06/04/2018 2+  0 - 5 /lpf Final    Bacteria 06/04/2018 2+* NEG /hpf Final    Mucus 06/04/2018 1+* NEG /lpf Final       Imaging:  I have personally reviewed the patients radiographs and have reviewed the reports:  No recent CXR     IMPRESSION:   Asthma- fairly well controlled with intermittent symptomatic exacerbations related to environmental exposure to temp change, pollen- weeding and also some increased GERD symptoms  Reactive airways- periodic flare ups related to environmental changes  Anxiety/Depression  Stress  IBS  GERD- on Nexium        RECOMMENDATIONS:   · Continue current treatment  · Discussed need for preventing trigger exposure- wearing mask   · Continue Stepped up GERD treatment- encouraged to take antacids when symptomatic  · Continue Spiriva Respimet, Asmanex.  Discussed options for single agent but limited due to LABA component ( palpitations)  · Will continue current treatment and encourage airway humidification , ambient air temp control and continue antiinflammatory control  · Continue bronchodilators and Singulair   · Prescriptions reviewed and renewed  · Continue treatment per pain management  · Reassurance provided  · Encouraged activity        Andrew Gibson MD

## 2019-04-11 NOTE — PROGRESS NOTES
The pt. Is non complaining. Richelle Jesus presents today for   Chief Complaint   Patient presents with   [facility] Other     F/U to 10/18       Is someone accompanying this pt? No  Is the patient using any DME equipment during OV? No   -DME Company NA    Depression Screening:  3 most recent PHQ Screens 9/17/2014   PHQ Not Done Active Diagnosis of Depression or Bipolar Disorder       Learning Assessment:  Learning Assessment 11/27/2013   PRIMARY LEARNER Patient   PRIMARY LANGUAGE ENGLISH   LEARNER PREFERENCE PRIMARY READING   ANSWERED BY patient   RELATIONSHIP SELF       Abuse Screening:  Abuse Screening Questionnaire 2/20/2017   Do you ever feel afraid of your partner? N   Are you in a relationship with someone who physically or mentally threatens you? N   Is it safe for you to go home? Y       Fall Risk  Fall Risk Assessment, last 12 mths 12/8/2017   Able to walk? Yes   Fall in past 12 months? Yes   Fall with injury? Yes   Number of falls in past 12 months 2   Fall Risk Score 3         Coordination of Care:  1. Have you been to the ER, urgent care clinic since your last visit? Hospitalized since your last visit? No    2. Have you seen or consulted any other health care providers outside of the [address] [place] [address] since your last visit? Follows up with numerous MDs/Specialists. Medication variance in dosage/sig per patient as follows: per Med Rec. Several meds could be removed from the list due to \"not a current med\"    She needs a refill on Asmanex which is pended.

## 2019-04-19 NOTE — TELEPHONE ENCOUNTER
Prior auth approved by plan from 3/20/19-4/18/2020  Tried to call pt to inform her but she didn't answer

## 2019-05-31 ENCOUNTER — APPOINTMENT (OUTPATIENT)
Dept: INTERNAL MEDICINE CLINIC | Age: 59
End: 2019-05-31

## 2019-05-31 ENCOUNTER — HOSPITAL ENCOUNTER (OUTPATIENT)
Dept: LAB | Age: 59
Discharge: HOME | End: 2019-05-31
Payer: MEDICARE

## 2019-05-31 DIAGNOSIS — E78.5 HYPERLIPIDEMIA LDL GOAL <160: ICD-10-CM

## 2019-05-31 LAB
ALBUMIN SERPL-MCNC: 4.2 G/DL (ref 3.4–5)
ALBUMIN/GLOB SERPL: 1.7 {RATIO} (ref 0.8–1.7)
ALP SERPL-CCNC: 30 U/L (ref 45–117)
ALT SERPL-CCNC: 34 U/L (ref 13–56)
ANION GAP SERPL CALC-SCNC: 8 MMOL/L (ref 3–18)
AST SERPL-CCNC: 21 U/L (ref 15–37)
BILIRUB SERPL-MCNC: 0.3 MG/DL (ref 0.2–1)
BUN SERPL-MCNC: 18 MG/DL (ref 7–18)
BUN/CREAT SERPL: 24 (ref 12–20)
CALCIUM SERPL-MCNC: 9 MG/DL (ref 8.5–10.1)
CHLORIDE SERPL-SCNC: 102 MMOL/L (ref 100–108)
CHOLEST SERPL-MCNC: 333 MG/DL
CO2 SERPL-SCNC: 29 MMOL/L (ref 21–32)
CREAT SERPL-MCNC: 0.74 MG/DL (ref 0.6–1.3)
GLOBULIN SER CALC-MCNC: 2.5 G/DL (ref 2–4)
GLUCOSE SERPL-MCNC: 93 MG/DL (ref 74–99)
HDLC SERPL-MCNC: 76 MG/DL (ref 40–60)
HDLC SERPL: 4.4 {RATIO} (ref 0–5)
LDLC SERPL CALC-MCNC: 230.4 MG/DL (ref 0–100)
LIPID PROFILE,FLP: ABNORMAL
POTASSIUM SERPL-SCNC: 4.2 MMOL/L (ref 3.5–5.5)
PROT SERPL-MCNC: 6.7 G/DL (ref 6.4–8.2)
SODIUM SERPL-SCNC: 139 MMOL/L (ref 136–145)
TRIGL SERPL-MCNC: 133 MG/DL (ref ?–150)
VLDLC SERPL CALC-MCNC: 26.6 MG/DL

## 2019-05-31 PROCEDURE — 80053 COMPREHEN METABOLIC PANEL: CPT

## 2019-05-31 PROCEDURE — 80061 LIPID PANEL: CPT

## 2019-05-31 PROCEDURE — 36415 COLL VENOUS BLD VENIPUNCTURE: CPT

## 2019-06-03 ENCOUNTER — HOSPITAL ENCOUNTER (OUTPATIENT)
Dept: PHYSICAL THERAPY | Age: 59
Discharge: HOME | End: 2019-06-03
Payer: MEDICARE

## 2019-06-03 PROCEDURE — 97140 MANUAL THERAPY 1/> REGIONS: CPT

## 2019-06-03 PROCEDURE — 97162 PT EVAL MOD COMPLEX 30 MIN: CPT

## 2019-06-03 PROCEDURE — 97530 THERAPEUTIC ACTIVITIES: CPT

## 2019-06-03 NOTE — PROGRESS NOTES
PT DAILY TREATMENT NOTE 10-18    Patient Name: Jessica Rosales  Date:6/3/2019  : 1960  [x]  Patient  Verified  [place]or: [place] MED[place]ARE / Plan: [place] MEDICARE PART [place] & B / Product Type: Medicare /    In time:9:50  Out time:10:35  Total Treatment Time (min): 45  Visit #: 1 of 10      Medicare/BCBS Only   Total Timed Codes (min):  30 1:1 Treatment Time:  45       Treatment Area: Pain in left hip [M25.552]  Sacroiliac pain [M53.3]     SUBJECTIVE  Pain Level (0-10 scale): -8/10  Any medication changes, allergies to medications, adverse drug reactions, diagnosis change, or new procedure performed?: [x] No    [] Yes (see summary sheet for update)  Subjective functional status/changes:   [x] See paper initial evaluation form in patient chart.       OBJECTIVE    15 min [x]Eval                  []Re-Eval     15 min Therapeutic Activity:  [] See flow sheet : HEP given and reviewed with Pt; energy conservation; balance and fall safety at home   Rationale: increase ROM and improve balance to improve the patients ability to reduce low back and hip pain with ADLs, prevent further falls, fatigue    15 min Manual Therapy:  Pelvic assessment and explanation of obliquity findings; MET leg pull to correct right ilial upslip; MET to correct right posteriorly rotated innominate; shotgun maneuver; PA mobs to L1-2   Rationale: decrease pain and increase ROM to reduce pain to low back, left hip, normalize gait pattern    With   [] TE   [x] TA   [] neuro   [] other: Patient Education: [x] Review HEP    [] Progressed/Changed HEP based on:   [] positioning   [] body mechanics   [] transfers   [] heat/ice application    [] other:      Other Objective/Functional Measures: FOTO 45 pts     Pain Level (0-10 scale) post treatment: 7/10    ASSESSMENT/Changes in Function:     Patient will continue to benefit from skilled PT services to address functional mobility deficits, address ROM deficits, address strength deficits, analyze and address soft tissue restrictions, analyze and cue movement patterns, analyze and modify body mechanics/ergonomics, assess and modify postural abnormalities, address imbalance/dizziness and instruct in home and community integration to attain remaining goals.      [x]  See Plan of Care  []  See progress note/recertification  []  See Discharge Summary         PLAN  []  Upgrade activities as tolerated     [x]  Continue plan of care  []  Update interventions per flow sheet       []  Discharge due to:_  []  Other:_      Altaf Nick, PT 6/3/2019  11:21 AM

## 2019-06-03 NOTE — PROGRESS NOTES
[facility], [address] [place] [address]  [phone] fax  Plan of Care/ Statement of Necessity for Physical Therapy Services    Patient name: Guillaume Arredondo Start of Care: 6/3/2019   Referral source: Darleen Ellis MD : 1960    Medical Diagnosis: Pain in left hip [M25.552]  Sacroiliac pain [M53.3]  Payor: [place] MEDICARE / Plan: [place] MEDICARE PART A & B / Product Type: Medicare /  Onset Date:5/15/19    Treatment Diagnosis: Left SIJ, Hip pain   Prior Hospitalization: see medical history Provider#: [id number]   Medications: Verified on Patient summary List    Comorbidities: Fibromyalgia; Osteopenia/Osteoporosis; Arthritis; Asthma; Interstitial Cystitis; Acid Reflux; TMJD; Carpal tunnel syndrome; Sesamoiditis right foot; frequent falls   Prior Level of Function: On disability since ; [employer] member - not going regularly; functionally independent      The Plan of Care and following information is based on the information from the initial evaluation. Assessment/ key information: Pt is a 63 y.o. female who presents with c/o lower back pain that radiates into left hip and buttock that has persisted for about one month after multiple falls, lifting 20 lb dog multiple times, and performing heavier household chores, but is improving after recent injection on 5/15/19, and use of pain medication. Pt notes increased pain upon waking in the morning, sitting especially, and repetitive bending. Pain is no longer as intense as it was at onset but soreness still present. Gideon Alves Upon exam, Pt exhibited palpable tenderness and tightness to B L/S paraspinals, piriformis, glutes; hypomobility at L1-2, corrected with PA mobs; poor core activation; and pelvic obliquity of right ilial upslip, posteriorly rotated innominate - corrected with MET.   Pt would benefit from skilled PT to address above deficits to improve Pt's function and ability to return to gentle exercise program without pain or difficulty. Evaluation Complexity History HIGH Complexity :3+ comorbidities / personal factors will impact the outcome/ POC ; Examination MEDIUM Complexity : 3 Standardized tests and measures addressing body structure, function, activity limitation and / or participation in recreation  ;Presentation MEDIUM Complexity : Evolving with changing characteristics  ; Clinical Decision Making MEDIUM Complexity : FOTO score of 26-74  Overall Complexity Rating: MEDIUM  Problem List: pain affecting function, decrease ROM, decrease strength, edema affecting function, impaired gait/ balance, decrease ADL/ functional abilitiies, decrease activity tolerance, decrease flexibility/ joint mobility and decrease transfer abilities   Treatment Plan may include any combination of the following: Therapeutic exercise, Therapeutic activities, Neuromuscular re-education, Physical agent/modality, Gait/balance training, Manual therapy, Aquatic therapy, Patient education, Functional mobility training and Stair training  Patient / Family readiness to learn indicated by: asking questions, trying to perform skills and interest  Persons(s) to be included in education: patient (P)  Barriers to Learning/Limitations: None  Patient Goal (s): Disappearance of pain by reducing inflammation and exercising area.   Patient Self Reported Health Status: poor  Rehabilitation Potential: good    Short Term Goals: To be accomplished in 1 weeks:  Goal: Pt to be compliant with initial HEP to improve lumbar and LE flexibility. Status at last note/certification: Established and reviewed with Pt  Goal: Pt to initiate aquatics program without increased pain to aid in achievement of all LTGs. Status at last note/certification: N/A  Long Term Goals: To be accomplished in 5 weeks:  Goal: Pt to report 4/10 pain at worst or < upon waking in the morning for ease with ADLs.   Status at last note/certification: 2/14 pain at worst  Goal: Pt to report no pain with sitting for 30 minutes for ease with driving and other ADLs. Status at last note/certification: pain with sitting, especially in the morning  Goal: Pt to report no difficulty with performing moderate activities to increase ease with household chores, recreational activities. Status at last note/certification: moderate difficulty  Goal: Pt to report no falls x 3 weeks to show improved strength and stability with amb and prevent re-injury. Status at last note/certification: frequent falls monthly  Goal: Pt to report FOTO score of 54 pts to show improved function and quality of life. Status at last note/certification: FOTO 45 pts     Frequency / Duration: Patient to be seen 2 times per week for 5 weeks. Patient/ Caregiver education and instruction: Diagnosis, prognosis, exercises   [x]  Plan of care has been reviewed with PTA    Certification Period: 6/3/19 - 7/2/19  Evelyn Nick, PT 6/3/2019 11:25 AM  _____________________________________________________________________  I certify that the above Therapy Services are being furnished while the patient is under my care. I agree with the treatment plan and certify that this therapy is necessary.     Physician's Signature:____________Date:_________TIME:________    ** Signature, Date and Time must be completed for valid certification **    Please sign and return to [facility] [address]  [phone] fax

## 2019-06-11 ENCOUNTER — APPOINTMENT (OUTPATIENT)
Dept: PHYSICAL THERAPY | Age: 59
End: 2019-06-11
Payer: MEDICARE

## 2019-06-12 ENCOUNTER — OFFICE VISIT (OUTPATIENT)
Dept: INTERNAL MEDICINE CLINIC | Age: 59
End: 2019-06-12

## 2019-06-12 VITALS
HEIGHT: 63 IN | SYSTOLIC BLOOD PRESSURE: 98 MMHG | HEART RATE: 86 BPM | DIASTOLIC BLOOD PRESSURE: 68 MMHG | TEMPERATURE: 98.8 F | WEIGHT: 151 LBS | OXYGEN SATURATION: 98 % | RESPIRATION RATE: 14 BRPM | BODY MASS INDEX: 26.75 KG/M2

## 2019-06-12 DIAGNOSIS — M81.0 AGE-RELATED OSTEOPOROSIS WITHOUT CURRENT PATHOLOGICAL FRACTURE: ICD-10-CM

## 2019-06-12 DIAGNOSIS — F32.A CHRONIC DEPRESSION: ICD-10-CM

## 2019-06-12 DIAGNOSIS — M46.1 SACROILIITIS, NOT ELSEWHERE CLASSIFIED (HCC): ICD-10-CM

## 2019-06-12 DIAGNOSIS — E78.5 HYPERLIPIDEMIA LDL GOAL <160: Primary | ICD-10-CM

## 2019-06-12 DIAGNOSIS — K21.9 GASTROESOPHAGEAL REFLUX DISEASE WITHOUT ESOPHAGITIS: ICD-10-CM

## 2019-06-12 RX ORDER — OMEPRAZOLE 40 MG/1
40 CAPSULE, DELAYED RELEASE ORAL DAILY
Qty: 90 CAP | Refills: 3 | Status: SHIPPED | OUTPATIENT
Start: 2019-06-12 | End: 2020-06-08 | Stop reason: SDUPTHER

## 2019-06-12 NOTE — PROGRESS NOTES
Chief Complaint   Patient presents with    Cholesterol Problem     6 month follow up with lab results. Health Maintenance Due   Topic Date Due    Shingrix Vaccine Age 49> (1 of 2) 07/18/2011    Pneumococcal 0-64 years (1 of 1 - PPSV23) 01/23/2017    MEDICARE YEARLY EXAM  03/14/2018     1. Have you been to the ER, urgent care clinic or hospitalized since your last visit? NO.     2. Have you seen or consulted any other health care providers outside of the [address] [place] [address] since your last visit (Include any pap smears or colon screening)? YES. Dr. Yady Hernandez, last seen 3 weeks ago. Do you have an Advanced Directive?  YES      Learning Assessment 6/12/2019   PRIMARY LEARNER Patient   BARRIERS PRIMARY LEARNER NONE   CO-LEARNER CAREGIVER No   PRIMARY LANGUAGE ENGLISH   LEARNER PREFERENCE PRIMARY READING     DEMONSTRATION   ANSWERED BY patient   RELATIONSHIP SELF

## 2019-06-12 NOTE — PROGRESS NOTES
Ole Klinefelter 1960, is a 63 y.o. female, who is seen today for reevaluation of GERD, hyperlipidemia, fibromyalgia, depression, now with sacroiliitis on the left. She saw her pain management doctor and diagnosed there, she is going to start water exercise soon. Pain is a little bit better now than it was initially, it came on without injury but she had been picking things up in her yard. She also notes that she still has acid material coming up into her throat when she bends forward and that did not happen when she was on Nexium but her insurance company will not cover Nexium unless she fails two other PPIs.     Past Medical History:   Diagnosis Date    Allergy     Anxiety     Arthritis     Asthma     mild    Atypical ductal hyperplasia of breast     Atypical hyperplasia of breast 2003    right    Chronic depression     Chronic fatigue syndrome     Chronic interstitial cystitis     Chronic interstitial cystitis     Chronic pain     fibromyalgia    Constipation     Depression     Diarrhea     Difficulty walking     Dizziness     Dry eyes     Dry mouth     Dyspnea     Fibromyalgia     Fibromyalgia     Generalized stiffness     GERD (gastroesophageal reflux disease)     Headache(784.0)     Heartburn     Hyperlipidemia     IBS (irritable bowel syndrome)     IC (interstitial cystitis)     Imbalance     Incoordination     Insomnia     Irregular periods/menstrual cycles     Joint pain     Microscopic hematuria     Muscle pain     Myofascial pain syndrome     Numbness and tingling in hands     Ovarian cyst, left     Painful sex     Pneumonia     Psychiatric disorder     anxiety - depression    RLS (restless legs syndrome)     Stress     TMJ (temporomandibular joint syndrome)     Transient total loss of muscle tone     Uterine fibroid     Weakness generalized      Current Outpatient Medications   Medication Sig Dispense Refill    pentosan polysulfate sodium (ELMIRON) 100 mg capsule Take 1 Cap by mouth two (2) times a day. Indications: Bladder Pain, bladder wall inflammation 180 Cap 3    mometasone (ASMANEX TWISTHALER) 220 mcg (30 doses) inhaler Take 1 Puff by inhalation daily. 90 Cap 3    pantoprazole (PROTONIX) 40 mg tablet Take 1 Tab by mouth daily. 90 Tab 3    montelukast (SINGULAIR) 10 mg tablet Take 1 Tab by mouth daily. 90 Tab 3    mth-me blue-sod phos-phsal-hyo (URO-MP) 118-10-40.8-36 mg cap capsule Take 1 Cap by mouth four (4) times daily. 120 Cap 6    tiotropium bromide (SPIRIVA RESPIMAT) 2.5 mcg/actuation inhaler Take 2 Puffs by inhalation daily. 3 Inhaler 6    albuterol (PROVENTIL HFA, VENTOLIN HFA, PROAIR HFA) 90 mcg/actuation inhaler Take 2 Puffs by inhalation every six (6) hours as needed for Wheezing. 3 Inhaler 6    gabapentin (NEURONTIN) 800 mg tablet take 1 tablet by mouth three times a day  0    denosumab (PROLIA) 60 mg/mL injection 60 mg by SubCUTAneous route.  Methen-Hyos-M Blue-BA-PhSal (HYOPHEN) tablet Take 1 Tab by mouth four (4) times daily. 120 Tab 3    OXcarbazepine (TRILEPTAL) 150 mg tablet Take 1 Tab by mouth three (3) times daily. 90 Tab 2    calcium glycerophosphate (PRELIEF) 65 mg tab Take  by mouth.  meloxicam (MOBIC) 15 mg tablet take 1 tablet by mouth once daily 30 Tab 11    modafinil (PROVIGIL) 200 mg tablet take 1 tablet by mouth twice a day for 30 DAYS maximum daily dose of 2 tablets 60 Tab 5    ergocalciferol (ERGOCALCIFEROL) 50,000 unit capsule take 1 capsule by mouth TWO (2) TIMES A WEEK  Indications: PREVENTION OF VITAMIN D DEFICIENCY (Patient taking differently: take 1 capsule by mouth once a week  Indications: PREVENTION OF VITAMIN D DEFICIENCY) 8 Cap 11    OTHER Indications: Compound cream for inflammation      B.infantis-B.ani-B.long-B.bifi (PROBIOTIC 4X) 10-15 mg TbEC Take  by mouth.  ENZYMES,DIGESTIVE, PLANT, (DIGESTIVE ENZYMES, PLANT, PO) Take  by mouth.       calcium citrate-vitamin D3 (CITRACAL + D) tablet Take  by mouth two (2) times a day.  LORazepam (ATIVAN) 2 mg tablet Take  by mouth every eight (8) hours as needed for Anxiety.  DULoxetine (CYMBALTA) 60 mg capsule Take 120 mg by mouth daily.  simethicone (PHAZYME) 180 mg cap Take  by mouth as needed.  trazodone (DESYREL) 100 mg tablet Take 100 mg by mouth nightly. Visit Vitals  BP 98/68 (BP 1 Location: Left arm, BP Patient Position: Sitting)   Pulse 86   Temp 98.8 °F (37.1 °C) (Oral)   Resp 14   Ht 5' 3\" (1.6 m)   Wt 151 lb (68.5 kg)   SpO2 98%   BMI 26.75 kg/m²     Carotids are 2+ without bruits. Lungs are clear to percussion. Good breath sounds with no wheezing or crackles. Heart reveals a regular rhythm with normal S1 and S2 no murmur gallop click or rub. Apical impulse is not palpable. Abdomen is soft and nontender with no hepatosplenomegaly or masses and no bruits. Extremities reveal no clubbing cyanosis or edema. Pulses are 2+. Results for orders placed or performed during the hospital encounter of 05/31/19   LIPID PANEL   Result Value Ref Range    LIPID PROFILE          Cholesterol, total 333 (H) <200 MG/DL    Triglyceride 133 <150 MG/DL    HDL Cholesterol 76 (H) 40 - 60 MG/DL    LDL, calculated 230.4 (H) 0 - 100 MG/DL    VLDL, calculated 26.6 MG/DL    CHOL/HDL Ratio 4.4 0 - 5.0     METABOLIC PANEL, COMPREHENSIVE   Result Value Ref Range    Sodium 139 136 - 145 mmol/L    Potassium 4.2 3.5 - 5.5 mmol/L    Chloride 102 100 - 108 mmol/L    CO2 29 21 - 32 mmol/L    Anion gap 8 3.0 - 18 mmol/L    Glucose 93 74 - 99 mg/dL    BUN 18 7.0 - 18 MG/DL    Creatinine 0.74 0.6 - 1.3 MG/DL    BUN/Creatinine ratio 24 (H) 12 - 20      GFR est AA >60 >60 ml/min/1.73m2    GFR est non-AA >60 >60 ml/min/1.73m2    Calcium 9.0 8.5 - 10.1 MG/DL    Bilirubin, total 0.3 0.2 - 1.0 MG/DL    ALT (SGPT) 34 13 - 56 U/L    AST (SGOT) 21 15 - 37 U/L    Alk.  phosphatase 30 (L) 45 - 117 U/L    Protein, total 6.7 6.4 - 8.2 g/dL Albumin 4.2 3.4 - 5.0 g/dL    Globulin 2.5 2.0 - 4.0 g/dL    A-G Ratio 1.7 0.8 - 1.7       Assessment: #1. Hyperlipidemia but her ten-year risk is less than 2%. Continue very healthy diet and avoid weight gain. #2.  GERD not doing as well, will try switching to omeprazole 40 mg daily and if that fails perhaps her insurance will cover Nexium. #3. Sacroiliitis being managed elsewhere. #4.  Depression drugs. She will continue follow-up with her specialist.  #5. She falls once or twice every month and specialist including an endocrinologist recently recommended she stay on Prolia or switch to yearly IV injection of bisphosphonate in view of the fact that she has a lot of reflux. For now she wants to stay on Prolia and perhaps go through an infusion center so she will have to hassle with getting the medicine from the pharmacy on her own and paying out-of-pocket expenses. Follow-up 6 months for complete evaluation    Gabriel France MD FACP    Please note: This document has been produced using voice recognition software. Unrecognized errors in transcription may be present.

## 2019-06-13 ENCOUNTER — HOSPITAL ENCOUNTER (OUTPATIENT)
Dept: PHYSICAL THERAPY | Age: 59
Discharge: HOME | End: 2019-06-13
Payer: MEDICARE

## 2019-06-13 PROCEDURE — 97113 AQUATIC THERAPY/EXERCISES: CPT

## 2019-06-18 ENCOUNTER — HOSPITAL ENCOUNTER (OUTPATIENT)
Dept: PHYSICAL THERAPY | Age: 59
Discharge: HOME | End: 2019-06-18
Payer: MEDICARE

## 2019-06-18 PROCEDURE — 97113 AQUATIC THERAPY/EXERCISES: CPT

## 2019-06-20 ENCOUNTER — HOSPITAL ENCOUNTER (OUTPATIENT)
Dept: PHYSICAL THERAPY | Age: 59
Discharge: HOME | End: 2019-06-20
Payer: MEDICARE

## 2019-06-20 PROCEDURE — 97113 AQUATIC THERAPY/EXERCISES: CPT

## 2019-06-25 ENCOUNTER — HOSPITAL ENCOUNTER (OUTPATIENT)
Dept: PHYSICAL THERAPY | Age: 59
Discharge: HOME | End: 2019-06-25
Payer: MEDICARE

## 2019-06-25 PROCEDURE — 97113 AQUATIC THERAPY/EXERCISES: CPT

## 2019-07-02 ENCOUNTER — HOSPITAL ENCOUNTER (OUTPATIENT)
Dept: PHYSICAL THERAPY | Age: 59
Discharge: HOME | End: 2019-07-02
Payer: MEDICARE

## 2019-07-02 PROCEDURE — 97113 AQUATIC THERAPY/EXERCISES: CPT

## 2019-07-09 ENCOUNTER — HOSPITAL ENCOUNTER (OUTPATIENT)
Dept: PHYSICAL THERAPY | Age: 59
Discharge: HOME | End: 2019-07-09
Payer: MEDICARE

## 2019-07-09 PROCEDURE — 97112 NEUROMUSCULAR REEDUCATION: CPT

## 2019-07-09 NOTE — PROGRESS NOTES
PT DAILY TREATMENT NOTE 10-18    Patient Name: Aliyah Code  Date:2019  : 1960  [x]  Patient  Verified  Payor: [place] MEDICARE / Plan: [place] MEDICARE PART A & B / Product Type: Medicare /    In time: 8;15  Out time: 8;53  Total Treatment Time (min): 38  Visit #: 1 of 10    Medicare/BCBS Only   Total Timed Codes (min):  38 1:1 Treatment Time:  38       Treatment Area: Pain in left hip [M25.552]  Sacroiliac pain [M53.3]    SUBJECTIVE  Pain Level (0-10 scale): 7.5  Any medication changes, allergies to medications, adverse drug reactions, diagnosis change, or new procedure performed?: [x] No    [] Yes (see summary sheet for update)  Subjective functional status/changes:   [] No changes reported  I think I tweeked my back Friday. We had our carpet cleaned and my  and I were moving furniture back in place. OBJECTIVE     38 min Neuromuscular Re-education:  []  See flow sheet :   Rationale: increase ROM and increase strength  to improve the patients ability to improve activity tolerance, Hip strength          With   [] TE   [] TA   [] neuro   [] other: Patient Education: [x] Review HEP    [] Progressed/Changed HEP based on:   [] positioning   [] body mechanics   [] transfers   [] heat/ice application    [] other:      Other Objective/Functional Measures: initiated land based PT     Pain Level (0-10 scale) post treatment: 8    ASSESSMENT/Changes in Function: good static balance with Eyes open for rhiomberg and tandem,  But sway with eyes closed with feet together.   Unable to balance in tandemwitheyes closed    Patient will continue to benefit from skilled PT services to modify and progress therapeutic interventions, address functional mobility deficits, address ROM deficits, address strength deficits, analyze and address soft tissue restrictions, analyze and cue movement patterns, analyze and modify body mechanics/ergonomics, assess and modify postural abnormalities, address imbalance/dizziness and instruct in home and community integration to attain remaining goals. []  See Plan of Care  []  See progress note/recertification  []  See Discharge Summary         Progress towards goals / Updated goals:  Goal: Pt to report 4/10 pain at worst or < upon waking in the morning for ease with ADLs. Status at last note/certification: 7/10 pain at worst  Goal: Pt to report no difficulty with performing moderate activities to increase ease with household chores, recreational activities. Status at last note/certification: moderate difficulty  Goal: Pt to report FOTO score of 54 pts to show improved function and quality of life.   Status at last note/certification: ZKGD 97 WCD     PLAN  [x]  Upgrade activities as tolerated     []  Continue plan of care  []  Update interventions per flow sheet       []  Discharge due to:_  []  Other:_      Ash Swain, SHAMAR 7/9/2019  8:29 AM    Future Appointments   Date Time Provider Roslyn Luna   7/11/2019 12:00 PM Susan Nick PT [facility]   7/17/2019 11:15 AM Norma Montanez [facility]   7/19/2019 11:15 AM [facility]   8/28/2019 11:00 AM [facility] INFUSION NURSE 2 [facility]   10/3/2019 11:15 AM Yaritza Puente MD [facility]   12/13/2019  7:55 AM IOC NURSE VISIT [facility] RUPINDER SCHED   12/20/2019 10:30 AM Yadira Reich MD [facility]

## 2019-07-11 ENCOUNTER — HOSPITAL ENCOUNTER (OUTPATIENT)
Dept: PHYSICAL THERAPY | Age: 59
Discharge: HOME | End: 2019-07-11
Payer: MEDICARE

## 2019-07-11 PROCEDURE — 97112 NEUROMUSCULAR REEDUCATION: CPT

## 2019-07-11 PROCEDURE — 97140 MANUAL THERAPY 1/> REGIONS: CPT

## 2019-07-17 ENCOUNTER — HOSPITAL ENCOUNTER (OUTPATIENT)
Dept: PHYSICAL THERAPY | Age: 59
Discharge: HOME | End: 2019-07-17
Payer: MEDICARE

## 2019-07-17 PROCEDURE — 97140 MANUAL THERAPY 1/> REGIONS: CPT

## 2019-07-17 PROCEDURE — 97112 NEUROMUSCULAR REEDUCATION: CPT

## 2019-07-17 NOTE — PROGRESS NOTES
PT DAILY TREATMENT NOTE 10-18    Patient Name: Twin Redmond  Date:2019  : 1960  [x]  Patient  Verified  Payor: [place] MEDICARE / Plan: [place] MEDICARE PART A & B / Product Type: Medicare /    In time: 11:15  Out time: 11:56  Total Treatment Time (min): 41  Visit #: 3 of 10    Medicare/BCBS Only   Total Timed Codes (min): 41   1:1 Treatment Time: 33        Treatment Area: Pain in left hip [M25.552]  Sacroiliac pain [M53.3]    SUBJECTIVE  Pain Level (0-10 scale): 5/10  Any medication changes, allergies to medications, adverse drug reactions, diagnosis change, or new procedure performed?: [x] No    [] Yes (see summary sheet for update)  Subjective functional status/changes:   [] No changes reported  \"I was really sore that evening after I left here last time but the next day was the first day I woke up without pain, but it didn't last.\"    OBJECTIVE     33 min Neuromuscular Re-education:  []  See flow sheet :   Rationale: increase ROM and increase strength  to improve the patients ability to improve activity tolerance, Hip strength         8 min Manual Therapy:  Pelvic reassessment; MET to correct right posteriorly rotated innominate   Rationale: decrease pain and increase ROM to normalize gait and reduce hip pain with gait, standing, daily tasks     With   [] TE   [] TA   [] neuro   [] other: Patient Education: [x] Review HEP    [] Progressed/Changed HEP based on:   [] positioning   [] body mechanics   [] transfers   [] heat/ice application    [] other:      Other Objective/Functional Measures: Performed adjusted Rx program per flow sheet. Pain Level (0-10 scale) post treatment: 6/10    ASSESSMENT/Changes in Function: Pt continued to note soreness in lower back and B hips with exercises, but only slight increase in pain level after session. Pt reports land therapy sessions are helping and she can see the benefit and would like to continue with them.   Will continue to progress per Pt tolerance to work towards pelvic stabilization and consistent neutral alignment and improved hip strength and LE stability for ease with daily tasks. Patient will continue to benefit from skilled PT services to modify and progress therapeutic interventions, address functional mobility deficits, address ROM deficits, address strength deficits, analyze and address soft tissue restrictions, analyze and cue movement patterns, analyze and modify body mechanics/ergonomics, assess and modify postural abnormalities, address imbalance/dizziness and instruct in home and community integration to attain remaining goals. []  See Plan of Care  []  See progress note/recertification  []  See Discharge Summary         Progress towards goals / Updated goals:  Goal: Pt to report 4/10 pain at worst or < upon waking in the morning for ease with ADLs. Status at last note/certification: 7/10 pain at worst  Current status: progressing - 7/10 pain at worst still  Goal: Pt to report no difficulty with performing moderate activities to increase ease with household chores, recreational activities. Status at last note/certification: moderate difficulty  Current status: reassess at MD note  Goal: Pt to report FOTO score of 54 pts to show improved function and quality of life.   Status at last note/certification: CPXV 32 YDJ   Current status: reassess at MD note    PLAN  [x]  Upgrade activities as tolerated     []  Continue plan of care  []  Update interventions per flow sheet       []  Discharge due to:_  []  Other:_      Shireen Nick, PT 7/17/2019  8:29 AM    Future Appointments   Date Time Provider Roslyn Luna   7/19/2019 11:15 AM Juan Fuentes   8/15/2019  1:00 PM Shanika Sanches MD [address]   8/28/2019 11:00 AM [facility] INFUSION NURSE 2 [facility]   10/3/2019 11:15 AM Dario Dowell MD [facility]   12/13/2019  7:55 AM [facility] NURSE VISIT [facility] RUPINDER SCHED   12/20/2019 10:30 AM Edelmira Oh MD [facility] [address]

## 2019-07-19 ENCOUNTER — HOSPITAL ENCOUNTER (OUTPATIENT)
Dept: PHYSICAL THERAPY | Age: 59
Discharge: HOME | End: 2019-07-19
Payer: MEDICARE

## 2019-07-19 PROCEDURE — 97112 NEUROMUSCULAR REEDUCATION: CPT

## 2019-07-19 PROCEDURE — 97110 THERAPEUTIC EXERCISES: CPT

## 2019-07-19 NOTE — PROGRESS NOTES
PT DAILY TREATMENT NOTE 10-18    Patient Name: Angie Hassan  Date:2019  : 1960  [x]  Patient  Verified  Payor: Willi Mean / Plan: [place] MEDICARE PART A & B / Product Type: Medicare /    In time:11:15  Out time:11:56  Total Treatment Time (min): 41  Visit #: 4 of 10    Medicare/BCBS Only   Total Timed Codes (min):  41 1:1 Treatment Time:  41       Treatment Area: Pain in left hip [M25.552]  Sacroiliac pain [M53.3]    SUBJECTIVE  Pain Level (0-10 scale): 2  Any medication changes, allergies to medications, adverse drug reactions, diagnosis change, or new procedure performed?: [x] No    [] Yes (see summary sheet for update)  Subjective functional status/changes:   [] No changes reported  \"I am noticing benefit from land therapy. OBJECTIVE    15 min Therapeutic Exercise:  [x] See flow sheet :   Rationale: increase ROM and increase strength to improve the patients ability to perform transfers with decreased pain. 26 min Neuromuscular Re-education:  [x]  See flow sheet :   Rationale: increase strength, improve coordination, improve balance and increase proprioception  to improve the patients ability to perform functional mobility with improved core control. With   [x] TE   [] TA   [] neuro   [] other: Patient Education: [x] Review HEP    [] Progressed/Changed HEP based on:   [] positioning   [] body mechanics   [] transfers   [] heat/ice application    [] other:      Other Objective/Functional Measures:      Pain Level (0-10 scale) post treatment: 3    ASSESSMENT/Changes in Function: Patient appropriately challenged by new therapeutic interventions today. Presenting with frequent knee flexion substitution when performing sidelying hip internal rotation.     Patient will continue to benefit from skilled PT services to modify and progress therapeutic interventions, address functional mobility deficits, address ROM deficits, address strength deficits, analyze and address soft tissue restrictions, analyze and cue movement patterns, analyze and modify body mechanics/ergonomics, assess and modify postural abnormalities, address imbalance/dizziness and instruct in home and community integration to attain remaining goals. []  See Plan of Care  []  See progress note/recertification  []  See Discharge Summary         Progress towards goals / Updated goals:  Goal: Pt to report 4/10 pain at worst or < upon waking in the morning for ease with ADLs. Status at last note/certification: 7/10 pain at worst  Current status: progressing - 6 at most in past therapy sessions  Goal: Pt to report no difficulty with performing moderate activities to increase ease with household chores, recreational activities. Status at last note/certification: moderate difficulty  Current status: reassess at MD note  Goal: Pt to report FOTO score of 54 pts to show improved function and quality of life.   Status at last note/certification: YXXB 56 WWM   Current status: reassess at MD note     PLAN  [x]  Upgrade activities as tolerated     [x]  Continue plan of care  []  Update interventions per flow sheet       []  Discharge due to:_  []  Other:_          Genice Poser 7/19/2019  11:20 AM    Future Appointments   Date Time Provider Roslyn Luna   7/23/2019  9:00 AM Vignesh Nick, PT [facility]   7/25/2019  4:15 PM Vginesh Nick, PT [facility]   7/30/2019  4:15 PM Vignesh Nick, PT [facility]   8/15/2019  1:00 PM Shun Howard MD [address]   8/28/2019 11:00 AM [facility] INFUSION NURSE 2 [facility]   10/3/2019 11:15 AM Gabo Owens MD [address] [facility]   12/13/2019  7:55 AM IOC NURSE VISIT [facility] RUPINDER SCHED   12/20/2019 10:30 AM Bakari Galindo MD [facility]

## 2019-07-23 ENCOUNTER — APPOINTMENT (OUTPATIENT)
Dept: PHYSICAL THERAPY | Age: 59
End: 2019-07-23
Payer: MEDICARE

## 2019-07-23 ENCOUNTER — HOSPITAL ENCOUNTER (OUTPATIENT)
Dept: PHYSICAL THERAPY | Age: 59
Discharge: HOME | End: 2019-07-23
Payer: MEDICARE

## 2019-07-23 PROCEDURE — 97112 NEUROMUSCULAR REEDUCATION: CPT

## 2019-07-23 PROCEDURE — 97140 MANUAL THERAPY 1/> REGIONS: CPT

## 2019-07-23 NOTE — PROGRESS NOTES
PT DAILY TREATMENT NOTE 10-18    Patient Name: Priscilla Lennox  Date:2019  : 1960  [x]  Patient  Verified  Payor: [place] MEDICARE / Plan: [place] MEDICARE PART A & B / Product Type: Medicare /    In time:9:03  Out time:9:50  Total Treatment Time (min): 52  Visit #: 5 of 10    Medicare/BCBS Only   Total Timed Codes (min):  47 1:1 Treatment Time:  52       Treatment Area: Pain in left hip [M25.552]  Sacroiliac pain [M53.3]    SUBJECTIVE  Pain Level (0-10 scale): 0/10 hip; 4/10 back  Any medication changes, allergies to medications, adverse drug reactions, diagnosis change, or new procedure performed?: [x] No    [] Yes (see summary sheet for update)  Subjective functional status/changes:   [] No changes reported  \"After my last couple of visits, I've been working the legs more and the hip doesn't really hurt, but my back is starting to hurt more. The back doesn't hurt as much as it did last week. So, what I did last visit helped. \"    OBJECTIVE    39 min Neuromuscular Re-education:  []  See flow sheet :   Rationale: increase strength, improve coordination and increase proprioception  to improve the patients ability to improve core stability, reduce strain with daily tasks    8 min Manual Therapy:  MET leg pull to correct right ilial upslip   Rationale: decrease pain, increase ROM and increase tissue extensibility to normalize gait pattern, reduce hip, LBP          With   [] TE   [] TA   [] neuro   [] other: Patient Education: [x] Review HEP    [] Progressed/Changed HEP based on:   [] positioning   [] body mechanics   [] transfers   [] heat/ice application    [] other:      Other Objective/Functional Measures: Increased reps per flow sheet. Continued with progressed program.  Focused on proper TransA contraction and breathing throughout exercises. Pain Level (0-10 scale) post treatment: 0/10 hip; 2/10 back    ASSESSMENT/Changes in Function: Pt is steadily improving with skilled therapy.   Pt reports minimal to no hip pain now and with continued focus on TransA contraction, lower back pain is lessening. Pt asked about riding bike at home. Ultimately advised against it as it is a regular bike with an uncomfortable seat and no back support that already makes Pt uncomfortable. Pt advised to use bikes at [facility]. Will plan to finish out remaining visits and then D/C to updated HEP. Patient will continue to benefit from skilled PT services to address functional mobility deficits, address ROM deficits, address strength deficits, analyze and address soft tissue restrictions, analyze and cue movement patterns, analyze and modify body mechanics/ergonomics, assess and modify postural abnormalities and instruct in home and community integration to attain remaining goals. []  See Plan of Care  []  See progress note/recertification  []  See Discharge Summary         Progress towards goals / Updated goals:  Goal: Pt to report 4/10 pain at worst or < upon waking in the morning for ease with ADLs. Status at last note/certification: 7/10 pain at worst  Current status: progressing - 6 at most in past therapy sessions  Goal: Pt to report no difficulty with performing moderate activities to increase ease with household chores, recreational activities. Status at last note/certification: moderate difficulty  Current status: reassess at MD note  Goal: Pt to report FOTO score of 54 pts to show improved function and quality of life.   Status at last note/certification: HFHR 35 UPO   Current status: reassess at MD note    PLAN  []  Upgrade activities as tolerated     []  Continue plan of care  []  Update interventions per flow sheet       []  Discharge due to:_  [x]  Other:_goals next visit      Paul Nick PT 7/23/2019  9:15 AM    Future Appointments   Date Time Provider Roslyn Luna   7/25/2019  4:15 PM Paul Nick, PT [facility]   7/30/2019  4:15 PM Paul Nick, PT [facility]   8/15/2019 1:00 PM Glibert Shea MD [address]   8/28/2019 11:00 AM [facility] INFUSION NURSE 2 [facility]   10/3/2019 11:15 AM Livan Thompson MD [facility]   12/13/2019  7:55 AM [facility] N[facility]SE VISIT [facility]   12/20/2019 10:30 AM David Gerardo MD [facility]

## 2019-07-25 ENCOUNTER — APPOINTMENT (OUTPATIENT)
Dept: PHYSICAL THERAPY | Age: 59
End: 2019-07-25
Payer: MEDICARE

## 2019-07-29 ENCOUNTER — HOSPITAL ENCOUNTER (OUTPATIENT)
Dept: PHYSICAL THERAPY | Age: 59
Discharge: HOME | End: 2019-07-29
Payer: MEDICARE

## 2019-07-29 PROCEDURE — 97112 NEUROMUSCULAR REEDUCATION: CPT

## 2019-07-29 PROCEDURE — 97140 MANUAL THERAPY 1/> REGIONS: CPT

## 2019-07-29 NOTE — PROGRESS NOTES
[facility], [address] [place] [address]  [phone] fax    Continued Plan of Care/ Re-certification for Physical Therapy Services    Patient name: Cait Florence Start of Care: 6/3/2019   Referral source: Jaron Hayden MD : 1960               Medical Diagnosis: Pain in left hip [M25.552]  Sacroiliac pain [M53.3]  Payor: [place] MEDICARE / Plan: VA MEDICARE PART A & B / Product Type: Medicare /  Onset Date:5/15/19               Treatment Diagnosis: Left SIJ, Hip pain   Prior Hospitalization: see medical history Provider#: [id number]   Medications: Verified on Patient summary List    Comorbidities: Fibromyalgia; Osteopenia/Osteoporosis; Arthritis; Asthma; Interstitial Cystitis; Acid Reflux; TMJD; Carpal tunnel syndrome; Sesamoiditis right foot; frequent falls   Prior Level of Function: On disability since ; [employer] member - not going regularly; functionally independent    Visits from Start of Care: 12    Missed Visits: 2    The Plan of Care and following information is based on the patient's current status:    Goal: Pt to report 4/10 pain at worst or < upon waking in the morning for ease with ADLs. Status at last note/certification: 7/10 pain at worst  Current status: progressing - 6 at most in past therapy sessions  Goal: Pt to report no difficulty with performing moderate activities to increase ease with household chores, recreational activities. Status at last note/certification: moderate difficulty  Current status: progressing - still moderate difficulty, attributing to fatigue from FMS also  Goal: Pt to report FOTO score of 54 pts to show improved function and quality of life. Status at last note/certification: VZZS 04 ZGU   Current status: not met - FOTO 44 pts    Key functional changes: Pt is steadily improving with skilled therapy.   Pt reports minimal to no hip pain now and with continued focus on TransA contraction, lower back pain is lessening. Pt is able to sit, get in/out of car, and walk around home without pain and notes increased ease with bathing. Pt continues to be challenged with bending over, picking up dog, shaving legs, and gardening. Problems/ barriers to goal attainment: None     Problem List: pain affecting function, decrease ROM, decrease strength, impaired gait/ balance, decrease ADL/ functional abilitiies, decrease activity tolerance, decrease flexibility/ joint mobility and decrease transfer abilities    Treatment Plan: Therapeutic exercise, Therapeutic activities, Neuromuscular re-education, Physical agent/modality, Gait/balance training, Manual therapy, Aquatic therapy, Patient education, Functional mobility training and Stair training     Patient Goal (s) has been updated and includes: \"Improve my balance; reduce the pain. \"     Goals for this certification period to be accomplished in 4 treatments:  Goal: Pt to report 4/10 pain at worst or < upon waking in the morning for ease with ADLs. Status at last note/certification: 6 at most in past therapy sessions  Current status:   Goal: Pt to report no difficulty with performing moderate activities to increase ease with household chores, recreational activities. Status at last note/certification: still moderate difficulty, attributing to fatigue from FMS also  Current status:   Goal: Pt to report FOTO score of 54 pts to show improved function and quality of life. Status at last note/certification: TUYETUN 02 RQT   Current status:     Frequency / Duration: Patient to be seen 2 times per week for 4 treatments:    Assessment / Recommendations:Pt would benefit from continued skilled therapy to increase LE strength, stabilize pelvis, and improve standing balance for ease with ADLs.     Certification Period: 7/31/19 - 8/29/19    Terrell Nick, PT 7/29/2019 5:05 PM    ________________________________________________________________________  I certify that the above Therapy Services are being furnished while the patient is under my care. I agree with the treatment plan and certify that this therapy is necessary. [] I have read the above and request that my patient continue as recommended.   [] I have read the above report and request that my patient continue therapy with the following changes/special instructions: ______________________________________  [] I have read the above report and request that my patient be discharged from therapy    Physician's Signature:____________Date:_________TIME:________    ** Signature, Date and Time must be completed for valid certification **    Please sign and return to [facility] [address]  [phone] fax

## 2019-07-30 ENCOUNTER — APPOINTMENT (OUTPATIENT)
Dept: PHYSICAL THERAPY | Age: 59
End: 2019-07-30
Payer: MEDICARE

## 2019-08-01 ENCOUNTER — HOSPITAL ENCOUNTER (OUTPATIENT)
Dept: PHYSICAL THERAPY | Age: 59
Discharge: HOME | End: 2019-08-01
Payer: MEDICARE

## 2019-08-01 PROCEDURE — 97112 NEUROMUSCULAR REEDUCATION: CPT

## 2019-08-01 PROCEDURE — 97140 MANUAL THERAPY 1/> REGIONS: CPT

## 2019-08-01 NOTE — PROGRESS NOTES
PT DAILY TREATMENT NOTE 10-18    Patient Name: Priscilla Lennox  Date:2019  : 1960  [x]  Patient  Verified  Payor: VA MEDICARE / Plan: VA MEDICARE PART A & B / Product Type: Medicare /    In time:9:05  Out time:9:43  Total Treatment Time (min): 38  Visit #: 1 of 4    Medicare/BCBS Only   Total Timed Codes (min):  38 1:1 Treatment Time: 38        Treatment Area: Pain in left hip [M25.552]  Sacroiliac pain [M53.3]    SUBJECTIVE  Pain Level (0-10 scale): 0/10 hip; 6/10 back  Any medication changes, allergies to medications, adverse drug reactions, diagnosis change, or new procedure performed?: [x] No    [] Yes (see summary sheet for update)  Subjective functional status/changes:   [] No changes reported  \"My back has still been bothering me since last weekend. I forgot to take my medication this morning also. So, all of my muscles hurt. \"    OBJECTIVE    30 min Neuromuscular Re-education:  []  See flow sheet :   Rationale: increase strength, improve coordination and increase proprioception  to improve the patients ability to improve core stability, reduce strain with daily tasks    8 min Manual Therapy:  Pelvic reassessment; MET to correct right posteriorly rotated innominate   Rationale: decrease pain, increase ROM and increase tissue extensibility to normalize gait pattern, reduce hip, LBP          With   [] TE   [] TA   [] neuro   [] other: Patient Education: [x] Review HEP    [] Progressed/Changed HEP based on:   [] positioning   [] body mechanics   [] transfers   [] heat/ice application    [] other:      Other Objective/Functional Measures: Pelvic reassessment revealed slight right posteriorly rotated innominate. Pain Level (0-10 scale) post treatment: 0/10 hip; 6/10 back    ASSESSMENT/Changes in Function: Pt and PT discussed flare in lower back pain.   Pt did begin two newer exercises in the past week that are harder for Pt to perform, so will hold those next visit per flow sheet to see if lower back pain will calm. Pt also advised to use MHP to assist with reducing muscle tightness. Plan to D/C next week. Patient will continue to benefit from skilled PT services to address functional mobility deficits, address ROM deficits, address strength deficits, analyze and address soft tissue restrictions, analyze and cue movement patterns, analyze and modify body mechanics/ergonomics, assess and modify postural abnormalities and instruct in home and community integration to attain remaining goals. []  See Plan of Care  []  See progress note/recertification  []  See Discharge Summary         Progress towards goals / Updated goals:  Goal: Pt to report 4/10 pain at worst or < upon waking in the morning for ease with ADLs. Status at last note/certification: 6 at most in past therapy sessions  Current status:   Goal: Pt to report no difficulty with performing moderate activities to increase ease with household chores, recreational activities. Status at last note/certification: still moderate difficulty, attributing to fatigue from FMS also  Current status:   Goal: Pt to report FOTO score of 54 pts to show improved function and quality of life.   Status at last note/certification: CAAZ 16 CTG   Current status: reassess at discharge    PLAN  [x]  Upgrade activities as tolerated     []  Continue plan of care  []  Update interventions per flow sheet       []  Discharge due to:_  []  Other:_    Prasanth Nick PT 8/1/2019  9:15 AM    Future Appointments   Date Time Provider Roslyn Luna   8/6/2019  9:00 AM Prasanth Nick, PT [facility]   8/8/2019  3:15 PM Prasanth Nick, PT [facility]   8/15/2019  1:00 PM Gilbert Shea MD [address]   8/28/2019 11:00 AM [facility] NURSE 2 [facility]   10/3/2019 11:15 AM Livan Thompson MD [facility] R[facility]INDER [address]   12/13/2019  7:55 AM [facility] NURSE VISIT [facility] RUPINDER SCHED   12/20/2019 10:30 AM aDvid Gerardo MD [facility]

## 2019-08-06 ENCOUNTER — HOSPITAL ENCOUNTER (OUTPATIENT)
Dept: PHYSICAL THERAPY | Age: 59
Discharge: HOME | End: 2019-08-06
Payer: MEDICARE

## 2019-08-06 PROCEDURE — 97112 NEUROMUSCULAR REEDUCATION: CPT

## 2019-08-06 NOTE — PROGRESS NOTES
PT DAILY TREATMENT NOTE 10-18    Patient Name: Nunu Dixon  Date:2019  : 1960  [x]  Patient  Verified  Payor: VA MEDICARE / Plan: VA MEDICARE PART A & B / Product Type: Medicare /    In time:9:02  Out time:9:36  Total Treatment Time (min): 34  Visit #: 2 of 4    Medicare/BCBS Only   Total Timed Codes (min): 34  1:1 Treatment Time: 34       Treatment Area: Pain in left hip [M25.552]  Sacroiliac pain [M53.3]    SUBJECTIVE  Pain Level (0-10 scale): 0/10 hip; 6/10 back  Any medication changes, allergies to medications, adverse drug reactions, diagnosis change, or new procedure performed?: [x] No    [] Yes (see summary sheet for update)  Subjective functional status/changes:   [] No changes reported  \"My back is still acting up. The hip feels fine. I've tried the heating pad and it helps for a little while but then the pain comes back. \"    OBJECTIVE    34 min Neuromuscular Re-education:  []  See flow sheet :   Rationale: increase strength, improve coordination and increase proprioception  to improve the patients ability to improve core stability, reduce strain with daily tasks          With   [] TE   [] TA   [] neuro   [] other: Patient Education: [x] Review HEP    [] Progressed/Changed HEP based on:   [] positioning   [] body mechanics   [] transfers   [] heat/ice application    [] other:      Other Objective/Functional Measures: Pelvic reassessment revealed slight right posteriorly rotated innominate - Pt performed self-correction to neutralize. Held [employer] and [employer] walkouts due to increasing lower back pain. Pt noted muscle soreness during all exercises from FMS flare but completed all tasks. Pain Level (0-10 scale) post treatment: 0/10 hip; 6/10 back    ASSESSMENT/Changes in Function: Pt reports she almost fell yesterday but was able to catch self and maintain balance and right herself which is an improvement.   Pt notes she feels stronger and more stable and she had been falling a lot and that has not been happening since starting PT. Pt feels she has accomplished goal of PT as left hip no longer hurts nor gives her problems with performance of ADLs, household chores. Patient will continue to benefit from skilled PT services to address functional mobility deficits, address ROM deficits, address strength deficits, analyze and address soft tissue restrictions, analyze and cue movement patterns, analyze and modify body mechanics/ergonomics, assess and modify postural abnormalities and instruct in home and community integration to attain remaining goals. []  See Plan of Care  []  See progress note/recertification  []  See Discharge Summary         Progress towards goals / Updated goals:  Goal: Pt to report 4/10 pain at worst or < upon waking in the morning for ease with ADLs. Status at last note/certification: 6 at most in past therapy sessions  Current status: met - 0/10 pain upon waking in the morning, performing ADLs  Goal: Pt to report no difficulty with performing moderate activities to increase ease with household chores, recreational activities. Status at last note/certification: still moderate difficulty, attributing to fatigue from FMS also  Current status: met - no difficulty with performing household chores, recreational activities in hips  Goal: Pt to report FOTO score of 54 pts to show improved function and quality of life.   Status at last note/certification: YSKO 58 TSA   Current status: reassess at discharge    PLAN  [x]  Upgrade activities as tolerated     []  Continue plan of care  []  Update interventions per flow sheet       []  Discharge due to:_  [x]  Other:_FOTO and D/C next visit    Marie Nick, PT 8/6/2019  9:15 AM    Future Appointments   Date Time Provider Roslyn Luna   8/8/2019  3:15 PM Marie Nick, PT [facility]   8/15/2019  1:00 PM Scott Moscoso MD [address]   8/28/2019 11:00 AM [facility]SE [facility] 10/3/2019 11:15 AM Rika Hare MD [facility] RUPINDER SCHED   12/13/2019  7:55 AM [facility] NURSE VISIT [facility] SCHED   12/20/2019 10:30 AM Morro Deluca MD [facility]

## 2019-08-08 ENCOUNTER — HOSPITAL ENCOUNTER (OUTPATIENT)
Dept: PHYSICAL THERAPY | Age: 59
Discharge: HOME | End: 2019-08-08
Payer: MEDICARE

## 2019-08-08 PROCEDURE — 97112 NEUROMUSCULAR REEDUCATION: CPT

## 2019-08-08 PROCEDURE — 97140 MANUAL THERAPY 1/> REGIONS: CPT

## 2019-08-08 NOTE — PROGRESS NOTES
PT DISCHARGE DAILY NOTE AND [id number]    Date:2019   Patient name: Cait Turner Start of Care: 6/3/2019   Referral source: Lionel Hayden MD : 1960               Medical Diagnosis: Pain in left hip [M25.552]  Sacroiliac pain [M53.3]  Payor: VA MEDICARE / Plan: VA MEDICARE PART A & B / Product Type: Medicare /  Onset Date:5/15/19               Treatment Diagnosis: Left SIJ, Hip pain   Prior Hospitalization: see medical history Provider#: [id number]   Medications: Verified on Patient summary List    Comorbidities: Fibromyalgia; Osteopenia/Osteoporosis; Arthritis; Asthma; Interstitial Cystitis; Acid Reflux; TMJD; Carpal tunnel syndrome; Sesamoiditis right foot; frequent falls   Prior Level of Function: On disability since ; [employer] member - not going regularly; functionally independent    Visits from Start of Care: 14    Missed Visits: 2    Reporting Period : 19 to 19    [x]  Patient  Verified  Payor: Favio Ratliff / Plan: VA MEDICARE PART A & B / Product Type: Medicare /    In time:3:15  Out time:3:54  Total Treatment Time (min): 39  Total Timed Codes (min): 39  1:1 Treatment Time ( [address] only): 44   Visit #: 3 of 4    SUBJECTIVE  Pain Level (0-10 scale): 4-5/10  Any medication changes, allergies to medications, adverse drug reactions, diagnosis change, or new procedure performed?: [x] No    [] Yes (see summary sheet for update)  Subjective functional status/changes:   [] No changes reported  \"Something is wrong. I have had to lift my 19-20 lb dog because it sprained its leg. I was fine but this morning after making the bed and grooming in the bathroom, I started getting this spasm in the left hip and it hurt really bad, like 8/10 - like it was when I first started PT. I took meds and rested and the pain is not so bad but its coming back. I don't know what did it. \"    OBJECTIVE    31 min Neuromuscular Re-education:  []  See flow sheet :   Rationale: increase strength and increase proprioception  to improve the patients ability to improve core/glute stability, increase ease with ADLs    8 min Manual Therapy:  Pelvic reassessment; MET leg pull to correct right ilial upslip; MET to correct right posteriorly rotated innominate; shotgun maneuver   Rationale: decrease pain, increase ROM and increase tissue extensibility to reduce spasms and pain; normalize gait; increase ease with ADLs         With   [] TE   [] TA   [] neuro   [] other: Patient Education: [x] Review HEP    [] Progressed/Changed HEP based on:   [] positioning   [] body mechanics   [] transfers   [] heat/ice application    [] other:      Other Objective/Functional Measures: Performed exercises at [facility] today. Reassessed goals. Pain Level (0-10 scale) post treatment: 0/10    Summary of Care:  Goal: Pt to report 4/10 pain at worst or < upon waking in the morning for ease with ADLs. Status at last note/certification: 6 at most in past therapy sessions  Current status: met - 0/10 pain upon waking in the morning, performing ADLs  Goal: Pt to report no difficulty with performing moderate activities to increase ease with household chores, recreational activities. Status at last note/certification: still moderate difficulty, attributing to fatigue from FMS also  Current status: met - no difficulty with performing household chores, recreational activities in hips  Goal: Pt to report FOTO score of 54 pts to show improved function and quality of life. Status at last note/certification: GKPY 13 URV   Current status: progressing - FOTO 50 pts    ASSESSMENT/Changes in Function: Pt attended 14 visits consistently and made good progress with skilled physical therapy services. At time of last visit, Pt reported the following:  Functional Gains - minimal to no pain in left hip with ADLs or household chores ;  Functional Deficits - none in reference to left hip only but continues to report lower back pain and fatigue from Fibromyalgia; and 95% improvement since start of care. Pt has met or is progressing towards all goals and is compliant with comprehensive HEP. Pt is appropriate for D/C at this time to continue to manage care independently and maintain long term gains made with skilled therapy.   Thank you for this referral.    Thank you for this referral!     PLAN  [x]Discontinue therapy: [x]Patient has reached or is progressing toward set goals      []Patient is non-compliant or has abdicated      []Due to lack of appreciable progress towards set goals    Prema Nick, PT 8/8/2019  3:11 PM

## 2019-08-15 ENCOUNTER — OFFICE VISIT (OUTPATIENT)
Dept: PULMONOLOGY | Age: 59
End: 2019-08-15

## 2019-08-15 VITALS
SYSTOLIC BLOOD PRESSURE: 126 MMHG | DIASTOLIC BLOOD PRESSURE: 78 MMHG | HEART RATE: 101 BPM | BODY MASS INDEX: 29.23 KG/M2 | HEIGHT: 63 IN | TEMPERATURE: 98.2 F | RESPIRATION RATE: 18 BRPM | WEIGHT: 165 LBS | OXYGEN SATURATION: 98 %

## 2019-08-15 DIAGNOSIS — F41.9 ANXIETY: ICD-10-CM

## 2019-08-15 DIAGNOSIS — G47.33 OSA (OBSTRUCTIVE SLEEP APNEA): ICD-10-CM

## 2019-08-15 DIAGNOSIS — R06.09 DYSPNEA ON EXERTION: ICD-10-CM

## 2019-08-15 DIAGNOSIS — J45.40 MODERATE PERSISTENT ASTHMA WITHOUT COMPLICATION: Primary | ICD-10-CM

## 2019-08-15 DIAGNOSIS — M79.7 FIBROMYALGIA: ICD-10-CM

## 2019-08-15 RX ORDER — ONDANSETRON 4 MG/1
4 TABLET, FILM COATED ORAL
COMMUNITY
Start: 2019-08-15 | End: 2019-08-15

## 2019-08-15 RX ORDER — LANOLIN ALCOHOL/MO/W.PET/CERES
400 CREAM (GRAM) TOPICAL
COMMUNITY
Start: 2019-08-15 | End: 2019-08-15

## 2019-08-15 RX ORDER — KETOROLAC TROMETHAMINE 10 MG/1
TABLET, FILM COATED ORAL
Refills: 0 | COMMUNITY
Start: 2019-05-15 | End: 2019-08-15

## 2019-08-15 RX ORDER — ESOMEPRAZOLE MAGNESIUM 40 MG/1
40 CAPSULE, DELAYED RELEASE ORAL
COMMUNITY
Start: 2019-08-15 | End: 2019-08-15

## 2019-08-15 NOTE — PROGRESS NOTES
[facility]  Pulmonary, Critical Care, and Sleep Medicine      Pulmonary Office Progress Notes    Name: Morgan Akins     : 1960     Date: 8/15/2019        Subjective:     Patient is a 63 y.o. female with history of asthma and reactive airways presents for follow up.    08/15/19     No new concerns with SOB, cough, wheezing-she states the current summer heat and humidity has caused some increase in symptoms. She has needed to use more rescue medications  For most part she is tries to stay indoors  Overall better after starting Spiriva  Denies any wheezing. No increase in nasal congestion. Denies sore throat. Needs refills:  Asmanex   Denies any other interval problems-recovering from sacroiliitis for which she took some physical therapy which seems to have helped and now needs to resume her water exercises    C/o hand arthritis symptoms. -on mobic  She reports increased symptoms of chest discomfort and some cough with the change of weather. Some congestion which improved with claritan. She is under the care of pain management for fibromyalgia.     Past Medical History:   Diagnosis Date    Allergy     Anxiety     Arthritis     Asthma     mild    Atypical ductal hyperplasia of breast     Atypical hyperplasia of breast 2003    right    Chronic depression     Chronic fatigue syndrome     Chronic interstitial cystitis     Chronic interstitial cystitis     Chronic pain     fibromyalgia    Constipation     Depression     Diarrhea     Difficulty walking     Dizziness     Dry eyes     Dry mouth     Dyspnea     Fibromyalgia     Fibromyalgia     Generalized stiffness     GERD (gastroesophageal reflux disease)     Headache(784.0)     Heartburn     Hyperlipidemia     IBS (irritable bowel syndrome)     IC (interstitial cystitis)     Imbalance     Incoordination     Insomnia     Irregular periods/menstrual cycles     Joint pain     Microscopic hematuria  Muscle pain     Myofascial pain syndrome     Numbness and tingling in hands     Ovarian cyst, left     Painful sex     Pneumonia     Psychiatric disorder     anxiety - depression    RLS (restless legs syndrome)     Stress     TMJ (temporomandibular joint syndrome)     Transient total loss of muscle tone     Uterine fibroid     Weakness generalized        Allergies   Allergen Reactions    Codeine Rash and Itching     Other reaction(s): other/intolerance    Tussionex Itching       Current Outpatient Medications   Medication Sig Dispense Refill    omeprazole (PRILOSEC) 40 mg capsule Take 1 Cap by mouth daily. 90 Cap 3    pentosan polysulfate sodium (ELMIRON) 100 mg capsule Take 1 Cap by mouth two (2) times a day. Indications: Bladder Pain, bladder wall inflammation 180 Cap 3    mometasone (ASMANEX TWISTHALER) 220 mcg (30 doses) inhaler Take 1 Puff by inhalation daily. (Patient taking differently: Take 1 Puff by inhalation two (2) times a day.) 90 Cap 3    montelukast (SINGULAIR) 10 mg tablet Take 1 Tab by mouth daily. 90 Tab 3    tiotropium bromide (SPIRIVA RESPIMAT) 2.5 mcg/actuation inhaler Take 2 Puffs by inhalation daily. 3 Inhaler 6    albuterol (PROVENTIL HFA, VENTOLIN HFA, PROAIR HFA) 90 mcg/actuation inhaler Take 2 Puffs by inhalation every six (6) hours as needed for Wheezing. 3 Inhaler 6    gabapentin (NEURONTIN) 800 mg tablet take 1 tablet by mouth three times a day  0    denosumab (PROLIA) 60 mg/mL injection 60 mg by SubCUTAneous route.  Methen-Hyos-M Blue-BA-PhSal (HYOPHEN) tablet Take 1 Tab by mouth four (4) times daily. 120 Tab 3    OXcarbazepine (TRILEPTAL) 150 mg tablet Take 1 Tab by mouth three (3) times daily. 90 Tab 2    calcium glycerophosphate (PRELIEF) 65 mg tab Take 1 Tab by mouth daily.       meloxicam (MOBIC) 15 mg tablet take 1 tablet by mouth once daily 30 Tab 11    modafinil (PROVIGIL) 200 mg tablet take 1 tablet by mouth twice a day for 30 DAYS maximum daily dose of 2 tablets (Patient taking differently: Take 200 mg by mouth daily.) 60 Tab 5    ergocalciferol (ERGOCALCIFEROL) 50,000 unit capsule take 1 capsule by mouth TWO (2) TIMES A WEEK  Indications: PREVENTION OF VITAMIN D DEFICIENCY (Patient taking differently: Take 50,000 Units by mouth every seven (7) days. take 1 capsule by mouth once a week  Indications: Prevention of Vitamin D Deficiency) 8 Cap 11    OTHER Indications: Compound cream for inflammation      B.infantis-B.ani-B.long-B.bifi (PROBIOTIC 4X) 10-15 mg TbEC Take 1 Tab by mouth daily.  ENZYMES,DIGESTIVE, PLANT, (DIGESTIVE ENZYMES, PLANT, PO) Take 1 Tab by mouth daily.  calcium citrate-vitamin D3 (CITRACAL + D) tablet Take 1 Tab by mouth daily.  LORazepam (ATIVAN) 2 mg tablet Take 2 mg by mouth every eight (8) hours as needed for Anxiety.  DULoxetine (CYMBALTA) 60 mg capsule Take 120 mg by mouth daily.  simethicone (PHAZYME) 180 mg cap Take 225 mg by mouth as needed.  trazodone (DESYREL) 100 mg tablet Take 100 mg by mouth nightly.  esomeprazole (NEXIUM) 40 mg capsule 40 mg.      conjugated estrogens (PREMARIN) 0.625 mg/gram vaginal cream 1 Applicator.  ketorolac (TORADOL) 10 mg tablet take 1 tablet by mouth four times a day  0    magnesium oxide (MAG-OX) 400 mg tablet 400 mg.      ondansetron hcl (ZOFRAN) 4 mg tablet 4 mg.  Health system-me blue-sod phos-phsal-hyo (URO-MP) 118-10-40.8-36 mg cap capsule Take 1 Cap by mouth four (4) times daily.  120 Cap 3       Review of Systems:  HEENT: No epistaxis, no nasal drainage, no difficulty in swallowing, no redness in eyes  Respiratory: as above  Cardiovascular: no chest pain, no palpitations, no chronic leg edema, no syncope  Gastrointestinal:  Retrosternal pain+ no abd pain, no vomiting, no diarrhea, no bleeding symptoms  Genitourinary: No urinary symptoms or hematuria  Integument/breast: No ulcers or rashes  Musculoskeletal:Neg  Neurological: No focal weakness, no seizures, no headaches  Behvioral/Psych: No anxiety, no depression  Constitutional: No fever, no chills, no weight loss, no night sweats     Objective:     Visit Vitals  /78 (BP 1 Location: Left arm, BP Patient Position: Sitting)   Pulse (!) 101   Temp 98.2 °F (36.8 °C) (Oral)   Resp 18   Ht 5' 3\" (1.6 m)   Wt 74.8 kg (165 lb)   SpO2 98%   BMI 29.23 kg/m²        Physical Exam:   General: comfortable, no acute distress  HEENT: pupils reactive, sclera anicteric, EOM intact  Neck: No adenopathy or thyroid swelling, no lymphadenopathy or JVD, supple  CVS: S1S2 no murmurs  RS: Mod AE bilaterally, no tactile fremitus or egophony, no accessory muscle use  Neuro: non focal, awake, alert  Extrm: no leg edema, clubbing or cyanosis  Skin: no rash    Data review:       Imaging:  I have personally reviewed the patients radiographs and have reviewed the reports:  No recent CXR     IMPRESSION:   Asthma-moderate persistent with predominant seasonal exacerbations fairly well controlled with intermittent symptomatic exacerbations related to environmental exposure to temp change, pollen- weeding and also some increased GERD symptoms  Reactive airways- periodic flare ups related to environmental changes  Anxiety/Depression  Stress  IBS  GERD- on Nexium        RECOMMENDATIONS:   · Continue current treatment  · Discussed need for preventing trigger exposure- wearing mask   · Continue Stepped up GERD treatment- encouraged to take antacids when symptomatic  · Continue Spiriva Respimet, Asmanex.  Discussed options for single agent but limited due to LABA component ( palpitations)  · Will continue current treatment and encourage airway humidification , ambient air temp control and continue antiinflammatory control  · Continue bronchodilators and Singulair   · Prescriptions reviewed and renewed  · Continue treatment per pain management  · Reassurance provided  · Encouraged activity        Елена Pritchett MD

## 2019-08-28 ENCOUNTER — HOSPITAL ENCOUNTER (OUTPATIENT)
Dept: INFUSION THERAPY | Age: 59
Discharge: HOME | End: 2019-08-28
Payer: MEDICARE

## 2019-08-28 VITALS
DIASTOLIC BLOOD PRESSURE: 80 MMHG | RESPIRATION RATE: 18 BRPM | TEMPERATURE: 98.4 F | HEART RATE: 99 BPM | SYSTOLIC BLOOD PRESSURE: 117 MMHG | OXYGEN SATURATION: 97 %

## 2019-08-28 LAB
ANION GAP BLD CALC-SCNC: 16 MMOL/L (ref 10–20)
BUN BLD-MCNC: 24 MG/DL (ref 7–18)
CA-I BLD-MCNC: 1.19 MMOL/L (ref 1.12–1.32)
CHLORIDE BLD-SCNC: 99 MMOL/L (ref 100–108)
CO2 BLD-SCNC: 28 MMOL/L (ref 19–24)
CREAT UR-MCNC: 0.8 MG/DL (ref 0.6–1.3)
GLUCOSE BLD STRIP.AUTO-MCNC: 101 MG/DL (ref 74–106)
HCT VFR BLD CALC: 41 % (ref 36–49)
HGB BLD-MCNC: 13.9 G/DL (ref 12–16)
POTASSIUM BLD-SCNC: 4.6 MMOL/L (ref 3.5–5.5)
SODIUM BLD-SCNC: 137 MMOL/L (ref 136–145)

## 2019-08-28 PROCEDURE — 80047 BASIC METABLC PNL IONIZED CA: CPT

## 2019-08-28 PROCEDURE — 74011250636 HC RX REV CODE- 250/636: Performed by: PSYCHIATRY & NEUROLOGY

## 2019-08-28 PROCEDURE — 96372 THER/PROPH/DIAG INJ SC/IM: CPT

## 2019-08-28 RX ADMIN — DENOSUMAB 60 MG: 60 INJECTION SUBCUTANEOUS at 11:15

## 2019-08-28 NOTE — PROGRESS NOTES
[facility] Progress Note    Date: 2019    Name: Ruthie Kapadia    MRN: [id number]         : 1960    Prolia      Ms. Norman to [facility], ambulatory at 1055. Pt was assessed and education was provided. Patient states she has been receiving Prolia for 2 years with her last injection 6 months ago. Ms. Alec Pena vitals were reviewed and patient was observed for 5 minutes prior to treatment. Visit Vitals  /80 (BP 1 Location: Right arm, BP Patient Position: Sitting)   Pulse 99   Temp 98.4 °F (36.9 °C)   Resp 18   SpO2 97%       Blood obtained peripherally from right AC x 1 attempt with butterfly needle for calcium level. No bleeding or hematoma noted at site. Guaze and coban applied. Recent Results (from the past 8 hour(s))   POC CHEM8    Collection Time: 19 11:13 AM   Result Value Ref Range    CO2, POC 28 (H) 19 - 24 MMOL/L    Glucose,  74 - 106 MG/DL    BUN, POC 24 (H) 7 - 18 MG/DL    Creatinine, POC 0.8 0.6 - 1.3 MG/DL    GFRAA, POC >60 >60 ml/min/1.73m2    GFRNA, POC >60 >60 ml/min/1.73m2    Sodium,  136 - 145 MMOL/L    Potassium, POC 4.6 3.5 - 5.5 MMOL/L    Calcium, ionized (POC) 1.19 1.12 - 1.32 mmol/L    Chloride, POC 99 (L) 100 - 108 MMOL/L    Anion gap, POC 16 10 - 20      Hematocrit, POC 41 36 - 49 %    Hemoglobin, POC 13.9 12 - 16 G/DL     Calcium level WNL to proceed. Prolia 60mg given SQ in the right upper arm. No bleeding or hematoma noted at site. Injection site secured with band-aid. Ms. Maikel Christine tolerated the injection, and had no complaints. Patient armband removed and shredded. Ms. Maikel Christine was discharged from [place] [address] in stable condition at 1120. She is to return on  19 at 1100 for her next Prolia appointment.     Joseph Oneal RN  2019  1120

## 2019-10-02 ENCOUNTER — OFFICE VISIT (OUTPATIENT)
Dept: INTERNAL MEDICINE CLINIC | Age: 59
End: 2019-10-02

## 2019-10-02 VITALS
RESPIRATION RATE: 12 BRPM | HEART RATE: 96 BPM | DIASTOLIC BLOOD PRESSURE: 74 MMHG | HEIGHT: 63 IN | TEMPERATURE: 98.2 F | SYSTOLIC BLOOD PRESSURE: 112 MMHG | OXYGEN SATURATION: 97 % | WEIGHT: 149.2 LBS | BODY MASS INDEX: 26.44 KG/M2

## 2019-10-02 DIAGNOSIS — J20.9 BRONCHITIS WITH BRONCHOSPASM: Primary | ICD-10-CM

## 2019-10-02 RX ORDER — PREDNISONE 20 MG/1
TABLET ORAL
Qty: 18 TAB | Refills: 0 | Status: SHIPPED | OUTPATIENT
Start: 2019-10-02 | End: 2019-10-25 | Stop reason: ALTCHOICE

## 2019-10-02 NOTE — PROGRESS NOTES
Noa Wray 1960, is a 63 y.o. female, who is seen today for persistent nonproductive cough. 5 days ago she developed a sore throat which stayed sore for a couple of days and now has been just scratchy. If again nasal congestion or postnasal drip symptoms. She started coughing almost 5 days ago and that has gotten worse and worse and is keeping her awake at night. She produces no sputum. She has had no chills and does not think she has had a fever. She has been through similar symptoms in the past and required prednisone. She does not have any dyspnea or hear any wheezing. She is using her usual inhalers and just saw her pulmonary specialist about 6 weeks ago.     Past Medical History:   Diagnosis Date    Allergy     Anxiety     Arthritis     Asthma     mild    Atypical ductal hyperplasia of breast     Atypical hyperplasia of breast 2003    right    Chronic depression     Chronic fatigue syndrome     Chronic interstitial cystitis     Chronic interstitial cystitis     Chronic pain     fibromyalgia    Constipation     Depression     Diarrhea     Difficulty walking     Dizziness     Dry eyes     Dry mouth     Dyspnea     Fibromyalgia     Fibromyalgia     Generalized stiffness     GERD (gastroesophageal reflux disease)     Headache(784.0)     Heartburn     Hyperlipidemia     IBS (irritable bowel syndrome)     IC (interstitial cystitis)     Imbalance     Incoordination     Insomnia     Irregular periods/menstrual cycles     Joint pain     Microscopic hematuria     Muscle pain     Myofascial pain syndrome     Numbness and tingling in hands     Ovarian cyst, left     Painful sex     Pneumonia     Psychiatric disorder     anxiety - depression    RLS (restless legs syndrome)     Stress     TMJ (temporomandibular joint syndrome)     Transient total loss of muscle tone     Uterine fibroid     Weakness generalized      Current Outpatient Medications Medication Sig Dispense Refill    predniSONE (DELTASONE) 20 mg tablet 2 tablets by mouth daily for 6 days, then 1 tablet daily for 6 days. 18 Tab 0    mth-me blue-sod phos-phsal-hyo (URO-MP) 118-10-40.8-36 mg cap capsule Take 1 Cap by mouth four (4) times daily. 120 Cap 3    omeprazole (PRILOSEC) 40 mg capsule Take 1 Cap by mouth daily. 90 Cap 3    pentosan polysulfate sodium (ELMIRON) 100 mg capsule Take 1 Cap by mouth two (2) times a day. Indications: Bladder Pain, bladder wall inflammation 180 Cap 3    mometasone (ASMANEX TWISTHALER) 220 mcg (30 doses) inhaler Take 1 Puff by inhalation daily. (Patient taking differently: Take 1 Puff by inhalation two (2) times a day.) 90 Cap 3    montelukast (SINGULAIR) 10 mg tablet Take 1 Tab by mouth daily. 90 Tab 3    tiotropium bromide (SPIRIVA RESPIMAT) 2.5 mcg/actuation inhaler Take 2 Puffs by inhalation daily. 3 Inhaler 6    albuterol (PROVENTIL HFA, VENTOLIN HFA, PROAIR HFA) 90 mcg/actuation inhaler Take 2 Puffs by inhalation every six (6) hours as needed for Wheezing. 3 Inhaler 6    Methen-Hyos-M Blue-BA-PhSal (HYOPHEN) tablet Take 1 Tab by mouth four (4) times daily. 120 Tab 3    OXcarbazepine (TRILEPTAL) 150 mg tablet Take 1 Tab by mouth three (3) times daily. 90 Tab 2    meloxicam (MOBIC) 15 mg tablet take 1 tablet by mouth once daily 30 Tab 11    modafinil (PROVIGIL) 200 mg tablet take 1 tablet by mouth twice a day for 30 DAYS maximum daily dose of 2 tablets (Patient taking differently: Take 200 mg by mouth daily.) 60 Tab 5    ergocalciferol (ERGOCALCIFEROL) 50,000 unit capsule take 1 capsule by mouth TWO (2) TIMES A WEEK  Indications: PREVENTION OF VITAMIN D DEFICIENCY (Patient taking differently: Take 50,000 Units by mouth every seven (7) days.  take 1 capsule by mouth once a week  Indications: Prevention of Vitamin D Deficiency) 8 Cap 11    gabapentin (NEURONTIN) 800 mg tablet take 1 tablet by mouth three times a day  0    denosumab (PROLIA) 60 mg/mL injection 60 mg by SubCUTAneous route.  calcium glycerophosphate (PRELIEF) 65 mg tab Take 1 Tab by mouth daily.  OTHER Indications: Compound cream for inflammation      B.infantis-B.ani-B.long-B.bifi (PROBIOTIC 4X) 10-15 mg TbEC Take 1 Tab by mouth daily.  ENZYMES,DIGESTIVE, PLANT, (DIGESTIVE ENZYMES, PLANT, PO) Take 1 Tab by mouth daily.  calcium citrate-vitamin D3 (CITRACAL + D) tablet Take 1 Tab by mouth daily.  LORazepam (ATIVAN) 2 mg tablet Take 2 mg by mouth every eight (8) hours as needed for Anxiety.  DULoxetine (CYMBALTA) 60 mg capsule Take 120 mg by mouth daily.  simethicone (PHAZYME) 180 mg cap Take 225 mg by mouth as needed.  trazodone (DESYREL) 100 mg tablet Take 100 mg by mouth nightly. Visit Vitals  /74 (BP 1 Location: Left arm, BP Patient Position: Sitting)   Pulse 96   Temp 98.2 °F (36.8 °C) (Oral)   Resp 12   Ht 5' 3\" (1.6 m)   Wt 149 lb 3.2 oz (67.7 kg)   SpO2 97%   BMI 26.43 kg/m²     Nasal passages are clear. Pharynx reveals no redness exudate or drainage. Neck reveals no adenopathy or tenderness. Lungs are clear to percussion. Good breath sounds with no wheezing and no crackles. With forced expiration there is no wheezing and it does not cause her to cough. When she coughed on command that also sounded clear, moving no secretions. Assessment: Upper respiratory infection, probably viral, but with bronchospasm. She will continue her usual inhalers and will be started on prednisone but only 40 mg a day for 6 days and then 20 mg a day for 6 days, hopefully that will be enough. Gabriel Thakkar Mai, MD FACP    Please note: This document has been produced using voice recognition software. Unrecognized errors in transcription may be present.

## 2019-10-03 ENCOUNTER — TELEPHONE (OUTPATIENT)
Dept: INTERNAL MEDICINE CLINIC | Age: 59
End: 2019-10-03

## 2019-10-03 NOTE — TELEPHONE ENCOUNTER
Patient stating she is urinating every 5 min since starting the Prednisone. Wants to know if you can start her on a lower dose so this doesn't continue.

## 2019-10-04 ENCOUNTER — TELEPHONE (OUTPATIENT)
Dept: INTERNAL MEDICINE CLINIC | Age: 59
End: 2019-10-04

## 2019-10-08 DIAGNOSIS — J45.40 MODERATE PERSISTENT ASTHMA WITHOUT COMPLICATION: ICD-10-CM

## 2019-10-08 DIAGNOSIS — R06.09 DYSPNEA ON EXERTION: ICD-10-CM

## 2019-10-08 NOTE — TELEPHONE ENCOUNTER
Pt states she needs a refill for Spiriva Respimat to be sent to [facility]. Please advise .

## 2019-10-10 ENCOUNTER — TELEPHONE (OUTPATIENT)
Dept: INTERNAL MEDICINE CLINIC | Age: 59
End: 2019-10-10

## 2019-10-10 DIAGNOSIS — Z12.31 SCREENING MAMMOGRAM, ENCOUNTER FOR: Primary | ICD-10-CM

## 2019-10-14 ENCOUNTER — OFFICE VISIT (OUTPATIENT)
Dept: ORTHOPEDIC SURGERY | Age: 59
End: 2019-10-14

## 2019-10-14 VITALS
WEIGHT: 148.4 LBS | SYSTOLIC BLOOD PRESSURE: 125 MMHG | HEIGHT: 63 IN | BODY MASS INDEX: 26.29 KG/M2 | TEMPERATURE: 96.4 F | RESPIRATION RATE: 16 BRPM | OXYGEN SATURATION: 98 % | HEART RATE: 90 BPM | DIASTOLIC BLOOD PRESSURE: 85 MMHG

## 2019-10-14 DIAGNOSIS — R20.0 BILATERAL HAND NUMBNESS: Primary | ICD-10-CM

## 2019-10-14 NOTE — PROGRESS NOTES
1. Have you been to the ER, urgent care clinic since your last visit? Hospitalized since your last visit? No    2. Have you seen or consulted any other health care providers outside of the [address] [place] [address] since your last visit? Include any pap smears or colon screening.  No

## 2019-10-14 NOTE — PROGRESS NOTES
Christel Narvaez is a 63 y.o. female right handed unknown employment. Worker's Compensation and legal considerations: not known. Vitals:    10/14/19 1017   BP: 125/85   Pulse: 90   Resp: 16   Temp: 96.4 °F (35.8 °C)   TempSrc: Oral   SpO2: 98%   Weight: 148 lb 6.4 oz (67.3 kg)   Height: 5' 3\" (1.6 m)   PainSc:   0 - No pain   PainLoc: Hand           Chief Complaint   Patient presents with    Hand Pain     Bilateral          HPI: Patient comes in today to establish care with a hand physician regarding her history of bilateral hand numbness and tingling. She has worn carpal tunnel braces at night that has helped her in the past.  She was recently on a steroid Dosepak for bronchitis. She reports no pain in her hands today. She also reports a history of bilateral thumb joint arthritis as well as fibromyalgia.     Date of onset:  indeterminate    Injury: No    Prior Treatment:  Yes: Comment: braces    Numbness/ Tingling: Yes: Comment: intermittent at night    ROS: Review of Systems - General ROS: negative  Respiratory ROS: no cough, shortness of breath, or wheezing  Cardiovascular ROS: no chest pain or dyspnea on exertion  Musculoskeletal ROS: negative  Neurological ROS: positive for - numbness/tingling and weakness  Dermatological ROS: negative    Past Medical History:   Diagnosis Date    Allergy     Anxiety     Arthritis     Asthma     mild    Atypical ductal hyperplasia of breast     Atypical hyperplasia of breast 2003    right    Chronic depression     Chronic fatigue syndrome     Chronic interstitial cystitis     Chronic interstitial cystitis     Chronic pain     fibromyalgia    Constipation     Depression     Diarrhea     Difficulty walking     Dizziness     Dry eyes     Dry mouth     Dyspnea     Fibromyalgia     Fibromyalgia     Generalized stiffness     GERD (gastroesophageal reflux disease)     Headache(784.0)     Heartburn     Hyperlipidemia     IBS (irritable bowel syndrome)     IC (interstitial cystitis)     Imbalance     Incoordination     Insomnia     Irregular periods/menstrual cycles     Joint pain     Microscopic hematuria     Muscle pain     Myofascial pain syndrome     Numbness and tingling in hands     Ovarian cyst, left     Painful sex     Pneumonia     Psychiatric disorder     anxiety - depression    RLS (restless legs syndrome)     Stress     TMJ (temporomandibular joint syndrome)     Transient total loss of muscle tone     Uterine fibroid     Weakness generalized        Past Surgical History:   Procedure Laterality Date    ENDOSCOPY, COLON, DIAGNOSTIC  2013    10 years    HX BREAST BIOPSY  2003    right    HX COLONOSCOPY  2012    donohue    HX GYN      left ovarian cystectomy    HX GYN      laparoscopy, laparotomy, lysis of adhesions.  HX MYOMECTOMY      HX OVARIAN CYST REMOVAL  2003    HX UROLOGICAL  05/24/10    cystoscopy and hydrodilation       Current Outpatient Medications   Medication Sig Dispense Refill    tiotropium bromide (SPIRIVA RESPIMAT) 2.5 mcg/actuation inhaler Take 2 Puffs by inhalation daily. Indications: Prevention of Bronchospasm with Chronic Bronchitis 3 Inhaler 3    predniSONE (DELTASONE) 20 mg tablet 2 tablets by mouth daily for 6 days, then 1 tablet daily for 6 days. 18 Tab 0    mth-me blue-sod phos-phsal-hyo (URO-MP) 118-10-40.8-36 mg cap capsule Take 1 Cap by mouth four (4) times daily. 120 Cap 3    omeprazole (PRILOSEC) 40 mg capsule Take 1 Cap by mouth daily. 90 Cap 3    pentosan polysulfate sodium (ELMIRON) 100 mg capsule Take 1 Cap by mouth two (2) times a day. Indications: Bladder Pain, bladder wall inflammation 180 Cap 3    mometasone (ASMANEX TWISTHALER) 220 mcg (30 doses) inhaler Take 1 Puff by inhalation daily. (Patient taking differently: Take 1 Puff by inhalation two (2) times a day.) 90 Cap 3    montelukast (SINGULAIR) 10 mg tablet Take 1 Tab by mouth daily.  90 Tab 3    albuterol (PROVENTIL HFA, VENTOLIN HFA, PROAIR HFA) 90 mcg/actuation inhaler Take 2 Puffs by inhalation every six (6) hours as needed for Wheezing. 3 Inhaler 6    gabapentin (NEURONTIN) 800 mg tablet take 1 tablet by mouth three times a day  0    denosumab (PROLIA) 60 mg/mL injection 60 mg by SubCUTAneous route.  OXcarbazepine (TRILEPTAL) 150 mg tablet Take 1 Tab by mouth three (3) times daily. 90 Tab 2    calcium glycerophosphate (PRELIEF) 65 mg tab Take 1 Tab by mouth daily.  meloxicam (MOBIC) 15 mg tablet take 1 tablet by mouth once daily 30 Tab 11    modafinil (PROVIGIL) 200 mg tablet take 1 tablet by mouth twice a day for 30 DAYS maximum daily dose of 2 tablets (Patient taking differently: Take 200 mg by mouth daily.) 60 Tab 5    ergocalciferol (ERGOCALCIFEROL) 50,000 unit capsule take 1 capsule by mouth TWO (2) TIMES A WEEK  Indications: PREVENTION OF VITAMIN D DEFICIENCY (Patient taking differently: Take 50,000 Units by mouth every seven (7) days. take 1 capsule by mouth once a week  Indications: Prevention of Vitamin D Deficiency) 8 Cap 11    OTHER Indications: Compound cream for inflammation      B.infantis-B.ani-B.long-B.bifi (PROBIOTIC 4X) 10-15 mg TbEC Take 1 Tab by mouth daily.  ENZYMES,DIGESTIVE, PLANT, (DIGESTIVE ENZYMES, PLANT, PO) Take 1 Tab by mouth daily.  calcium citrate-vitamin D3 (CITRACAL + D) tablet Take 1 Tab by mouth daily.  LORazepam (ATIVAN) 2 mg tablet Take 2 mg by mouth every eight (8) hours as needed for Anxiety.  DULoxetine (CYMBALTA) 60 mg capsule Take 120 mg by mouth daily.  simethicone (PHAZYME) 180 mg cap Take 225 mg by mouth as needed.  trazodone (DESYREL) 100 mg tablet Take 100 mg by mouth nightly.  Methen-Hyos-M Blue-BA-PhSal (HYOPHEN) tablet Take 1 Tab by mouth four (4) times daily.  120 Tab 3       Allergies   Allergen Reactions    Codeine Rash and Itching     Other reaction(s): other/intolerance    Tussionex Itching         PE: NEUROVASCULAR    Examination L R Examination L R   Carpal Comp. ? ? Pronator Comp. - -   Carpal Tinel ? ? Pronator Tinel - -   Phalen's ? ? Pronator Stress - -   Cubital Comp. - - Guyon Comp. - -   Cubital Tinel - - Guyon Tinel - -   Elbow Hyperflexion - - Adson's - -   Spurling's - - SC Comp. ? ? PCB Median abn - - SC Tinel ? ? Radial Tinel - - IC Comp. - -   Digital Tinel - - IC Tinel - -   Radial 2-Pt WNL WNL Ulnar 2-Pt WNL WNL     Radial Pulse: 2+  Capillary Refill: < 2 sec  Tommy: Not Performed  Digital Tommy: Not Performed      Imaging: none indicated    EMG from 2016 does not show any evidence of carpal tunnel syndrome or other peripheral neuropathy. ICD-10-CM ICD-9-CM    1. Bilateral hand numbness R20.0 782. 0 EMG TWO EXTREMITIES UPPER      NCV/LAT MOTOR PER NERVE UP/RT      NCV/LAT MOTOR PER NERVE UP/LT       Plan: The source of her intermittent numbness and tingling in her hands is unclear at this point. We will get bilateral upper extremity EMG and nerve conduction studies    To rule out a peripheral compression neuropathy.     Plan was reviewed with patient, who verbalized agreement and understanding of the plan

## 2019-10-24 ENCOUNTER — TELEPHONE (OUTPATIENT)
Dept: PULMONOLOGY | Age: 59
End: 2019-10-24

## 2019-10-24 NOTE — TELEPHONE ENCOUNTER
Pt called stating she has had cough and chest congestion going on 4 weeks. She saw her pcp and he gave her 12 days of prednisone. She also had been taking mucinex and delsym with little help. This week she started having chest pain while breathing.  Please call [phone]

## 2019-10-24 NOTE — TELEPHONE ENCOUNTER
Pt tx by PCP with Prednisone several weeks ago. No she is still with slight congestion but pain with breathing.  Appt given

## 2019-10-25 ENCOUNTER — OFFICE VISIT (OUTPATIENT)
Dept: PULMONOLOGY | Age: 59
End: 2019-10-25

## 2019-10-25 VITALS
RESPIRATION RATE: 18 BRPM | TEMPERATURE: 98 F | BODY MASS INDEX: 26.22 KG/M2 | WEIGHT: 148 LBS | OXYGEN SATURATION: 95 % | HEIGHT: 63 IN | DIASTOLIC BLOOD PRESSURE: 87 MMHG | HEART RATE: 112 BPM | SYSTOLIC BLOOD PRESSURE: 117 MMHG

## 2019-10-25 DIAGNOSIS — K21.9 GASTROESOPHAGEAL REFLUX DISEASE WITHOUT ESOPHAGITIS: ICD-10-CM

## 2019-10-25 DIAGNOSIS — J45.909 ASTHMA, UNSPECIFIED ASTHMA SEVERITY, UNSPECIFIED WHETHER COMPLICATED, UNSPECIFIED WHETHER PERSISTENT: Primary | ICD-10-CM

## 2019-10-25 DIAGNOSIS — R05.9 COUGH: ICD-10-CM

## 2019-10-25 RX ORDER — PREDNISONE 10 MG/1
TABLET ORAL
Qty: 18 TAB | Refills: 0 | Status: SHIPPED | OUTPATIENT
Start: 2019-10-25 | End: 2019-12-13

## 2019-10-25 RX ORDER — DOXYCYCLINE 100 MG/1
100 CAPSULE ORAL 2 TIMES DAILY
Qty: 14 CAP | Refills: 0 | Status: SHIPPED | OUTPATIENT
Start: 2019-10-25 | End: 2019-11-01

## 2019-10-25 RX ORDER — ONDANSETRON 4 MG/1
TABLET, FILM COATED ORAL
COMMUNITY
Start: 2019-10-25 | End: 2019-10-25

## 2019-10-25 RX ORDER — PANTOPRAZOLE SODIUM 40 MG/1
TABLET, DELAYED RELEASE ORAL
COMMUNITY
Start: 2019-10-25 | End: 2019-10-25

## 2019-10-25 NOTE — PATIENT INSTRUCTIONS
 Continue Asmanex and Spiriva Respimat   daily, rinse mouth out after use.        Continue albuterol nebulizer treatment or rescue inhaler, 2 inhalations every 4-6 hours as needed for shortness of breath, wheezing or cough    · Complete course of antibiotics    · Increase fluids, take OTC mucinex as needed for cough     Recommend healthy diet/weight and exercise as tolerated     Follow-up in pulmonary clinic in 3 months or sooner with worsening of symptoms     Report to the ER with chest pain or difficulty breathing

## 2019-10-25 NOTE — PROGRESS NOTES
Mimi Keenan presents today for   Chief Complaint   Patient presents with    Shortness of Breath     sick visit per patient    Cough     x 1 month per patient    Asthma     last seen by Dr. Obed Strong on 8/15/2019    Breathing Problem     FARMER    Sleep Apnea       Is someone accompanying this pt? No    Is the patient using any DME equipment during OV? No    -DME Company N/A    Depression Screening:  3 most recent PHQ Screens 6/12/2019   PHQ Not Done -   Little interest or pleasure in doing things Not at all   Feeling down, depressed, irritable, or hopeless Not at all   Total Score PHQ 2 0       Learning Assessment:  Learning Assessment 6/12/2019   PRIMARY LEARNER Patient   BARRIERS PRIMARY LEARNER NONE   CO-LEARNER CAREGIVER No   PRIMARY LANGUAGE ENGLISH   LEARNER PREFERENCE PRIMARY READING     DEMONSTRATION   ANSWERED BY patient   RELATIONSHIP SELF       Abuse Screening:  Abuse Screening Questionnaire 6/12/2019   Do you ever feel afraid of your partner? N   Are you in a relationship with someone who physically or mentally threatens you? N   Is it safe for you to go home? Y       Fall Risk  Fall Risk Assessment, last 12 mths 6/12/2019   Able to walk? Yes   Fall in past 12 months? Yes   Fall with injury? Yes   Number of falls in past 12 months 6   Fall Risk Score 7         Coordination of Care:  1. Have you been to the ER, urgent care clinic since your last visit? Hospitalized since your last visit? No    2. Have you seen or consulted any other health care providers outside of the [address] [place] [address] since your last visit? Include any pap smears or colon screening.  No

## 2019-10-25 NOTE — PROGRESS NOTES
[facility]  Pulmonary, Critical Care, and Sleep Medicine      Pulmonary Office Progress Notes    Name: Mary Greer     : 1960     Date: 10/25/2019        Subjective:     10/25/2019          HPI    Mary Greer  is a 63 y.o. female with PMH of asthma and reactive airways who presents for evaluation of cough. She was last seen here on 8/15/2019 for routine follow-up visit at which time her symptoms were well managed on daily ICS/L AMA and albuterol as needed. Today she appears comfortable, in no distress but reports that she has had a persistent cough and generalized fatigue for 1 month. She describes the cough as daily and productive of yellow/green sputum. She was prescribed a course of prednisone by her PCP and although she had some improvement, her symptoms have now returned. She denies wheezing, change or hemoptysis. No fever, chills or orthopnea; no leg/calf pain or swelling and no decreased appetite or weight loss. She complains of nasal congestion/drainage and a sore throat as well. She has no history of smoking.           Past Medical History:   Diagnosis Date    Allergy     Anxiety     Arthritis     Asthma     mild    Atypical ductal hyperplasia of breast     Atypical hyperplasia of breast 2003    right    Chronic depression     Chronic fatigue syndrome     Chronic interstitial cystitis     Chronic interstitial cystitis     Chronic pain     fibromyalgia    Constipation     Depression     Diarrhea     Difficulty walking     Dizziness     Dry eyes     Dry mouth     Dyspnea     Fibromyalgia     Fibromyalgia     Generalized stiffness     GERD (gastroesophageal reflux disease)     Headache(784.0)     Heartburn     Hyperlipidemia     IBS (irritable bowel syndrome)     IC (interstitial cystitis)     Imbalance     Incoordination     Insomnia     Irregular periods/menstrual cycles     Joint pain     Microscopic hematuria     Muscle pain     Myofascial pain syndrome     Numbness and tingling in hands     Ovarian cyst, left     Painful sex     Pneumonia     Psychiatric disorder     anxiety - depression    RLS (restless legs syndrome)     Stress     TMJ (temporomandibular joint syndrome)     Transient total loss of muscle tone     Uterine fibroid     Weakness generalized        Allergies   Allergen Reactions    Codeine Rash and Itching     Other reaction(s): other/intolerance    Tussionex Itching       Current Outpatient Medications   Medication Sig Dispense Refill    digestive 8-L.acidoph-pectin (DIGESTIVE ENZYME, ACIDOPH,PEC,) 50 million cell-100 mg tab Take 1 Tab by mouth daily.  L.joe,acid,ferm,rhm-B.bif,long (CONTROLLED DELIVERY PROBIOTIC) 126 mg (2 billion cell) TaDE       doxycycline (VIBRAMYCIN) 100 mg capsule Take 1 Cap by mouth two (2) times a day for 7 days. 14 Cap 0    predniSONE (DELTASONE) 10 mg tablet Take 30 mg x 3 days, take 20 mg x 3 days, take 10 mg x 3 days. Take with breakfast 18 Tab 0    tiotropium bromide (SPIRIVA RESPIMAT) 2.5 mcg/actuation inhaler Take 2 Puffs by inhalation daily. Indications: Prevention of Bronchospasm with Chronic Bronchitis 3 Inhaler 3    omeprazole (PRILOSEC) 40 mg capsule Take 1 Cap by mouth daily. 90 Cap 3    pentosan polysulfate sodium (ELMIRON) 100 mg capsule Take 1 Cap by mouth two (2) times a day. Indications: Bladder Pain, bladder wall inflammation 180 Cap 3    mometasone (ASMANEX TWISTHALER) 220 mcg (30 doses) inhaler Take 1 Puff by inhalation daily. (Patient taking differently: Take 1 Puff by inhalation two (2) times a day.) 90 Cap 3    montelukast (SINGULAIR) 10 mg tablet Take 1 Tab by mouth daily. 90 Tab 3    albuterol (PROVENTIL HFA, VENTOLIN HFA, PROAIR HFA) 90 mcg/actuation inhaler Take 2 Puffs by inhalation every six (6) hours as needed for Wheezing.  3 Inhaler 6    gabapentin (NEURONTIN) 800 mg tablet take 1 tablet by mouth three times a day  0    denosumab (PROLIA) 60 mg/mL injection 60 mg by SubCUTAneous route. 2 times yearly      Methen-Hyos-M Blue-BA-PhSal (HYOPHEN) tablet Take 1 Tab by mouth four (4) times daily. 120 Tab 3    OXcarbazepine (TRILEPTAL) 150 mg tablet Take 1 Tab by mouth three (3) times daily. 90 Tab 2    calcium glycerophosphate (PRELIEF) 65 mg tab Take 1 Tab by mouth daily.  meloxicam (MOBIC) 15 mg tablet take 1 tablet by mouth once daily 30 Tab 11    modafinil (PROVIGIL) 200 mg tablet take 1 tablet by mouth twice a day for 30 DAYS maximum daily dose of 2 tablets (Patient taking differently: Take 200 mg by mouth daily.) 60 Tab 5    ergocalciferol (ERGOCALCIFEROL) 50,000 unit capsule take 1 capsule by mouth TWO (2) TIMES A WEEK  Indications: PREVENTION OF VITAMIN D DEFICIENCY (Patient taking differently: Take 50,000 Units by mouth every seven (7) days. take 1 capsule by mouth once a week  Indications: Prevention of Vitamin D Deficiency) 8 Cap 11    OTHER Indications: Compound cream for inflammation      B.infantis-B.ani-B.long-B.bifi (PROBIOTIC 4X) 10-15 mg TbEC Take 1 Tab by mouth daily.  ENZYMES,DIGESTIVE, PLANT, (DIGESTIVE ENZYMES, PLANT, PO) Take 1 Tab by mouth daily.  calcium citrate-vitamin D3 (CITRACAL + D) tablet Take 1 Tab by mouth daily.  LORazepam (ATIVAN) 2 mg tablet Take 2 mg by mouth every eight (8) hours as needed for Anxiety.  DULoxetine (CYMBALTA) 60 mg capsule Take 120 mg by mouth daily.  simethicone (PHAZYME) 180 mg cap Take 225 mg by mouth as needed.  trazodone (DESYREL) 100 mg tablet Take 100 mg by mouth nightly.  ondansetron hcl (ZOFRAN) 4 mg tablet       pantoprazole (PROTONIX) 40 mg tablet 1 tablet      [facility] blue-sod phos-phsal-hyo (URO-MP) 118-10-40.8-36 mg cap capsule Take 1 Cap by mouth four (4) times daily. 120 Cap 3       Review of Systems:    HEENT: No epistaxis, no nasal drainage, no difficulty in swallowing, no redness in eyes.   Sore throat  Respiratory: As stated above in HPI  Cardiovascular: no chest pain, no palpitations, no chronic leg edema, no syncope  Gastrointestinal: no abd pain, no vomiting, no diarrhea, no bleeding symptoms  Genitourinary: No urinary symptoms or hematuria  Integument/breast: No ulcers or rashes  Musculoskeletal: No leg / calf pain  Neurological: No focal weakness, no seizures, no headaches  Behvioral/Psych: No anxiety, no depression  Constitutional: No fever, chills or night sweats. No decreased appetite or weight loss. Generalized fatigue    Objective:     Visit Vitals  /87 (BP 1 Location: Right arm, BP Patient Position: Sitting)   Pulse (!) 112   Temp 98 °F (36.7 °C) (Oral)   Resp 18   Ht 5' 3\" (1.6 m)   Wt 67.1 kg (148 lb)   SpO2 95%   BMI 26.22 kg/m²        PHYSICAL EXAM      General: Oriented to person, place, and time. Well-developed, well-nourished, and in no distress      Head:   Normocephalic, without obvious abnormality, atraumatic       Eyes:   Pupils reactive, conjunctivae / corneas clear. EOM's intact, no scleral icterus       Nose:   Nares normal, no drainage. Throat:    Lips, mucosa and tongue normal. Teeth and gums normal       Neck:   Supple, symmetrical, trachea midline. No adenopathy or thyroid swelling; no carotid bruit or JVD. CVS:    Regular rate and rhythm. S1S2 normal,  no murmurs       RS:      Symmetrical chest rise, good AE bilaterally. Mild expiratory wheezing left upper lobe. No rales or rhonchi, no accessory muscle use      Abd:     Soft, non-tender.   No hepatosplenomegaly                                                     Neuro:   non focal, awake, alert and oriented to person, place, time and situation    Extrm:   no leg edema,  clubbing or cyanosis       Skin:   no rash    Data review:     Hospital Outpatient Visit on 08/28/2019   Component Date Value Ref Range Status    CO2, POC 08/28/2019 28* 19 - 24 MMOL/L Final    Glucose, POC 08/28/2019 101  74 - 106 MG/DL Final    BUN, POC 08/28/2019 24* 7 - 18 MG/DL Final    Creatinine, POC 08/28/2019 0.8  0.6 - 1.3 MG/DL Final    GFRAA, POC 08/28/2019 >60  >60 ml/min/1.73m2 Final    GFRNA, POC 08/28/2019 >60  >60 ml/min/1.73m2 Final    Comment: Estimated GFR is calculated using the IDMS-traceable Modification of Diet in Renal Disease (MDRD) Study equation, reported for both  Americans (GFRAA) and non- Americans (GFRNA), and normalized to 1.73m2 body surface area. The physician must decide which value applies to the patient. The MDRD study equation should only be used in individuals age 25 or older. It has not been validated for the following: pregnant women, patients with serious comorbid conditions, or on certain medications, or persons with extremes of body size, muscle mass, or nutritional status.  Sodium, POC 08/28/2019 137  136 - 145 MMOL/L Final    Potassium, POC 08/28/2019 4.6  3.5 - 5.5 MMOL/L Final    Calcium, ionized (POC) 08/28/2019 1.19  1.12 - 1.32 mmol/L Final    Chloride, POC 08/28/2019 99* 100 - 108 MMOL/L Final    Anion gap, POC 08/28/2019 16  10 - 20   Final    Hematocrit, POC 08/28/2019 41  36 - 49 % Final    Hemoglobin, POC 08/28/2019 13.9  12 - 16 G/DL Final   Hospital Outpatient Visit on 05/31/2019   Component Date Value Ref Range Status    LIPID PROFILE 05/31/2019        Final    Cholesterol, total 05/31/2019 333* <200 MG/DL Final    Triglyceride 05/31/2019 133  <150 MG/DL Final    Comment: The drugs N-acetylcysteine (NAC) and  Metamiszole have been found to cause falsely  low results in this chemical assay. Please  be sure to submit blood samples obtained  BEFORE administration of either of these  drugs to assure correct results.       HDL Cholesterol 05/31/2019 76* 40 - 60 MG/DL Final    LDL, calculated 05/31/2019 230.4* 0 - 100 MG/DL Final    VLDL, calculated 05/31/2019 26.6  MG/DL Final    CHOL/HDL Ratio 05/31/2019 4.4  0 - 5.0   Final    Sodium 05/31/2019 139  136 - 145 mmol/L Final  Potassium 05/31/2019 4.2  3.5 - 5.5 mmol/L Final    Chloride 05/31/2019 102  100 - 108 mmol/L Final    CO2 05/31/2019 29  21 - 32 mmol/L Final    Anion gap 05/31/2019 8  3.0 - 18 mmol/L Final    Glucose 05/31/2019 93  74 - 99 mg/dL Final    BUN 05/31/2019 18  7.0 - 18 MG/DL Final    Creatinine 05/31/2019 0.74  0.6 - 1.3 MG/DL Final    BUN/Creatinine ratio 05/31/2019 24* 12 - 20   Final    GFR est AA 05/31/2019 >60  >60 ml/min/1.73m2 Final    GFR est non-AA 05/31/2019 >60  >60 ml/min/1.73m2 Final    Comment: (NOTE)  Estimated GFR is calculated using the Modification of Diet in Renal   Disease (MDRD) Study equation, reported for both  Americans   (GFRAA) and non- Americans (GFRNA), and normalized to 1.73m2   body surface area. The physician must decide which value applies to   the patient. The MDRD study equation should only be used in   individuals age 25 or older. It has not been validated for the   following: pregnant women, patients with serious comorbid conditions,   or on certain medications, or persons with extremes of body size,   muscle mass, or nutritional status.  Calcium 05/31/2019 9.0  8.5 - 10.1 MG/DL Final    Bilirubin, total 05/31/2019 0.3  0.2 - 1.0 MG/DL Final    ALT (SGPT) 05/31/2019 34  13 - 56 U/L Final    AST (SGOT) 05/31/2019 21  15 - 37 U/L Final    Alk.  phosphatase 05/31/2019 30* 45 - 117 U/L Final    Protein, total 05/31/2019 6.7  6.4 - 8.2 g/dL Final    Albumin 05/31/2019 4.2  3.4 - 5.0 g/dL Final    Globulin 05/31/2019 2.5  2.0 - 4.0 g/dL Final    A-G Ratio 05/31/2019 1.7  0.8 - 1.7   Final                   PULMONARY FUNCTION TESTS    Date FVC FEV1  FEV1/FVC DAQ02-04 TLC RV RV/TLC VC DLCO                                                         Imaging:  I have personally reviewed the patients radiographs and have reviewed the reports:  XR Results (most recent):  Results from Hospital Encounter encounter on 11/04/16   XR FOOT RT AP/LAT    Narrative Right foot, 2 views:        INDICATION:    Pain in right foot, mainly at the lateral aspect. No known trauma. COMPARISON STUDY: None. FINDINGS:  The study shows no evidence of fracture, malalignment of bones, arthritic  process, or any soft tissue abnormality in right foot. Impression IMPRESSION:    Normal radiographs of right foot. CT Results (most recent):  Results from Hospital Encounter encounter on 10/16/14   CT UROGRAM W WO CONT    Narrative CT UROGRAM    CPT CODE:     COMPARISON: None. INDICATIONS: Microscopic persistent hematuria    TECHNIQUE: Axial 5 mm sections through the abdomen and pelvis without IV  contrast are followed by dynamic enhanced scanning during the corticomedullary  nephrogram phase with 5 mm sections, through the kidneys, and then 2.5 mm  volumetrically acquired sections through the abdomen and pelvis after four  minute delay. Reformatted thin and thick section MIP images as well as VR  images are obtained to better evaluate the relationship between the ureters,  collecting systems, and kidneys, as well as the spatial relationships of these  structures to the abdominal and pelvic viscera. The patient received 100 cc of  Optiray 320 intravenously. Urogram: Renal size and contour appear normal. There is no hydronephrosis. The  visualized portions of the ureters are normal. The opacified portions of the  urinary bladder appears normal.    CT ABDOMEN: The lungs are normal. Both kidneys enhance normally. There is no  hydronephrosis. There are no kidney stones or ureteral stones. No ureterectasis. The liver and spleen, pancreas, and gallbladder are normal. The adrenals are  normal. Bowel and mesentery normal. There is normal bilateral excretion of  contrast.    CT PELVIS: Bladder is normal. Uterus normal. No pelvic mass or lymphadenopathy  is appreciated. There is no ascites area the appendix is normal. Bowel and  mesentery are normal otherwise. Impression IMPRESSION:     No cause for hematuria is discovered. Normal CT urogram.          Patient Active Problem List   Diagnosis Code    Chronic interstitial cystitis N30.10    Chronic depression F32.9    Anxiety F41.9    Stress F43.9    Asthma J45.909    Irritable bowel syndrome with diarrhea K58.0    Fibromyalgia M79.7    Lumbago M54.5    Sacroiliitis, not elsewhere classified ([address] M46.1    Pain in joint, multiple sites M25.50    Encounter for long-term (current) use of high-risk medication Z79.899    Vitamin D deficiency E55.9    Dyspnea R06.00    Anorgasmia of female F54.26    LUCRECIA (obstructive sleep apnea) G47.33    Osteoporosis M81.0    De Quervain's tenosynovitis, right M65.4    Mild intermittent asthma without complication P97.58    Chronic pain syndrome G89.4    Hyperlipidemia LDL goal <160 E78.5    Bronchitis with bronchospasm J20.9    Age-related osteoporosis without current pathological fracture M81.0    Osteopenia M85.80       IMPRESSION:   · Reactive airways/asthma-moderate persistent with predominant seasonal exacerbations  · Cough-daily, persistent, productive of yellow/green mucus    · GERD      RECOMMENDATIONS:     · Continue Asmanex, Spiriva Respimat daily and albuterol PRN. Discussed appropriate use of respiratory  maintenance and rescue  medications with patient. Patient unable to tolerate LABA due to palpitations  · Continue singular 10 mg daily  · Complete course of antibiotics-doxycycline 100 mg twice daily x7 days  · Complete course of prednisone- 30 mg x 3 days, 20 mg x 3 days, 10 mg x 3 days  · Increase p.o. fluids, take over-the-counter Mucinex as needed for cough  · Discussed recommendations for preventative vaccines and immunizations when symptoms resolve  · Follow up in pulmonary clinic in   3 months    or sooner with worsening of symptoms  · Recommend healthy diet / weight / exercise as tolerated  · Report to ER with chest pain or difficulty breathing. Please note that this dictation was completed with [employer], the computer voice recognition software. Quite often unanticipated grammatical, syntax, homophones, and other interpretive errors are inadvertently transcribed by the computer software. Please disregard these errors. Please excuse any errors that have escaped final proofreading.               Collette Chess, NP

## 2019-11-01 ENCOUNTER — TELEPHONE (OUTPATIENT)
Dept: PULMONOLOGY | Age: 59
End: 2019-11-01

## 2019-11-01 NOTE — TELEPHONE ENCOUNTER
The pt. States that since she started using Asmanex and Spiriva Respimat daily she has developed a yellow-white coating on her tongue. She rinses after MDI use. She denies soreness. She didn't use the Prednisone that was ordered 10/25. She is finishing the Doxycycline at present.

## 2019-11-01 NOTE — TELEPHONE ENCOUNTER
Pt states since upping her steroid inhaler use to daily, her tongue is now turning yellow. Believes she may be getting thrush. Please advise [phone].

## 2019-11-01 NOTE — TELEPHONE ENCOUNTER
ELZBIETA Hatfield, LPN   Caller: Unspecified (Today,  9:28 AM)             She has been on those inhalers since 2017 so I don't think they are the cause alone, however taking abx with inhaled ICS increases the risk of thrush.    I put in an Rx for Nystatin swish / spit, one tsp qid x 10 days. Bismark Merino should follow up if symptoms do not resolve.  Continue current inhalers, complete doxycycline.  Thank you      The pt. Is notified of same.

## 2019-11-12 ENCOUNTER — CLINICAL SUPPORT (OUTPATIENT)
Dept: INTERNAL MEDICINE CLINIC | Age: 59
End: 2019-11-12

## 2019-11-12 DIAGNOSIS — Z23 ENCOUNTER FOR IMMUNIZATION: ICD-10-CM

## 2019-11-12 NOTE — PATIENT INSTRUCTIONS
Vaccine Information Statement    Influenza (Flu) Vaccine (Inactivated or Recombinant): What You Need to Know    Many Vaccine Information Statements are available in Vietnamese and other languages. See www.immunize.org/vis  Hojas de información sobre vacunas están disponibles en español y en muchos otros idiomas. Visite www.immunize.org/vis    1. Why get vaccinated? Influenza vaccine can prevent influenza (flu). Flu is a contagious disease that spreads around the [facility] every year, usually between October and May. Anyone can get the flu, but it is more dangerous for some people. Infants and young children, people 72years of age and older, pregnant women, and people with certain health conditions or a weakened immune system are at greatest risk of flu complications. Pneumonia, bronchitis, sinus infections and ear infections are examples of flu-related complications. If you have a medical condition, such as heart disease, cancer or diabetes, flu can make it worse. Flu can cause fever and chills, sore throat, muscle aches, fatigue, cough, headache, and runny or stuffy nose. Some people may have vomiting and diarrhea, though this is more common in children than adults. Each year thousands of people in the [facility] die from flu, and many more are hospitalized. Flu vaccine prevents millions of illnesses and flu-related visits to the doctor each year. 2. Influenza vaccines     CDC recommends everyone 10months of age and older get vaccinated every flu season. Children 6 months through 6years of age may need 2 doses during a single flu season. Everyone else needs only 1 dose each flu season. It takes about 2 weeks for protection to develop after vaccination. There are many flu viruses, and they are always changing. Each year a new flu vaccine is made to protect against three or four viruses that are likely to cause disease in the upcoming flu season.  Even when the vaccine doesnt exactly match these viruses, it may still provide some protection. Influenza vaccine does not cause flu. Influenza vaccine may be given at the same time as other vaccines. 3. Talk with your health care provider    Tell your vaccine provider if the person getting the vaccine:   Has had an allergic reaction after a previous dose of influenza vaccine, or has any severe, life-threatening allergies.  Has ever had Guillain-Barré Syndrome (also called GBS). In some cases, your health care provider may decide to postpone influenza vaccination to a future visit. People with minor illnesses, such as a cold, may be vaccinated. People who are moderately or severely ill should usually wait until they recover before getting influenza vaccine. Your health care provider can give you more information. 4. Risks of a reaction     Soreness, redness, and swelling where shot is given, fever, muscle aches, and headache can happen after influenza vaccine.  There may be a very small increased risk of Guillain-Barré Syndrome (GBS) after inactivated influenza vaccine (the flu shot). Marietta Beery children who get the flu shot along with pneumococcal vaccine (PCV13), and/or DTaP vaccine at the same time might be slightly more likely to have a seizure caused by fever. Tell your health care provider if a child who is getting flu vaccine has ever had a seizure. People sometimes faint after medical procedures, including vaccination. Tell your provider if you feel dizzy or have vision changes or ringing in the ears. As with any medicine, there is a very remote chance of a vaccine causing a severe allergic reaction, other serious injury, or death. 5. What if there is a serious problem? An allergic reaction could occur after the vaccinated person leaves the clinic.  If you see signs of a severe allergic reaction (hives, swelling of the face and throat, difficulty breathing, a fast heartbeat, dizziness, or weakness), call 9-1-1 and get the person to the nearest hospital.    For other signs that concern you, call your health care provider. Adverse reactions should be reported to the Vaccine Adverse Event Reporting System (VAERS). Your health care provider will usually file this report, or you can do it yourself. Visit the VAERS website at www.vaers. [facility].gov or call 4-703.666.7350. VAERS is only for reporting reactions, and VAERS staff do not give medical advice. 6. The National Vaccine Injury Compensation Program    The [facility] Vaccine Injury Compensation Program (VICP) is a federal program that was created to compensate people who may have been injured by certain vaccines. Visit the VICP website at www.hrsa.gov/vaccinecompensation or call 1-480.868.5883 to learn about the program and about filing a claim. There is a time limit to file a claim for compensation. 7. How can I learn more?  Ask your health care provider.  Call your local or state health department.  Contact the Centers for Disease Control and Prevention (CDC):  - Call 5-937.101.8303 (1-800-CDC-INFO) or  - Visit CDCs influenza website at www.cdc.gov/flu    Vaccine Information Statement (Interim)  Inactivated Influenza Vaccine   8/15/2019  42 WALT Beckett [id number]   Department of Health and Human Services  Centers for Disease Control and Prevention    Office Use Only

## 2019-11-12 NOTE — PROGRESS NOTES
The pt is here for a prolia injection. She was given the injection in her left arm subcutaneously. The pt tolerated the injection well. There were no signs and symptoms of infection noted at the injection site. There was no redness, swelling, or bleeding at the injection site. This was the pt's own supply. The lot # is [id number], exp date 10/2019, [facility] #[id number] and  is [employer]. Hatchet Flap Text: The defect edges were debeveled with a #15 scalpel blade.  Given the location of the defect, shape of the defect and the proximity to free margins a hatchet flap was deemed most appropriate.  Using a sterile surgical marker, an appropriate hatchet flap was drawn incorporating the defect and placing the expected incisions within the relaxed skin tension lines where possible.    The area thus outlined was incised deep to adipose tissue with a #15 scalpel blade.  The skin margins were undermined to an appropriate distance in all directions utilizing iris scissors.

## 2019-11-12 NOTE — PROGRESS NOTES
Ami Do 1960 female who presents for routine immunizations. Patient denies any symptoms , reactions or allergies that would exclude them from being immunized today. Risks and adverse reactions were discussed and the VIS was given to them. All questions were addressed. Order placed for Flu vaccine,  per Verbal Order from DR. Chandan Ramos with read back. Patient declined to stay for observation.     Maribell Ndiaye LPN

## 2019-11-27 ENCOUNTER — HOSPITAL ENCOUNTER (OUTPATIENT)
Dept: MAMMOGRAPHY | Age: 59
Discharge: HOME | End: 2019-11-27
Attending: INTERNAL MEDICINE
Payer: MEDICARE

## 2019-11-27 DIAGNOSIS — Z12.31 SCREENING MAMMOGRAM, ENCOUNTER FOR: ICD-10-CM

## 2019-11-27 PROCEDURE — 77063 BREAST TOMOSYNTHESIS BI: CPT

## 2019-12-04 ENCOUNTER — OFFICE VISIT (OUTPATIENT)
Dept: ORTHOPEDIC SURGERY | Age: 59
End: 2019-12-04

## 2019-12-04 DIAGNOSIS — R20.2 NUMBNESS AND TINGLING IN BOTH HANDS: Primary | ICD-10-CM

## 2019-12-04 DIAGNOSIS — R20.0 NUMBNESS AND TINGLING IN BOTH HANDS: Primary | ICD-10-CM

## 2019-12-04 NOTE — LETTER
12/4/19 Patient: Sommer Correia YOB: 1960 Date of Visit: 12/4/2019 MD [place] [address] [address] [id number] [address] [address] VIA In Basket Tiffanie Meryl, [address] [id number] [address] [address] VIA In Basket Dear MD Tiffanie Pastor, DO, Thank you for referring Ms. Dong Maynard to [facility] for evaluation. My notes for this consultation are attached. If you have questions, please do not hesitate to call me. I look forward to following your patient along with you.  
 
 
Sincerely, 
 
Consuelo Lawson MD

## 2019-12-04 NOTE — PROGRESS NOTES
[address]  [address]  [address]  Phone: [phone]  Fax: [phone]        Herbie De La Torre  : 1960  PCP: Rhys Carey MD  2019    ELECTROMYOGRAPHY AND NERVE CONDUCTION STUDIES    Brandi Burrell was referred by Dr. Kay Mcgowan for electrodiagnostic evaluation of bilateral hand numbness and tingling. NCV & EMG Findings:  All nerve conduction studies (as indicated in the following tables) were within normal limits. All left vs. right side differences were within normal limits. All examined muscles (as indicated in the following table) showed no evidence of electrical instability. INTERPRETATION  This was a normal nerve conduction and EMG study showing there to be no signs of neuropathy, myopathy, or radiculopathy in the nerves and muscles tested. CLINICAL INTERPRETATION  There are no electrodiagnostic findings correlating with her symptoms. HISTORY OF PRESENT ILLNESS  Cait Du is a 63 y.o. female. Pt presents today for BUE EMG evaluation of bilateral hand numbness and tingling. She has worn carpal tunnel braces at night that have been helpful. Pt notes that they sometimes make her hot at night, so she tends to throw them off. Her symptoms are worse at night. She also c/o weakness in her hands. She reports dropping things. PmHx: fibromyalgia Pt notes that she has been on Gabapentin for many years for various pathologies. She is in pain management.      PAST MEDICAL HISTORY   Past Medical History:   Diagnosis Date    Allergy     Anxiety     Arthritis     Asthma     mild    Atypical ductal hyperplasia of breast     Atypical hyperplasia of breast 2003    right    Chronic depression     Chronic fatigue syndrome     Chronic interstitial cystitis     Chronic interstitial cystitis     Chronic pain     fibromyalgia    Constipation     Depression     Diarrhea     Difficulty walking     Dizziness     Dry eyes     Dry mouth     Dyspnea     Fibromyalgia     Fibromyalgia     Generalized stiffness     GERD (gastroesophageal reflux disease)     Headache(784.0)     Heartburn     Hyperlipidemia     IBS (irritable bowel syndrome)     IC (interstitial cystitis)     Imbalance     Incoordination     Insomnia     Irregular periods/menstrual cycles     Joint pain     Microscopic hematuria     Muscle pain     Myofascial pain syndrome     Numbness and tingling in hands     Ovarian cyst, left     Painful sex     Pneumonia     Psychiatric disorder     anxiety - depression    RLS (restless legs syndrome)     Stress     TMJ (temporomandibular joint syndrome)     Transient total loss of muscle tone     Uterine fibroid     Weakness generalized        Past Surgical History:   Procedure Laterality Date    ENDOSCOPY, COLON, DIAGNOSTIC  2013    10 years    HX BREAST BIOPSY  2003    right    HX COLONOSCOPY  2012    donohue    HX GYN      left ovarian cystectomy    HX GYN      laparoscopy, laparotomy, lysis of adhesions.  HX MYOMECTOMY      HX OVARIAN CYST REMOVAL  2003    HX UROLOGICAL  05/24/10    cystoscopy and hydrodilation   . MEDICATIONS    Current Outpatient Medications   Medication Sig Dispense Refill    digestive 8-L.acidoph-pectin (DIGESTIVE ENZYME, ACIDOPH,PEC,) 50 million cell-100 mg tab Take 1 Tab by mouth daily.  predniSONE (DELTASONE) 10 mg tablet Take 30 mg x 3 days, take 20 mg x 3 days, take 10 mg x 3 days. Take with breakfast 18 Tab 0    tiotropium bromide (SPIRIVA RESPIMAT) 2.5 mcg/actuation inhaler Take 2 Puffs by inhalation daily. Indications: Prevention of Bronchospasm with Chronic Bronchitis 3 Inhaler 3    mth-me blue-sod phos-phsal-hyo (URO-MP) 118-10-40.8-36 mg cap capsule Take 1 Cap by mouth four (4) times daily. 120 Cap 3    omeprazole (PRILOSEC) 40 mg capsule Take 1 Cap by mouth daily.  90 Cap 3    pentosan polysulfate sodium (ELMIRON) 100 mg capsule Take 1 Cap by mouth two (2) times a day. Indications: Bladder Pain, bladder wall inflammation 180 Cap 3    mometasone (ASMANEX TWISTHALER) 220 mcg (30 doses) inhaler Take 1 Puff by inhalation daily. (Patient taking differently: Take 1 Puff by inhalation two (2) times a day.) 90 Cap 3    montelukast (SINGULAIR) 10 mg tablet Take 1 Tab by mouth daily. 90 Tab 3    albuterol (PROVENTIL HFA, VENTOLIN HFA, PROAIR HFA) 90 mcg/actuation inhaler Take 2 Puffs by inhalation every six (6) hours as needed for Wheezing. 3 Inhaler 6    gabapentin (NEURONTIN) 800 mg tablet take 1 tablet by mouth three times a day  0    denosumab (PROLIA) 60 mg/mL injection 60 mg by SubCUTAneous route. 2 times yearly      Methen-Hyos-M Blue-BA-PhSal (HYOPHEN) tablet Take 1 Tab by mouth four (4) times daily. 120 Tab 3    OXcarbazepine (TRILEPTAL) 150 mg tablet Take 1 Tab by mouth three (3) times daily. 90 Tab 2    calcium glycerophosphate (PRELIEF) 65 mg tab Take 1 Tab by mouth daily.  meloxicam (MOBIC) 15 mg tablet take 1 tablet by mouth once daily 30 Tab 11    modafinil (PROVIGIL) 200 mg tablet take 1 tablet by mouth twice a day for 30 DAYS maximum daily dose of 2 tablets (Patient taking differently: Take 200 mg by mouth daily.) 60 Tab 5    ergocalciferol (ERGOCALCIFEROL) 50,000 unit capsule take 1 capsule by mouth TWO (2) TIMES A WEEK  Indications: PREVENTION OF VITAMIN D DEFICIENCY (Patient taking differently: Take 50,000 Units by mouth every seven (7) days. take 1 capsule by mouth once a week  Indications: Prevention of Vitamin D Deficiency) 8 Cap 11    OTHER Indications: Compound cream for inflammation      B.infantis-B.ani-B.long-B.bifi (PROBIOTIC 4X) 10-15 mg TbEC Take 1 Tab by mouth daily.  ENZYMES,DIGESTIVE, PLANT, (DIGESTIVE ENZYMES, PLANT, PO) Take 1 Tab by mouth daily.  calcium citrate-vitamin D3 (CITRACAL + D) tablet Take 1 Tab by mouth daily.       LORazepam (ATIVAN) 2 mg tablet Take 2 mg by mouth every eight (8) hours as needed for Anxiety.  DULoxetine (CYMBALTA) 60 mg capsule Take 120 mg by mouth daily.  simethicone (PHAZYME) 180 mg cap Take 225 mg by mouth as needed.  trazodone (DESYREL) 100 mg tablet Take 100 mg by mouth nightly. ALLERGIES  Allergies   Allergen Reactions    Codeine Rash and Itching     Other reaction(s): other/intolerance    Tussionex Itching          SOCIAL HISTORY    Social History     Socioeconomic History    Marital status:      Spouse name: Not on file    Number of children: Not on file    Years of education: Not on file    Highest education level: Not on file   Tobacco Use    Smoking status: Never Smoker    Smokeless tobacco: Never Used   Substance and Sexual Activity    Alcohol use: Yes     Alcohol/week: 0.8 standard drinks     Types: 1 Cans of beer per week     Comment: socially    Drug use: No    Sexual activity: Yes     Partners: Male       FAMILY HISTORY  Family History   Problem Relation Age of Onset    Cancer Father         prostate and bone    Heart Disease Father     Heart Attack Father     Depression Mother     Osteoporosis Mother     Osteoporosis Sister     Diabetes Brother     Kidney Disease Brother         dialysis    Hypertension Brother     Diabetes Maternal Grandmother     Hypertension Brother     Stroke Other     Other Other         fibromyalgia    Schizophrenia Brother     Other Brother         obsessive compulsive disorder         PHYSICAL EXAMINATION  There were no vitals taken for this visit. Pain Assessment  10/14/2019   Location of Pain Other (comment); Hand   Pain Location Comment Bilateral thumb   Location Modifiers Left;Right   Severity of Pain 0   Quality of Pain (No Data)   Quality of Pain Comment Numbness and tingling   Duration of Pain A few minutes   Frequency of Pain Intermittent   Aggravating Factors (No Data)   Aggravating Factors Comment Sleeping   Relieving Factors (No Data) Relieving Factors Comment brace           Constitutional:  Well developed, well nourished, in no acute distress. Psychiatric: Affect and mood are appropriate. Integumentary: No rashes or abrasions noted on exposed areas. SPINE/MUSCULOSKELETAL EXAM    On brief examination: Per Dr. Farhana Vaughn, ?  Carpal compress, carpal tinel, and Phalen's bilaterally    MOTOR:      Biceps  Triceps Deltoids Wrist Ext Wrist Flex Hand Intrin   Right 5/5 5/5 5/5 5/5 5/5 5/5   Left 5/5 5/5 5/5 5/5 5/5 5/5             Hip Flex  Quads Hamstrings Ankle DF EHL Ankle PF   Right 5/5 5/5 5/5 5/5 5/5 5/5   Left 5/5 5/5 5/5 5/5 5/5 5/5     NCV & EMG Findings:  Nerve Conduction Studies  Anti Sensory Summary Table     Stim Site NR Peak (ms) Norm Peak (ms) O-P Amp (µV) Norm O-P Amp Site1 Site2 Delta-P (ms) Dist (cm) Elijah (m/s) Norm Elijah (m/s)   Left Median Anti Sensory (2nd Digit)   Wrist    3.1 <3.6 52.0 >10 Wrist 2nd Digit 3.1 14.0 45 >39   Right Median Anti Sensory (2nd Digit)   Wrist    3.1 <3.6 39.1 >10 Wrist 2nd Digit 3.1 14.0 45 >39   Left Radial Anti Sensory (Base 1st Digit)   Wrist    2.0 <3.1 37.6  Wrist Base 1st Digit 2.0 0.0     Right Radial Anti Sensory (Base 1st Digit)   Wrist    2.1 <3.1 41.0  Wrist Base 1st Digit 2.1 0.0     Left Ulnar Anti Sensory (5th Digit)   Wrist    3.3 <3.7 60.9 >15.0 Wrist 5th Digit 3.3 14.0 42 >38   Right Ulnar Anti Sensory (5th Digit)   Wrist    3.0 <3.7 51.0 >15.0 Wrist 5th Digit 3.0 14.0 47 >38     Motor Summary Table     Stim Site NR Onset (ms) Norm Onset (ms) O-P Amp (mV) Norm O-P Amp Site1 Site2 Delta-0 (ms) Dist (cm) Elijah (m/s) Norm Elijah (m/s)   Left Median Motor (Abd Poll Brev)   Wrist    3.0 <4.2 9.1 >5 Elbow Wrist 3.6 19.0 53 >50   Elbow    6.6  8.6          Right Median Motor (Abd Poll Brev)   Wrist    3.0 <4.2 10.5 >5 Elbow Wrist 3.7 19.5 53 >50   Elbow    6.7  9.8          Left Ulnar Motor (Abd Dig Min)   Wrist    2.7 <4.2 8.2 >3 B Elbow Wrist 2.8 15.5 55 >53   B Elbow    5.5  7.1  A Elbow B Elbow 1.6 10.0 63 >53   A Elbow    7.1  7.0          Right Ulnar Motor (Abd Dig Min)   Wrist    2.7 <4.2 8.8 >3 B Elbow Wrist 2.7 15.5 57 >53   B Elbow    5.4  7.4  A Elbow B Elbow 1.6 10.0 63 >53   A Elbow    7.0  7.2            EMG     Side Muscle Nerve Root Ins Act Fibs Psw Amp Dur Poly Recrt Int Erling Staff Comment   Right Biceps Musculocut C5-6 Nml Nml Nml Nml Nml 0 Nml Nml    Right Triceps Radial C6-7-8 Nml Nml Nml Nml Nml 0 Nml Nml    Right PronatorTeres Median C6-7 Nml Nml Nml Nml Nml 0 Nml Nml    Right Abd Poll Brev Median C8-T1 Nml Nml Nml Nml Nml 0 Nml Nml    Right 1stDorInt Ulnar C8-T1 Nml Nml Nml Nml Nml 0 Nml Nml    Left Biceps Musculocut C5-6 Nml Nml Nml Nml Nml 0 Nml Nml    Left Triceps Radial C6-7-8 Nml Nml Nml Nml Nml 0 Nml Nml    Left PronatorTeres Median C6-7 Nml Nml Nml Nml Nml 0 Nml Nml    Left Abd Poll Brev Median C8-T1 Nml Nml Nml Nml Nml 0 Nml Nml    Left 1stDorInt Ulnar C8-T1 Nml Nml Nml Nml Nml 0 Nml Nml        Nerve Conduction Studies  Anti Sensory Left/Right Comparison     Stim Site L Lat (ms) R Lat (ms) L-R Lat (ms) L Amp (µV) R Amp (µV) L-R Amp (%) Site1 Site2 L Elijah (m/s) R Elijah (m/s) L-R Elijah (m/s)   Median Anti Sensory (2nd Digit)   Wrist 3.1 3.1 0.0 52.0 39.1 24.8 Wrist 2nd Digit 45 45 0   Radial Anti Sensory (Base 1st Digit)   Wrist 2.0 2.1 0.1 37.6 41.0 8.3 Wrist Base 1st Digit      Ulnar Anti Sensory (5th Digit)   Wrist 3.3 3.0 0.3 60.9 51.0 16.3 Wrist 5th Digit 42 47 5     Motor Left/Right Comparison     Stim Site L Lat (ms) R Lat (ms) L-R Lat (ms) L Amp (mV) R Amp (mV) L-R Amp (%) Site1 Site2 L Elijah (m/s) R Elijah (m/s) L-R Elijah (m/s)   Median Motor (Abd Poll Brev)   Wrist 3.0 3.0 0.0 9.1 10.5 13.3 Elbow Wrist 53 53 0   Elbow 6.6 6.7 0.1 8.6 9.8 12.2        Ulnar Motor (Abd Dig Min)   Wrist 2.7 2.7 0.0 8.2 8.8 6.8 B Elbow Wrist 55 57 2   B Elbow 5.5 5.4 0.1 7.1 7.4 4.1 A Elbow B Elbow 63 63 0   A Elbow 7.1 7.0 0.1 7.0 7.2 2.8              Waveforms:                                  [facility]  OFFICE PROCEDURE PROGRESS NOTE        Chart reviewed for the following:   ILolly, have reviewed the History, Physical and updated the Allergic reactions for [employer]     TIME OUT performed immediately prior to start of procedure:   Jennifer Kerr, have performed the following reviews on [employer] prior to the start of the procedure:            * Patient was identified by name and date of birth   * Agreement on procedure being performed was verified  * Risks and Benefits explained to the patient  * Procedure site verified and marked as necessary  * Patient was positioned for comfort  * Consent was signed and verified     Time: 3:06 PM      Date of procedure: 12/4/2019    Procedure performed by:  Allan Grijalva MD    Provider accompanied by: Rafa. Patient accompanied by: None.     How tolerated by patient: tolerated the procedure well with no complications    Post Procedural Pain Scale: 0 - No Hurt    Comments: none    Written by Rohan Diaz, [address] as dictated by Lolly Greco MD

## 2019-12-13 ENCOUNTER — APPOINTMENT (OUTPATIENT)
Dept: INTERNAL MEDICINE CLINIC | Age: 59
End: 2019-12-13

## 2019-12-13 ENCOUNTER — HOSPITAL ENCOUNTER (OUTPATIENT)
Dept: LAB | Age: 59
Discharge: HOME | End: 2019-12-13
Payer: MEDICARE

## 2019-12-13 DIAGNOSIS — E78.5 HYPERLIPIDEMIA LDL GOAL <160: ICD-10-CM

## 2019-12-13 DIAGNOSIS — K21.9 GASTROESOPHAGEAL REFLUX DISEASE WITHOUT ESOPHAGITIS: ICD-10-CM

## 2019-12-13 LAB
ALBUMIN SERPL-MCNC: 4.2 G/DL (ref 3.4–5)
ALBUMIN/GLOB SERPL: 1.4 {RATIO} (ref 0.8–1.7)
ALP SERPL-CCNC: 30 U/L (ref 45–117)
ALT SERPL-CCNC: 27 U/L (ref 13–56)
ANION GAP SERPL CALC-SCNC: 5 MMOL/L (ref 3–18)
AST SERPL-CCNC: 12 U/L (ref 10–38)
BASOPHILS # BLD: 0 K/UL (ref 0–0.1)
BASOPHILS NFR BLD: 0 % (ref 0–2)
BILIRUB SERPL-MCNC: 0.5 MG/DL (ref 0.2–1)
BUN SERPL-MCNC: 17 MG/DL (ref 7–18)
BUN/CREAT SERPL: 25 (ref 12–20)
CALCIUM SERPL-MCNC: 9.4 MG/DL (ref 8.5–10.1)
CHLORIDE SERPL-SCNC: 100 MMOL/L (ref 100–111)
CO2 SERPL-SCNC: 29 MMOL/L (ref 21–32)
CREAT SERPL-MCNC: 0.67 MG/DL (ref 0.6–1.3)
DIFFERENTIAL METHOD BLD: NORMAL
EOSINOPHIL # BLD: 0.1 K/UL (ref 0–0.4)
EOSINOPHIL NFR BLD: 2 % (ref 0–5)
ERYTHROCYTE [DISTWIDTH] IN BLOOD BY AUTOMATED COUNT: 12.9 % (ref 11.6–14.5)
GLOBULIN SER CALC-MCNC: 2.9 G/DL (ref 2–4)
GLUCOSE SERPL-MCNC: 89 MG/DL (ref 74–99)
HCT VFR BLD AUTO: 42.9 % (ref 35–45)
HGB BLD-MCNC: 13.7 G/DL (ref 12–16)
LYMPHOCYTES # BLD: 1.3 K/UL (ref 0.9–3.6)
LYMPHOCYTES NFR BLD: 24 % (ref 21–52)
MCH RBC QN AUTO: 27.8 PG (ref 24–34)
MCHC RBC AUTO-ENTMCNC: 31.9 G/DL (ref 31–37)
MCV RBC AUTO: 87 FL (ref 74–97)
MONOCYTES # BLD: 0.3 K/UL (ref 0.05–1.2)
MONOCYTES NFR BLD: 5 % (ref 3–10)
NEUTS SEG # BLD: 3.7 K/UL (ref 1.8–8)
NEUTS SEG NFR BLD: 69 % (ref 40–73)
PLATELET # BLD AUTO: 276 K/UL (ref 135–420)
PMV BLD AUTO: 9.9 FL (ref 9.2–11.8)
POTASSIUM SERPL-SCNC: 4 MMOL/L (ref 3.5–5.5)
PROT SERPL-MCNC: 7.1 G/DL (ref 6.4–8.2)
RBC # BLD AUTO: 4.93 M/UL (ref 4.2–5.3)
SODIUM SERPL-SCNC: 134 MMOL/L (ref 136–145)
WBC # BLD AUTO: 5.4 K/UL (ref 4.6–13.2)

## 2019-12-13 PROCEDURE — 85025 COMPLETE CBC W/AUTO DIFF WBC: CPT

## 2019-12-13 PROCEDURE — 36415 COLL VENOUS BLD VENIPUNCTURE: CPT

## 2019-12-13 PROCEDURE — 80061 LIPID PANEL: CPT

## 2019-12-13 PROCEDURE — 80053 COMPREHEN METABOLIC PANEL: CPT

## 2019-12-14 LAB
CHOLEST SERPL-MCNC: 320 MG/DL
HDLC SERPL-MCNC: 71 MG/DL (ref 40–60)
HDLC SERPL: 4.5 {RATIO} (ref 0–5)
LDLC SERPL CALC-MCNC: 227.4 MG/DL (ref 0–100)
LIPID PROFILE,FLP: ABNORMAL
TRIGL SERPL-MCNC: 108 MG/DL (ref ?–150)
VLDLC SERPL CALC-MCNC: 21.6 MG/DL

## 2019-12-20 ENCOUNTER — OFFICE VISIT (OUTPATIENT)
Dept: INTERNAL MEDICINE CLINIC | Age: 59
End: 2019-12-20

## 2019-12-20 VITALS
BODY MASS INDEX: 25.34 KG/M2 | HEIGHT: 63 IN | WEIGHT: 143 LBS | TEMPERATURE: 98 F | DIASTOLIC BLOOD PRESSURE: 74 MMHG | SYSTOLIC BLOOD PRESSURE: 114 MMHG | HEART RATE: 80 BPM | RESPIRATION RATE: 14 BRPM | OXYGEN SATURATION: 99 %

## 2019-12-20 DIAGNOSIS — J45.20 MILD INTERMITTENT ASTHMA WITHOUT COMPLICATION: ICD-10-CM

## 2019-12-20 DIAGNOSIS — E78.5 HYPERLIPIDEMIA LDL GOAL <160: Primary | ICD-10-CM

## 2019-12-20 DIAGNOSIS — F32.A CHRONIC DEPRESSION: ICD-10-CM

## 2019-12-20 DIAGNOSIS — M81.0 AGE-RELATED OSTEOPOROSIS WITHOUT CURRENT PATHOLOGICAL FRACTURE: ICD-10-CM

## 2019-12-20 DIAGNOSIS — M79.7 FIBROMYALGIA: ICD-10-CM

## 2019-12-20 DIAGNOSIS — G89.4 CHRONIC PAIN SYNDROME: ICD-10-CM

## 2019-12-20 RX ORDER — FOLIC ACID 1 MG/1
1 TABLET ORAL 2 TIMES DAILY
COMMUNITY
End: 2020-12-14

## 2019-12-20 NOTE — PROGRESS NOTES
Identified pt with two pt identifiers(name and ). Reviewed record in preparation for visit and have obtained necessary documentation. Chief Complaint   Patient presents with    Complete Physical     yearly physical with lab review (ROOM 10)        Health Maintenance Due   Topic    Shingrix Vaccine Age 50> (1 of 2)    Pneumococcal 0-64 years (1 of 1 - PPSV23)    MEDICARE YEARLY EXAM     PAP AKA CERVICAL CYTOLOGY        Coordination of Care Questionnaire:  :   1) Have you been to an emergency room, urgent care, or hospitalized since your last visit? If yes, where when, and reason for visit? no       2. Have seen or consulted any other health care provider since your last visit? If yes, where when, and reason for visit? NO      3) Do you have an Advanced Directive/ Living Will in place? YES  If yes, do we have a copy on file NO, Patient will bring in copy   If no, would you like information NO      Learning Assessment 2019   PRIMARY LEARNER Patient   BARRIERS PRIMARY LEARNER NONE   CO-LEARNER CAREGIVER No   PRIMARY LANGUAGE ENGLISH   LEARNER PREFERENCE PRIMARY READING     DEMONSTRATION   ANSWERED BY patient   RELATIONSHIP SELF        3 most recent PHQ Screens 2019   PHQ Not Done -   Little interest or pleasure in doing things Not at all   Feeling down, depressed, irritable, or hopeless Not at all   Total Score PHQ 2 0        Abuse Screening Questionnaire 2019   Do you ever feel afraid of your partner? N   Are you in a relationship with someone who physically or mentally threatens you? N   Is it safe for you to go home? Y        Fall Risk Assessment, last 12 mths 2019   Able to walk? Yes   Fall in past 12 months? Yes   Fall with injury?  Yes   Number of falls in past 12 months 8 or more   Fall Risk Score 9

## 2019-12-20 NOTE — PROGRESS NOTES
Cherie Ashton 1960, is a 63 y.o. female, who is seen today for reevaluation of fibromyalgia hyperlipidemia overweight asthma insomnia GERD and other issues returns for evaluation. She is a little bit fatigued and her pain is certainly no better but she has been on narcotics for many years and she does see a specialist.  She is breathing pretty well with Asmanex and Spiriva and has not required albuterol lately. Depression seems to be fairly well controlled with high-dose Cymbalta. For fibromyalgia she is also using gabapentin 800 mg 3 times a day and meloxicam.  She sleeps reasonably well with the help of trazodone. She uses lorazepam up to 3 times a day as needed. She is having no reflux as she continues omeprazole.     Past Medical History:   Diagnosis Date    Allergy     Anxiety     Arthritis     Asthma     mild    Atypical ductal hyperplasia of breast     Atypical hyperplasia of breast 2003    right    Chronic depression     Chronic fatigue syndrome     Chronic interstitial cystitis     Chronic interstitial cystitis     Chronic pain     fibromyalgia    Constipation     Depression     Diarrhea     Difficulty walking     Dizziness     Dry eyes     Dry mouth     Dyspnea     Fibromyalgia     Fibromyalgia     Generalized stiffness     GERD (gastroesophageal reflux disease)     Headache(784.0)     Heartburn     Hyperlipidemia     IBS (irritable bowel syndrome)     IC (interstitial cystitis)     Imbalance     Incoordination     Insomnia     Irregular periods/menstrual cycles     Joint pain     Microscopic hematuria     Muscle pain     Myofascial pain syndrome     Numbness and tingling in hands     Ovarian cyst, left     Painful sex     Pneumonia     Psychiatric disorder     anxiety - depression    RLS (restless legs syndrome)     Stress     TMJ (temporomandibular joint syndrome)     Transient total loss of muscle tone     Uterine fibroid     Weakness generalized      Past Surgical History:   Procedure Laterality Date    ENDOSCOPY, COLON, DIAGNOSTIC  2013    10 years    HX BREAST BIOPSY  2003    right    HX COLONOSCOPY  2012    donohue    HX GYN      left ovarian cystectomy    HX GYN      laparoscopy, laparotomy, lysis of adhesions.  HX MYOMECTOMY      HX OVARIAN CYST REMOVAL  2003    HX UROLOGICAL  05/24/10    cystoscopy and hydrodilation     Current Outpatient Medications   Medication Sig Dispense Refill    folic acid (FOLVITE) 1 mg tablet Take 1 mg by mouth two (2) times a day.  [facility] blue-sod phos-phsal-hyo (VILAMIT MB) 118-10-40.8-36 mg cap capsule Take 1 Cap by mouth four (4) times daily. 120 Cap 11    tiotropium bromide (SPIRIVA RESPIMAT) 2.5 mcg/actuation inhaler Take 2 Puffs by inhalation daily. Indications: Prevention of Bronchospasm with Chronic Bronchitis 3 Inhaler 3    omeprazole (PRILOSEC) 40 mg capsule Take 1 Cap by mouth daily. 90 Cap 3    pentosan polysulfate sodium (ELMIRON) 100 mg capsule Take 1 Cap by mouth two (2) times a day. Indications: Bladder Pain, bladder wall inflammation 180 Cap 3    mometasone (ASMANEX TWISTHALER) 220 mcg (30 doses) inhaler Take 1 Puff by inhalation daily. (Patient taking differently: Take 1 Puff by inhalation two (2) times a day.) 90 Cap 3    montelukast (SINGULAIR) 10 mg tablet Take 1 Tab by mouth daily. 90 Tab 3    albuterol (PROVENTIL HFA, VENTOLIN HFA, PROAIR HFA) 90 mcg/actuation inhaler Take 2 Puffs by inhalation every six (6) hours as needed for Wheezing. 3 Inhaler 6    gabapentin (NEURONTIN) 800 mg tablet take 1 tablet by mouth three times a day  0    denosumab (PROLIA) 60 mg/mL injection 60 mg by SubCUTAneous route. 2 times yearly      OXcarbazepine (TRILEPTAL) 150 mg tablet Take 1 Tab by mouth three (3) times daily. 90 Tab 2    calcium glycerophosphate (PRELIEF) 65 mg tab Take 1 Tab by mouth daily.       meloxicam (MOBIC) 15 mg tablet take 1 tablet by mouth once daily 30 Tab 11  modafinil (PROVIGIL) 200 mg tablet take 1 tablet by mouth twice a day for 30 DAYS maximum daily dose of 2 tablets (Patient taking differently: Take 200 mg by mouth daily.) 60 Tab 5    OTHER Indications: Compound cream for inflammation      B.infantis-B.ani-B.long-B.bifi (PROBIOTIC 4X) 10-15 mg TbEC Take 1 Tab by mouth daily.  ENZYMES,DIGESTIVE, PLANT, (DIGESTIVE ENZYMES, PLANT, PO) Take 1 Tab by mouth daily.  calcium citrate-vitamin D3 (CITRACAL + D) tablet Take 1 Tab by mouth daily.  LORazepam (ATIVAN) 2 mg tablet Take 2 mg by mouth every eight (8) hours as needed for Anxiety.  DULoxetine (CYMBALTA) 60 mg capsule Take 120 mg by mouth daily.  simethicone (PHAZYME) 180 mg cap Take 225 mg by mouth as needed.  trazodone (DESYREL) 100 mg tablet Take 100 mg by mouth nightly.  digestive 8-L.acidoph-pectin (DIGESTIVE ENZYME, ACIDOPH,PEC,) 50 million cell-100 mg tab Take 1 Tab by mouth daily.  ergocalciferol (ERGOCALCIFEROL) 50,000 unit capsule take 1 capsule by mouth TWO (2) TIMES A WEEK  Indications: PREVENTION OF VITAMIN D DEFICIENCY (Patient taking differently: Take 50,000 Units by mouth every seven (7) days. take 1 capsule by mouth once a week  Indications: Prevention of Vitamin D Deficiency) 8 Cap 11     Allergies   Allergen Reactions    Codeine Rash and Itching     Other reaction(s): other/intolerance    Tussionex Itching     Social History     Socioeconomic History    Marital status:      Spouse name: Not on file    Number of children: Not on file    Years of education: Not on file    Highest education level: Not on file   Tobacco Use    Smoking status: Never Smoker    Smokeless tobacco: Never Used   Substance and Sexual Activity    Alcohol use:  Yes     Alcohol/week: 0.8 standard drinks     Types: 1 Cans of beer per week     Comment: socially    Drug use: No    Sexual activity: Yes     Partners: Male     Visit Vitals  /74 (BP 1 Location: Left arm, BP Patient Position: Sitting)   Pulse 80   Temp 98 °F (36.7 °C) (Oral)   Resp 14   Ht 5' 2.6\" (1.59 m)   Wt 143 lb (64.9 kg)   SpO2 99%   BMI 25.66 kg/m²     The patient is a well-developed well-nourished female in no apparent distress. HEENT: Pupils are equal and react to light and extraocular movements are full. Ear canals and tympanic membranes appear normal. Oral cavity appears normal with no oral lesions. Neck: Carotids are 2+ without bruits. No adenopathy or thyromegaly. Lungs are clear to percussion. I hear no wheezing, rales or rhonchi. Heart reveals a regular rhythm with no murmur, gallop, click or rub. The apical impulse is in the fifth interspace at the midclavicular line. Abdomen is soft and nontender with no hepatosplenomegaly or masses. Bowel sounds are normoactive and there is no distention or tympany. Extremities reveal no clubbing cyanosis or edema. Pulses are 2+. Skin reveals no suspicious skin growths. Breasts reveal no masses, skin or nipple abnormalities. No axillary adenopathy. Results for orders placed or performed during the hospital encounter of 12/13/19   CBC WITH AUTOMATED DIFF   Result Value Ref Range    WBC 5.4 4.6 - 13.2 K/uL    RBC 4.93 4.20 - 5.30 M/uL    HGB 13.7 12.0 - 16.0 g/dL    HCT 42.9 35.0 - 45.0 %    MCV 87.0 74.0 - 97.0 FL    MCH 27.8 24.0 - 34.0 PG    MCHC 31.9 31.0 - 37.0 g/dL    RDW 12.9 11.6 - 14.5 %    PLATELET 282 553 - 392 K/uL    MPV 9.9 9.2 - 11.8 FL    NEUTROPHILS 69 40 - 73 %    LYMPHOCYTES 24 21 - 52 %    MONOCYTES 5 3 - 10 %    EOSINOPHILS 2 0 - 5 %    BASOPHILS 0 0 - 2 %    ABS. NEUTROPHILS 3.7 1.8 - 8.0 K/UL    ABS. LYMPHOCYTES 1.3 0.9 - 3.6 K/UL    ABS. MONOCYTES 0.3 0.05 - 1.2 K/UL    ABS. EOSINOPHILS 0.1 0.0 - 0.4 K/UL    ABS.  BASOPHILS 0.0 0.0 - 0.1 K/UL    DF AUTOMATED     LIPID PANEL   Result Value Ref Range    LIPID PROFILE          Cholesterol, total 320 (H) <200 MG/DL    Triglyceride 108 <150 MG/DL    HDL Cholesterol 71 (H) 40 - 60 MG/DL    LDL, calculated 227.4 (H) 0 - 100 MG/DL    VLDL, calculated 21.6 MG/DL    CHOL/HDL Ratio 4.5 0 - 5.0     METABOLIC PANEL, COMPREHENSIVE   Result Value Ref Range    Sodium 134 (L) 136 - 145 mmol/L    Potassium 4.0 3.5 - 5.5 mmol/L    Chloride 100 100 - 111 mmol/L    CO2 29 21 - 32 mmol/L    Anion gap 5 3.0 - 18 mmol/L    Glucose 89 74 - 99 mg/dL    BUN 17 7.0 - 18 MG/DL    Creatinine 0.67 0.6 - 1.3 MG/DL    BUN/Creatinine ratio 25 (H) 12 - 20      GFR est AA >60 >60 ml/min/1.73m2    GFR est non-AA >60 >60 ml/min/1.73m2    Calcium 9.4 8.5 - 10.1 MG/DL    Bilirubin, total 0.5 0.2 - 1.0 MG/DL    ALT (SGPT) 27 13 - 56 U/L    AST (SGOT) 12 10 - 38 U/L    Alk. phosphatase 30 (L) 45 - 117 U/L    Protein, total 7.1 6.4 - 8.2 g/dL    Albumin 4.2 3.4 - 5.0 g/dL    Globulin 2.9 2.0 - 4.0 g/dL    A-G Ratio 1.4 0.8 - 1.7       Assessment: #1. Fibromyalgia seems to be doing fairly well, she will continue gabapentin meloxicam and Cymbalta. She will follow-up with her pain management specialist.  #2.  Minimally overweight with body mass index of 25.6, I told her I thought she is doing quite well with that and recommend she not gaining a lot of weight but her current weight is fine. #3. Hyperlipidemia but with other risk factors low and HDL high at 71 her ten-year risk is only about 8.7% and certainly statin therapy is not needed as we discussed. #4.  Mild intermittent asthma doing well, she will continue Asmanex Spiriva and as needed albuterol. #5. Insomnia doing well, she will continue trazodone 1 #6. Anxiety doing well, she will continue lorazepam as needed. Her last Pap smear was about 2 months ago and her last mammogram was within the last year, I would over current guidelines for Pap smears and mammograms with her and she will follow-up with her gynecologist.    Follow-up 6 months or sooner if needed    Gabriel Gan MD FACP    Please note: This document has been produced using voice recognition software. Unrecognized errors in transcription may be present.

## 2019-12-26 ENCOUNTER — OFFICE VISIT (OUTPATIENT)
Dept: ORTHOPEDIC SURGERY | Age: 59
End: 2019-12-26

## 2019-12-26 VITALS
BODY MASS INDEX: 25.52 KG/M2 | WEIGHT: 144 LBS | HEIGHT: 63 IN | DIASTOLIC BLOOD PRESSURE: 62 MMHG | TEMPERATURE: 96.9 F | OXYGEN SATURATION: 96 % | RESPIRATION RATE: 16 BRPM | HEART RATE: 89 BPM | SYSTOLIC BLOOD PRESSURE: 109 MMHG

## 2019-12-26 DIAGNOSIS — R20.2 NUMBNESS AND TINGLING IN BOTH HANDS: Primary | ICD-10-CM

## 2019-12-26 DIAGNOSIS — R20.0 NUMBNESS AND TINGLING IN BOTH HANDS: Primary | ICD-10-CM

## 2019-12-26 DIAGNOSIS — M79.7 FIBROMYALGIA: ICD-10-CM

## 2019-12-26 DIAGNOSIS — M77.11 LATERAL EPICONDYLITIS OF RIGHT ELBOW: ICD-10-CM

## 2019-12-26 NOTE — PROGRESS NOTES
Stefan Casarez is a 63 y.o. female right handed unknown employment. Worker's Compensation and legal considerations: not known. Vitals:    12/26/19 1058   BP: 109/62   Pulse: 89   Resp: 16   Temp: 96.9 °F (36.1 °C)   TempSrc: Oral   SpO2: 96%   Weight: 144 lb (65.3 kg)   Height: 5' 2.6\" (1.59 m)   PainSc:   4   PainLoc: Hand           Chief Complaint   Patient presents with    Hand Pain     Beto hand pain    Wrist Pain     Beto wrist pain       HPI: Patient comes in today with complaints of continued bilateral hand numbness and tingling whenever she is not wearing her braces. She recently had an EMG that did not show any peripheral mononeuropathy. Of note she has a history of fibromyalgia. She also sees a pain management specialist.    Previous HPI: Patient comes in today to establish care with a hand physician regarding her history of bilateral hand numbness and tingling. She has worn carpal tunnel braces at night that has helped her in the past.  She was recently on a steroid Dosepak for bronchitis. She reports no pain in her hands today. She also reports a history of bilateral thumb joint arthritis as well as fibromyalgia.     Date of onset:  indeterminate    Injury: No    Prior Treatment:  Yes: Comment: braces    Numbness/ Tingling: Yes: Comment: intermittent at night    ROS: Review of Systems - General ROS: negative  Respiratory ROS: no cough, shortness of breath, or wheezing  Cardiovascular ROS: no chest pain or dyspnea on exertion  Musculoskeletal ROS: negative  Neurological ROS: positive for - numbness/tingling and weakness  Dermatological ROS: negative    Past Medical History:   Diagnosis Date    Allergy     Anxiety     Arthritis     Asthma     mild    Atypical ductal hyperplasia of breast     Atypical hyperplasia of breast 2003    right    Chronic depression     Chronic fatigue syndrome     Chronic interstitial cystitis     Chronic interstitial cystitis     Chronic pain fibromyalgia    Constipation     Depression     Diarrhea     Difficulty walking     Dizziness     Dry eyes     Dry mouth     Dyspnea     Fibromyalgia     Fibromyalgia     Generalized stiffness     GERD (gastroesophageal reflux disease)     Headache(784.0)     Heartburn     Hyperlipidemia     IBS (irritable bowel syndrome)     IC (interstitial cystitis)     Imbalance     Incoordination     Insomnia     Irregular periods/menstrual cycles     Joint pain     Microscopic hematuria     Muscle pain     Myofascial pain syndrome     Numbness and tingling in hands     Ovarian cyst, left     Painful sex     Pneumonia     Psychiatric disorder     anxiety - depression    RLS (restless legs syndrome)     Stress     TMJ (temporomandibular joint syndrome)     Transient total loss of muscle tone     Uterine fibroid     Weakness generalized        Past Surgical History:   Procedure Laterality Date    ENDOSCOPY, COLON, DIAGNOSTIC  2013    10 years    HX BREAST BIOPSY  2003    right    HX COLONOSCOPY  2012    donohue    HX GYN      left ovarian cystectomy    HX GYN      laparoscopy, laparotomy, lysis of adhesions.  HX MYOMECTOMY      HX OVARIAN CYST REMOVAL  2003    HX UROLOGICAL  05/24/10    cystoscopy and hydrodilation       Current Outpatient Medications   Medication Sig Dispense Refill    folic acid (FOLVITE) 1 mg tablet Take 1 mg by mouth two (2) times a day.  [facility]-me blue-sod phos-phsal-hyo (THIAGO STONE) 118-10-40.8-36 mg cap capsule Take 1 Cap by mouth four (4) times daily. 120 Cap 11    digestive 8-L.acidoph-pectin (DIGESTIVE ENZYME, ACIDOPH,PEC,) 50 million cell-100 mg tab Take 1 Tab by mouth daily.  tiotropium bromide (SPIRIVA RESPIMAT) 2.5 mcg/actuation inhaler Take 2 Puffs by inhalation daily. Indications: Prevention of Bronchospasm with Chronic Bronchitis 3 Inhaler 3    omeprazole (PRILOSEC) 40 mg capsule Take 1 Cap by mouth daily.  90 Cap 3    pentosan polysulfate sodium (ELMIRON) 100 mg capsule Take 1 Cap by mouth two (2) times a day. Indications: Bladder Pain, bladder wall inflammation 180 Cap 3    mometasone (ASMANEX TWISTHALER) 220 mcg (30 doses) inhaler Take 1 Puff by inhalation daily. (Patient taking differently: Take 1 Puff by inhalation two (2) times a day.) 90 Cap 3    montelukast (SINGULAIR) 10 mg tablet Take 1 Tab by mouth daily. 90 Tab 3    albuterol (PROVENTIL HFA, VENTOLIN HFA, PROAIR HFA) 90 mcg/actuation inhaler Take 2 Puffs by inhalation every six (6) hours as needed for Wheezing. 3 Inhaler 6    gabapentin (NEURONTIN) 800 mg tablet take 1 tablet by mouth three times a day  0    denosumab (PROLIA) 60 mg/mL injection 60 mg by SubCUTAneous route. 2 times yearly      OXcarbazepine (TRILEPTAL) 150 mg tablet Take 1 Tab by mouth three (3) times daily. 90 Tab 2    calcium glycerophosphate (PRELIEF) 65 mg tab Take 1 Tab by mouth daily.  meloxicam (MOBIC) 15 mg tablet take 1 tablet by mouth once daily 30 Tab 11    modafinil (PROVIGIL) 200 mg tablet take 1 tablet by mouth twice a day for 30 DAYS maximum daily dose of 2 tablets (Patient taking differently: Take 200 mg by mouth daily.) 60 Tab 5    ergocalciferol (ERGOCALCIFEROL) 50,000 unit capsule take 1 capsule by mouth TWO (2) TIMES A WEEK  Indications: PREVENTION OF VITAMIN D DEFICIENCY (Patient taking differently: Take 50,000 Units by mouth every seven (7) days. take 1 capsule by mouth once a week  Indications: Prevention of Vitamin D Deficiency) 8 Cap 11    OTHER Indications: Compound cream for inflammation      B.infantis-B.ani-B.long-B.bifi (PROBIOTIC 4X) 10-15 mg TbEC Take 1 Tab by mouth daily.  ENZYMES,DIGESTIVE, PLANT, (DIGESTIVE ENZYMES, PLANT, PO) Take 1 Tab by mouth daily.  calcium citrate-vitamin D3 (CITRACAL + D) tablet Take 1 Tab by mouth daily.  LORazepam (ATIVAN) 2 mg tablet Take 2 mg by mouth every eight (8) hours as needed for Anxiety.       DULoxetine (CYMBALTA) 60 mg capsule Take 120 mg by mouth daily.  simethicone (PHAZYME) 180 mg cap Take 225 mg by mouth as needed.  trazodone (DESYREL) 100 mg tablet Take 100 mg by mouth nightly. Allergies   Allergen Reactions    Codeine Rash and Itching     Other reaction(s): other/intolerance    Tussionex Itching         PE:     ELBOW:    Examination L R   Tender Lat. Epi. - +   Resisted Wrist Ext. - +   Resisted Finger Ext. - +   Resisted Supination - -   Tender Med Epi. - -   Resisted Wrist Flex. - -   Resisted Finger Ext. - -   Resisted Pronation - -   PIN Compression - -     ROM: Full          NEUROVASCULAR    Examination L R Examination L R   Carpal Comp. ? ? Pronator Comp. - -   Carpal Tinel ? ? Pronator Tinel - -   Phalen's ? ? Pronator Stress - -   Cubital Comp. - - Guyon Comp. - -   Cubital Tinel - - Guyon Tinel - -   Elbow Hyperflexion - - Adson's - -   Spurling's - - SC Comp. ? ? PCB Median abn - - SC Tinel ? ? Radial Tinel - - IC Comp. - -   Digital Tinel - - IC Tinel - -   Radial 2-Pt WNL WNL Ulnar 2-Pt WNL WNL     Radial Pulse: 2+  Capillary Refill: < 2 sec  Tommy: Not Performed  [employer]: Not Performed      Imaging: none indicated    NCV & EMG Findings:  All nerve conduction studies (as indicated in the following tables) were within normal limits. All left vs. right side differences were within normal limits.       All examined muscles (as indicated in the following table) showed no evidence of electrical instability.       INTERPRETATION  This was a normal nerve conduction and EMG study showing there to be no signs of neuropathy, myopathy, or radiculopathy in the nerves and muscles tested.      CLINICAL INTERPRETATION  There are no electrodiagnostic findings correlating with her symptoms. EMG from 2016 does not show any evidence of carpal tunnel syndrome or other peripheral neuropathy. ICD-10-CM ICD-9-CM    1. Numbness and tingling in both hands R20.0 782.0     R20.2     2.  Fibromyalgia M79.7 729.1    3. Lateral epicondylitis of right elbow M77.11 726.32        Plan:     Though there is a slim chance she may have an EMG negative and physical exam negative mild carpal tunnel on bilateral hands, since she is having improvement with wearing braces at night we will continue this.     Right Tennis Elbow Brace    Plan was reviewed with patient, who verbalized agreement and understanding of the plan

## 2019-12-26 NOTE — PROGRESS NOTES
1. Have you been to the ER, urgent care clinic since your last visit? Hospitalized since your last visit? No    2. Have you seen or consulted any other health care providers outside of the [address] [place] [address] since your last visit? Include any pap smears or colon screening.  No

## 2020-02-20 RX ORDER — DIPHENHYDRAMINE HYDROCHLORIDE 50 MG/ML
25 INJECTION, SOLUTION INTRAMUSCULAR; INTRAVENOUS AS NEEDED
Status: CANCELLED
Start: 2020-02-26

## 2020-02-20 RX ORDER — EPINEPHRINE 1 MG/ML
0.3 INJECTION, SOLUTION, CONCENTRATE INTRAVENOUS AS NEEDED
Status: CANCELLED | OUTPATIENT
Start: 2020-02-26

## 2020-02-20 RX ORDER — ACETAMINOPHEN 325 MG/1
650 TABLET ORAL AS NEEDED
Status: CANCELLED
Start: 2020-02-26

## 2020-02-20 RX ORDER — ONDANSETRON 2 MG/ML
8 INJECTION INTRAMUSCULAR; INTRAVENOUS AS NEEDED
Status: CANCELLED | OUTPATIENT
Start: 2020-02-26

## 2020-02-20 RX ORDER — HYDROCORTISONE SODIUM SUCCINATE 100 MG/2ML
100 INJECTION, POWDER, FOR SOLUTION INTRAMUSCULAR; INTRAVENOUS AS NEEDED
Status: CANCELLED | OUTPATIENT
Start: 2020-02-26

## 2020-02-20 RX ORDER — DIPHENHYDRAMINE HYDROCHLORIDE 50 MG/ML
50 INJECTION, SOLUTION INTRAMUSCULAR; INTRAVENOUS AS NEEDED
Status: CANCELLED
Start: 2020-02-26

## 2020-02-20 RX ORDER — ALBUTEROL SULFATE 0.83 MG/ML
2.5 SOLUTION RESPIRATORY (INHALATION) AS NEEDED
Status: CANCELLED
Start: 2020-02-26

## 2020-02-24 DIAGNOSIS — J45.40 MODERATE PERSISTENT ASTHMA WITHOUT COMPLICATION: ICD-10-CM

## 2020-02-24 RX ORDER — MONTELUKAST SODIUM 10 MG/1
10 TABLET ORAL DAILY
Qty: 90 TAB | Refills: 3 | Status: SHIPPED | OUTPATIENT
Start: 2020-02-24 | End: 2021-02-22 | Stop reason: SDUPTHER

## 2020-02-26 ENCOUNTER — HOSPITAL ENCOUNTER (OUTPATIENT)
Dept: INFUSION THERAPY | Age: 60
Discharge: HOME | End: 2020-02-26
Payer: MEDICARE

## 2020-02-26 VITALS
HEART RATE: 91 BPM | RESPIRATION RATE: 18 BRPM | SYSTOLIC BLOOD PRESSURE: 119 MMHG | OXYGEN SATURATION: 98 % | TEMPERATURE: 97.9 F | DIASTOLIC BLOOD PRESSURE: 67 MMHG

## 2020-02-26 DIAGNOSIS — M81.0 AGE-RELATED OSTEOPOROSIS WITHOUT CURRENT PATHOLOGICAL FRACTURE: ICD-10-CM

## 2020-02-26 DIAGNOSIS — M85.80 OSTEOPENIA, UNSPECIFIED LOCATION: Primary | ICD-10-CM

## 2020-02-26 LAB
ALBUMIN SERPL-MCNC: 3.9 G/DL (ref 3.4–5)
ALBUMIN/GLOB SERPL: 1.1 {RATIO} (ref 0.8–1.7)
ALP SERPL-CCNC: 31 U/L (ref 45–117)
ALT SERPL-CCNC: 45 U/L (ref 13–56)
ANION GAP SERPL CALC-SCNC: 6 MMOL/L (ref 3–18)
AST SERPL-CCNC: 34 U/L (ref 10–38)
BILIRUB SERPL-MCNC: 0.3 MG/DL (ref 0.2–1)
BUN SERPL-MCNC: 16 MG/DL (ref 7–18)
BUN/CREAT SERPL: 24 (ref 12–20)
CALCIUM SERPL-MCNC: 9 MG/DL (ref 8.5–10.1)
CHLORIDE SERPL-SCNC: 99 MMOL/L (ref 100–111)
CO2 SERPL-SCNC: 29 MMOL/L (ref 21–32)
CREAT SERPL-MCNC: 0.67 MG/DL (ref 0.6–1.3)
GLOBULIN SER CALC-MCNC: 3.4 G/DL (ref 2–4)
GLUCOSE SERPL-MCNC: 101 MG/DL (ref 74–99)
MAGNESIUM SERPL-MCNC: 2.4 MG/DL (ref 1.6–2.6)
PHOSPHATE SERPL-MCNC: 4.4 MG/DL (ref 2.5–4.9)
POTASSIUM SERPL-SCNC: 4.3 MMOL/L (ref 3.5–5.5)
PROT SERPL-MCNC: 7.3 G/DL (ref 6.4–8.2)
SODIUM SERPL-SCNC: 134 MMOL/L (ref 136–145)

## 2020-02-26 PROCEDURE — 96372 THER/PROPH/DIAG INJ SC/IM: CPT

## 2020-02-26 PROCEDURE — 84100 ASSAY OF PHOSPHORUS: CPT

## 2020-02-26 PROCEDURE — 74011250636 HC RX REV CODE- 250/636: Performed by: PSYCHIATRY & NEUROLOGY

## 2020-02-26 PROCEDURE — 83735 ASSAY OF MAGNESIUM: CPT

## 2020-02-26 PROCEDURE — 80053 COMPREHEN METABOLIC PANEL: CPT

## 2020-02-26 PROCEDURE — 36415 COLL VENOUS BLD VENIPUNCTURE: CPT

## 2020-02-26 RX ORDER — ONDANSETRON 2 MG/ML
8 INJECTION INTRAMUSCULAR; INTRAVENOUS AS NEEDED
Status: CANCELLED | OUTPATIENT
Start: 2020-02-26

## 2020-02-26 RX ORDER — ACETAMINOPHEN 325 MG/1
650 TABLET ORAL AS NEEDED
Status: CANCELLED
Start: 2020-02-26

## 2020-02-26 RX ORDER — ALBUTEROL SULFATE 0.83 MG/ML
2.5 SOLUTION RESPIRATORY (INHALATION) AS NEEDED
Status: CANCELLED
Start: 2020-02-26

## 2020-02-26 RX ORDER — DIPHENHYDRAMINE HYDROCHLORIDE 50 MG/ML
50 INJECTION, SOLUTION INTRAMUSCULAR; INTRAVENOUS AS NEEDED
Status: CANCELLED
Start: 2020-02-26

## 2020-02-26 RX ORDER — HYDROCORTISONE SODIUM SUCCINATE 100 MG/2ML
100 INJECTION, POWDER, FOR SOLUTION INTRAMUSCULAR; INTRAVENOUS AS NEEDED
Status: CANCELLED | OUTPATIENT
Start: 2020-02-26

## 2020-02-26 RX ORDER — DIPHENHYDRAMINE HYDROCHLORIDE 50 MG/ML
25 INJECTION, SOLUTION INTRAMUSCULAR; INTRAVENOUS AS NEEDED
Status: CANCELLED
Start: 2020-02-26

## 2020-02-26 RX ORDER — EPINEPHRINE 1 MG/ML
0.3 INJECTION, SOLUTION, CONCENTRATE INTRAVENOUS AS NEEDED
Status: CANCELLED | OUTPATIENT
Start: 2020-02-26

## 2020-02-26 RX ADMIN — DENOSUMAB 60 MG: 60 INJECTION SUBCUTANEOUS at 14:30

## 2020-02-26 NOTE — PROGRESS NOTES
[facility] Progress Note    Date: 2020    Name: Qamar Michele    MRN: [id number]         : 1960    Prolia      Ms. Norman to [facility], ambulatory at 1100 Pt was assessed and education was provided. Patient states she has been receiving Prolia for over 2 years with her last injection 6 months ago. Right hip pain 5/10. Took pain medication today. Ms. Emerald Syk vitals were reviewed and patient was observed for 5 minutes prior to treatment. Visit Vitals  /81 (BP 1 Location: Left arm, BP Patient Position: Sitting)   Pulse 100   Temp 98.1 °F (36.7 °C)   Resp 18   SpO2 96%       Blood obtained peripherally from left AC x 1 attempt with butterfly needle for calcium, magnesium and phosphorus No bleeding or hematoma noted at site. Guaze and coban applied. Patient states she will return at 1430 for her prolia if calcium meets criteria. Ms. Gladis Jordan was discharged from [place] [address] in stable condition at [phone]. She is to return on  2020 at [phone] for  Prolia appointment. Matt Chacon RN  2020  1120    1425 pm-- Patient returned for her prolia injection. Calcium 9.0. No complaints voiced. Prolia 60 mg given to right upper back of arm SQ. Tolerated well. No irritation or bleeding at site. . Bandaid to site      Recent Results (from the past 12 hour(s))   MAGNESIUM    Collection Time: 20 11:00 AM   Result Value Ref Range    Magnesium 2.4 1.6 - 2.6 mg/dL   PHOSPHORUS    Collection Time: 20 11:00 AM   Result Value Ref Range    Phosphorus 4.4 2.5 - 4.9 MG/DL   METABOLIC PANEL, COMPREHENSIVE    Collection Time: 20 11:00 AM   Result Value Ref Range    Sodium 134 (L) 136 - 145 mmol/L    Potassium 4.3 3.5 - 5.5 mmol/L    Chloride 99 (L) 100 - 111 mmol/L    CO2 29 21 - 32 mmol/L    Anion gap 6 3.0 - 18 mmol/L    Glucose 101 (H) 74 - 99 mg/dL    BUN 16 7.0 - 18 MG/DL    Creatinine 0.67 0.6 - 1.3 MG/DL    BUN/Creatinine ratio 24 (H) 12 - 20      GFR est AA >60 >60 ml/min/1.73m2    GFR est non-AA >60 >60 ml/min/1.73m2    Calcium 9.0 8.5 - 10.1 MG/DL    Bilirubin, total 0.3 0.2 - 1.0 MG/DL    ALT (SGPT) 45 13 - 56 U/L    AST (SGOT) 34 10 - 38 U/L    Alk. phosphatase 31 (L) 45 - 117 U/L    Protein, total 7.3 6.4 - 8.2 g/dL    Albumin 3.9 3.4 - 5.0 g/dL    Globulin 3.4 2.0 - 4.0 g/dL    A-G Ratio 1.1 0.8 - 1.7       Patient was discharged in stable condition at 1435. She is to return on 8/26/2020 at 1100 for her next appt.

## 2020-03-18 DIAGNOSIS — J45.40 MODERATE PERSISTENT ASTHMA WITHOUT COMPLICATION: ICD-10-CM

## 2020-03-18 RX ORDER — ALBUTEROL SULFATE 90 UG/1
AEROSOL, METERED RESPIRATORY (INHALATION)
Qty: 54 G | Refills: 3 | Status: SHIPPED | OUTPATIENT
Start: 2020-03-18 | End: 2021-05-17 | Stop reason: SDUPTHER

## 2020-04-27 ENCOUNTER — TELEPHONE (OUTPATIENT)
Dept: INTERNAL MEDICINE CLINIC | Age: 60
End: 2020-04-27

## 2020-04-27 NOTE — TELEPHONE ENCOUNTER
Patient contacted she was informed that we changed her medication from protonix to omeprazole in June of 2019. Patient states that she forgot and to disregard the message.

## 2020-06-08 RX ORDER — OMEPRAZOLE 40 MG/1
40 CAPSULE, DELAYED RELEASE ORAL DAILY
Qty: 90 CAP | Refills: 3 | Status: SHIPPED | OUTPATIENT
Start: 2020-06-08 | End: 2021-05-27 | Stop reason: SDUPTHER

## 2020-06-23 ENCOUNTER — OFFICE VISIT (OUTPATIENT)
Dept: INTERNAL MEDICINE CLINIC | Age: 60
End: 2020-06-23

## 2020-06-23 VITALS
HEIGHT: 63 IN | WEIGHT: 135.8 LBS | HEART RATE: 87 BPM | RESPIRATION RATE: 12 BRPM | OXYGEN SATURATION: 96 % | SYSTOLIC BLOOD PRESSURE: 132 MMHG | DIASTOLIC BLOOD PRESSURE: 82 MMHG | TEMPERATURE: 98.4 F | BODY MASS INDEX: 24.06 KG/M2

## 2020-06-23 DIAGNOSIS — E78.5 HYPERLIPIDEMIA LDL GOAL <160: Primary | ICD-10-CM

## 2020-06-23 DIAGNOSIS — J45.20 MILD INTERMITTENT ASTHMA WITHOUT COMPLICATION: ICD-10-CM

## 2020-06-23 DIAGNOSIS — M79.7 FIBROMYALGIA: ICD-10-CM

## 2020-06-23 DIAGNOSIS — M46.1 SACROILIITIS, NOT ELSEWHERE CLASSIFIED (HCC): ICD-10-CM

## 2020-06-23 NOTE — PROGRESS NOTES
Yelena Hackett 1960, is a 63 y.o. female, who is seen today for reevaluation of hyperlipidemia, fibromyalgia, mild intermittent asthma, GERD, chronic insomnia and history of depression. She feels fairly well in general, is taking all of her medicine correctly and breathing well without wheezing or coughing. She is sleeping reasonably well with trazodone. She is treated elsewhere with gabapentin and meloxicam and Cymbalta for fibromyalgia and even that is doing fairly well. Weight is pretty stable lately. She eats a healthy diet. She also notes that she has had some increased pain in the lateral right hip and she blames that on recurrent sacroiliitis and says that she needs adjustment with physical therapy. When she uses 650 mg of acetaminophen the pain goes away all day.     Past Medical History:   Diagnosis Date    Allergy     Anxiety     Arthritis     Asthma     mild    Atypical ductal hyperplasia of breast     Atypical hyperplasia of breast 2003    right    Chronic depression     Chronic fatigue syndrome     Chronic interstitial cystitis     Chronic interstitial cystitis     Chronic pain     fibromyalgia    Constipation     Depression     Diarrhea     Difficulty walking     Dizziness     Dry eyes     Dry mouth     Dyspnea     Fibromyalgia     Fibromyalgia     Generalized stiffness     GERD (gastroesophageal reflux disease)     Headache(784.0)     Heartburn     Hyperlipidemia     IBS (irritable bowel syndrome)     IC (interstitial cystitis)     Imbalance     Incoordination     Insomnia     Irregular periods/menstrual cycles     Joint pain     Microscopic hematuria     Muscle pain     Myofascial pain syndrome     Numbness and tingling in hands     Ovarian cyst, left     Painful sex     Pneumonia     Psychiatric disorder     anxiety - depression    RLS (restless legs syndrome)     Stress     TMJ (temporomandibular joint syndrome)     Transient total loss of muscle tone     Uterine fibroid     Weakness generalized      Current Outpatient Medications   Medication Sig Dispense Refill    omeprazole (PRILOSEC) 40 mg capsule Take 1 Cap by mouth daily. 90 Cap 3    montelukast (SINGULAIR) 10 mg tablet Take 1 Tab by mouth daily. 90 Tab 3    folic acid (FOLVITE) 1 mg tablet Take 1 mg by mouth two (2) times a day.  [facility]-me blue-sod phos-phsal-hyo (VILAMIT MB) 118-10-40.8-36 mg cap capsule Take 1 Cap by mouth four (4) times daily. 120 Cap 11    digestive 8-L.acidoph-pectin (DIGESTIVE ENZYME, ACIDOPH,PEC,) 50 million cell-100 mg tab Take 1 Tab by mouth daily.  tiotropium bromide (SPIRIVA RESPIMAT) 2.5 mcg/actuation inhaler Take 2 Puffs by inhalation daily. Indications: Prevention of Bronchospasm with Chronic Bronchitis 3 Inhaler 3    mometasone (ASMANEX TWISTHALER) 220 mcg (30 doses) inhaler Take 1 Puff by inhalation daily. (Patient taking differently: Take 1 Puff by inhalation two (2) times a day.) 90 Cap 3    gabapentin (NEURONTIN) 800 mg tablet take 1 tablet by mouth three times a day  0    denosumab (PROLIA) 60 mg/mL injection 60 mg by SubCUTAneous route. 2 times yearly      OXcarbazepine (TRILEPTAL) 150 mg tablet Take 1 Tab by mouth three (3) times daily. 90 Tab 2    calcium glycerophosphate (PRELIEF) 65 mg tab Take 1 Tab by mouth daily.  meloxicam (MOBIC) 15 mg tablet take 1 tablet by mouth once daily 30 Tab 11    modafinil (PROVIGIL) 200 mg tablet take 1 tablet by mouth twice a day for 30 DAYS maximum daily dose of 2 tablets (Patient taking differently: Take 200 mg by mouth daily.) 60 Tab 5    ergocalciferol (ERGOCALCIFEROL) 50,000 unit capsule take 1 capsule by mouth TWO (2) TIMES A WEEK  Indications: PREVENTION OF VITAMIN D DEFICIENCY (Patient taking differently: Take 50,000 Units by mouth every seven (7) days.  take 1 capsule by mouth once a week  Indications: Prevention of Vitamin D Deficiency) 8 Cap 11    OTHER Indications: Compound cream for inflammation      B.infantis-B.ani-B.long-B.bifi (PROBIOTIC 4X) 10-15 mg TbEC Take 1 Tab by mouth daily.  ENZYMES,DIGESTIVE, PLANT, (DIGESTIVE ENZYMES, PLANT, PO) Take 1 Tab by mouth daily.  calcium citrate-vitamin D3 (CITRACAL + D) tablet Take 1 Tab by mouth daily.  LORazepam (ATIVAN) 2 mg tablet Take 2 mg by mouth every eight (8) hours as needed for Anxiety.  DULoxetine (CYMBALTA) 60 mg capsule Take 120 mg by mouth daily.  simethicone (PHAZYME) 180 mg cap Take 225 mg by mouth as needed.  trazodone (DESYREL) 100 mg tablet Take 150 mg by mouth nightly.  Ventolin HFA 90 mcg/actuation inhaler INHALE 2 PUFFS BY INHALATION EVERY 6 HOURS AS NEEDED FOR WHEEZING 54 g 3    pentosan polysulfate sodium (ELMIRON) 100 mg capsule Take 1 Cap by mouth two (2) times a day. Indications: Bladder Pain, bladder wall inflammation 180 Cap 3     Visit Vitals  /82 (BP 1 Location: Right arm, BP Patient Position: Sitting)   Pulse 87   Temp 98.4 °F (36.9 °C) (Oral)   Resp 12   Ht 5' 2.6\" (1.59 m)   Wt 135 lb 12.8 oz (61.6 kg)   SpO2 96%   BMI 24.36 kg/m²     Neck reveals no adenopathy or thyromegaly. Carotids are 2+ without bruits. Lungs are clear to percussion. Good breath sounds with no wheezing or crackles. Heart reveals a regular rhythm with normal S1 and S2 no murmur gallop click or rub. Abdomen is soft and nontender with no hepatosplenomegaly or masses and no bruits. Extremities reveal no clubbing cyanosis or edema. Pulses are 2+. There is point tenderness in the lateral right hip and good range of motion of the hip without discomfort. Normal straight leg raising. Assessment: #1.  Marked hyperlipidemia but ten-year risk is calculated at about 2.8% as of 6 months ago. She will continue very healthy diet but statin therapy is not indicated. #2.  Mild intermittent asthma doing well, she will continue Asmanex and Spiriva and use albuterol when needed.    #3.  Chronic insomnia doing pretty well. She will continue trazodone 50 mg at bedtime. #4.  Fibromyalgia stabilized, she will continue gabapentin and her milligrams 3 times a day and meloxicam and Cymbalta. #5. She may have greater trochanter pain syndrome on the right and she may have sacroiliitis as well but that does not seem to be the current problem and adjustment should not be the answer. Repeatedly going to physical therapy for adjustments but certainly not the answer but physical therapy itself may sometimes be helpful for her. Follow-up in about 6 months for her annual checkup with lab with Dr. Miley Mg. Gabriel Hernandez MD FACP    Please note: This document has been produced using voice recognition software. Unrecognized errors in transcription may be present.

## 2020-07-22 ENCOUNTER — OFFICE VISIT (OUTPATIENT)
Dept: PULMONOLOGY | Age: 60
End: 2020-07-22

## 2020-07-22 VITALS
BODY MASS INDEX: 24.73 KG/M2 | WEIGHT: 134.4 LBS | SYSTOLIC BLOOD PRESSURE: 120 MMHG | HEART RATE: 96 BPM | HEIGHT: 62 IN | OXYGEN SATURATION: 99 % | DIASTOLIC BLOOD PRESSURE: 60 MMHG | TEMPERATURE: 98.1 F | RESPIRATION RATE: 18 BRPM

## 2020-07-22 DIAGNOSIS — G47.33 OSA (OBSTRUCTIVE SLEEP APNEA): ICD-10-CM

## 2020-07-22 DIAGNOSIS — J45.21 MILD INTERMITTENT ASTHMA WITH ACUTE EXACERBATION: Primary | ICD-10-CM

## 2020-07-22 RX ORDER — MOMETASONE FUROATE 220 UG/1
1 INHALANT RESPIRATORY (INHALATION) DAILY
Qty: 90 CAP | Refills: 3 | Status: SHIPPED | OUTPATIENT
Start: 2020-07-22 | End: 2021-01-27 | Stop reason: SDUPTHER
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
## 2020-07-28 ENCOUNTER — HOSPITAL ENCOUNTER (OUTPATIENT)
Age: 60
Discharge: HOME | End: 2020-07-28
Attending: PSYCHIATRY & NEUROLOGY
Payer: MEDICARE

## 2020-07-28 DIAGNOSIS — M25.50 PAIN IN JOINT, MULTIPLE SITES: ICD-10-CM

## 2020-07-28 PROCEDURE — 73721 MRI JNT OF LWR EXTRE W/O DYE: CPT

## 2020-08-25 ENCOUNTER — HOSPITAL ENCOUNTER (OUTPATIENT)
Dept: INFUSION THERAPY | Age: 60
Discharge: HOME | End: 2020-08-25
Payer: MEDICARE

## 2020-08-25 LAB
ALBUMIN SERPL-MCNC: 4 G/DL (ref 3.4–5)
ALBUMIN/GLOB SERPL: 1.4 {RATIO} (ref 0.8–1.7)
ALP SERPL-CCNC: 30 U/L (ref 45–117)
ALT SERPL-CCNC: 45 U/L (ref 13–56)
ANION GAP SERPL CALC-SCNC: 6 MMOL/L (ref 3–18)
AST SERPL-CCNC: 20 U/L (ref 10–38)
BILIRUB SERPL-MCNC: 0.2 MG/DL (ref 0.2–1)
BUN SERPL-MCNC: 23 MG/DL (ref 7–18)
BUN/CREAT SERPL: 28 (ref 12–20)
CALCIUM SERPL-MCNC: 9.7 MG/DL (ref 8.5–10.1)
CHLORIDE SERPL-SCNC: 101 MMOL/L (ref 100–111)
CO2 SERPL-SCNC: 30 MMOL/L (ref 21–32)
CREAT SERPL-MCNC: 0.82 MG/DL (ref 0.6–1.3)
GLOBULIN SER CALC-MCNC: 2.9 G/DL (ref 2–4)
GLUCOSE SERPL-MCNC: 94 MG/DL (ref 74–99)
MAGNESIUM SERPL-MCNC: 2 MG/DL (ref 1.6–2.6)
PHOSPHATE SERPL-MCNC: 4.3 MG/DL (ref 2.5–4.9)
POTASSIUM SERPL-SCNC: 4 MMOL/L (ref 3.5–5.5)
PROT SERPL-MCNC: 6.9 G/DL (ref 6.4–8.2)
SODIUM SERPL-SCNC: 137 MMOL/L (ref 136–145)

## 2020-08-25 PROCEDURE — 84100 ASSAY OF PHOSPHORUS: CPT

## 2020-08-25 PROCEDURE — 36415 COLL VENOUS BLD VENIPUNCTURE: CPT

## 2020-08-25 PROCEDURE — 83735 ASSAY OF MAGNESIUM: CPT

## 2020-08-25 PROCEDURE — 80053 COMPREHEN METABOLIC PANEL: CPT

## 2020-08-25 RX ORDER — ALBUTEROL SULFATE 0.83 MG/ML
2.5 SOLUTION RESPIRATORY (INHALATION) AS NEEDED
Status: CANCELLED
Start: 2020-08-26

## 2020-08-25 RX ORDER — ONDANSETRON 2 MG/ML
8 INJECTION INTRAMUSCULAR; INTRAVENOUS AS NEEDED
Status: CANCELLED | OUTPATIENT
Start: 2020-08-26

## 2020-08-25 RX ORDER — EPINEPHRINE 1 MG/ML
0.3 INJECTION, SOLUTION, CONCENTRATE INTRAVENOUS AS NEEDED
Status: CANCELLED | OUTPATIENT
Start: 2020-08-26

## 2020-08-25 RX ORDER — ACETAMINOPHEN 325 MG/1
650 TABLET ORAL AS NEEDED
Status: CANCELLED
Start: 2020-08-26

## 2020-08-25 RX ORDER — DIPHENHYDRAMINE HYDROCHLORIDE 50 MG/ML
25 INJECTION, SOLUTION INTRAMUSCULAR; INTRAVENOUS AS NEEDED
Status: CANCELLED
Start: 2020-08-26

## 2020-08-25 RX ORDER — HYDROCORTISONE SODIUM SUCCINATE 100 MG/2ML
100 INJECTION, POWDER, FOR SOLUTION INTRAMUSCULAR; INTRAVENOUS AS NEEDED
Status: CANCELLED | OUTPATIENT
Start: 2020-08-26

## 2020-08-25 RX ORDER — DIPHENHYDRAMINE HYDROCHLORIDE 50 MG/ML
50 INJECTION, SOLUTION INTRAMUSCULAR; INTRAVENOUS AS NEEDED
Status: CANCELLED
Start: 2020-08-26

## 2020-08-25 NOTE — PROGRESS NOTES
[facility] Lab Visit:    Andrey Chun  1960  [id number]    1600:  Pt arrived ambulatory. Labs drawn peripherally in left ac and sent for processing. Pt is scheduled to return on 8/26/20 for Prolia injection. Departed [facility] ambulatory and in no distress. There were no vitals taken for this visit.

## 2020-08-26 ENCOUNTER — HOSPITAL ENCOUNTER (OUTPATIENT)
Dept: INFUSION THERAPY | Age: 60
Discharge: HOME | End: 2020-08-26
Payer: MEDICARE

## 2020-08-26 VITALS
RESPIRATION RATE: 18 BRPM | SYSTOLIC BLOOD PRESSURE: 132 MMHG | OXYGEN SATURATION: 97 % | DIASTOLIC BLOOD PRESSURE: 85 MMHG | TEMPERATURE: 97.6 F | HEART RATE: 91 BPM

## 2020-08-26 DIAGNOSIS — M81.0 AGE-RELATED OSTEOPOROSIS WITHOUT CURRENT PATHOLOGICAL FRACTURE: ICD-10-CM

## 2020-08-26 DIAGNOSIS — M85.80 OSTEOPENIA, UNSPECIFIED LOCATION: Primary | ICD-10-CM

## 2020-08-26 DIAGNOSIS — M85.80 OSTEOPENIA, UNSPECIFIED LOCATION: ICD-10-CM

## 2020-08-26 PROCEDURE — 96372 THER/PROPH/DIAG INJ SC/IM: CPT

## 2020-08-26 PROCEDURE — 74011250636 HC RX REV CODE- 250/636: Performed by: PSYCHIATRY & NEUROLOGY

## 2020-08-26 RX ORDER — EPINEPHRINE 1 MG/ML
0.3 INJECTION, SOLUTION, CONCENTRATE INTRAVENOUS AS NEEDED
Status: CANCELLED | OUTPATIENT
Start: 2021-02-24

## 2020-08-26 RX ORDER — DIPHENHYDRAMINE HYDROCHLORIDE 50 MG/ML
50 INJECTION, SOLUTION INTRAMUSCULAR; INTRAVENOUS AS NEEDED
Status: CANCELLED
Start: 2021-02-24

## 2020-08-26 RX ORDER — DIPHENHYDRAMINE HYDROCHLORIDE 50 MG/ML
25 INJECTION, SOLUTION INTRAMUSCULAR; INTRAVENOUS AS NEEDED
Status: CANCELLED
Start: 2021-02-24

## 2020-08-26 RX ORDER — ONDANSETRON 2 MG/ML
8 INJECTION INTRAMUSCULAR; INTRAVENOUS AS NEEDED
Status: CANCELLED | OUTPATIENT
Start: 2021-02-24

## 2020-08-26 RX ORDER — ACETAMINOPHEN 325 MG/1
650 TABLET ORAL AS NEEDED
Status: CANCELLED
Start: 2021-02-24

## 2020-08-26 RX ORDER — ALBUTEROL SULFATE 0.83 MG/ML
2.5 SOLUTION RESPIRATORY (INHALATION) AS NEEDED
Status: CANCELLED
Start: 2021-02-24

## 2020-08-26 RX ORDER — HYDROCORTISONE SODIUM SUCCINATE 100 MG/2ML
100 INJECTION, POWDER, FOR SOLUTION INTRAMUSCULAR; INTRAVENOUS AS NEEDED
Status: CANCELLED | OUTPATIENT
Start: 2021-02-24

## 2020-08-26 RX ADMIN — DENOSUMAB 60 MG: 60 INJECTION SUBCUTANEOUS at 11:27

## 2020-08-26 NOTE — PROGRESS NOTES
[facility] Progress Note    Date: 2020    Name: Pop Pryor    MRN: [id number]         : 1960    Prolia      Ms. Norman to [facility], ambulatory at [phone]. Pt was assessed and education was provided. Patient states she has been receiving Prolia for over 2 years with her last injection 6 months ago. Right hip pain 5/10. Took pain medication today. Ms. Nikkie Antonio vitals were reviewed and patient was observed for 5 minutes prior to treatment. Visit Vitals  /85 (BP 1 Location: Left arm, BP Patient Position: Sitting)   Pulse 91   Temp 97.6 °F (36.4 °C)   Resp 18   SpO2 97%       Results for Marcela Hidalgo (MRN [id number])    Ref. Range 2020 16:00   Sodium Latest Ref Range: 136 - 145 mmol/L 137   Potassium Latest Ref Range: 3.5 - 5.5 mmol/L 4.0   Chloride Latest Ref Range: 100 - 111 mmol/L 101   CO2 Latest Ref Range: 21 - 32 mmol/L 30   Anion gap Latest Ref Range: 3.0 - 18 mmol/L 6   Glucose Latest Ref Range: 74 - 99 mg/dL 94   BUN Latest Ref Range: 7.0 - 18 MG/DL 23 (H)   Creatinine Latest Ref Range: 0.6 - 1.3 MG/DL 0.82   BUN/Creatinine ratio Latest Ref Range: 12 - 20   28 (H)   Calcium Latest Ref Range: 8.5 - 10.1 MG/DL 9.7   Phosphorus Latest Ref Range: 2.5 - 4.9 MG/DL 4.3   Magnesium Latest Ref Range: 1.6 - 2.6 mg/dL 2.0   GFR est non-AA Latest Ref Range: >60 ml/min/1.73m2 >60   GFR est AA Latest Ref Range: >60 ml/min/1.73m2 >60   Bilirubin, total Latest Ref Range: 0.2 - 1.0 MG/DL 0.2   Protein, total Latest Ref Range: 6.4 - 8.2 g/dL 6.9   Albumin Latest Ref Range: 3.4 - 5.0 g/dL 4.0   Globulin Latest Ref Range: 2.0 - 4.0 g/dL 2.9   A-G Ratio Latest Ref Range: 0.8 - 1.7   1.4   ALT Latest Ref Range: 13 - 56 U/L 45   AST Latest Ref Range: 10 - 38 U/L 20   Alk. phosphatase Latest Ref Range: 45 - 117 U/L 30 (L)     Labs ok for injection, Calcium 9.7. Prolia 60 mg given to left upper back of arm SQ. Tolerated well. No irritation or bleeding at site. Juan Luis Cue to site    Patient was discharged in stable condition at 1435. She is to return on 2/23/2021 at 1130 for her pre-prolia labs.       Ehsan Franco RN  August 26, 2020

## 2020-08-31 ENCOUNTER — TELEPHONE (OUTPATIENT)
Dept: PULMONOLOGY | Age: 60
End: 2020-08-31

## 2020-08-31 NOTE — TELEPHONE ENCOUNTER
Spoke with patient. She has already had one Preumovac 23 in 2016.  She will discuss at next visit if the doctor feels she needs the 13 now or at 73

## 2020-08-31 NOTE — TELEPHONE ENCOUNTER
Pt states she just signed up for my chart recently and it sends her messages about things on a daily basis now. It is telling her she is overdue for a pneumonia shot. Can someone call her and let her know how often she should get that and if she needs one right now? She last got one 11/28/2016. Please call [phone].

## 2020-10-20 ENCOUNTER — TRANSCRIBE ORDER (OUTPATIENT)
Dept: SCHEDULING | Age: 60
End: 2020-10-20

## 2020-10-20 DIAGNOSIS — Z12.31 VISIT FOR SCREENING MAMMOGRAM: Primary | ICD-10-CM

## 2020-10-30 ENCOUNTER — HOSPITAL ENCOUNTER (OUTPATIENT)
Dept: PHYSICAL THERAPY | Age: 60
Discharge: HOME | End: 2020-10-30
Payer: MEDICARE

## 2020-10-30 PROCEDURE — 97140 MANUAL THERAPY 1/> REGIONS: CPT

## 2020-10-30 PROCEDURE — 97530 THERAPEUTIC ACTIVITIES: CPT

## 2020-10-30 PROCEDURE — 97162 PT EVAL MOD COMPLEX 30 MIN: CPT

## 2020-10-30 NOTE — PROGRESS NOTES
PT DAILY TREATMENT NOTE 10-18    Patient Name: Jesús Knowles  Date:10/30/2020  : 1960  [x]  Patient  Verified  [place]or: [place] MED[place] / Plan: [place] MEDICARE PART A & B / Product Type: Medicare /    In time:11:20  Out time:12:00  Total Treatment Time (min): 40  Visit #: 1 of 10      Medicare/BCBS Only   Total Timed Codes (min):  23 1:1 Treatment Time:  40       Treatment Area: Low back pain [M54.5]  Chronic SI joint pain [M53.3, G89.29]     SUBJECTIVE  Pain Level (0-10 scale): 8/10  Any medication changes, allergies to medications, adverse drug reactions, diagnosis change, or new procedure performed?: [x] No    [] Yes (see summary sheet for update)  Subjective functional status/changes:   [x] See paper initial evaluation form in patient chart.       OBJECTIVE    17 min [x]Eval                  []Re-Eval     15 min Therapeutic Activity:  [] See flow sheet : diagnosis; prognosis; HEP given and reviewed with Pt   Rationale: increase ROM and increase strength to improve the patients ability to improve right hip mobility, increase glute strength    8 min Manual Therapy:  MET leg pull, MET to correct right anteriorly rotated innominate   Rationale: decrease pain and increase ROM to normalize gait and reduce hip pain    With   [] TE   [] TA   [] neuro   [] other: Patient Education: [x] Review HEP    [] Progressed/Changed HEP based on:   [] positioning   [] body mechanics   [] transfers   [] heat/ice application    [] other:      Other Objective/Functional Measures: FOTO 38 pts    Pain Level (0-10 scale) post treatment: 7/10    ASSESSMENT/Changes in Function:     Patient will continue to benefit from skilled PT services to address functional mobility deficits, address ROM deficits, address strength deficits, analyze and address soft tissue restrictions, analyze and cue movement patterns, analyze and modify body mechanics/ergonomics, assess and modify postural abnormalities and instruct in home and community integration to attain remaining goals.      [x]  See Plan of Care  []  See progress note/recertification  []  See Discharge Summary         PLAN  []  Upgrade activities as tolerated     [x]  Continue plan of care  []  Update interventions per flow sheet       []  Discharge due to:_  []  Other:_      Zan Nick, PT 10/30/2020  11:11 AM

## 2020-10-30 NOTE — PROGRESS NOTES
[facility], [address] [place] [address]  [phone] fax  Plan of Care/ Statement of Necessity for Physical Therapy Services    Patient name: Magi Alvarez Start of Care: 10/30/2020   Referral source: Walton Castleman, * : 1960    Medical Diagnosis: Low back pain [M54.5]  Chronic SI joint pain [M53.3, G89.29]  Payor: [place] MEDICARE / Plan: [place] MEDICARE PART A & B / Product Type: Medicare /  Onset Date:2020    Treatment Diagnosis: Right Hip/Buttock pain   Prior Hospitalization: see medical history Provider#: [id number]   Medications: Verified on Patient summary List    Comorbidities: Anxiety; Arthritis; Asthma; Back pain; Fibromyalgia (FMS); Interstitial Cystitis; Osteopenia; Sesamoiditis right toe   Prior Level of Function: Lives with spouse; functionally independent; The Plan of Care and following information is based on the information from the initial evaluation. Assessment/ key information: Pt is a 62 y.o. female who presents with c/o right lateral hip/thigh and buttock pain that has persisted since 2020, without known MIRIAN. Pt has hx of chronic LBP and intermittent sacroiliitis. Pt has increased pain with prolonged sitting, standing, amb, repetitive activities, stair negotiation, limiting activity tolerance. Pt denies paresthesias and recent MRI showed right greater trochanteric bursitis. Upon exam, Pt exhibited palpable tenderness along right greater trochanter, ITB, piriformis, gluteal mm; pain with hip IR; poor core/glute activation; and pelvic obliquities of right ilial upslip, right anteriorly rotated innominate that was corrected with MET. Pt would benefit from skilled PT to address above deficits to improve Pt's function and ability to return to premorbid status with less pain and limitations.     Evaluation Complexity History MEDIUM  Complexity : 1-2 comorbidities / personal factors will impact the outcome/ POC ; Examination MEDIUM Complexity : 3 Standardized tests and measures addressing body structure, function, activity limitation and / or participation in recreation  ;Presentation MEDIUM Complexity : Evolving with changing characteristics  ; Clinical Decision Making MEDIUM Complexity : FOTO score of 26-74  Overall Complexity Rating: MEDIUM  Problem List: pain affecting function, decrease ROM, decrease strength, edema affecting function, impaired gait/ balance, decrease ADL/ functional abilitiies, decrease activity tolerance, decrease flexibility/ joint mobility and decrease transfer abilities   Treatment Plan may include any combination of the following: Therapeutic exercise, Therapeutic activities, Neuromuscular re-education, Physical agent/modality, Gait/balance training, Manual therapy, Aquatic therapy, Patient education and Functional mobility training  Patient / Family readiness to learn indicated by: asking questions, trying to perform skills and interest  Persons(s) to be included in education: patient (P)  Barriers to Learning/Limitations: None  Patient Goal (s): Remove the pain.   Patient Self Reported Health Status: poor  Rehabilitation Potential: good    Short Term Goals: To be accomplished in 1 weeks:  Goal: Pt to be compliant with initial HEP to improve right hip mobility, glute strength. Status at last note/certification: Established and reviewed with Pt  Long Term Goals: To be accomplished in 5 weeks:  Goal: Pt to exhibit 5/5 right hip strength grossly without pain for ease of negotiating stairs and squatting to  objects. Status at last note/certification: 5/5, except 4+/5 hip ABD - all with pain  Goal: Pt to report no difficulty with no difficulty with usual activities to be able to assist spouse with household chores.   Status at last note/certification: quite a bit of difficulty  Goal: Pt to report at least 50% improvement in right hip/buttock symptoms to increase activity tolerance in home, community. Status at last note/certification: N/A  Goal: Pt to report < 4/10 pain at worst to increase ease with ADLs. Status at last note/certification: 7/56 pain at worst  Goal: Pt to report FOTO score of 50 pts to show improved function and quality of life. Status at last note/certification: FOTO 38 pts     Frequency / Duration: Patient to be seen 2 times per week for 5 weeks. Patient/ Caregiver education and instruction: Diagnosis, prognosis, exercises   [x]  Plan of care has been reviewed with PTA    Certification Period: 10/30/20 - 11/28/20  Evelynhiren Nick, PT 10/30/2020 11:12 AM  _____________________________________________________________________  I certify that the above Therapy Services are being furnished while the patient is under my care. I agree with the treatment plan and certify that this therapy is necessary.     Physician's Signature:____________Date:_________TIME:________    ** Signature, Date and Time must be completed for valid certification **    Please sign and return to [facility] [address]  [phone] fax

## 2020-11-02 ENCOUNTER — TELEPHONE (OUTPATIENT)
Dept: INTERNAL MEDICINE CLINIC | Age: 60
End: 2020-11-02

## 2020-11-02 NOTE — TELEPHONE ENCOUNTER
Patient stating she woke up with a slight headache and nausea Saturday morning. She got up to go check her temp and passed out. Hit her head on the floor. She was able to get herself up but immediately passed out again. Her  found her laying on the bathroom floor. He had to shake her awake. Her BP was in the 90's/60's. She drank water and ate saltine crackers. Started feeling a little better. A couple hours later her BP was 110/70 something. She is a former pt of Dr. Armando Miller. Scheduled to establish care with Dr. Sara Mann in January. She is concerned and wants to know if she can be seen sooner.

## 2020-11-02 NOTE — TELEPHONE ENCOUNTER
Spoke with patient she states she passed out twice Saturday, and hit her head on the hardwood floor. Patient states she is feeling much better today. Patient states she would like to be seen before her cpe appointment. Patient was scheduled for 11/16/2020.

## 2020-11-03 ENCOUNTER — HOSPITAL ENCOUNTER (OUTPATIENT)
Dept: PHYSICAL THERAPY | Age: 60
Discharge: HOME | End: 2020-11-03
Payer: MEDICARE

## 2020-11-03 ENCOUNTER — TELEPHONE (OUTPATIENT)
Dept: PULMONOLOGY | Age: 60
End: 2020-11-03

## 2020-11-03 PROCEDURE — 97112 NEUROMUSCULAR REEDUCATION: CPT

## 2020-11-03 PROCEDURE — 97035 APP MDLTY 1+ULTRASOUND EA 15: CPT

## 2020-11-03 PROCEDURE — 97110 THERAPEUTIC EXERCISES: CPT

## 2020-11-03 NOTE — TELEPHONE ENCOUNTER
Pt MWINFK([phone]). Wants to know if there are any OTC medications that Dr Vladimir Donato can recommend for her allergies. She cannot take Claritin D. Please check and call back.

## 2020-11-03 NOTE — PROGRESS NOTES
PT DAILY TREATMENT NOTE     Patient Name: Kezia Vega  Date:11/3/2020  : 1960  [x]  Patient  Verified  Payor: VA MEDICARE / Plan: VA MEDICARE PART A & B / Product Type: Medicare /    In time:1116  Out time:1201  Total Treatment Time (min): 45  Visit #: 2 of 10    Medicare/BCBS Only   Total Timed Codes (min):  45 1:1 Treatment Time:  45       Treatment Area: Pain in right hip [M25.551]  Chronic SI joint pain [M53.3, G89.29]    SUBJECTIVE  Pain Level (0-10 scale): 6  Any medication changes, allergies to medications, adverse drug reactions, diagnosis change, or new procedure performed?: [x] No    [] Yes (see summary sheet for update)  Subjective functional status/changes:   [] No changes reported  I had a rough weekend. I got up Saturday and I passed out. My  found me in the bathroom, my blood sugar was low. I had some water and ate something and then I felt fine. I'm going in for a check up on the .      OBJECTIVE    Modality rationale: decrease inflammation, decrease pain and increase tissue extensibility to improve the patients ability to improve mobility tolerance   Min Type Additional Details    [] Estim:  []Unatt       []IFC  []Premod                        []Other:  []w/ice   []w/heat  Position:  Location:    [] Estim: []Att    []TENS instruct  []NMES                    []Other:  []w/US   []w/ice   []w/heat  Position:  Location:    []  Traction: [] Cervical       []Lumbar                       [] Prone          []Supine                       []Intermittent   []Continuous Lbs:  [] before manual  [] after manual   8 [x]  Ultrasound: []Continuous   [x] Pulsed                           []1MHz   [x]3MHz W/cm2:1.2  Location:Right greater trochanter    []  Iontophoresis with dexamethasone         Location: [] Take home patch   [] In clinic    []  Ice     []  heat  []  Ice massage  []  Laser   []  Anodyne Position:  Location:    []  Laser with stim  []  Other:  Position:  Location: []  Vasopneumatic Device Pressure:       [] lo [] med [] hi   Temperature: [] lo [] med [] hi   [x] Skin assessment post-treatment:  [x]intact []redness- no adverse reaction    []redness  adverse reaction:     10 min Therapeutic Exercise:  [x] See flow sheet :stretches, HEP review   Rationale: increase ROM and increase strength to improve the patients ability to improve stand/amb tolerance, ease of household tasks    35 min Neuromuscular Re-education:  [x]  See flow sheet :RIght leg pull   Rationale: increase strength, improve coordination and increase proprioception  to improve the patients ability to improve core, gluteal activation in order to reduce compensation with movements    With   [] TE   [] TA   [] neuro   [] other: Patient Education: [x] Review HEP    [] Progressed/Changed HEP based on:   [] positioning   [] body mechanics   [] transfers   [] heat/ice application    [] other:      Other Objective/Functional Measures: initiated treatment per flow sheet     Pain Level (0-10 scale) post treatment: 7    ASSESSMENT/Changes in Function: Patient denies concussion-like symptoms following passing out and falling this past weekend and is following up with MD in two weeks. At session today she reports increased Right hip pain/SIJ pain throughout, but is able to complete full plinth routine. Held off on Reformer activities to reduce post-therapy soreness. Reviewed proper technique with HEP as Patient had questions regarding stretches. We will progress as able.      Patient will continue to benefit from skilled PT services to modify and progress therapeutic interventions, address functional mobility deficits, address ROM deficits, address strength deficits, analyze and address soft tissue restrictions, analyze and cue movement patterns, analyze and modify body mechanics/ergonomics, assess and modify postural abnormalities, address imbalance/dizziness and instruct in home and community integration to attain remaining goals. []  See Plan of Care  []  See progress note/recertification  []  See Discharge Summary         Progress towards goals / Updated goals:  Short Term Goals: To be accomplished in 1 weeks:  Goal: Pt to be compliant with initial HEP to improve right hip mobility, glute strength. Status at last note/certification: Established and reviewed with Pt  Current status: progressing, initiated and reviewed and first follow up  Long Term Goals: To be accomplished in 5 weeks:  Goal: Pt to exhibit 5/5 right hip strength grossly without pain for ease of negotiating stairs and squatting to  objects. Status at last note/certification: 5/5, except 4+/5 hip ABD - all with pain   Current status:   Goal: Pt to report no difficulty with no difficulty with usual activities to be able to assist spouse with household chores. Status at last note/certification: quite a bit of difficulty  Current status:   Goal: Pt to report at least 50% improvement in right hip/buttock symptoms to increase activity tolerance in home, community. Status at last note/certification: N/A  Current status:   Goal: Pt to report < 4/10 pain at worst to increase ease with ADLs. Status at last note/certification: 3/95 pain at worst  Current status:   Goal: Pt to report FOTO score of 50 pts to show improved function and quality of life.   Status at last note/certification: FOTO 38 pts   Current status:     PLAN  []  Upgrade activities as tolerated     [x]  Continue plan of care  []  Update interventions per flow sheet       []  Discharge due to:_  []  Other:_      Delmus Sever, PT 11/3/2020  11:09 AM    Future Appointments   Date Time Provider Roslyn Luna   11/3/2020 11:15 AM Hilaria Lofton PT [facility]   11/5/2020 12:00 PM Hilaria Lofton PT [facility]   11/9/2020  3:00 PM Wilfred Nick, PT [facility]   11/13/2020  1:30 PM Hilaria Lofton PT [facility]   11/16/2020 10:00 AM Davin Castaneda MD [facility] BS AMB 11/17/2020 12:00 PM Cindy Santos, [facility]   11/19/2020 12:00 PM Nick Nick, [facility]   11/23/2020 12:00 PM Nick Nick, [facility]   11/25/2020 12:00 PM Cindy Santos, [facility]   12/14/2020 11:00 AM Bridger Carney MD [facility]   12/23/2020  1:45 PM [facility] RM 4 3D [facility]   12/28/2020  8:05 AM [facility] NURSE VISIT [facility] BS AMB   1/8/2021 10:30 AM Sumaya Almonte MD [facility] BS AMB   2/23/2021 11:30 AM [facility] INFUSION PHLEBOTOMIST [facility]   2/24/2021 11:00 AM [facility] FAST TRACK NURSE [facility]

## 2020-11-03 NOTE — TELEPHONE ENCOUNTER
Patient having problems with fall allergies. Takes singular but not helping. She states she can not take anything with a decongestant in it. Sneezing, runny nose and coughing.    Would like your advise in best allergy med to get

## 2020-11-05 ENCOUNTER — HOSPITAL ENCOUNTER (OUTPATIENT)
Dept: PHYSICAL THERAPY | Age: 60
Discharge: HOME | End: 2020-11-05
Payer: MEDICARE

## 2020-11-05 PROCEDURE — 97112 NEUROMUSCULAR REEDUCATION: CPT

## 2020-11-05 PROCEDURE — 97035 APP MDLTY 1+ULTRASOUND EA 15: CPT

## 2020-11-05 NOTE — PROGRESS NOTES
PT DAILY TREATMENT NOTE     Patient Name: Kelly Lin  Date:2020  : 1960  [x]  Patient  Verified  Payor: VA MEDICARE / Plan: VA MEDICARE PART A & B / Product Type: Medicare /    In time:1200  Out time:1232  Total Treatment Time (min): 32  Visit #: 3 of 10    Medicare/BCBS Only   Total Timed Codes (min):  32 1:1 Treatment Time:  32       Treatment Area: Pain in right hip [M25.551]  Chronic SI joint pain [M53.3, G89.29]    SUBJECTIVE  Pain Level (0-10 scale): 6  Any medication changes, allergies to medications, adverse drug reactions, diagnosis change, or new procedure performed?: [x] No    [] Yes (see summary sheet for update)  Subjective functional status/changes:   [] No changes reported  The SI isn't too bad today but the hip is bothering me.      OBJECTIVE    Modality rationale: decrease inflammation and decrease pain to improve the patients ability to improve ambulation tolerance   Min Type Additional Details    [] Estim:  []Unatt       []IFC  []Premod                        []Other:  []w/ice   []w/heat  Position:  Location:    [] Estim: []Att    []TENS instruct  []NMES                    []Other:  []w/US   []w/ice   []w/heat  Position:  Location:    []  Traction: [] Cervical       []Lumbar                       [] Prone          []Supine                       []Intermittent   []Continuous Lbs:  [] before manual  [] after manual   8 [x]  Ultrasound: []Continuous   [x] Pulsed                           []1MHz   [x]3MHz W/cm2:1.3  Location:Right greater trochanter    []  Iontophoresis with dexamethasone         Location: [] Take home patch   [] In clinic    []  Ice     []  heat  []  Ice massage  []  Laser   []  Anodyne Position:  Location:    []  Laser with stim  []  Other:  Position:  Location:    []  Vasopneumatic Device Pressure:       [] lo [] med [] hi   Temperature: [] lo [] med [] hi   [x] Skin assessment post-treatment:  [x]intact []redness- no adverse reaction    []redness  adverse reaction:     24 min Neuromuscular Re-education:  [x]  See flow sheet :   Rationale: increase strength, improve coordination and increase proprioception  to improve the patients ability to improve core, glue engagement in order to reduce strain in the lower spine, pelvis with mobility    With   [] TE   [] TA   [] neuro   [] other: Patient Education: [x] Review HEP    [] Progressed/Changed HEP based on:   [] positioning   [] body mechanics   [] transfers   [] heat/ice application    [] other:      Other Objective/Functional Measures: increased repetitions of prone hip IR/ER with band     Pain Level (0-10 scale) post treatment: 4    ASSESSMENT/Changes in Function: Patient reports Right greater trochanter/glute pain with ambulation and getting into/out of her car, but less SIJ pain over the past few days. She states that her home stretches hurt, so advised her to ease off and stretch to the point of feeling a stretch, not pain. Able to complete full session today with decreased pain levels. Patient will continue to benefit from skilled PT services to modify and progress therapeutic interventions, address functional mobility deficits, address ROM deficits, address strength deficits, analyze and address soft tissue restrictions, analyze and cue movement patterns, analyze and modify body mechanics/ergonomics, assess and modify postural abnormalities, address imbalance/dizziness and instruct in home and community integration to attain remaining goals. []  See Plan of Care  []  See progress note/recertification  []  See Discharge Summary         Progress towards goals / Updated goals:  Short Term Goals: To be accomplished in 1 weeks:  Goal: Pt to be compliant with initial HEP to improve right hip mobility, glute strength.   Status at last note/certification: Established and reviewed with Pt  Current status: progressing, initiated and reviewed and first follow up  [address] be accomplished in 5 weeks:  Goal: Pt to exhibit 5/5 right hip strength grossly without pain for ease of negotiating stairs and squatting to  objects. Status at last note/certification: 5/5, except 4+/5 hip ABD - all with pain   Current status: reassess closer to MD note  Goal: Pt to report no difficulty with no difficulty with usual activities to be able to assist spouse with household chores. Status at last note/certification: quite a bit of difficulty  Current status: not met, no change  Goal: Pt to report at least 50% improvement in right hip/buttock symptoms to increase activity tolerance in home, community. Status at last note/certification: N/A  Current status: reassess closer to MD note  Goal: Pt to report < 4/10 pain at worst to increase ease with ADLs. Status at last note/certification: 8/10 pain at worst  Current status: progressing, no real change in pain levels  Goal: Pt to report FOTO score of 50 pts to show improved function and quality of life.   Status at last note/certification: ONCD 53 RGU   Current status: reassess at MD note    PLAN  [x]  Upgrade activities as tolerated     [x]  Continue plan of care  []  Update interventions per flow sheet       []  Discharge due to:_  []  Other:_      Kia Spaulding, PT 11/5/2020  12:00 PM    Future Appointments   Date Time Provider Roslyn Luna   11/9/2020  3:00 PM [facility]   11/13/2020  1:30 PM Jovanny Baldwin, PT [facility]   11/16/2020 10:00 AM Daysi Casper MD [facility] BS AMB   11/17/2020 12:00 PM Jovanny Baldwin, PT [facility]   11/19/2020 12:00 PM Nguyễn Nick, PT [facility]   11/23/2020 12:00 PM Nguyễn Nick, PT [facility]   11/25/2020 12:00 PM Jovanny Baldwin PT [facility]   12/14/2020 11:00 AM Brittany Barahona MD [facility]   12/23/2020  1:45 PM [facility] RM 4 3D [facility]   12/28/2020  8:05 AM [facility] NURSE VISIT [facility] BS AMB   1/8/2021 10:30 AM Daysi Casper MD [facility] BS AMB   2/23/2021 11:30 AM [facility] INFUSION PHLEBOTOMIST [facility]   2/24/2021 11:00 AM [facility] FAST TRACK NURSE [facility]

## 2020-11-09 ENCOUNTER — HOSPITAL ENCOUNTER (OUTPATIENT)
Dept: PHYSICAL THERAPY | Age: 60
Discharge: HOME | End: 2020-11-09
Payer: MEDICARE

## 2020-11-09 PROCEDURE — 97140 MANUAL THERAPY 1/> REGIONS: CPT

## 2020-11-09 PROCEDURE — 97112 NEUROMUSCULAR REEDUCATION: CPT

## 2020-11-09 PROCEDURE — 97530 THERAPEUTIC ACTIVITIES: CPT

## 2020-11-09 NOTE — PROGRESS NOTES
PT DAILY TREATMENT NOTE     Patient Name: Diane Massey  Date:2020  : 1960  [x]  Patient  Verified  Payor: [place] MEDICARE / Plan: [place] MEDICARE PART A & B / Product Type: Medicare /    In time:2:55  Out time:3:42  Total Treatment Time (min): 52  Visit #: 4 of 10    Medicare/BCBS Only   Total Timed Codes (min):  47 1:1 Treatment Time:  52       Treatment Area: Pain in right hip [M25.551]  Chronic SI joint pain [M53.3, G89.29]    SUBJECTIVE  Pain Level (0-10 scale): 7/10  Any medication changes, allergies to medications, adverse drug reactions, diagnosis change, or new procedure performed?: [x] No    [] Yes (see summary sheet for update)  Subjective functional status/changes:   [] No changes reported  \"After , appt, I started feeling some pain in the right hip and it just got worse. I called the clinic on Friday and was given some tips for pain control but nothing really helped. I hurt so bad over the weekend. I took pain medication and nothing improved. Today, though, as I went through the day, the pain started to ease up. \"     OBJECTIVE    20 min Therapeutic Activity:  []  See flow sheet : reviewed and adjusted HEP for Pt comfort and understanding; discussed modifications of treatment program to reduce overall pain   Rationale: increase ROM  to improve the patients ability to perform ADLs, and sleep with less discomfort    19 min Neuromuscular Re-education:  [x]  See flow sheet :   Rationale: increase strength, improve coordination and increase proprioception  to improve the patients ability to improve core, glue engagement in order to reduce strain in the lower spine, pelvis with mobility    8 min Manual Therapy:  MET leg pull to correct right ilial upslip; MET to correct left posteriorly rotated innominate; MET to correct left on right backward sacral torsion   The manual therapy interventions were performed at a separate and distinct time from the therapeutic activities interventions. Rationale: decrease pain and increase ROM to reduce hip/SIJ pain, normalize gait pattern    With   [] TE   [] TA   [] neuro   [] other: Patient Education: [x] Review HEP    [] Progressed/Changed HEP based on:   [] positioning   [] body mechanics   [] transfers   [] heat/ice application    [] other:      Other Objective/Functional Measures: Decreased overall repetitions and reviewed/adjusted HEP for Pt comfort at home. Pain Level (0-10 scale) post treatment: 4/10    ASSESSMENT/Changes in Function: Pt able to perform exercise interventions today without increase in overall pain levels. Pt exhibited lumbosacral mal-alignment faults that improved with manual techniques and reduced overall pain levels. Pt continues to wear heel lift in right shoe and is to contact MD regarding need to continue use. Pt reported understanding of HEP adjustments - piriformis stretch in sitting with ankles crossed, TFL stretch with knee bent - and will inform therapist at next visit about any changes in pain from today's session. Patient will continue to benefit from skilled PT services to modify and progress therapeutic interventions, address functional mobility deficits, address ROM deficits, address strength deficits, analyze and address soft tissue restrictions, analyze and cue movement patterns, analyze and modify body mechanics/ergonomics, assess and modify postural abnormalities, address imbalance/dizziness and instruct in home and community integration to attain remaining goals. []  See Plan of Care  []  See progress note/recertification  []  See Discharge Summary         Progress towards goals / Updated goals:  Short Term Goals: To be accomplished in 1 weeks:  Goal: Pt to be compliant with initial HEP to improve right hip mobility, glute strength.   Status at last note/certification: Established and reviewed with Pt  Current status: met - Pt compliant, adjusted for Pt comfort  Long Term Goals: To be accomplished in 5 weeks:  Goal: Pt to exhibit 5/5 right hip strength grossly without pain for ease of negotiating stairs and squatting to  objects. Status at last note/certification: 5/5, except 4+/5 hip ABD - all with pain   Current status: reassess closer to MD note  Goal: Pt to report no difficulty with no difficulty with usual activities to be able to assist spouse with household chores. Status at last note/certification: quite a bit of difficulty  Current status: not met, no change  Goal: Pt to report at least 50% improvement in right hip/buttock symptoms to increase activity tolerance in home, community. Status at last note/certification: N/A  Current status: reassess closer to MD note  Goal: Pt to report < 4/10 pain at worst to increase ease with ADLs. Status at last note/certification: 8/10 pain at worst  Current status: progressing, no real change in pain levels  Goal: Pt to report FOTO score of 50 pts to show improved function and quality of life.   Status at last note/certification: RPKC 80 UGZ   Current status: reassess at MD note    PLAN  [x]  Upgrade activities as tolerated     [x]  Continue plan of care  []  Update interventions per flow sheet       []  Discharge due to:_  []  Other:_      Jose Nick PT 11/9/2020  12:00 PM    Future Appointments   Date Time Provider Roslyn Luna   11/13/2020  1:30 PM Vargas Quintero [facility]   11/16/2020 10:00 AM Tania Salvador MD [facility] BS AMB   11/17/2020 11:15 AM Jose Nick [facility]   11/19/2020 12:00 PM Jose Nick [facility]   11[facility]23/2020 12:00 PM Jose Nick [facility]   11/25/2020 12:00 PM Jose Nick [facility]   12/14/2020 11:00 AM Marino Severino MD [facility] OpenPM   12/23/2020  1:45 PM HBV NAMITA RM 4 3D [facility] HBV   12/28/2020  8:05 AM [facility] NURSE VISIT [facility] BS AMB   1/8/2021 10:30 AM Tania Salvador MD [facility] BS AMB   2/23/2021 11:30 AM HBV INFUSION PHLEBOTOMIST [facility] 2/24/2021 11:00 AM HBV FAST TRACK NURSE [facility]

## 2020-11-13 ENCOUNTER — HOSPITAL ENCOUNTER (OUTPATIENT)
Dept: PHYSICAL THERAPY | Age: 60
Discharge: HOME | End: 2020-11-13
Payer: MEDICARE

## 2020-11-13 PROCEDURE — 97112 NEUROMUSCULAR REEDUCATION: CPT

## 2020-11-13 PROCEDURE — 97140 MANUAL THERAPY 1/> REGIONS: CPT

## 2020-11-13 NOTE — PROGRESS NOTES
PT DAILY TREATMENT NOTE     Patient Name: David Middleton  Date:2020  : 1960  [x]  Patient  Verified  Payor: [place] MEDICARE / Plan: [place] MEDICARE PART A & B / Product Type: Medicare /    In time:130  Out time:201  Total Treatment Time (min): 31  Visit #: 5 of 10    Medicare/BCBS Only   Total Timed Codes (min):  26 1:1 Treatment Time:  26       Treatment Area: Pain in right hip [M25.551]  Chronic SI joint pain [M53.3, G89.29]    SUBJECTIVE  Pain Level (0-10 scale): 7.5  Any medication changes, allergies to medications, adverse drug reactions, diagnosis change, or new procedure performed?: [x] No    [] Yes (see summary sheet for update)  Subjective functional status/changes:   [] No changes reported  It seems like after easing off of the exercises last time and at home it seems like it feels better. But yesterday picking up the dog it's hurting again.      OBJECTIVE    Modality rationale: decrease pain and increase tissue extensibility to improve the patients ability to reduce soreness following exercises    Min Type Additional Details    [] Estim:  []Unatt       []IFC  []Premod                        []Other:  []w/ice   []w/heat  Position:  Location:    [] Estim: []Att    []TENS instruct  []NMES                    []Other:  []w/US   []w/ice   []w/heat  Position:  Location:    []  Traction: [] Cervical       []Lumbar                       [] Prone          []Supine                       []Intermittent   []Continuous Lbs:  [] before manual  [] after manual    []  Ultrasound: []Continuous   [] Pulsed                           []1MHz   []3MHz W/cm2:  Location:    []  Iontophoresis with dexamethasone         Location: [] Take home patch   [] In clinic   5 []  Ice     [x]  heat  []  Ice massage  []  Laser   []  Anodyne Position:left sidelying  Location:Right hip/low back    []  Laser with stim  []  Other:  Position:  Location:    []  Vasopneumatic Device Pressure:       [] lo [] med [] hi Temperature: [] lo [] med [] hi   [x] Skin assessment post-treatment:  [x]intact []redness- no adverse reaction    []redness  adverse reaction:     18 min Neuromuscular Re-education:  [x]  See flow sheet :   Rationale: increase strength, improve coordination and increase proprioception  to improve the patients ability to improve core, glute activation and stability with functional tasks in order to reduce lumbar strain     8 min Manual Therapy:  Pelvic alignment assessment, Right ilial upslip MET, shotgun MET   The manual therapy interventions were performed at a separate and distinct time from the therapeutic activities interventions. Rationale: decrease pain, increase ROM and increase tissue extensibility to improve ease of weight bearing activities        With   [] TE   [] TA   [] neuro   [] other: Patient Education: [x] Review HEP    [] Progressed/Changed HEP based on:   [] positioning   [] body mechanics   [] transfers   [] heat/ice application    [] other:      Other Objective/Functional Measures: Right ilial upslip noted and corrected with MET; no ilial rotation noted     Pain Level (0-10 scale) post treatment: 7    ASSESSMENT/Changes in Function: Patient continues to report increased low back pain with bending and lifting. Pain has been lower the past few days since adjusting routine last visit, but with wet weather yesterday she reports bending to put rain jacket on her dog and to pick dog up, increasing pain levels. Limited change in pain following session today, but no increase, and pelvic alignment corrected.      Patient will continue to benefit from skilled PT services to modify and progress therapeutic interventions, address functional mobility deficits, address ROM deficits, address strength deficits, analyze and address soft tissue restrictions, analyze and cue movement patterns, analyze and modify body mechanics/ergonomics, assess and modify postural abnormalities, address imbalance/dizziness and instruct in home and community integration to attain remaining goals. []  See Plan of Care  []  See progress note/recertification  []  See Discharge Summary         Progress towards goals / Updated goals:  Short Term Goals: To be accomplished in 1 weeks:  Goal: Pt to be compliant with initial HEP to improve right hip mobility, glute strength. Status at last note/certification: Established and reviewed with Pt  Current status: met - Pt compliant, adjusted for Pt comfort  Long Term Goals: To be accomplished in 5 weeks:  Goal: Pt to exhibit 5/5 right hip strength grossly without pain for ease of negotiating stairs and squatting to  objects. Status at last note/certification: 5/5, except 4+/5 hip ABD - all with pain   Current status: reassess closer to MD note  Goal: Pt to report no difficulty with no difficulty with usual activities to be able to assist spouse with household chores. Status at last note/certification: quite a bit of difficulty  Current status: progressing, reported limited pain earlier in the week but recent increase, no overall change  Goal: Pt to report at least 50% improvement in right hip/buttock symptoms to increase activity tolerance in home, community. Status at last note/certification: N/A  Current status: reassess at MD note  Goal: Pt to report < 4/10 pain at worst to increase ease with ADLs. Status at last note/certification: 8/10 pain at worst  Current status: progressing, around 7.5/10  Goal: Pt to report FOTO score of 50 pts to show improved function and quality of life.   Status at last note/certification: MSCO 67 LWX   Current status: reassess at MD note    PLAN  [x]  Upgrade activities as tolerated     [x]  Continue plan of care  []  Update interventions per flow sheet       []  Discharge due to:_  []  Other:_      Salima Gibson, MARIANNA 11/13/2020  1:13 PM    Future Appointments   Date Time Provider Roslyn Luna   11/13/2020  1:30 PM Cherelle Sinclair, PT [facility] 11/16/2020 10:00 AM Chhaya Mike MD [facility] BS AMB   11/17/2020 11:15 AM Zan Nick, [facility]   11/19/2020 12:00 PM Zan Nick, [facility]   11/23/2020 12:00 PM Zan Nick, [facility]   11/25/2020 12:00 PM Zan Nick, [facility]   12/14/2020 11:00 AM Sarahy Masters MD [facility] OpenPM   12/23/2020  1:45 PM HBV NAMITA RM 4 3D [facility] HBV   12/28/2020  8:05 AM IOC NURSE VISIT [facility] BS AMB   1/8/2021 10:30 AM Chhaya Mike MD [facility] BS AMB   2/23/2021 11:30 AM HBV INFUSION PHLEBOTOMIST [facility]   2/24/2021 11:00 AM HBV FAST TRACK NURSE [facility]

## 2020-11-16 ENCOUNTER — HOSPITAL ENCOUNTER (OUTPATIENT)
Dept: LAB | Age: 60
Discharge: HOME | End: 2020-11-16
Payer: MEDICARE

## 2020-11-16 ENCOUNTER — TRANSCRIBE ORDER (OUTPATIENT)
Dept: SCHEDULING | Age: 60
End: 2020-11-16

## 2020-11-16 ENCOUNTER — OFFICE VISIT (OUTPATIENT)
Dept: INTERNAL MEDICINE CLINIC | Age: 60
End: 2020-11-16
Payer: MEDICARE

## 2020-11-16 VITALS
HEART RATE: 97 BPM | TEMPERATURE: 98.4 F | BODY MASS INDEX: 25.95 KG/M2 | WEIGHT: 141 LBS | SYSTOLIC BLOOD PRESSURE: 130 MMHG | OXYGEN SATURATION: 98 % | HEIGHT: 62 IN | DIASTOLIC BLOOD PRESSURE: 70 MMHG | RESPIRATION RATE: 20 BRPM

## 2020-11-16 DIAGNOSIS — M79.7 FIBROMYALGIA: ICD-10-CM

## 2020-11-16 DIAGNOSIS — R55 MICTURITION SYNCOPE: Primary | ICD-10-CM

## 2020-11-16 DIAGNOSIS — E78.5 HYPERLIPIDEMIA LDL GOAL <160: ICD-10-CM

## 2020-11-16 DIAGNOSIS — E55.9 VITAMIN D DEFICIENCY: ICD-10-CM

## 2020-11-16 DIAGNOSIS — J45.21 MILD INTERMITTENT ASTHMA WITH ACUTE EXACERBATION: ICD-10-CM

## 2020-11-16 DIAGNOSIS — M46.1 SACROILIITIS, NOT ELSEWHERE CLASSIFIED (HCC): ICD-10-CM

## 2020-11-16 DIAGNOSIS — N30.10 CHRONIC INTERSTITIAL CYSTITIS: ICD-10-CM

## 2020-11-16 DIAGNOSIS — Z13.6 ENCOUNTER FOR SPECIAL SCREENING EXAMINATION FOR CARDIOVASCULAR DISORDER: Primary | ICD-10-CM

## 2020-11-16 DIAGNOSIS — R94.31 ABNORMAL EKG: ICD-10-CM

## 2020-11-16 DIAGNOSIS — J45.20 MILD INTERMITTENT ASTHMA WITHOUT COMPLICATION: ICD-10-CM

## 2020-11-16 DIAGNOSIS — F41.9 ANXIETY: ICD-10-CM

## 2020-11-16 LAB
25(OH)D3 SERPL-MCNC: 78 NG/ML (ref 30–100)
ALBUMIN SERPL-MCNC: 4.2 G/DL (ref 3.4–5)
ALBUMIN/GLOB SERPL: 1.3 {RATIO} (ref 0.8–1.7)
ALP SERPL-CCNC: 30 U/L (ref 45–117)
ALT SERPL-CCNC: 47 U/L (ref 13–56)
ANION GAP SERPL CALC-SCNC: 6 MMOL/L (ref 3–18)
AST SERPL-CCNC: 20 U/L (ref 10–38)
BASOPHILS # BLD: 0 K/UL (ref 0–0.1)
BASOPHILS NFR BLD: 0 % (ref 0–2)
BILIRUB SERPL-MCNC: 0.3 MG/DL (ref 0.2–1)
BUN SERPL-MCNC: 19 MG/DL (ref 7–18)
BUN/CREAT SERPL: 26 (ref 12–20)
CALCIUM SERPL-MCNC: 9.2 MG/DL (ref 8.5–10.1)
CHLORIDE SERPL-SCNC: 97 MMOL/L (ref 100–111)
CHOLEST SERPL-MCNC: 361 MG/DL
CO2 SERPL-SCNC: 30 MMOL/L (ref 21–32)
CREAT SERPL-MCNC: 0.74 MG/DL (ref 0.6–1.3)
DIFFERENTIAL METHOD BLD: ABNORMAL
EOSINOPHIL # BLD: 0.1 K/UL (ref 0–0.4)
EOSINOPHIL NFR BLD: 1 % (ref 0–5)
ERYTHROCYTE [DISTWIDTH] IN BLOOD BY AUTOMATED COUNT: 12.4 % (ref 11.6–14.5)
GLOBULIN SER CALC-MCNC: 3.2 G/DL (ref 2–4)
GLUCOSE SERPL-MCNC: 96 MG/DL (ref 74–99)
HCT VFR BLD AUTO: 41.2 % (ref 35–45)
HDLC SERPL-MCNC: 70 MG/DL (ref 40–60)
HDLC SERPL: 5.2 {RATIO} (ref 0–5)
HGB BLD-MCNC: 14.2 G/DL (ref 12–16)
LDLC SERPL CALC-MCNC: 248.6 MG/DL (ref 0–100)
LIPID PROFILE,FLP: ABNORMAL
LYMPHOCYTES # BLD: 1.4 K/UL (ref 0.9–3.6)
LYMPHOCYTES NFR BLD: 21 % (ref 21–52)
MCH RBC QN AUTO: 28.6 PG (ref 24–34)
MCHC RBC AUTO-ENTMCNC: 34.5 G/DL (ref 31–37)
MCV RBC AUTO: 82.9 FL (ref 74–97)
MONOCYTES # BLD: 0.4 K/UL (ref 0.05–1.2)
MONOCYTES NFR BLD: 5 % (ref 3–10)
NEUTS SEG # BLD: 4.8 K/UL (ref 1.8–8)
NEUTS SEG NFR BLD: 73 % (ref 40–73)
PLATELET # BLD AUTO: 334 K/UL (ref 135–420)
PMV BLD AUTO: 9.1 FL (ref 9.2–11.8)
POTASSIUM SERPL-SCNC: 4.6 MMOL/L (ref 3.5–5.5)
PROT SERPL-MCNC: 7.4 G/DL (ref 6.4–8.2)
RBC # BLD AUTO: 4.97 M/UL (ref 4.2–5.3)
SODIUM SERPL-SCNC: 133 MMOL/L (ref 136–145)
T4 FREE SERPL-MCNC: 0.8 NG/DL (ref 0.7–1.5)
TRIGL SERPL-MCNC: 212 MG/DL (ref ?–150)
TSH SERPL DL<=0.05 MIU/L-ACNC: 1.1 UIU/ML (ref 0.36–3.74)
VLDLC SERPL CALC-MCNC: 42.4 MG/DL
WBC # BLD AUTO: 6.6 K/UL (ref 4.6–13.2)

## 2020-11-16 PROCEDURE — 93005 ELECTROCARDIOGRAM TRACING: CPT | Performed by: INTERNAL MEDICINE

## 2020-11-16 PROCEDURE — 80053 COMPREHEN METABOLIC PANEL: CPT

## 2020-11-16 PROCEDURE — 36415 COLL VENOUS BLD VENIPUNCTURE: CPT

## 2020-11-16 PROCEDURE — G9899 SCRN MAM PERF RSLTS DOC: HCPCS | Performed by: INTERNAL MEDICINE

## 2020-11-16 PROCEDURE — 80061 LIPID PANEL: CPT

## 2020-11-16 PROCEDURE — G8419 CALC BMI OUT NRM PARAM NOF/U: HCPCS | Performed by: INTERNAL MEDICINE

## 2020-11-16 PROCEDURE — G9717 DOC PT DX DEP/BP F/U NT REQ: HCPCS | Performed by: INTERNAL MEDICINE

## 2020-11-16 PROCEDURE — 84443 ASSAY THYROID STIM HORMONE: CPT

## 2020-11-16 PROCEDURE — G0463 HOSPITAL OUTPT CLINIC VISIT: HCPCS | Performed by: INTERNAL MEDICINE

## 2020-11-16 PROCEDURE — 93010 ELECTROCARDIOGRAM REPORT: CPT | Performed by: INTERNAL MEDICINE

## 2020-11-16 PROCEDURE — G8427 DOCREV CUR MEDS BY ELIG CLIN: HCPCS | Performed by: INTERNAL MEDICINE

## 2020-11-16 PROCEDURE — 3017F COLORECTAL CA SCREEN DOC REV: CPT | Performed by: INTERNAL MEDICINE

## 2020-11-16 PROCEDURE — 99214 OFFICE O/P EST MOD 30 MIN: CPT | Performed by: INTERNAL MEDICINE

## 2020-11-16 PROCEDURE — 84439 ASSAY OF FREE THYROXINE: CPT

## 2020-11-16 PROCEDURE — 82306 VITAMIN D 25 HYDROXY: CPT

## 2020-11-16 PROCEDURE — 85025 COMPLETE CBC W/AUTO DIFF WBC: CPT

## 2020-11-16 RX ORDER — OMEPRAZOLE 40 MG/1
40 CAPSULE, DELAYED RELEASE ORAL DAILY
COMMUNITY
End: 2021-05-27 | Stop reason: SDUPTHER

## 2020-11-16 RX ORDER — METHENAMINE, SODIUM PHOSPHATE, MONOBASIC, MONOHYDRATE, PHENYL SALICYLATE, METHYLENE BLUE, AND HYOSCYAMINE SULFATE 118; 40.8; 36; 10; .12 MG/1; MG/1; MG/1; MG/1; MG/1
CAPSULE ORAL
COMMUNITY
Start: 2019-12-13 | End: 2020-12-14 | Stop reason: SDUPTHER

## 2020-11-16 NOTE — PROGRESS NOTES
Patient is in the office today for passing out about 2 weeks ago. Patient states her  checked her BP and it was 99/61, 99/58, and 106/64. Patient states she still has some head pain. 1. Have you been to the ER, urgent care clinic since your last visit? Hospitalized since your last visit? No    2. Have you seen or consulted any other health care providers outside of the [address] [place] [address] since your last visit? Include any pap smears or colon screening. Yes, Dr. Manasa Akhtar, Dr. Brodie Schmid and [facility]YN.

## 2020-11-16 NOTE — PATIENT INSTRUCTIONS

## 2020-11-17 ENCOUNTER — HOSPITAL ENCOUNTER (OUTPATIENT)
Dept: PHYSICAL THERAPY | Age: 60
Discharge: HOME | End: 2020-11-17
Payer: MEDICARE

## 2020-11-17 PROCEDURE — 97140 MANUAL THERAPY 1/> REGIONS: CPT

## 2020-11-17 PROCEDURE — 97112 NEUROMUSCULAR REEDUCATION: CPT

## 2020-11-17 NOTE — PROGRESS NOTES
PT DAILY TREATMENT NOTE     Patient Name: Diane Massey  Date:2020  : 1960  [x]  Patient  Verified  Payor: [place] MEDICARE / Plan: VA MEDICARE PART A & B / Product Type: Medicare /    In time:11:47  Out time:12:20  Total Treatment Time (min): 33  Visit #: 6 of 10    Medicare/BCBS Only   Total Timed Codes (min):  33 1:1 Treatment Time:  33       Treatment Area: Pain in right hip [M25.551]  Chronic SI joint pain [M53.3, G89.29]    SUBJECTIVE  Pain Level (0-10 scale): 6.5-7/10  Any medication changes, allergies to medications, adverse drug reactions, diagnosis change, or new procedure performed?: [x] No    [] Yes (see summary sheet for update)  Subjective functional status/changes:   [] No changes reported  \"I woke up this morning and was hurting from my right hip up to my lower back. I have triggers with the FMS and this area is one of them, so I don't know if its just the hip or the FMS too. I think my HEP also causes me more pain. I felt better after the last couple of sessions but then after doing the HEP, I had some pain again. \"     OBJECTIVE    25 min Neuromuscular Re-education:  [x]  See flow sheet :   Rationale: increase strength, improve coordination and increase proprioception  to improve the patients ability to improve core, glute activation and stability with functional tasks in order to reduce lumbar strain     8 min Manual Therapy:  lumbopelvic alignment reassessment; MET leg pull to correct right ilial upslip; MET to correct right posteriorly rotated innominate   The manual therapy interventions were performed at a separate and distinct time from the therapeutic activities interventions.   Rationale: decrease pain, increase ROM and increase tissue extensibility to improve pelvic stability, ease of standing/amb       With   [] TE   [] TA   [] neuro   [] other: Patient Education: [x] Review HEP    [] Progressed/Changed HEP based on:   [] positioning   [] body mechanics   [] transfers   [] heat/ice application    [] other:      Other Objective/Functional Measures: Continued with current regimen at reduced repetitions. Updated HEP and reviewed with Pt. Pain Level (0-10 scale) post treatment: 6/10    ASSESSMENT/Changes in Function: Pt continues to note flares in right hip pain and exhibits continued alignment obliquities. Pt did admit that she sits with right leg bent under body most of the time at home and that her dog lays on her right hip, shifting her into a twisted position when watching television. Pt advised to adjust positioning as this may help with alignment faults and pain. Pt also removed heel lift to determine need for use since alignment has been corrected in PT sessions. Patient will continue to benefit from skilled PT services to modify and progress therapeutic interventions, address functional mobility deficits, address ROM deficits, address strength deficits, analyze and address soft tissue restrictions, analyze and cue movement patterns, analyze and modify body mechanics/ergonomics, assess and modify postural abnormalities, address imbalance/dizziness and instruct in home and community integration to attain remaining goals. []  See Plan of Care  []  See progress note/recertification  []  See Discharge Summary         Progress towards goals / Updated goals:  Short Term Goals: To be accomplished in 1 weeks:  Goal: Pt to be compliant with initial HEP to improve right hip mobility, glute strength. Status at last note/certification: Established and reviewed with Pt  Current status: met - Pt compliant, adjusted for Pt comfort  Long Term Goals: To be accomplished in 5 weeks:  Goal: Pt to exhibit 5/5 right hip strength grossly without pain for ease of negotiating stairs and squatting to  objects.   Status at last note/certification: 5/5, except 4+/5 hip ABD - all with pain   Current status: reassess closer to MD note  Goal: Pt to report no difficulty with no difficulty with usual activities to be able to assist spouse with household chores. Status at last note/certification: quite a bit of difficulty  Current status: progressing, reported limited pain earlier in the week but recent increase, no overall change  Goal: Pt to report at least 50% improvement in right hip/buttock symptoms to increase activity tolerance in home, community. Status at last note/certification: N/A  Current status: reassess at MD note  Goal: Pt to report < 4/10 pain at worst to increase ease with ADLs. Status at last note/certification: 8/10 pain at worst  Current status: progressing, around 7.5/10  Goal: Pt to report FOTO score of 50 pts to show improved function and quality of life.   Status at last note/certification: LUDIN 88 TNY   Current status: reassess at MD note    PLAN  [x]  Upgrade activities as tolerated     [x]  Continue plan of care  []  Update interventions per flow sheet       []  Discharge due to:_  []  Other:_      Nguyễn Nick PT 11/17/2020  1:13 PM    Future Appointments   Date Time Provider Roslyn Luna   11/19/2020 12:00 PM Nguyễn Nick, PT [facility]   11/20/2020  7:30 AM HBV CT RM 1 HBVRCT HBV   11/23/2020 12:00 PM [facility]   11/25/2020 12:00 PM Nguyễn Nick, PT [facility]   12/14/2020 11:00 AM Brittany Barahona MD [facility] OpenPM   12/23/2020  1:45 PM HBV NAMITA RM 4 3D [facility] HBV   2/23/2021 11:30 AM HBV INFUSION PHLEBOTOMIST [facility] HBV   2/24/2021 11:00 AM HBV FAST TRACK NURSE [facility] HBV   5/17/2021  8:15 AM IOC NURSE VISIT [facility] BS AMB   5/24/2021 11:00 AM Dave Castaneda MD [facility] BS AMB

## 2020-11-19 ENCOUNTER — HOSPITAL ENCOUNTER (OUTPATIENT)
Dept: PHYSICAL THERAPY | Age: 60
Discharge: HOME | End: 2020-11-19
Payer: MEDICARE

## 2020-11-19 PROCEDURE — 97112 NEUROMUSCULAR REEDUCATION: CPT

## 2020-11-19 PROCEDURE — 97530 THERAPEUTIC ACTIVITIES: CPT

## 2020-11-19 NOTE — PROGRESS NOTES
PT DAILY TREATMENT NOTE     Patient Name: Jayde Miller  Date:2020  : 1960  [x]  Patient  Verified  Payor: [place] MED[place]ARE / Plan: [place] MEDICARE PART A & B / Product Type: Medicare /    In time:12:00  Out time:12:35  Total Treatment Time (min): 35  Visit #: 7 of 10    Medicare/BCBS Only   Total Timed Codes (min):  35 1:1 Treatment Time:  35       Treatment Area: Pain in right hip [M25.551]  Chronic SI joint pain [M53.3, G89.29]    SUBJECTIVE  Pain Level (0-10 scale): 10  Any medication changes, allergies to medications, adverse drug reactions, diagnosis change, or new procedure performed?: [x] No    [] Yes (see summary sheet for update)  Subjective functional status/changes:   [] No changes reported  \"I still feel it along my right lower back and right hip in the mornings. I am still taking the Meloxicam.\"     OBJECTIVE    27 min Neuromuscular Re-education:  [x]  See flow sheet :   Rationale: increase strength, improve coordination and increase proprioception  to improve the patients ability to improve core, glute activation and stability with functional tasks in order to reduce lumbar strain     8 min Therapeutic Activity:  []  See flow sheet : instructed Pt in MET correction for right posteriorly rotated innominate to correct pelvic alignment   Rationale: increase ROM  to improve the patients ability to improve ambulation and weight bearing activities with less pain       With   [] TE   [] TA   [] neuro   [] other: Patient Education: [x] Review HEP    [] Progressed/Changed HEP based on:   [] positioning   [] body mechanics   [] transfers   [] heat/ice application    [] other:      Other Objective/Functional Measures: Increased holds for exercises but did not increase repetitions to focus on improving stability. Added gentle stretching for HS and calves.     Pain Level (0-10 scale) post treatment: 5.5/10    ASSESSMENT/Changes in Function: Pt noted increased challenge with progression in exercises today, noting more muscle soreness at end of session. Added stretches to reduce muscle tension. Pt noted slight increase in pain overall after session only. Pt reported having cramps in right calf intermittently at night while sleeping - which is normal for her due to restless leg syndrome - but now having lingering muscle soreness for a couple of days afterwards. Pt advised to incorporate gentle stretching daily to assist with reducing muscle tightness. Patient will continue to benefit from skilled PT services to modify and progress therapeutic interventions, address functional mobility deficits, address ROM deficits, address strength deficits, analyze and address soft tissue restrictions, analyze and cue movement patterns, analyze and modify body mechanics/ergonomics, assess and modify postural abnormalities, address imbalance/dizziness and instruct in home and community integration to attain remaining goals. []  See Plan of Care  []  See progress note/recertification  []  See Discharge Summary         Progress towards goals / Updated goals:  Short Term Goals: To be accomplished in 1 weeks:  Goal: Pt to be compliant with initial HEP to improve right hip mobility, glute strength. Status at last note/certification: Established and reviewed with Pt  Current status: met - Pt compliant, adjusted for Pt comfort  Long Term Goals: To be accomplished in 5 weeks:  Goal: Pt to exhibit 5/5 right hip strength grossly without pain for ease of negotiating stairs and squatting to  objects. Status at last note/certification: 5/5, except 4+/5 hip ABD - all with pain   Current status: reassess closer to MD note  Goal: Pt to report no difficulty with no difficulty with usual activities to be able to assist spouse with household chores.   Status at last note/certification: quite a bit of difficulty  Current status: progressing - more moderate difficulty now as pain levels are slowly decreasing with activities, walking  Goal: Pt to report at least 50% improvement in right hip/buttock symptoms to increase activity tolerance in home, community. Status at last note/certification: N/A  Current status: progressing - 50% improved with therapy and use of mediction  Goal: Pt to report < 4/10 pain at worst to increase ease with ADLs. Status at last note/certification: 8/10 pain at worst  Current status: progressing - 6.5/10 pain at worst  Goal: Pt to report FOTO score of 50 pts to show improved function and quality of life.   Status at last note/certification: DZHX 01 AZZ   Current status: reassess at MD note    PLAN  [x]  Upgrade activities as tolerated     [x]  Continue plan of care  []  Update interventions per flow sheet       []  Discharge due to:_  []  Other:_      Russell Nick, PT 11/19/2020  1:13 PM    Future Appointments   Date Time Provider Roslyn Luna   11/20/2020  7:30 AM HBV CT RM 1 [facility]   11/23/2020 12:00 PM Russell Nick, PT [facility]   11/25/2020 12:00 PM Russell Nick, PT [facility]   12/4/2020  8:40 AM DO JAYMIE Javier [facility] BS AMB   12/14/2020 11:00 AM Kike Sandoval MD [facility]   12/23/2020  1:45 PM HBV NAMITA RM 4 3D [facility] HBV   2/23/2021 11:30 AM HBV INFUSION PHLEBOTOMIST [facility] HBV   2/24/2021 11:00 AM HBV FAST TRACK NURSE [facility] HBV   5/17/2021  8:15 AM [facility] NURSE VISIT [facility] BS AMB   5/24/2021 11:00 AM Otf Castaneda MD IO BS AMB

## 2020-11-20 ENCOUNTER — HOSPITAL ENCOUNTER (OUTPATIENT)
Dept: CT IMAGING | Age: 60
Discharge: HOME | End: 2020-11-20
Attending: INTERNAL MEDICINE
Payer: SELF-PAY

## 2020-11-20 ENCOUNTER — TELEPHONE (OUTPATIENT)
Dept: INTERNAL MEDICINE CLINIC | Age: 60
End: 2020-11-20

## 2020-11-20 DIAGNOSIS — Z13.6 ENCOUNTER FOR SPECIAL SCREENING EXAMINATION FOR CARDIOVASCULAR DISORDER: ICD-10-CM

## 2020-11-20 PROCEDURE — 75571 CT HRT W/O DYE W/CA TEST: CPT

## 2020-11-20 NOTE — TELEPHONE ENCOUNTER
Pt stated she just completed phone call with Dr Mey Carpenter re: lab results. She said the day the labs were collected she was in for an office visit for an issue. Stated she had not fasted, said she didn't think about it and no one asked her if she was fasting     She is asking if that would have affected her labs.  Stated her cholesterol lab results were higher than usual.

## 2020-11-20 NOTE — TELEPHONE ENCOUNTER
Pt aware Na on low side. This is most likely due to Trileptal, Will continue to monitor. Pt takes vit D 76265 units/week. She will skip 1 week per month to decrease vit D which is high. She did Heart Scan today so will see if her high LDL needs to be treated. Pt also has f/u appt with cardiology soon.

## 2020-11-21 NOTE — PROGRESS NOTES
Gwen Fraser is a 62 y.o.  female and presents with Loss of Consciousness; Anxiety; Cholesterol Problem; and Vitamin D Deficiency      SUBJECTIVE:    Syncope  Patient complains of syncope. Onset was 2 weeks ago, with improving course since that time. Patient describes the episode as sudden loss of consciousness without warning and syncope occurred in setting of within several minutes of arising from sitting position after urinating . Patient also has associated symptoms of  general feeling of lightheadedness. Pt said she hit her head but never went to ER and does not have any residual symptoms. Pt also checked her BP which was low at 90/60 . The patient denies visual aura, headache, nausea. Taking culprit meds?: No suspect medications. Patient admits that she may not be hydrating herself as well as she should drink admitted before cups of water a day. Patient also has a history of borderline low sodium levels which may be related to her Trileptal.    Patient has history of very high LDL that has been over 200 in the past.  Patient has never been on a statin medication. Recommended to her considering doing a heart scan to see if a statin needs to be considered. Her HDL is good at 70. Patient's EKG today was not completely normal so we will go ahead and refer her to cardiology for more complete evaluation including echocardiogram.  Patient has a history of asthma moderate persistent for which she uses Asmanex and Singulair and Spiriva. Patient is followed by pulmonary. Patient is also followed by the spine center as well as pain management with Dr. Lylia Mcardle. She is on Trileptal and Neurontin to help manage chronic pain and fibromyalgia. Patient is followed by Dr. Jackeline Issa from psychiatry and remains on Cymbalta 220 mg daily, trazodone and Ativan 2 mg 3 times a day. Patient also has interstitial cystitis for which she follows urology.         Respiratory ROS: negative for - shortness of breath  Cardiovascular ROS: negative for - chest pain    Current Outpatient Medications   Medication Sig    mth-me blue-sod phos-phsal-hyo (UribeL) 118-10-40.8-36 mg cap capsule     omeprazole (PRILOSEC) 40 mg capsule Take 40 mg by mouth daily.  mometasone (Asmanex Twisthaler) 220 mcg/ actuation (30) inhaler Take 1 Puff by inhalation daily.  omeprazole (PRILOSEC) 40 mg capsule Take 1 Cap by mouth daily.  Ventolin HFA 90 mcg/actuation inhaler INHALE 2 PUFFS BY INHALATION EVERY 6 HOURS AS NEEDED FOR WHEEZING    montelukast (SINGULAIR) 10 mg tablet Take 1 Tab by mouth daily.  digestive 8-L.acidoph-pectin (DIGESTIVE ENZYME, ACIDOPH,PEC,) 50 million cell-100 mg tab Take 1 Tab by mouth daily.  tiotropium bromide (SPIRIVA RESPIMAT) 2.5 mcg/actuation inhaler Take 2 Puffs by inhalation daily. Indications: Prevention of Bronchospasm with Chronic Bronchitis    gabapentin (NEURONTIN) 800 mg tablet take 1 tablet by mouth three times a day    denosumab (PROLIA) 60 mg/mL injection 60 mg by SubCUTAneous route. 2 times yearly    OXcarbazepine (TRILEPTAL) 150 mg tablet Take 1 Tab by mouth three (3) times daily.  calcium glycerophosphate (PRELIEF) 65 mg tab Take 1 Tab by mouth daily.  meloxicam (MOBIC) 15 mg tablet take 1 tablet by mouth once daily    modafinil (PROVIGIL) 200 mg tablet take 1 tablet by mouth twice a day for 30 DAYS maximum daily dose of 2 tablets (Patient taking differently: Take 200 mg by mouth daily.)    ergocalciferol (ERGOCALCIFEROL) 50,000 unit capsule take 1 capsule by mouth TWO (2) TIMES A WEEK  Indications: PREVENTION OF VITAMIN D DEFICIENCY (Patient taking differently: Take 50,000 Units by mouth every seven (7) days. take 1 capsule by mouth once a week  Indications: Prevention of Vitamin D Deficiency)    B.infantis-B.ani-B.long-B.bifi (PROBIOTIC 4X) 10-15 mg TbEC Take 1 Tab by mouth daily.  ENZYMES,DIGESTIVE, PLANT, (DIGESTIVE ENZYMES, PLANT, PO) Take 1 Tab by mouth daily.     calcium citrate-vitamin D3 (CITRACAL + D) tablet Take 1 Tab by mouth daily.  LORazepam (ATIVAN) 2 mg tablet Take 2 mg by mouth every eight (8) hours.  DULoxetine (CYMBALTA) 60 mg capsule Take 120 mg by mouth daily.  simethicone (PHAZYME) 180 mg cap Take 225 mg by mouth as needed.  trazodone (DESYREL) 100 mg tablet Take 150 mg by mouth nightly.  folic acid (FOLVITE) 1 mg tablet Take 1 mg by mouth two (2) times a day.  [facility]-me blue-sod phos-phsal-hyo (VILAMIT MB) 118-10-40.8-36 mg cap capsule Take 1 Cap by mouth four (4) times daily.  pentosan polysulfate sodium (ELMIRON) 100 mg capsule Take 1 Cap by mouth two (2) times a day. Indications: Bladder Pain, bladder wall inflammation    OTHER Indications: Compound cream for inflammation     No current facility-administered medications for this visit.           OBJECTIVE:  alert, well appearing, and in no distress  Visit Vitals  /70 (BP 1 Location: Right arm, BP Patient Position: Sitting)   Pulse 97   Temp 98.4 °F (36.9 °C) (Temporal)   Resp 20   Ht 5' 2\" (1.575 m)   Wt 141 lb (64 kg)   SpO2 98%   BMI 25.79 kg/m²      well developed and well nourished  Chest - clear to auscultation, no wheezes, rales or rhonchi, symmetric air entry  Heart - normal rate, regular rhythm, normal S1, S2, no murmurs, rubs, clicks or gallops  Extremities - peripheral pulses normal, no pedal edema, no clubbing or cyanosis        Labs:   Lab Results   Component Value Date/Time    WBC 6.6 11/16/2020 01:16 PM    HGB 14.2 11/16/2020 01:16 PM    Hemoglobin, POC 13.9 08/28/2019 11:13 AM    HCT 41.2 11/16/2020 01:16 PM    Hematocrit, POC 41 08/28/2019 11:13 AM    PLATELET 725 96/32/2036 01:16 PM    MCV 82.9 11/16/2020 01:16 PM     Lab Results   Component Value Date/Time    Cholesterol, total 361 (H) 11/16/2020 01:16 PM    HDL Cholesterol 70 (H) 11/16/2020 01:16 PM    LDL, calculated 248.6 (H) 11/16/2020 01:16 PM    Triglyceride 212 (H) 11/16/2020 01:16 PM    CHOL/HDL Ratio 5.2 (H) 11/16/2020 01:16 PM     Lab Results   Component Value Date/Time    TSH 1.10 11/16/2020 01:16 PM    T4, Free 0.8 11/16/2020 01:16 PM    T4, Total 8.1 08/29/2015 10:42 AM      Lab Results   Component Value Date/Time    Sodium 133 (L) 11/16/2020 01:16 PM    Potassium 4.6 11/16/2020 01:16 PM    Chloride 97 (L) 11/16/2020 01:16 PM    CO2 30 11/16/2020 01:16 PM    Anion gap 6 11/16/2020 01:16 PM    Glucose 96 11/16/2020 01:16 PM    BUN 19 (H) 11/16/2020 01:16 PM    Creatinine 0.74 11/16/2020 01:16 PM    BUN/Creatinine ratio 26 (H) 11/16/2020 01:16 PM    GFR est AA >60 11/16/2020 01:16 PM    GFR est non-AA >60 11/16/2020 01:16 PM    Calcium 9.2 11/16/2020 01:16 PM    Bilirubin, total 0.3 11/16/2020 01:16 PM    ALT (SGPT) 47 11/16/2020 01:16 PM    Alk. phosphatase 30 (L) 11/16/2020 01:16 PM    Protein, total 7.4 11/16/2020 01:16 PM    Albumin 4.2 11/16/2020 01:16 PM    Globulin 3.2 11/16/2020 01:16 PM    A-G Ratio 1.3 11/16/2020 01:16 PM          Assessment/Plan      ICD-10-CM ICD-9-CM    1. Micturition syncope  R55 780.2  if cardiac work-up is negative no further evaluation needed AMB POC EKG ROUTINE W/ 12 LEADS, INTER & REP   2. Abnormal EKG  R94.31 794.31 REFERRAL TO CARDIOLOGY for further evaluation   3. Vitamin D deficiency  E55.9 268.9  patient continues on vitamin D 50,000 units/week will check VITAMIN D, 25 HYDROXY   4. Hyperlipidemia LDL goal <160  E78.5 272.4  patient's LDL has been more than 200. Patient advised to consider heart scan to see if statin needed   5. Anxiety  F41.9 300.00  fairly well controlled. Patient followed by psychiatrist and continues on Ativan 2 mg 3 times daily and Cymbalta 120 mg daily   6. Fibromyalgia  M79.7 729.1  patient doing fairly well on Trileptal which is followed by pain management   7. Chronic interstitial cystitis  N30.10 595.1  patient managed by urology   8.  Mild intermittent asthma with acute exacerbation  J45.21 493.92  patient stable on Asmanex/Spiriva and Singulair and followed by pulmonary     Follow-up and Dispositions    · Return if symptoms worsen or fail to improve. Reviewed plan of care. Patient has provided input and agrees with goals.

## 2020-11-23 ENCOUNTER — HOSPITAL ENCOUNTER (OUTPATIENT)
Dept: PHYSICAL THERAPY | Age: 60
Discharge: HOME | End: 2020-11-23
Payer: MEDICARE

## 2020-11-23 PROCEDURE — 97112 NEUROMUSCULAR REEDUCATION: CPT

## 2020-11-23 PROCEDURE — 97530 THERAPEUTIC ACTIVITIES: CPT

## 2020-11-23 NOTE — PROGRESS NOTES
PT DAILY TREATMENT NOTE     Patient Name: Elder Whitehead  Date:2020  : 1960  [x]  Patient  Verified  Payor: [place] MEDICARE / Plan: [place] MEDICARE PART A & B / Product Type: Medicare /    In time:12:00  Out time:12:41  Total Treatment Time (min): 41  Visit #: 8 of 10    Medicare/BCBS Only   Total Timed Codes (min):  41 1:1 Treatment Time:  41       Treatment Area: Pain in right hip [M25.551]  Chronic SI joint pain [M53.3, G89.29]    SUBJECTIVE  Pain Level (0-10 scale): 3-410  Any medication changes, allergies to medications, adverse drug reactions, diagnosis change, or new procedure performed?: [x] No    [] Yes (see summary sheet for update)  Subjective functional status/changes:   [] No changes reported  \"I was sore for a couple of days after I was last here but I am noticing that the pain overall is less and more intermittent. I am not having to take the Tylenol with the Meloxicam so I think its coming along. \"     OBJECTIVE    31 min Neuromuscular Re-education:  [x]  See flow sheet :   Rationale: increase strength, improve coordination and increase proprioception  to improve the patients ability to improve core, glute activation and stability with functional tasks in order to reduce lumbar strain      10 min Therapeutic Activity:  []  See flow sheet : goals, FOTO   Rationale: increase ROM and increase strength  to improve the patients ability to increase ease of transfers, standing/amb without increased pain      With   [] TE   [] TA   [] neuro   [] other: Patient Education: [x] Review HEP    [] Progressed/Changed HEP based on:   [] positioning   [] body mechanics   [] transfers   [] heat/ice application    [] other:      Other Objective/Functional Measures: Performed exercises per flow sheet with increased repetitions. Reassessed goals for progress note.     Pain Level (0-10 scale) post treatment: 2/10    ASSESSMENT/Changes in Function: Pt is progressing steadily with skilled therapy thus far. Pt reports 50% improvement in pain level and notes less radicular symptoms down right lateral hip/thigh with standing/amb activities in home. Pt has exhibited neutral lumbopelvic alignment in last visit, showing beginnings of stabilization in pelvis. Patient will continue to benefit from skilled PT services to modify and progress therapeutic interventions, address functional mobility deficits, address ROM deficits, address strength deficits, analyze and address soft tissue restrictions, analyze and cue movement patterns, analyze and modify body mechanics/ergonomics, assess and modify postural abnormalities, address imbalance/dizziness and instruct in home and community integration to attain remaining goals. []  See Plan of Care  [x]  See progress note/recertification  []  See Discharge Summary         Progress towards goals / Updated goals:  Short Term Goals: To be accomplished in 1 weeks:  Goal: Pt to be compliant with initial HEP to improve right hip mobility, glute strength. Status at last note/certification: Established and reviewed with Pt  Current status: met - Pt compliant, adjusted for Pt comfort  Long Term Goals: To be accomplished in 5 weeks:  Goal: Pt to exhibit 5/5 right hip strength grossly without pain for ease of negotiating stairs and squatting to  objects. Status at last note/certification: 5/5, except 4+/5 hip ABD - all with pain  Current status: met - 5/5 right hip strength without pain increase  Goal: Pt to report no difficulty with no difficulty with usual activities to be able to assist spouse with household chores. Status at last note/certification: quite a bit of difficulty  Current status: progressing - more moderate difficulty now as pain levels are slowly decreasing with activities, walking  Goal: Pt to report at least 50% improvement in right hip/buttock symptoms to increase activity tolerance in home, community.   Status at last note/certification: N/A  Current status: progressing - 50% improved with therapy and use of mediction  Goal: Pt to report < 4/10 pain at worst to increase ease with ADLs. Status at last note/certification: 8/10 pain at worst  Current status: progressing - 6.5/10 pain at worst  Goal: Pt to report FOTO score of 50 pts to show improved function and quality of life.   Status at last note/certification: MNAI 18 IVR   Current status: met - FOTO 53 pts    PLAN  [x]  Upgrade activities as tolerated     [x]  Continue plan of care  []  Update interventions per flow sheet       []  Discharge due to:_  []  Other:_      Shu Nick, PT 11/23/2020  1:13 PM    Future Appointments   Date Time Provider Roslyn Luna   11/25/2020 12:00 PM Shu Nick, PT [facility]   12/4/2020  8:40 AM Inocencia Rao DO [facility] BS AMB   12/14/2020 11:00 AM Moris Ramos MD [facility]   12/23/2020  1:45 PM HBV NAMITA RM 4 3D [facility] HBV   2/23/2021 11:30 AM HBV INFUSION PHLEBOTOMIST [facility] HBV   2/24/2021 11:00 AM HBV FAST TRACK NURSE [facility] HBV   5/17/2021  8:15 AM IOC NURSE VISIT IO BS AMB   5/24/2021 11:00 AM Lorrie Castaneda MD IO BS AMB

## 2020-11-23 NOTE — PROGRESS NOTES
[facility], [address] [place] [address]  [phone] fax    Continued Plan of Care/ Re-certification for Physical Therapy Services      Patient name: Frances Aguirre Start of Care: 10/30/2020   Referral source: Kadi Castillo, * : 1960   Medical/Treatment Diagnosis: Pain in right hip [M25.551]  Chronic SI joint pain [M53.3, G89.29]  Payor: [place] MEDICARE / Plan: VA MEDICARE PART A & B / Product Type: Medicare /  Onset Date:2020     Prior Hospitalization: see medical history Provider#: [id number]   Medications: Verified on Patient Summary List    Comorbidities: Anxiety; Arthritis; Asthma; Back pain; Fibromyalgia (FMS); Interstitial Cystitis; Osteopenia; Sesamoiditis right toe   Prior Level of Function: Lives with spouse; functionally independent; Visits from Start of Care: 8    Missed Visits: 0    The Plan of Care and following information is based on the patient's current status:    Short Term Goals: To be accomplished in 1 weeks:  Goal: Pt to be compliant with initial HEP to improve right hip mobility, glute strength. Status at last note/certification: Established and reviewed with Pt  Current status: met - Pt compliant, adjusted for Pt comfort  Long Term Goals: To be accomplished in 5 weeks:  Goal: Pt to exhibit 5/5 right hip strength grossly without pain for ease of negotiating stairs and squatting to  objects. Status at last note/certification: 5/5, except 4+/5 hip ABD - all with pain  Current status: met - 5/5 right hip strength without pain increase  Goal: Pt to report no difficulty with no difficulty with usual activities to be able to assist spouse with household chores.   Status at last note/certification: quite a bit of difficulty  Current status: progressing - a little difficulty now as pain levels are slowly decreasing with activities, walking  Goal: Pt to report at least 50% improvement in right hip/buttock symptoms to increase activity tolerance in home, community. Status at last note/certification: N/A  Current status: met - 50% improved with therapy and use of mediction  Goal: Pt to report < 4/10 pain at worst to increase ease with ADLs. Status at last note/certification: 8/10 pain at worst  Current status: progressing - 6.5/10 pain at worst  Goal: Pt to report FOTO score of 50 pts to show improved function and quality of life. Status at last note/certification: MYNO 55 DGL   Current status: met - FOTO 53 pts    Key functional changes:  Pt is progressing steadily with skilled therapy thus far. Pt reports 50% improvement in pain level and notes less radicular symptoms down right lateral hip/thigh with standing/amb activities in home. Pt has exhibited neutral lumbopelvic alignment in last visit, showing beginnings of stabilization in pelvis. Pt is able to perform sweeping with less discomfort but does note that she still requires the use of Meloxicam for pain daily to assist with managing pain levels. Pt would benefit from continued skilled therapy to improve core/hip stability and reduce pain and dependence on pain medication for ADL function. Problems/ barriers to goal attainment: None     Problem List: pain affecting function, decrease ROM, decrease strength, edema affecting function, impaired gait/ balance, decrease ADL/ functional abilitiies, decrease activity tolerance, decrease flexibility/ joint mobility and decrease transfer abilities    Treatment Plan: Therapeutic exercise, Therapeutic activities, Neuromuscular re-education, Physical agent/modality, Gait/balance training, Manual therapy, Aquatic therapy, Patient education, Functional mobility training and Stair training    Patient Goal (s) has been updated and includes: \"Remove the pain. \"     Goals for this certification period to be accomplished in 4 weeks:  Goal: Pt to report no difficulty with no difficulty with usual activities to be able to assist spouse with household chores. Status at last note/certification:  a little difficulty now as pain levels are slowly decreasing with activities, walking  Current status:   Goal: Pt to report at least 75% improvement in right hip/buttock symptoms to increase activity tolerance in home, community without need for medication. Status at last note/certification: 50% improved with therapy and use of mediction  Current status:  Goal: Pt to report < 4/10 pain at worst to increase ease with ADLs. Status at last note/certification: 6.5/10 pain at worst  Current status:     Frequency / Duration: Patient to be seen 2 times per week for 4 weeks:    Assessment / Recommendations: Pt would benefit from continued skilled therapy to increase core/hip stability, further reduce pain and reduce dependence on pain medication for ability to perform ADLs in home, community. Certification Period: 11/28/20 - 12/27/20    Evelyn Nick, PT 11/23/2020 12:44 PM    ________________________________________________________________________  I certify that the above Therapy Services are being furnished while the patient is under my care. I agree with the treatment plan and certify that this therapy is necessary. [] I have read the above and request that my patient continue as recommended.   [] I have read the above report and request that my patient continue therapy with the following changes/special instructions: ______________________________________  [] I have read the above report and request that my patient be discharged from therapy    Physician's Signature:____________Date:_________TIME:________    ** Signature, Date and Time must be completed for valid certification **    Please sign and return to [facility] [address]  [phone] fax

## 2020-11-25 ENCOUNTER — HOSPITAL ENCOUNTER (OUTPATIENT)
Dept: PHYSICAL THERAPY | Age: 60
Discharge: HOME | End: 2020-11-25
Payer: MEDICARE

## 2020-11-25 PROCEDURE — 97112 NEUROMUSCULAR REEDUCATION: CPT

## 2020-11-25 PROCEDURE — 97140 MANUAL THERAPY 1/> REGIONS: CPT

## 2020-11-25 NOTE — PROGRESS NOTES
PT DAILY TREATMENT NOTE     Patient Name: Alfredo Yeboah  Date:2020  : 1960  [x]  Patient  Verified  Payor: VA MEDICARE / Plan: VA MEDICARE PART A & B / Product Type: Medicare /    In time:12:00  Out time:12:38  Total Treatment Time (min): 38  Visit #: 9 of 10    Medicare/BCBS Only   Total Timed Codes (min):  38 1:1 Treatment Time:  38       Treatment Area: Pain in right hip [M25.551]  Chronic SI joint pain [M53.3, G89.29]    SUBJECTIVE  Pain Level (0-10 scale): 4/10  Any medication changes, allergies to medications, adverse drug reactions, diagnosis change, or new procedure performed?: [x] No    [] Yes (see summary sheet for update)  Subjective functional status/changes:   [] No changes reported  \"The pain is more intermittent now. I feel it a little in the right hip and along the side of the thigh but only when I bend down to put a jacket on the dog or I forget and let him lay on my hip. \"     OBJECTIVE    30 min Neuromuscular Re-education:  [x]  See flow sheet :   Rationale: increase strength, improve coordination and increase proprioception  to improve the patients ability to improve core, glute activation and stability with functional tasks in order to reduce lumbar strain      8 min Manual Therapy:  MET to correct right posteriorly rotated innominate; shotgun maneuver   The manual therapy interventions were performed at a separate and distinct time from the therapeutic activities interventions. Rationale: decrease pain and increase ROM to improve ease of standing/amb activities      With   [] TE   [] TA   [] neuro   [] other: Patient Education: [x] Review HEP    [] Progressed/Changed HEP based on:   [] positioning   [] body mechanics   [] transfers   [] heat/ice application    [] other:      Other Objective/Functional Measures: Added 1/2 prone donkey kicks, hip ABD for hip strengthening.     Pain Level (0-10 scale) post treatment: 2-3/10    ASSESSMENT/Changes in Function: Pt is steadily gaining in right hip stability and reports more manageable pain with ADLs. Pt still taking Meloxicam to assist with pain mitigation but notices improvements in overall pain levels and function. Will continue to progress per Pt tolerance to work towards return to premorbid status. Patient will continue to benefit from skilled PT services to modify and progress therapeutic interventions, address functional mobility deficits, address ROM deficits, address strength deficits, analyze and address soft tissue restrictions, analyze and cue movement patterns, analyze and modify body mechanics/ergonomics, assess and modify postural abnormalities, address imbalance/dizziness and instruct in home and community integration to attain remaining goals. []  See Plan of Care  [x]  See progress note/recertification  []  See Discharge Summary         Progress towards goals / Updated goals:  Goal: Pt to report no difficulty with no difficulty with usual activities to be able to assist spouse with household chores. Status at last note/certification:  a little difficulty now as pain levels are slowly decreasing with activities, walking  Current status:   Goal: Pt to report at least 75% improvement in right hip/buttock symptoms to increase activity tolerance in home, community without need for medication. Status at last note/certification: 50% improved with therapy and use of mediction  Current status:  Goal: Pt to report < 4/10 pain at worst to increase ease with ADLs.   Status at last note/certification: 6.5/10 pain at worst  Current status:     PLAN  [x]  Upgrade activities as tolerated     [x]  Continue plan of care  []  Update interventions per flow sheet       []  Discharge due to:_  []  Other:_      Juan José Nick, PT 11/25/2020  1:13 PM    Future Appointments   Date Time Provider Roslyn Luna   12/1/2020 11:15 AM Juan José Nick, PT [facility]   12/3/2020  3:00 PM Juan José Nick, PT [facility]   12/4/2020  8:40 AM Taran Cunningham DO [facility] BS AMB   12/7/2020 11:15 AM Katie Nick, [facility]   12/10/2020  1:30 PM Katie Nick, [facility]   12/14/2020 11:00 AM Mireya Mcadams MD [facility]   12/16/2020 12:00 PM Katie Nick, [facility]   12/18/2020 12:00 PM Katie Nick, [facility]   12/23/2020  1:45 PM HBV NAMITA RM 4 3D [facility]   2/23/2021 11:30 AM HBV INFUSION PHLEBOTOMIST [facility]   2/24/2021 11:00 AM HBV FAST TRACK NURSE [facility]   5/17/2021  8:15 AM [facility] NURSE VISIT [facility] BS AMB   5/24/2021 11:00 AM Anastasiia Castaneda MD [facility] BS AMB

## 2020-12-01 ENCOUNTER — HOSPITAL ENCOUNTER (OUTPATIENT)
Dept: PHYSICAL THERAPY | Age: 60
Discharge: HOME | End: 2020-12-01
Payer: MEDICARE

## 2020-12-01 PROCEDURE — 97112 NEUROMUSCULAR REEDUCATION: CPT

## 2020-12-01 PROCEDURE — 97140 MANUAL THERAPY 1/> REGIONS: CPT

## 2020-12-01 PROCEDURE — 97530 THERAPEUTIC ACTIVITIES: CPT

## 2020-12-01 NOTE — PROGRESS NOTES
PT DAILY TREATMENT NOTE     Patient Name: Catarina Lambert  Date:2020  : 1960  [x]  Patient  Verified  Payor: VA MEDICARE / Plan: VA MEDICARE PART A & B / Product Type: Medicare /    In time:11:17  Out time:12:00  Total Treatment Time (min): 43  Visit #: 1 of 8    Medicare/BCBS Only   Total Timed Codes (min):  43 1:1 Treatment Time:  43       Treatment Area: Pain in right hip [M25.551]  Chronic SI joint pain [M53.3, G89.29]    SUBJECTIVE  Pain Level (0-10 scale): 0/10  Any medication changes, allergies to medications, adverse drug reactions, diagnosis change, or new procedure performed?: [x] No    [] Yes (see summary sheet for update)  Subjective functional status/changes:   [] No changes reported  \"The pain is more intermittent now. I feel it a little in the right hip and along the side of the thigh but only when I bend down to put a jacket on the dog or I forget and let him lay on my hip. \"     OBJECTIVE    33 min Neuromuscular Re-education:  [x]  See flow sheet :   Rationale: increase strength, improve coordination and increase proprioception  to improve the patients ability to improve core, glute activation and stability with functional tasks in order to reduce lumbar strain      10 min Therapeutic Activity:  []  See flow sheet : given HEP for UE strengthening and stretches to pair with core stabilization program for full workout   Rationale: increase ROM and increase strength  to improve the patients ability to improve postural strength and flexibility      With   [] TE   [x] TA   [] neuro   [] other: Patient Education: [x] Review HEP    [] Progressed/Changed HEP based on:   [] positioning   [] body mechanics   [] transfers   [] heat/ice application    [] other:      Other Objective/Functional Measures: Updated exercises per flow sheet to work towards further core/hip stabilization.     Pain Level (0-10 scale) post treatment: 0/10    ASSESSMENT/Changes in Function: Pt notes mild pain only this past Saturday but overall has not had much pain in right lateral hip. Pt able to perform progressed exercise interventions without increase in overall pain levels. Will continue to progress per Pt tolerance to work towards transitioning Pt to independent management. Patient will continue to benefit from skilled PT services to modify and progress therapeutic interventions, address functional mobility deficits, address ROM deficits, address strength deficits, analyze and address soft tissue restrictions, analyze and cue movement patterns, analyze and modify body mechanics/ergonomics, assess and modify postural abnormalities, address imbalance/dizziness and instruct in home and community integration to attain remaining goals. []  See Plan of Care  []  See progress note/recertification  []  See Discharge Summary         Progress towards goals / Updated goals:  Goal: Pt to report no difficulty with no difficulty with usual activities to be able to assist spouse with household chores. Status at last note/certification:  a little difficulty now as pain levels are slowly decreasing with activities, walking  Current status:   Goal: Pt to report at least 75% improvement in right hip/buttock symptoms to increase activity tolerance in home, community without need for medication. Status at last note/certification: 50% improved with therapy and use of mediction  Current status:  Goal: Pt to report < 4/10 pain at worst to increase ease with ADLs.   Status at last note/certification: 6.5/10 pain at worst  Current status:     PLAN  [x]  Upgrade activities as tolerated     [x]  Continue plan of care  []  Update interventions per flow sheet       []  Discharge due to:_  []  Other:_      Shu Nick PT 12/1/2020  1:13 PM    Future Appointments   Date Time Provider Roslyn Luna   12/3/2020  3:00 PM Shu Nick, [facility]   12/4/2020  8:40 AM Inocencia Rao DO [facility] BS AMB   12/7/2020 11:15 AM Ulisses Nick, [facility]   12/10/2020  1:30 PM Ulisses Nick, [facility]   12/14/2020 11:00 AM Tawanna Casas MD [facility]   12/16/2020 12:00 PM Ulisses Nick, [facility]   12/18/2020 12:00 PM Ulisses Nick, [facility]   12/23/2020  1:45 PM HBV NAMITA RM 4 3D [facility]   2/23/2021 11:30 AM HBV INFUSION PHLEBOTOMIST [facility]   2/24/2021 11:00 AM HBV FAST TRACK NURSE [facility]   5/17/2021  8:15 AM [facility] NURSE VISIT [facility] BS AMB   5/24/2021 11:00 AM Maninder Castaneda MD [facility] BS AMB

## 2020-12-03 ENCOUNTER — HOSPITAL ENCOUNTER (OUTPATIENT)
Dept: PHYSICAL THERAPY | Age: 60
Discharge: HOME | End: 2020-12-03
Payer: MEDICARE

## 2020-12-03 PROCEDURE — 97112 NEUROMUSCULAR REEDUCATION: CPT

## 2020-12-03 NOTE — PROGRESS NOTES
PT DAILY TREATMENT NOTE     Patient Name: Kezia Vega  Date:12/3/2020  : 1960  [x]  Patient  Verified  Payor: VA MEDICARE / Plan: VA MEDICARE PART A & B / Product Type: Medicare /    In time:3:00  Out time:3:43  Total Treatment Time (min): 43  Visit #: 2 of 8    Medicare/BCBS Only   Total Timed Codes (min):  38 1:1 Treatment Time:  38       Treatment Area: Pain in right hip [M25.551]  Chronic SI joint pain [M53.3, G89.29]    SUBJECTIVE  Pain Level (0-10 scale): 1/10  Any medication changes, allergies to medications, adverse drug reactions, diagnosis change, or new procedure performed?: [x] No    [] Yes (see summary sheet for update)  Subjective functional status/changes:   [] No changes reported  \"I think I forgot to take the Meloxicam yesterday because I woke up and had a little discomfort along the outside of the right hip but then by the afternoon it was bad. So, I took the Meloxicam and it started to calm things down. This morning I had pain but again after taking the medication it calmed things down. \"     OBJECTIVE    Modality rationale: increase tissue extensibility to improve the patients ability to increase ease of ADLs, reduce post therapy soreness   Min Type Additional Details    [] Estim:  []Unatt       []IFC  []Premod                        []Other:  []w/ice   []w/heat  Position:  Location:    [] Estim: []Att    []TENS instruct  []NMES                    []Other:  []w/US   []w/ice   []w/heat  Position:  Location:    []  Traction: [] Cervical       []Lumbar                       [] Prone          []Supine                       []Intermittent   []Continuous Lbs:  [] before manual  [] after manual    []  Ultrasound: []Continuous   [] Pulsed                           []1MHz   []3MHz Location:  W/cm2:    []  Iontophoresis with dexamethasone         Location: [] Take home patch   [] In clinic   5 []  Ice     [x]  heat  []  Ice massage  []  Laser   []  Anodyne Position: left S/L  Location: right lateral thigh    []  Laser with stim  []  Other: Position:  Location:    []  Vasopneumatic Device Pressure:       [] lo [] med [] hi   Temperature: [] lo [] med [] hi   [x] Skin assessment post-treatment:  [x]intact []redness- no adverse reaction    []redness  adverse reaction:     38 min Neuromuscular Re-education:  [x]  See flow sheet :   Rationale: increase strength, improve coordination and increase proprioception  to improve the patients ability to improve core, glute activation and stability with functional tasks in order to reduce lumbar strain      With   [] TE   [x] TA   [] neuro   [] other: Patient Education: [x] Review HEP    [] Progressed/Changed HEP based on:   [] positioning   [] body mechanics   [] transfers   [] heat/ice application    [] other:      Other Objective/Functional Measures: Performed exercises per flow sheet. Added TFL/ITB stretch in supine to address lateral thigh pain and tightness. Pain Level (0-10 scale) post treatment: 1/10    ASSESSMENT/Changes in Function: Performed interventions today without progressions to avoid further aggravation of hip pain. Held squats as Pt noted some tightness after exercises at [facility]. Applied MHP x 5 minutes to right lateral hip to address tightness and Pt able to report return to baseline of pain at end of session. Pt advised to take it easy over the weekend and status will be reassessed at next visit. Patient will continue to benefit from skilled PT services to modify and progress therapeutic interventions, address functional mobility deficits, address ROM deficits, address strength deficits, analyze and address soft tissue restrictions, analyze and cue movement patterns, analyze and modify body mechanics/ergonomics, assess and modify postural abnormalities, address imbalance/dizziness and instruct in home and community integration to attain remaining goals.      []  See Plan of Care  []  See progress note/recertification  []  See Discharge Summary         Progress towards goals / Updated goals:  Goal: Pt to report no difficulty with no difficulty with usual activities to be able to assist spouse with household chores. Status at last note/certification:  a little difficulty now as pain levels are slowly decreasing with activities, walking  Current status: reassess at MD note  Goal: Pt to report at least 75% improvement in right hip/buttock symptoms to increase activity tolerance in home, community without need for medication. Status at last note/certification: 50% improved with therapy and use of mediction  Current status: reassess at MD note  Goal: Pt to report < 4/10 pain at worst to increase ease with ADLs.   Status at last note/certification: 6.5/10 pain at worst  Current status: progressing - 5-6/10 pain at worst    PLAN  [x]  Upgrade activities as tolerated     [x]  Continue plan of care  []  Update interventions per flow sheet       []  Discharge due to:_  []  Other:_      Zan Nick PT 12/3/2020  1:13 PM    Future Appointments   Date Time Provider Roslyn Luna   12/4/2020  8:40 AM Jm Rodgers DO [facility] BS AMB   12/7/2020 11:15 AM Zan Nick [facility]   12/10/2020  1:30 PM Zan Nick [facility]   12/14/2020 11:00 AM Sarahy Masters MD [facility]   12/16/2020 12:00 PM Zan Nick [facility]   12/18/2020 12:00 PM Zan Nick [facility]   12/23/2020  1:45 PM HBV NAMITA RM 4 3D [facility]   2/23/2021 11:30 AM HBV INFUSION PHLEBOTOMIST [facility]   2/24/2021 11:00 AM HBV FAST TRACK NURSE [facility]   5/17/2021  8:15 AM [facility] NURSE VISIT [facility] BS AMB   5/24/2021 11:00 AM Dianne Castaneda MD [facility] BS AMB

## 2020-12-04 ENCOUNTER — OFFICE VISIT (OUTPATIENT)
Dept: CARDIOLOGY CLINIC | Age: 60
End: 2020-12-04
Payer: MEDICARE

## 2020-12-04 ENCOUNTER — TELEPHONE (OUTPATIENT)
Dept: CARDIOLOGY CLINIC | Age: 60
End: 2020-12-04

## 2020-12-04 ENCOUNTER — TELEPHONE (OUTPATIENT)
Dept: INTERNAL MEDICINE CLINIC | Age: 60
End: 2020-12-04

## 2020-12-04 VITALS
SYSTOLIC BLOOD PRESSURE: 130 MMHG | BODY MASS INDEX: 25.76 KG/M2 | WEIGHT: 140 LBS | HEIGHT: 62 IN | OXYGEN SATURATION: 97 % | HEART RATE: 94 BPM | DIASTOLIC BLOOD PRESSURE: 90 MMHG

## 2020-12-04 DIAGNOSIS — R06.09 DYSPNEA ON EXERTION: ICD-10-CM

## 2020-12-04 DIAGNOSIS — E78.5 HYPERLIPIDEMIA LDL GOAL <160: ICD-10-CM

## 2020-12-04 DIAGNOSIS — R55 SYNCOPE, UNSPECIFIED SYNCOPE TYPE: ICD-10-CM

## 2020-12-04 DIAGNOSIS — R55 SYNCOPE, UNSPECIFIED SYNCOPE TYPE: Primary | ICD-10-CM

## 2020-12-04 PROCEDURE — 99204 OFFICE O/P NEW MOD 45 MIN: CPT | Performed by: INTERNAL MEDICINE

## 2020-12-04 PROCEDURE — G9899 SCRN MAM PERF RSLTS DOC: HCPCS | Performed by: INTERNAL MEDICINE

## 2020-12-04 PROCEDURE — G8427 DOCREV CUR MEDS BY ELIG CLIN: HCPCS | Performed by: INTERNAL MEDICINE

## 2020-12-04 PROCEDURE — G9717 DOC PT DX DEP/BP F/U NT REQ: HCPCS | Performed by: INTERNAL MEDICINE

## 2020-12-04 PROCEDURE — 93000 ELECTROCARDIOGRAM COMPLETE: CPT | Performed by: INTERNAL MEDICINE

## 2020-12-04 PROCEDURE — G8419 CALC BMI OUT NRM PARAM NOF/U: HCPCS | Performed by: INTERNAL MEDICINE

## 2020-12-04 NOTE — TELEPHONE ENCOUNTER
Pt calling, says she [facility] [address]. She doesn't see it noted in her chart. She did ask them to send to doctor. She also had her flu shot done at same location. Doesn't remember the date. Wants to be sure Dr. Christina Jimenez was notified and chart updated.

## 2020-12-04 NOTE — PROGRESS NOTES
Alsamantha Miller presents today for   Chief Complaint   Patient presents with    New Patient     referred by PCP for abn ekg     Dizziness     syncope - 2 weeks ago x2        Jacqui Alvarenga [address] preferred language for health care discussion is english/other. Is someone accompanying this pt? no    Is the patient using any DME equipment during [address]? no    Depression Screening:  3 most recent PHQ Screens 12/20/2019   PHQ Not Done -   Little interest or pleasure in doing things Not at all   Feeling down, depressed, irritable, or hopeless Not at all   Total Score PHQ 2 0       Learning Assessment:  Learning Assessment 6/12/2019   PRIMARY LEARNER Patient   BARRIERS PRIMARY LEARNER [address] CAREGIVER No   PRIMARY LANGUAGE ENGLISH   LEARNER PREFERENCE PRIMARY READING     DEMONSTRATION   ANSWERED BY patient   RELATIONSHIP SELF       Abuse Screening:  Abuse Screening Questionnaire 6/12/2019   Do you ever feel afraid of your partner? N   Are you in a relationship with someone who physically or mentally threatens you? N   Is it safe for you to go home? Y       Fall Risk  Fall Risk Assessment, last 12 mths 12/20/2019   Able to walk? Yes   Fall in past 12 months? Yes   Fall with injury? Yes   Number of falls in past 12 months 8 or more   Fall Risk Score 9       Pt currently taking Anticoagulant therapy? no    Coordination of Care:  1. Have you been to the ER, urgent care clinic since your last visit? Hospitalized since your last visit? no    2. Have you seen or consulted any other health care providers outside of the [address] [place] [address] [place] since your last visit? Include any pap smears or colon screening.  no

## 2020-12-04 NOTE — LETTER
12/4/20 Patient: Blanche Cortés YOB: 1960 Date of Visit: 12/4/2020 Bg Mccall MD 
[address]  [address] [id number] [place] [address] VIA In Basket Dear Bg Mccall MD, Thank you for referring Ms. Hali Orozco to [address] [place] [address] for evaluation. My notes for this consultation are attached. If you have questions, please do not hesitate to call me. I look forward to following your patient along with you. Sincerely, Kia Sosa, DO

## 2020-12-04 NOTE — TELEPHONE ENCOUNTER
Pt came for syncope but at end of visit had several Qs  Was able to see that her LDL has consistently been above 200s for many years    I ended up calling and speaking to her on the phone and informed her of this  Recommended starting high intensity statin therapy, spent significant time explaining indications risks benefits and that I strongly believe she has an underlying lipid metabolism disorder like heterozygous familial hyperlipidemia    I personally did not need any additional information and would like to just take the opportunity to start her on medical therapy but she had a lot of reservations    She says she would feel more comfortable if she could repeat her fasting lipid panel she really believes she was not fasting when she last took it, again I very frankly told her her LDL has never been less than 200 that I can see though she believes she can remember being 170 when she was 20 pounds lighter.     Will ask my nurse to give her FLP and will call her with follow up

## 2020-12-04 NOTE — PROGRESS NOTES
[facility]    Chief Complaint   Patient presents with    New Patient     referred by PCP for abn ekg     Dizziness     syncope - 2 weeks ago x2        HPI    Mika Dey is a 62 y.o. female with no known heart disease no hypertension or diabetes is here for initial evaluation of syncope. As you know a couple weeks ago patient states she was not feeling well and woke up with a headache. For some unknown reason she felt somewhat nauseous. She apparently then went to urinate in the bathroom and soon after had a syncopal episode. Like she lost consciousness and the first time it was not witnessed but her  was home and came running up stairs. Tried to kind of get her up and then shortly after she passed out again. He took her blood pressure and it was 90s over 60s. They did not go to the ER she just laid down and drink water and she felt back to normal soon after. Has not happened again but she does tell me she has interstitial cystitis and has noticed she has had to strain a little bit more when urinating. She apparently has asthma and does use inhalers and this is the only setting in which she is ever felt any discomfort in her chest at rest when she needs to use an inhaler. CV RFs: +FH premature ASCVD in father?     Past Medical History:   Diagnosis Date    Allergy     Anxiety     Arthritis     Asthma     mild    Atypical ductal hyperplasia of breast     Atypical hyperplasia of breast 2003    right    Chronic depression     Chronic fatigue syndrome     Chronic interstitial cystitis     Chronic interstitial cystitis     Chronic pain     fibromyalgia    Constipation     Depression     Diarrhea     Difficulty walking     Dizziness     Dry eyes     Dry mouth     Dyspnea     Fibromyalgia     Fibromyalgia     Generalized stiffness     GERD (gastroesophageal reflux disease)     Headache(784.0)     Heartburn     Hyperlipidemia     IBS (irritable bowel syndrome)     IC (interstitial cystitis)     Imbalance     Incoordination     Insomnia     Irregular periods/menstrual cycles     Joint pain     Microscopic hematuria     Muscle pain     Myofascial pain syndrome     Numbness and tingling in hands     Ovarian cyst, left     Painful sex     Pneumonia     Psychiatric disorder     anxiety - depression    RLS (restless legs syndrome)     Stress     TMJ (temporomandibular joint syndrome)     Transient total loss of muscle tone     Uterine fibroid     Weakness generalized        Past Surgical History:   Procedure Laterality Date    ENDOSCOPY, COLON, DIAGNOSTIC  2013    10 years    HX BREAST BIOPSY  2003    right    HX COLONOSCOPY  2012    donohue    HX GYN      left ovarian cystectomy    HX GYN      laparoscopy, laparotomy, lysis of adhesions.  HX MYOMECTOMY      HX OVARIAN CYST REMOVAL  2003    HX UROLOGICAL  05/24/10    cystoscopy and hydrodilation       Current Outpatient Medications   Medication Sig Dispense Refill    mth-me blue-sod phos-phsal-hyo (UribeL) 118-10-40.8-36 mg cap capsule       omeprazole (PRILOSEC) 40 mg capsule Take 40 mg by mouth daily.  mometasone (Asmanex Twisthaler) 220 mcg/ actuation (30) inhaler Take 1 Puff by inhalation daily. 90 Cap 3    Ventolin HFA 90 mcg/actuation inhaler INHALE 2 PUFFS BY INHALATION EVERY 6 HOURS AS NEEDED FOR WHEEZING 54 g 3    montelukast (SINGULAIR) 10 mg tablet Take 1 Tab by mouth daily. 90 Tab 3    folic acid (FOLVITE) 1 mg tablet Take 1 mg by mouth two (2) times a day.  digestive 8-L.acidoph-pectin (DIGESTIVE ENZYME, ACIDOPH,PEC,) 50 million cell-100 mg tab Take 1 Tab by mouth daily.  tiotropium bromide (SPIRIVA RESPIMAT) 2.5 mcg/actuation inhaler Take 2 Puffs by inhalation daily.  Indications: Prevention of Bronchospasm with Chronic Bronchitis 3 Inhaler 3    gabapentin (NEURONTIN) 800 mg tablet take 1 tablet by mouth three times a day  0    denosumab (PROLIA) 60 mg/mL injection 60 mg by SubCUTAneous route. 2 times yearly      OXcarbazepine (TRILEPTAL) 150 mg tablet Take 1 Tab by mouth three (3) times daily. 90 Tab 2    calcium glycerophosphate (PRELIEF) 65 mg tab Take 1 Tab by mouth daily.  meloxicam (MOBIC) 15 mg tablet take 1 tablet by mouth once daily 30 Tab 11    modafinil (PROVIGIL) 200 mg tablet take 1 tablet by mouth twice a day for 30 DAYS maximum daily dose of 2 tablets (Patient taking differently: Take 200 mg by mouth daily.) 60 Tab 5    ergocalciferol (ERGOCALCIFEROL) 50,000 unit capsule take 1 capsule by mouth TWO (2) TIMES A WEEK  Indications: PREVENTION OF VITAMIN D DEFICIENCY (Patient taking differently: Take 50,000 Units by mouth every seven (7) days. take 1 capsule by mouth once a week  Indications: Prevention of Vitamin D Deficiency) 8 Cap 11    OTHER Indications: Compound cream for inflammation      B.infantis-B.ani-B.long-B.bifi (PROBIOTIC 4X) 10-15 mg TbEC Take 1 Tab by mouth daily.  ENZYMES,DIGESTIVE, PLANT, (DIGESTIVE ENZYMES, PLANT, PO) Take 1 Tab by mouth daily.  calcium citrate-vitamin D3 (CITRACAL + D) tablet Take 1 Tab by mouth daily.  LORazepam (ATIVAN) 2 mg tablet Take 2 mg by mouth every eight (8) hours.  DULoxetine (CYMBALTA) 60 mg capsule Take 120 mg by mouth daily.  simethicone (PHAZYME) 180 mg cap Take 225 mg by mouth as needed.  trazodone (DESYREL) 100 mg tablet Take 150 mg by mouth nightly.  omeprazole (PRILOSEC) 40 mg capsule Take 1 Cap by mouth daily. 90 Cap 3    mth-me blue-sod phos-phsal-hyo (VILAMIT MB) 118-10-40.8-36 mg cap capsule Take 1 Cap by mouth four (4) times daily. 120 Cap 11    pentosan polysulfate sodium (ELMIRON) 100 mg capsule Take 1 Cap by mouth two (2) times a day.  Indications: Bladder Pain, bladder wall inflammation 180 Cap 3       Allergies   Allergen Reactions    Codeine Rash and Itching     Other reaction(s): other/intolerance    Tussionex Itching       Social History Socioeconomic History    Marital status:      Spouse name: Not on file    Number of children: Not on file    Years of education: Not on file    Highest education level: Not on file   Occupational History    Not on file   Social Needs    Financial resource strain: Not on file    Food insecurity     Worry: Not on file     Inability: Not on file    Transportation needs     Medical: Not on file     Non-medical: Not on file   Tobacco Use    Smoking status: Never Smoker    Smokeless tobacco: Never Used   Substance and Sexual Activity    Alcohol use: Yes     Alcohol/week: 0.8 standard drinks     Types: 1 Cans of beer per week     Comment: socially    Drug use: No    Sexual activity: Yes     Partners: Male   Lifestyle    Physical activity     Days per week: Not on file     Minutes per session: Not on file    Stress: Not on file   Relationships    Social connections     Talks on phone: Not on file     Gets together: Not on file     Attends [employer] service: Not on file     Active member of club or organization: Not on file     Attends meetings of clubs or organizations: Not on file     Relationship status: Not on file    Intimate partner violence     Fear of current or ex partner: Not on file     Emotionally abused: Not on file     Physically abused: Not on file     Forced sexual activity: Not on file   Other Topics Concern    Not on file   Social History Narrative    Not on file        FH: father had heart attack in his 46s? Review of Systems    14 pt Review of Systems is negative unless otherwise mentioned in the HPI.     Wt Readings from Last 3 Encounters:   12/04/20 63.5 kg (140 lb)   11/16/20 64 kg (141 lb)   07/22/20 61 kg (134 lb 6.4 oz)     Temp Readings from Last 3 Encounters:   11/16/20 98.4 °F (36.9 °C) (Temporal)   08/26/20 97.6 °F (36.4 °C)   07/22/20 98.1 °F (36.7 °C)     BP Readings from Last 3 Encounters:   12/04/20 (!) 130/90   11/16/20 130/70   08/26/20 132/85     Pulse Readings from Last 3 Encounters:   12/04/20 94   11/16/20 97   08/26/20 91       Physical Exam:    Visit Vitals  BP (!) 130/90 (BP 1 Location: Left arm, BP Patient Position: Sitting)   Pulse 94   Ht 5' 2\" (1.575 m)   Wt 63.5 kg (140 lb)   SpO2 97%   BMI 25.61 kg/m²      Physical Exam  HENT:      Head: Normocephalic and atraumatic. Eyes:      Pupils: Pupils are equal, round, and reactive to light. Cardiovascular:      Rate and Rhythm: Normal rate and regular rhythm. Heart sounds: Normal heart sounds. No murmur. No friction rub. No gallop. Pulmonary:      Effort: Pulmonary effort is normal. No respiratory distress. Breath sounds: Normal breath sounds. No wheezing or rales. Chest:      Chest wall: No tenderness. Abdominal:      General: Bowel sounds are normal.      Palpations: Abdomen is soft. Musculoskeletal:         General: No tenderness. Skin:     General: Skin is warm and dry. Neurological:      Mental Status: She is alert and oriented to person, place, and time.          EKG today shows: NSR, rightward axis and intervals, nonspecific ST segments, low voltage, similar to prior and c/w underlying pulm disease    Lab Results   Component Value Date/Time    Cholesterol, total 361 (H) 11/16/2020 01:16 PM    HDL Cholesterol 70 (H) 11/16/2020 01:16 PM    LDL, calculated 248.6 (H) 11/16/2020 01:16 PM    VLDL, calculated 42.4 11/16/2020 01:16 PM    Triglyceride 212 (H) 11/16/2020 01:16 PM    CHOL/HDL Ratio 5.2 (H) 11/16/2020 01:16 PM     Impression and Plan:  Jayde Miller is a 62 y.o. with:    1.) Syncope, most likely vasovagal/ neurocardiogenic  2.) LDL >200!, (CAC 84)  3.) +FH premature ASCVD  4.) Asthma, please dw pulm    1.) Echocardiogram, call with results  2.) Should dw pt HI statin based on LDL alone    Thank you for sending this patient for initial evaluation of her syncopal episode the majority of this visit was spent regarding her acute complaint  But she had several follow-up questions before leaving  The very and she mentioned her cholesterol and showed me her calcium score and wanted me to also comment on the pulmonary findings from it  Well as questions regarding her inhalers    I did not have the results of her most recent lipids during our discussion, and am only seeing them after she has left (and its completely different than what she reported to me). She said her risk was always calculated as 2% and she didn't need a statin and doesn't want to be on one    This pt absolutely needs HI statin, I will call her to discuss further when we get results of her echo. I had made her follow up PRN but will discuss followup after testing. .    Thank you for allowing me to participate in the care of your patient, please do not hesitate to call with questions or concerns.     [address],    Lindsay Elias, DO

## 2020-12-07 ENCOUNTER — HOSPITAL ENCOUNTER (OUTPATIENT)
Dept: PHYSICAL THERAPY | Age: 60
Discharge: HOME | End: 2020-12-07
Payer: MEDICARE

## 2020-12-07 PROCEDURE — 97112 NEUROMUSCULAR REEDUCATION: CPT

## 2020-12-07 NOTE — PROGRESS NOTES
PT DAILY TREATMENT NOTE     Patient Name: Richard Leonard  Date:2020  : 1960  [x]  Patient  Verified  [place]or: [place] MED[place] / Plan: [place] MED[place]ARE PART [place] & B / Product Type: Medicare /    In time:11:13  Out time:11:48  Total Treatment Time (min): 35  Visit #: 3 of 8    Medicare/BCBS Only   Total Timed Codes (min):  35 1:1 Treatment Time:  35       Treatment Area: Pain in right hip [M25.551]  Chronic SI joint pain [M53.3, G89.29]    SUBJECTIVE  Pain Level (0-10 scale): 0/10  Any medication changes, allergies to medications, adverse drug reactions, diagnosis change, or new procedure performed?: [x] No    [] Yes (see summary sheet for update)  Subjective functional status/changes:   [] No changes reported  \"Once I got back on the Meloxicam, I felt better. Although, this morning, I bent over to wipe my little dog's paw and upon standing back up, I had this sharp pain in the left buttock. It's one of my other triggers for my back but I don't normally have pain there. So, I don't know what that was. \"     OBJECTIVE    35 min Neuromuscular Re-education:  [x]  See flow sheet :   Rationale: increase strength, improve coordination and increase proprioception  to improve the patients ability to improve core, glute activation and stability with functional tasks in order to reduce lumbar strain      With   [] TE   [x] TA   [] neuro   [] other: Patient Education: [x] Review HEP    [] Progressed/Changed HEP based on:   [] positioning   [] body mechanics   [] transfers   [] heat/ice application    [] other:      Other Objective/Functional Measures: Performed all exercises per flow sheet. Pain Level (0-10 scale) post treatment: 0/10    ASSESSMENT/Changes in Function: Pt reported muscle fatigue only with interventions performed but no onset of pain. Pt interested in progressing program further as pain is now more manageable. Will look to incorporate pilates exercises at next session. Pt in agreement. Patient will continue to benefit from skilled PT services to modify and progress therapeutic interventions, address functional mobility deficits, address ROM deficits, address strength deficits, analyze and address soft tissue restrictions, analyze and cue movement patterns, analyze and modify body mechanics/ergonomics, assess and modify postural abnormalities, address imbalance/dizziness and instruct in home and community integration to attain remaining goals. []  See Plan of Care  []  See progress note/recertification  []  See Discharge Summary         Progress towards goals / Updated goals:  Goal: Pt to report no difficulty with no difficulty with usual activities to be able to assist spouse with household chores. Status at last note/certification:  a little difficulty now as pain levels are slowly decreasing with activities, walking  Current status: reassess at MD note  Goal: Pt to report at least 75% improvement in right hip/buttock symptoms to increase activity tolerance in home, community without need for medication. Status at last note/certification: 50% improved with therapy and use of mediction  Current status: reassess at MD note  Goal: Pt to report < 4/10 pain at worst to increase ease with ADLs.   Status at last note/certification: 6.5/10 pain at worst  Current status: progressing - 5-6/10 pain at worst    PLAN  [x]  Upgrade activities as tolerated     [x]  Continue plan of care  []  Update interventions per flow sheet       []  Discharge due to:_  []  Other:_      Jose Nick PT 12/7/2020  1:13 PM    Future Appointments   Date Time Provider Roslyn Luna   12/10/2020  1:30 PM Jose Nick [facility]   12/14/2020 11:00 AM Marnio Severino [facility]   12/16/2020 12:00 PM Jose Nick [facility]   12/18/2020 12:00 PM Jose Nick [facility]   12/22/2020 11:00 AM [facility] ECHO GE70 [facility] BS AMB   12/23/2020  1:45 PM [facility] NAMITA RM 4 3D [facility] 2/23/2021 11:30 AM HBV INFUSION PHLEBOTOMIST [facility]   2/24/2021 11:00 AM HBV FAST TRACK NURSE [facility]   5/17/2021  8:15 AM [facility] NURSE VISIT [facility] BS AMB   5/24/2021 11:00 AM Aureliano Castaneda MD [facility] AMB

## 2020-12-08 DIAGNOSIS — J45.40 MODERATE PERSISTENT ASTHMA WITHOUT COMPLICATION: ICD-10-CM

## 2020-12-08 DIAGNOSIS — R06.09 DYSPNEA ON EXERTION: ICD-10-CM

## 2020-12-10 ENCOUNTER — HOSPITAL ENCOUNTER (OUTPATIENT)
Dept: LAB | Age: 60
Discharge: HOME | End: 2020-12-10
Payer: MEDICARE

## 2020-12-10 ENCOUNTER — HOSPITAL ENCOUNTER (OUTPATIENT)
Dept: PHYSICAL THERAPY | Age: 60
Discharge: HOME | End: 2020-12-10
Payer: MEDICARE

## 2020-12-10 DIAGNOSIS — E78.5 HYPERLIPIDEMIA LDL GOAL <160: ICD-10-CM

## 2020-12-10 LAB
CHOLEST SERPL-MCNC: 371 MG/DL
HDLC SERPL-MCNC: 88 MG/DL (ref 40–60)
HDLC SERPL: 4.2 {RATIO} (ref 0–5)
LDLC SERPL CALC-MCNC: 265.6 MG/DL (ref 0–100)
LIPID PROFILE,FLP: ABNORMAL
TRIGL SERPL-MCNC: 87 MG/DL (ref ?–150)
VLDLC SERPL CALC-MCNC: 17.4 MG/DL

## 2020-12-10 PROCEDURE — 80061 LIPID PANEL: CPT

## 2020-12-10 PROCEDURE — 97112 NEUROMUSCULAR REEDUCATION: CPT

## 2020-12-10 PROCEDURE — 36415 COLL VENOUS BLD VENIPUNCTURE: CPT

## 2020-12-10 NOTE — PROGRESS NOTES
PT DAILY TREATMENT NOTE     Patient Name: Amy Reyes  Date:12/10/2020  : 1960  [x]  Patient  Verified  Payor: Renée [place]amon / Plan: [place] MEDICARE PART A & B / Product Type: Medicare /    In time:1:30  Out time:2:10  Total Treatment Time (min): 40   Visit #: 4 of 8    Medicare/BCBS Only   Total Timed Codes (min):  40 1:1 Treatment Time:  40       Treatment Area: Pain in right hip [M25.551]  Chronic SI joint pain [M53.3, G89.29]    SUBJECTIVE  Pain Level (0-10 scale): 0/10  Any medication changes, allergies to medications, adverse drug reactions, diagnosis change, or new procedure performed?: [x] No    [] Yes (see summary sheet for update)  Subjective functional status/changes:   [] No changes reported  \"I woke up with a new pain in the front of the right shoulder. The hip still feels good. \"     OBJECTIVE    40 min Neuromuscular Re-education:  [x]  See flow sheet :   Rationale: increase strength, improve coordination and increase proprioception  to improve the patients ability to improve core, glute activation and stability with functional tasks in order to reduce lumbar strain      With   [] TE   [x] TA   [] neuro   [] other: Patient Education: [x] Review HEP    [] Progressed/Changed HEP based on:   [] positioning   [] body mechanics   [] transfers   [] heat/ice application    [] other:      Other Objective/Functional Measures: Increased resistance per flow sheet. Added DKTC stretch on swiss ball, prone swimming, and sidestepping in parallel bars for increased hip strengthening. Pain Level (0-10 scale) post treatment: 1/10    ASSESSMENT/Changes in Function: Pt able to perform progressions of exercise without any increase in overall pain, mainly reporting mild soreness at end of session only. Will continue to progress per Pt tolerance to work towards independent management and return to premorbid status without increased pain.        Patient will continue to benefit from skilled PT services to modify and progress therapeutic interventions, address functional mobility deficits, address ROM deficits, address strength deficits, analyze and address soft tissue restrictions, analyze and cue movement patterns, analyze and modify body mechanics/ergonomics, assess and modify postural abnormalities, address imbalance/dizziness and instruct in home and community integration to attain remaining goals. []  See Plan of Care  []  See progress note/recertification  []  See Discharge Summary         Progress towards goals / Updated goals:  Goal: Pt to report no difficulty with no difficulty with usual activities to be able to assist spouse with household chores. Status at last note/certification:  a little difficulty now as pain levels are slowly decreasing with activities, walking  Current status: reassess at MD note  Goal: Pt to report at least 75% improvement in right hip/buttock symptoms to increase activity tolerance in home, community without need for medication. Status at last note/certification: 50% improved with therapy and use of mediction  Current status: reassess at MD note  Goal: Pt to report < 4/10 pain at worst to increase ease with ADLs.   Status at last note/certification: 6.5/10 pain at worst  Current status: progressing - 5-6/10 pain at worst    PLAN  [x]  Upgrade activities as tolerated     [x]  Continue plan of care  []  Update interventions per flow sheet       []  Discharge due to:_  []  Other:_      Julio C Nick, PT 12/10/2020  1:13 PM    Future Appointments   Date Time Provider Roslyn Luna   12/14/2020 11:00 AM Pedro Sprague MD [facility]   12/16/2020 12:00 PM Julio C Nick, PT [facility]   12/18/2020 12:00 PM Julio C Nick, PT [facility]   12/22/2020 11:00 AM [facility] ECHO GE70 [facility] BS AMB   12/23/2020  1:45 PM [facility] NAMITA RM 4 3D [facility]   2/23/2021 11:30 AM [facility] INFUSION PHLEBOTOMIST [facility]   2/24/2021 11:00 AM HBV FAST TRACK NURSE [facility]   5/17/2021  8:15 AM [facility] NURSE VISIT IO BS AMB   5/24/2021 11:00 AM Ezio Castaneda MD [facility] BS AMB

## 2020-12-11 NOTE — PROGRESS NOTES
Per your last note \" 1.) Syncope, most likely vasovagal/ neurocardiogenic  2.) LDL >200!, (CAC 84)  3.) +FH premature ASCVD  4.) Asthma, please dw pulm     1.) Echocardiogram, call with results  2.) Should dw pt HI statin based on LDL alone     Thank you for sending this patient for initial evaluation of her syncopal episode the majority of this visit was spent regarding her acute complaint  But she had several follow-up questions before leaving  The very and she mentioned her cholesterol and showed me her calcium score and wanted me to also comment on the pulmonary findings from it  Well as questions regarding her inhalers     I did not have the results of her most recent lipids during our discussion, and am only seeing them after she has left (and its completely different than what she reported to me).  She said her risk was always calculated as 2% and she didn't need a statin and doesn't want to be on one

## 2020-12-15 ENCOUNTER — TELEPHONE (OUTPATIENT)
Dept: CARDIOLOGY CLINIC | Age: 60
End: 2020-12-15

## 2020-12-15 NOTE — TELEPHONE ENCOUNTER
----- Message from Danika Chambers DO sent at 12/13/2020 11:11 PM EST -----  Her LDL went up to 265 (but has been >200 for yrs)  Would recommend starting her on high intensity statin-Lipitor 40 qhs  I already dw her (see telephone note) if she agrees, can just send to her pharmacy and recheck lipids again in 6months with follow up    Thanks  ----- Message -----  From: Booker Sunshine RN  Sent: 12/11/2020   8:21 AM EST  To: Danika Chambers DO    Per your last note \" 1.) Syncope, most likely vasovagal/ neurocardiogenic  2.) LDL >200!, (CAC 84)  3.) +FH premature ASCVD  4.) Asthma, please dw pulm     1.) Echocardiogram, call with results  2.) Should dw pt HI statin based on LDL alone     Thank you for sending this patient for initial evaluation of her syncopal episode the majority of this visit was spent regarding her acute complaint  But she had several follow-up questions before leaving  The very and she mentioned her cholesterol and showed me her calcium score and wanted me to also comment on the pulmonary findings from it  Well as questions regarding her inhalers     I did not have the results of her most recent lipids during our discussion, and am only seeing them after she has left (and its completely different than what she reported to me).  She said her risk was always calculated as 2% and she didn't need a statin and doesn't want to be on one

## 2020-12-15 NOTE — TELEPHONE ENCOUNTER
Spoke to patient , she is in agreement on starting lipitor but she would like to speak to you before starting this medication.      I will forward this to Dr Lul Grey

## 2020-12-16 ENCOUNTER — HOSPITAL ENCOUNTER (OUTPATIENT)
Dept: PHYSICAL THERAPY | Age: 60
Discharge: HOME | End: 2020-12-16
Payer: MEDICARE

## 2020-12-16 PROCEDURE — 97112 NEUROMUSCULAR REEDUCATION: CPT

## 2020-12-16 NOTE — PROGRESS NOTES
PT DAILY TREATMENT NOTE     Patient Name: Karime Car  Date:2020  : 1960  [x]  Patient  Verified  Payor: Celeste Hoang / Plan: [place] / Product Type: Medicare /    In time:12:05  Out time:12:45  Total Treatment Time (min): 40   Visit #: 5 of 8    Medicare/BCBS Only   Total Timed Codes (min):  40 1:1 Treatment Time:  40       Treatment Area: Pain in right hip [M25.551]  Chronic SI joint pain [M53.3, G89.29]    SUBJECTIVE  Pain Level (0-10 scale): 2-3/10  Any medication changes, allergies to medications, adverse drug reactions, diagnosis change, or new procedure performed?: [x] No    [] Yes (see summary sheet for update)  Subjective functional status/changes:   [] No changes reported  \"I was lying on my side watching tv and when I got up I had some pain on the right side, but once I started walking around, it didn't stay. I have very little pain right now. \"     OBJECTIVE    40 min Neuromuscular Re-education:  [x]  See flow sheet :   Rationale: increase strength, improve coordination and increase proprioception  to improve the patients ability to improve core, glute activation and stability with functional tasks in order to reduce lumbar strain      With   [] TE   [x] TA   [] neuro   [] other: Patient Education: [x] Review HEP    [] Progressed/Changed HEP based on:   [] positioning   [] body mechanics   [] transfers   [] heat/ice application    [] other:      Other Objective/Functional Measures: Added reformer supine LE series and bridging for further core and hip strengthening. Pain Level (0-10 scale) post treatment: 0/10    ASSESSMENT/Changes in Function: Pt has progressed steadily with skilled therapy services. Pt has maintained neutral pelvic alignment since 20, showing improved lumbo-pelvic stability. Pt reports only general muscle soreness with progressive strengthening exercises and is able to manage pain with HEP and stretches.   Pt has one final appointment and will finish up and transition to independent management with updated HEP. Patient will continue to benefit from skilled PT services to modify and progress therapeutic interventions, address functional mobility deficits, address ROM deficits, address strength deficits, analyze and address soft tissue restrictions, analyze and cue movement patterns, analyze and modify body mechanics/ergonomics, assess and modify postural abnormalities, address imbalance/dizziness and instruct in home and community integration to attain remaining goals. []  See Plan of Care  []  See progress note/recertification  []  See Discharge Summary         Progress towards goals / Updated goals:  Goal: Pt to report no difficulty with no difficulty with usual activities to be able to assist spouse with household chores. Status at last note/certification:  a little difficulty now as pain levels are slowly decreasing with activities, walking  Current status: reassess at MD note  Goal: Pt to report at least 75% improvement in right hip/buttock symptoms to increase activity tolerance in home, community without need for medication. Status at last note/certification: 50% improved with therapy and use of mediction  Current status: reassess at MD note  Goal: Pt to report < 4/10 pain at worst to increase ease with ADLs.   Status at last note/certification: 6.5/10 pain at worst  Current status: progressing - 5-6/10 pain at worst    PLAN  [x]  Upgrade activities as tolerated     [x]  Continue plan of care  []  Update interventions per flow sheet       []  Discharge due to:_  [x]  Other:_ goals, HEP, D/C next visit     Julio C Nick, PT 12/16/2020  1:13 PM    Future Appointments   Date Time Provider Roslyn Torresi   12/18/2020 12:00 PM Julio C Nick, PT [facility]   12/22/2020 11:00 AM CSI ECHO GE70 [facility] BS AMB   12/23/2020  1:45 PM HBV NAMITA RM 4 3D [facility]   1/27/2021 10:15 AM Eneida Diaz MD [facility] BS AMB   2/23/2021 11:30 AM HBV INFUSION PHLEBOTOMIST [facility] HBV   2/24/2021 11:00 AM HBV FAST TRACK NURSE [facility] HBV   5/17/2021  8:15 AM IOC NURSE VISIT IO BS AMB   5/24/2021 11:00 AM Lyle Castaneda MD [facility] BS AMB   12/14/2021 10:45 AM Junior Abhi MD [facility]

## 2020-12-18 ENCOUNTER — HOSPITAL ENCOUNTER (OUTPATIENT)
Dept: PHYSICAL THERAPY | Age: 60
Discharge: HOME | End: 2020-12-18
Payer: MEDICARE

## 2020-12-18 PROCEDURE — 97530 THERAPEUTIC ACTIVITIES: CPT

## 2020-12-18 PROCEDURE — 97110 THERAPEUTIC EXERCISES: CPT

## 2020-12-18 PROCEDURE — 97112 NEUROMUSCULAR REEDUCATION: CPT

## 2020-12-18 NOTE — PROGRESS NOTES
PT DISCHARGE DAILY NOTE AND [id number]    Patient name: Charo Espinoza Start of Care: 10/30/2020   Referral source: Lukaszconsueloana Parker, * : 1960   Medical/Treatment Diagnosis: Pain in right hip [M25.551]  Chronic SI joint pain [M53.3, G89.29]  Payor: [place] MEDICARE / Plan: [place] MEDICARE PART A & B / Product Type: Medicare /  Onset Date:2020      Prior Hospitalization: see medical history Provider#: [id number]   Medications: Verified on Patient Summary List     Comorbidities: Anxiety; Arthritis; Asthma; Back pain; Fibromyalgia (FMS); Interstitial Cystitis; Osteopenia; Sesamoiditis right toe   Prior Level of Function: Lives with spouse; functionally independent; Visits from Start of Care: 15    Missed Visits: 0    Reporting Period : 20 to 20    Date:2020  : 1960  [x]  Patient  Verified  Payor: Shirley Young / Plan: [place] MEDICARE PART A & B / Product Type: Medicare /    In time:12:04  Out time:12:44  Total Treatment Time (min): 40  Visit #: 6 of 8    Medicare/BCBS Only   Total Timed Codes (min):  40 1:1 Treatment Time:  38       SUBJECTIVE  Pain Level (0-10 scale): 0/10  Any medication changes, allergies to medications, adverse drug reactions, diagnosis change, or new procedure performed?: [x] No    [] Yes (see summary sheet for update)  Subjective functional status/changes:   [] No changes reported  \"I feel good. \"    OBJECTIVE    8 min Therapeutic Exercise:  [] See flow sheet :   Rationale: increase ROM to improve the patients ability to improve LE mobility for ease of ADLs    10 min Therapeutic Activity:  []  See flow sheet : goals, FOTO   Rationale: increase ROM, increase strength and improve coordination  to improve the patients ability to perform ADLs     22 min Neuromuscular Re-education:  []  See flow sheet :   Rationale: increase strength, improve coordination and increase proprioception  to improve the patients ability to increase standing/amb tolerance With   [] TE   [] TA   [] neuro   [] other: Patient Education: [x] Review HEP    [] Progressed/Changed HEP based on:   [] positioning   [] body mechanics   [] transfers   [] heat/ice application    [] other:      Other Objective/Functional Measures: Performed exercises per flow sheet. Reassessed goals for discharge. Pain Level (0-10 scale) post treatment: 0/10    Summary of Care:  Goal: Pt to report no difficulty with no difficulty with usual activities to be able to assist spouse with household chores. Status at last note/certification:  a little difficulty now as pain levels are slowly decreasing with activities, walking  Current status: progressing - no difficulty because of right hip but still has difficulty due to other ailments  Goal: Pt to report at least 75% improvement in right hip/buttock symptoms to increase activity tolerance in home, community without need for medication. Status at last note/certification: 50% improved with therapy and use of mediction  Current status: met - 100% improved  Goal: Pt to report < 4/10 pain at worst to increase ease with ADLs. Status at last note/certification: 6.5/10 pain at worst  Current status: met - 0/10 pain at worst    ASSESSMENT/Changes in Function: Pt attended 15 visits consistently and made great progress with skilled physical therapy services. At time of last visit, Pt reported the following:  Functional Gains - Pt is able to stand, walk better, is able to sit longer periods of time without pain, is able to walk dog, and notes less difficulty showering and reaching feet; Functional Deficits - none reported; and 100% improvement since start of care. Pt has met or is progressing towards all goals and is compliant with comprehensive HEP. Pt is appropriate for D/C at this time to continue to manage care independently and maintain long term gains made with skilled therapy.   Thank you for this referral.    Thank you for this referral! PLAN  [x]Discontinue therapy: [x]Patient has reached or is progressing toward set goals      []Patient is non-compliant or has abdicated      []Due to lack of appreciable progress towards set goals    Franklin Nick, MARIANNA 12/18/2020  12:08 PM

## 2020-12-18 NOTE — PROGRESS NOTES
Physical Therapy Discharge Instructions      [facility], [address] [place] [address]  [phone] fax    Patient: Edelmira Reeves  : 1960      Continue Home Exercise Program 1-2 times per day for 2 weeks, then decrease to 2-3 times per week      Continue with    [] Ice  as needed 1 times per day     [x] Heat           Follow up with MD:     [x] Upon completion of therapy     [] As needed      Recommendations:     [x]   Return to activity with home program    []   Return to activity with the following modifications:       []Post Rehab Program    []Join Independent aquatic program     []Return to/join local gym    Additional Comments: It has been a pleasure working with you! Continue with the exercises and please contact us if you need further assistance. Have a Shahriar Torres and Happy New Year!       Hernandez Nick, PT 2020 12:36 PM

## 2020-12-21 ENCOUNTER — TELEPHONE (OUTPATIENT)
Dept: CARDIOLOGY CLINIC | Age: 60
End: 2020-12-21

## 2020-12-21 NOTE — TELEPHONE ENCOUNTER
Called and spoke with patient to confirm echocardiogram appointment for tomorrow.     Eric Miller, RCS, RDCS

## 2020-12-22 LAB
ECHO AO ROOT DIAM: 2.97 CM
ECHO LA AREA 4C: 13.09 CM2
ECHO LA VOL 2C: 30.06 ML (ref 22–52)
ECHO LA VOL 4C: 29.49 ML (ref 22–52)
ECHO LA VOL BP: 32.93 ML (ref 22–52)
ECHO LA VOL/BSA BIPLANE: 20.05 ML/M2 (ref 16–28)
ECHO LA VOLUME INDEX A2C: 18.3 ML/M2 (ref 16–28)
ECHO LA VOLUME INDEX A4C: 17.95 ML/M2 (ref 16–28)
ECHO LV E' LATERAL VELOCITY: 6.86 CM/S
ECHO LV E' SEPTAL VELOCITY: 9.03 CM/S
ECHO LV INTERNAL DIMENSION DIASTOLIC: 4.46 CM (ref 3.9–5.3)
ECHO LV INTERNAL DIMENSION SYSTOLIC: 2.39 CM
ECHO LV IVSD: 0.86 CM (ref 0.6–0.9)
ECHO LV MASS 2D: 136.8 G (ref 67–162)
ECHO LV MASS INDEX 2D: 83.3 G/M2 (ref 43–95)
ECHO LV POSTERIOR WALL DIASTOLIC: 1 CM (ref 0.6–0.9)
ECHO LVOT CARDIAC OUTPUT: 3.78 LITER/MINUTE
ECHO LVOT DIAM: 1.95 CM
ECHO LVOT PEAK GRADIENT: 2.57 MMHG
ECHO LVOT PEAK VELOCITY: 80.11 CM/S
ECHO LVOT SV: 43.9 ML
ECHO LVOT VTI: 14.76 CM
ECHO MV A VELOCITY: 57.34 CM/S
ECHO MV E DECELERATION TIME (DT): 175.32 MS
ECHO MV E VELOCITY: 54.38 CM/S
ECHO MV E/A RATIO: 0.95
ECHO MV E/E' LATERAL: 7.93
ECHO MV E/E' RATIO (AVERAGED): 6.97
ECHO MV E/E' SEPTAL: 6.02
ECHO RV TAPSE: 1.56 CM (ref 1.5–2)
ECHO TV REGURGITANT MAX VELOCITY: 223.49 CM/S
ECHO TV REGURGITANT PEAK GRADIENT: 19.98 MMHG
LVOT MG: 1.37 MMHG

## 2020-12-22 NOTE — PROGRESS NOTES
Per your last note\" Shareen Ahumada is a 62 y.o. with:     1.) Syncope, most likely vasovagal/ neurocardiogenic  2.) LDL >200!, (CAC 84)  3.) +FH premature ASCVD  4.) Asthma, please dw pulm     1.) Echocardiogram, call with results  2.) Should dw pt HI statin based on LDL alone     Thank you for sending this patient for initial evaluation of her syncopal episode the majority of this visit was spent regarding her acute complaint  But she had several follow-up questions before leaving  The very and she mentioned her cholesterol and showed me her calcium score and wanted me to also comment on the pulmonary findings from it  Well as questions regarding her inhalers     I did not have the results of her most recent lipids during our discussion, and am only seeing them after she has left (and its completely different than what she reported to me). She said her risk was always calculated as 2% and she didn't need a statin and doesn't want to be on one     This pt absolutely needs HI statin, I will call her to discuss further when we get results of her echo. I had made her follow up PRN but will discuss followup after testing. Daly Archuleta

## 2020-12-23 ENCOUNTER — TELEPHONE (OUTPATIENT)
Dept: CARDIOLOGY CLINIC | Age: 60
End: 2020-12-23

## 2020-12-23 ENCOUNTER — OFFICE VISIT (OUTPATIENT)
Dept: CARDIOLOGY CLINIC | Age: 60
End: 2020-12-23
Payer: MEDICARE

## 2020-12-23 VITALS
BODY MASS INDEX: 25.95 KG/M2 | SYSTOLIC BLOOD PRESSURE: 140 MMHG | WEIGHT: 141 LBS | DIASTOLIC BLOOD PRESSURE: 80 MMHG | OXYGEN SATURATION: 98 % | HEART RATE: 103 BPM | HEIGHT: 62 IN

## 2020-12-23 DIAGNOSIS — E78.5 HYPERLIPIDEMIA LDL GOAL <160: ICD-10-CM

## 2020-12-23 DIAGNOSIS — R06.09 DYSPNEA ON EXERTION: ICD-10-CM

## 2020-12-23 DIAGNOSIS — I31.4 TAMPONADE: ICD-10-CM

## 2020-12-23 DIAGNOSIS — R55 SYNCOPE, UNSPECIFIED SYNCOPE TYPE: Primary | ICD-10-CM

## 2020-12-23 PROCEDURE — G8419 CALC BMI OUT NRM PARAM NOF/U: HCPCS | Performed by: INTERNAL MEDICINE

## 2020-12-23 PROCEDURE — G8427 DOCREV CUR MEDS BY ELIG CLIN: HCPCS | Performed by: INTERNAL MEDICINE

## 2020-12-23 PROCEDURE — 93000 ELECTROCARDIOGRAM COMPLETE: CPT | Performed by: INTERNAL MEDICINE

## 2020-12-23 PROCEDURE — 99214 OFFICE O/P EST MOD 30 MIN: CPT | Performed by: INTERNAL MEDICINE

## 2020-12-23 PROCEDURE — G9899 SCRN MAM PERF RSLTS DOC: HCPCS | Performed by: INTERNAL MEDICINE

## 2020-12-23 PROCEDURE — 3017F COLORECTAL CA SCREEN DOC REV: CPT | Performed by: INTERNAL MEDICINE

## 2020-12-23 PROCEDURE — G9717 DOC PT DX DEP/BP F/U NT REQ: HCPCS | Performed by: INTERNAL MEDICINE

## 2020-12-23 NOTE — PROGRESS NOTES
Tessa Otero presents today for   Chief Complaint   Patient presents with    Follow-up     per Dr. Juan Hernandez for abn echo       Tessa Otero preferred language for health care discussion is english/other. Is someone accompanying this pt? no    Is the patient using any DME equipment during [address]? no    Depression Screening:  3 most recent PHQ Screens 12/20/2019   PHQ Not Done -   Little interest or pleasure in doing things Not at all   Feeling down, depressed, irritable, or hopeless Not at all   Total Score PHQ 2 0       Learning Assessment:  Learning Assessment 6/12/2019   PRIMARY LEARNER Patient   BARRIERS PRIMARY LEARNER [address] CAREGIVER No   PRIMARY LANGUAGE ENGLISH   LEARNER PREFERENCE PRIMARY READING     DEMONSTRATION   ANSWERED BY patient   RELATIONSHIP SELF       Abuse Screening:  Abuse Screening Questionnaire 6/12/2019   Do you ever feel afraid of your partner? N   Are you in a relationship with someone who physically or mentally threatens you? N   Is it safe for you to go home? Y       Fall Risk  Fall Risk Assessment, last 12 mths 12/20/2019   Able to walk? Yes   Fall in past 12 months? Yes   Number of falls in past 12 months 8 or more   Fall with injury? 1       Pt currently taking Anticoagulant therapy? no    Coordination of Care:  1. Have you been to the ER, urgent care clinic since your last visit? Hospitalized since your last visit? no    2. Have you seen or consulted any other health care providers outside of the [address] [place] [address] since your last visit? Include any pap smears or colon screening.  no

## 2020-12-23 NOTE — PROGRESS NOTES
Karime Car    Urgent add on for pericardial effusion    HPI    Karime Car is a 62 y.o. female with no known heart disease no hypertension or diabetes who was sent to me for initial evaluation of syncope. As you know a couple weeks ago patient states she was not feeling well and woke up with a headache. For some unknown reason she felt somewhat nauseous. She apparently then went to urinate in the bathroom and soon after had a syncopal episode. Like she lost consciousness and the first time it was not witnessed but her  was home and came running up stairs. Tried to kind of get her up and then shortly after she passed out again. He took her blood pressure and it was 90s over 60s. They did not go to the ER she just laid down and drink water and she felt back to normal soon after. Has not happened again but she does tell me she has interstitial cystitis and has noticed she has had to strain a little bit more when urinating. She apparently has asthma and does use inhalers and this is the only setting in which she is ever felt any discomfort in her chest at rest when she needs to use an inhaler. This is chronic. Otherwise, no travel,says barely leaves the house due to COVID,no sick contacts. Has been worked up for RA and says has fibromyalgia no other autoimmune disease. No trauma. She has no new SOB, no swelling, can lay flat. After above, I ordered an echo- which surprisingly showed a large pericardial effusion without tamponade. CV RFs: +FH premature ASCVD in father?     Past Medical History:   Diagnosis Date    Allergy     Anxiety     Arthritis     Asthma     mild    Atypical ductal hyperplasia of breast     Atypical hyperplasia of breast 2003    right    Chronic depression     Chronic fatigue syndrome     Chronic interstitial cystitis     Chronic interstitial cystitis     Chronic pain     fibromyalgia    Constipation     Depression     Diarrhea     Difficulty walking  Dizziness     Dry eyes     Dry mouth     Dyspnea     Fainting episodes     Pt said she has a condition where she faints after urinating     Fibromyalgia     Fibromyalgia     Generalized stiffness     GERD (gastroesophageal reflux disease)     Headache(784.0)     Heartburn     Hyperlipidemia     IBS (irritable bowel syndrome)     IC (interstitial cystitis)     Imbalance     Incoordination     Insomnia     Irregular periods/menstrual cycles     Joint pain     Microscopic hematuria     Muscle pain     Myofascial pain syndrome     Numbness and tingling in hands     Ovarian cyst, left     Painful sex     Pneumonia     Psychiatric disorder     anxiety - depression    RLS (restless legs syndrome)     Stress     TMJ (temporomandibular joint syndrome)     Transient total loss of muscle tone     Uterine fibroid     Weakness generalized        Past Surgical History:   Procedure Laterality Date    ENDOSCOPY, COLON, DIAGNOSTIC  2013    10 years    HX BREAST BIOPSY  2003    right    HX COLONOSCOPY  2012    donohue    HX GYN      left ovarian cystectomy    HX GYN      laparoscopy, laparotomy, lysis of adhesions.  HX MYOMECTOMY      HX OVARIAN CYST REMOVAL  2003    HX UROLOGICAL  05/24/10    cystoscopy and hydrodilation       Current Outpatient Medications   Medication Sig Dispense Refill    [facility]-me blue-sod phos-phsal-hyo (UribeL) 118-10-40.8-36 mg cap capsule Take 1 Cap by mouth four (4) times daily as needed for Pain. 120 Cap 3    tiotropium bromide (Spiriva Respimat) 2.5 mcg/actuation inhaler Take 2 Puffs by inhalation daily. Indications: prevention of bronchospasm with chronic bronchitis 3 Inhaler 1    omeprazole (PRILOSEC) 40 mg capsule Take 40 mg by mouth daily.  mometasone (Asmanex Twisthaler) 220 mcg/ actuation (30) inhaler Take 1 Puff by inhalation daily. 90 Cap 3    omeprazole (PRILOSEC) 40 mg capsule Take 1 Cap by mouth daily.  90 Cap 3    Ventolin HFA 90 mcg/actuation inhaler INHALE 2 PUFFS BY INHALATION EVERY 6 HOURS AS NEEDED FOR WHEEZING 54 g 3    montelukast (SINGULAIR) 10 mg tablet Take 1 Tab by mouth daily. 90 Tab 3    mth-me blue-sod phos-phsal-hyo (VILAMIT MB) 118-10-40.8-36 mg cap capsule Take 1 Cap by mouth four (4) times daily. 120 Cap 11    digestive 8-L.acidoph-pectin (DIGESTIVE ENZYME, ACIDOPH,PEC,) 50 million cell-100 mg tab Take 1 Tab by mouth daily.  pentosan polysulfate sodium (ELMIRON) 100 mg capsule Take 1 Cap by mouth two (2) times a day. Indications: Bladder Pain, bladder wall inflammation 180 Cap 3    gabapentin (NEURONTIN) 800 mg tablet take 1 tablet by mouth three times a day  0    denosumab (PROLIA) 60 mg/mL injection 60 mg by SubCUTAneous route. 2 times yearly      OXcarbazepine (TRILEPTAL) 150 mg tablet Take 1 Tab by mouth three (3) times daily. 90 Tab 2    calcium glycerophosphate (PRELIEF) 65 mg tab Take 1 Tab by mouth daily.  meloxicam (MOBIC) 15 mg tablet take 1 tablet by mouth once daily 30 Tab 11    modafinil (PROVIGIL) 200 mg tablet take 1 tablet by mouth twice a day for 30 DAYS maximum daily dose of 2 tablets (Patient taking differently: Take 200 mg by mouth daily.) 60 Tab 5    ergocalciferol (ERGOCALCIFEROL) 50,000 unit capsule take 1 capsule by mouth TWO (2) TIMES A WEEK  Indications: PREVENTION OF VITAMIN D DEFICIENCY (Patient taking differently: Take 50,000 Units by mouth every seven (7) days. take 1 capsule by mouth once a week  Indications: Prevention of Vitamin D Deficiency) 8 Cap 11    OTHER Indications: Compound cream for inflammation      B.infantis-B.ani-B.long-B.bifi (PROBIOTIC 4X) 10-15 mg TbEC Take 1 Tab by mouth daily.  ENZYMES,DIGESTIVE, PLANT, (DIGESTIVE ENZYMES, PLANT, PO) Take 1 Tab by mouth daily.  calcium citrate-vitamin D3 (CITRACAL + D) tablet Take 1 Tab by mouth daily.  LORazepam (ATIVAN) 2 mg tablet Take 2 mg by mouth every eight (8) hours.       DULoxetine (CYMBALTA) 60 mg capsule Take 120 mg by mouth daily.  simethicone (PHAZYME) 180 mg cap Take 225 mg by mouth as needed.  trazodone (DESYREL) 100 mg tablet Take 150 mg by mouth nightly. Allergies   Allergen Reactions    Codeine Rash and Itching     Other reaction(s): other/intolerance    Tussionex Itching       Social History     Socioeconomic History    Marital status:      Spouse name: Not on file    Number of children: Not on file    Years of education: Not on file    Highest education level: Not on file   Occupational History    Not on file   Social Needs    Financial resource strain: Not on file    Food insecurity     Worry: Not on file     Inability: Not on file    Transportation needs     Medical: Not on file     Non-medical: Not on file   Tobacco Use    Smoking status: Never Smoker    Smokeless tobacco: Never Used   Substance and Sexual Activity    Alcohol use: Yes     Alcohol/week: 0.8 standard drinks     Types: 1 Cans of beer per week     Comment: socially    Drug use: No    Sexual activity: Yes     Partners: Male   Lifestyle    Physical activity     Days per week: Not on file     Minutes per session: Not on file    Stress: Not on file   Relationships    Social connections     Talks on phone: Not on file     Gets together: Not on file     Attends [employer] service: Not on file     Active member of club or organization: Not on file     Attends meetings of clubs or organizations: Not on file     Relationship status: Not on file    Intimate partner violence     Fear of current or ex partner: Not on file     Emotionally abused: Not on file     Physically abused: Not on file     Forced sexual activity: Not on file   Other Topics Concern    Not on file   Social History Narrative    Not on file        FH: father had heart attack in his 46s? Review of Systems    14 pt Review of Systems is negative unless otherwise mentioned in the HPI.     Wt Readings from Last 3 Encounters: 12/22/20 63.5 kg (140 lb)   12/14/20 63.5 kg (140 lb)   12/04/20 63.5 kg (140 lb)     Temp Readings from Last 3 Encounters:   11/16/20 98.4 °F (36.9 °C) (Temporal)   08/26/20 97.6 °F (36.4 °C)   07/22/20 98.1 °F (36.7 °C)     BP Readings from Last 3 Encounters:   12/22/20 (!) 130/90   12/04/20 (!) 130/90   11/16/20 130/70     Pulse Readings from Last 3 Encounters:   12/04/20 94   11/16/20 97   08/26/20 91       Physical Exam:    There were no vitals taken for this visit. Physical Exam  HENT:      Head: Normocephalic and atraumatic. Eyes:      Pupils: Pupils are equal, round, and reactive to light. Cardiovascular:      Rate and Rhythm: Normal rate and regular rhythm. Heart sounds: Normal heart sounds. No murmur. No friction rub. No gallop. Pulmonary:      Effort: Pulmonary effort is normal. No respiratory distress. Breath sounds: Normal breath sounds. No wheezing or rales. Chest:      Chest wall: No tenderness. Abdominal:      General: Bowel sounds are normal.      Palpations: Abdomen is soft. Musculoskeletal:         General: No tenderness. Skin:     General: Skin is warm and dry. Neurological:      Mental Status: She is alert and oriented to person, place, and time.          EKG last: NSR, rightward axis and intervals, nonspecific ST segments, low voltage, similar to prior and c/w underlying pulm disease    Lab Results   Component Value Date/Time    Cholesterol, total 371 (H) 12/10/2020 08:02 AM    HDL Cholesterol 88 (H) 12/10/2020 08:02 AM    LDL, calculated 265.6 (H) 12/10/2020 08:02 AM    VLDL, calculated 17.4 12/10/2020 08:02 AM    Triglyceride 87 12/10/2020 08:02 AM    CHOL/HDL Ratio 4.2 12/10/2020 08:02 AM     Lab Results   Component Value Date/Time    Sed rate (ESR) 3 08/29/2015 10:42 AM     Lab Results   Component Value Date/Time    TSH 1.10 11/16/2020 01:16 PM     Lab Results   Component Value Date/Time    WBC 6.6 11/16/2020 01:16 PM    Hemoglobin, POC 13.9 08/28/2019 11:13 AM HGB 14.2 11/16/2020 01:16 PM    Hematocrit, POC 41 08/28/2019 11:13 AM    HCT 41.2 11/16/2020 01:16 PM    PLATELET 083 89/89/0802 01:16 PM    MCV 82.9 11/16/2020 01:16 PM       Impression and Plan:  Cat Ritchie is a 62 y.o. with:    1.) Syncope, most likely vasovagal/ neurocardiogenic  2.) LDL >200!, (CAC 84);m will table further discussion of statin at this time  3.) +FH premature ASCVD  4.) Asthma, please dw pulm  5.) Large incidentally noted pericardial effusion without tamponade    1.) No acute symptoms- sating 98% on RA, with with some mild exertional sinus tach (ekg unchanged, low voltage, no pericarditis)  2.) Will repeat echo in one week, and decide if CTS referral for drainage  3.) Unclear underlying cause but would r/o malignancy/ autoimmune phenomenon but most likely idiopathetic/ viral etiology  4.) Will call with echo results next week and figure out next steps, call sooner if needed    Patients with a pericardial effusion who are hemodynamically stable with no evidence of cardiac tamponade do not require immediate drainage of the effusion for therapeutic purposes. However, sampling of the effusion may be indicated for diagnostic purposes in patients without a clear etiology for the effusion. Patients in whom the initial approach is observation should undergo serial echocardiographic studies (every five to seven days or sooner if clinically indicated), avoid volume depletion (including avoidance or judicious use of diuretics), and receive therapy aimed at the underlying cause (if known) of the pericardial effusion. Treatment of hemodynamically stable pericardial effusions should focus on treatment of the underlying condition rather than the effusion itself.  This may result in clinical improvement and resolution of the effusion without the need for percutaneous or surgical drainage    Deferred further discussion of statin at this point due to effusion    >>60 mins spent    Thank you for allowing me to participate in the care of your patient, please do not hesitate to call with questions or concerns.     [address],    Vicenta Mario, DO

## 2020-12-23 NOTE — TELEPHONE ENCOUNTER
----- Message from 0923 Gonzalo Ferrer MD sent at 12/22/2020  4:06 PM EST -----  Blair Kussmaul patient  ----- Message -----  From: Miladys France LPN  Sent: 61/98/7271   2:41 PM EST  To: [id number] Gonzalo Ferrer MD    Per your last note\" David Middleton is a 62 y.o. with:     1.) Syncope, most likely vasovagal/ neurocardiogenic  2.) LDL >200!, (CAC 84)  3.) +FH premature ASCVD  4.) Asthma, please dw pulm     1.) Echocardiogram, call with results  2.) Should dw pt HI statin based on LDL alone     Thank you for sending this patient for initial evaluation of her syncopal episode the majority of this visit was spent regarding her acute complaint  But she had several follow-up questions before leaving  The very and she mentioned her cholesterol and showed me her calcium score and wanted me to also comment on the pulmonary findings from it  Well as questions regarding her inhalers     I did not have the results of her most recent lipids during our discussion, and am only seeing them after she has left (and its completely different than what she reported to me). She said her risk was always calculated as 2% and she didn't need a statin and doesn't want to be on one     This pt absolutely needs HI statin, I will call her to discuss further when we get results of her echo. I had made her follow up PRN but will discuss followup after testing. Dionisio Rodriguez

## 2020-12-29 ENCOUNTER — TELEPHONE (OUTPATIENT)
Dept: CARDIOLOGY CLINIC | Age: 60
End: 2020-12-29

## 2021-01-04 ENCOUNTER — TELEPHONE (OUTPATIENT)
Dept: CARDIOLOGY CLINIC | Age: 61
End: 2021-01-04

## 2021-01-04 DIAGNOSIS — I31.4 TAMPONADE: Primary | ICD-10-CM

## 2021-01-04 NOTE — TELEPHONE ENCOUNTER
----- Message from Rocio Wood DO sent at 12/30/2020 12:22 PM EST -----  Can you please tell her that the amount of fluid around her heart is already significantly less than it was (just a week ago). Let's now check it again in about a month (can you please order another limited echo) and we'll call her with result    Otherwise, if you can make another follow up appt with me in 6 months (to discuss her other issues)    Thanks  ----- Message -----  From: Jonathan Salcedo LPN  Sent: 85/94/1400  11:54 AM EST  To: Rocio Wood DO    PER YOUR LAST NOTE\" Juan Manuel Ellis is a 62 y.o. with:     1.) Syncope, most likely vasovagal/ neurocardiogenic  2.) LDL >200!, (CAC 84)  3.) +FH premature ASCVD  4.) Asthma, please dw pulm     1.) Echocardiogram, call with results  2.) Should dw pt HI statin based on LDL alone     Thank you for sending this patient for initial evaluation of her syncopal episode the majority of this visit was spent regarding her acute complaint  But she had several follow-up questions before leaving  The very and she mentioned her cholesterol and showed me her calcium score and wanted me to also comment on the pulmonary findings from it  Well as questions regarding her inhalers     I did not have the results of her most recent lipids during our discussion, and am only seeing them after she has left (and its completely different than what she reported to me). She said her risk was always calculated as 2% and she didn't need a statin and doesn't want to be on one     This pt absolutely needs HI statin, I will call her to discuss further when we get results of her echo.  I had made her follow up PRN but will discuss followup after testing

## 2021-01-13 ENCOUNTER — HOSPITAL ENCOUNTER (OUTPATIENT)
Dept: MAMMOGRAPHY | Age: 61
Discharge: HOME | End: 2021-01-13
Attending: INTERNAL MEDICINE
Payer: MEDICARE

## 2021-01-13 DIAGNOSIS — Z12.31 VISIT FOR SCREENING MAMMOGRAM: ICD-10-CM

## 2021-01-13 PROCEDURE — 77063 BREAST TOMOSYNTHESIS BI: CPT

## 2021-01-27 ENCOUNTER — OFFICE VISIT (OUTPATIENT)
Dept: PULMONOLOGY | Age: 61
End: 2021-01-27
Payer: MEDICARE

## 2021-01-27 ENCOUNTER — HOSPITAL ENCOUNTER (OUTPATIENT)
Dept: LAB | Age: 61
Discharge: HOME | End: 2021-01-27
Payer: MEDICARE

## 2021-01-27 VITALS
DIASTOLIC BLOOD PRESSURE: 77 MMHG | RESPIRATION RATE: 16 BRPM | OXYGEN SATURATION: 97 % | SYSTOLIC BLOOD PRESSURE: 135 MMHG | BODY MASS INDEX: 25.83 KG/M2 | TEMPERATURE: 98.4 F | WEIGHT: 140.4 LBS | HEART RATE: 100 BPM | HEIGHT: 62 IN

## 2021-01-27 DIAGNOSIS — J45.40 MODERATE PERSISTENT ASTHMA WITHOUT COMPLICATION: ICD-10-CM

## 2021-01-27 DIAGNOSIS — J47.9 BRONCHIECTASIS WITHOUT COMPLICATION (HCC): ICD-10-CM

## 2021-01-27 DIAGNOSIS — I31.39 PERICARDIAL EFFUSION: ICD-10-CM

## 2021-01-27 DIAGNOSIS — J20.9 BRONCHITIS WITH BRONCHOSPASM: ICD-10-CM

## 2021-01-27 DIAGNOSIS — G47.33 OSA (OBSTRUCTIVE SLEEP APNEA): ICD-10-CM

## 2021-01-27 DIAGNOSIS — J45.30 MILD PERSISTENT ASTHMA WITHOUT COMPLICATION: ICD-10-CM

## 2021-01-27 DIAGNOSIS — R06.09 DYSPNEA ON EXERTION: ICD-10-CM

## 2021-01-27 DIAGNOSIS — J45.30 MILD PERSISTENT ASTHMA WITHOUT COMPLICATION: Primary | ICD-10-CM

## 2021-01-27 PROBLEM — Z78.9 NON-SMOKER: Status: ACTIVE | Noted: 2021-01-27

## 2021-01-27 PROBLEM — Z79.899 OTHER LONG TERM (CURRENT) DRUG THERAPY: Status: ACTIVE | Noted: 2021-01-27

## 2021-01-27 PROBLEM — M15.9 GENERALIZED OSTEOARTHRITIS: Status: ACTIVE | Noted: 2021-01-27

## 2021-01-27 PROBLEM — M26.609 TMJ (TEMPOROMANDIBULAR JOINT SYNDROME): Status: ACTIVE | Noted: 2021-01-27

## 2021-01-27 LAB — ERYTHROCYTE [SEDIMENTATION RATE] IN BLOOD: 8 MM/HR (ref 0–30)

## 2021-01-27 PROCEDURE — G9717 DOC PT DX DEP/BP F/U NT REQ: HCPCS | Performed by: INTERNAL MEDICINE

## 2021-01-27 PROCEDURE — 85652 RBC SED RATE AUTOMATED: CPT

## 2021-01-27 PROCEDURE — G9899 SCRN MAM PERF RSLTS DOC: HCPCS | Performed by: INTERNAL MEDICINE

## 2021-01-27 PROCEDURE — 3017F COLORECTAL CA SCREEN DOC REV: CPT | Performed by: INTERNAL MEDICINE

## 2021-01-27 PROCEDURE — G8427 DOCREV CUR MEDS BY ELIG CLIN: HCPCS | Performed by: INTERNAL MEDICINE

## 2021-01-27 PROCEDURE — 36415 COLL VENOUS BLD VENIPUNCTURE: CPT

## 2021-01-27 PROCEDURE — 86235 NUCLEAR ANTIGEN ANTIBODY: CPT

## 2021-01-27 PROCEDURE — 82103 ALPHA-1-ANTITRYPSIN TOTAL: CPT

## 2021-01-27 PROCEDURE — G8419 CALC BMI OUT NRM PARAM NOF/U: HCPCS | Performed by: INTERNAL MEDICINE

## 2021-01-27 PROCEDURE — 99215 OFFICE O/P EST HI 40 MIN: CPT | Performed by: INTERNAL MEDICINE

## 2021-01-27 RX ORDER — MOMETASONE FUROATE 220 UG/1
1 INHALANT RESPIRATORY (INHALATION) 2 TIMES DAILY
Qty: 90 CAP | Refills: 3 | Status: SHIPPED | OUTPATIENT
Start: 2021-01-27 | End: 2021-06-15 | Stop reason: CLARIF

## 2021-01-27 NOTE — PROGRESS NOTES
Belle Villanueva presents today for   Chief Complaint   Patient presents with    Other     Chest Irritation,No SOB, Cough,or Wheezing        Is someone accompanying this pt? No    Is the patient using any DME equipment during OV? No    -DME Company NA    Depression Screening:  3 most recent PHQ Screens 12/20/2019   PHQ Not Done -   Little interest or pleasure in doing things Not at all   Feeling down, depressed, irritable, or hopeless Not at all   Total Score PHQ 2 0       Learning Assessment:  Learning Assessment 6/12/2019   PRIMARY LEARNER Patient   BARRIERS PRIMARY LEARNER NONE   CO-LEARNER CAREGIVER No   PRIMARY LANGUAGE ENGLISH   LEARNER PREFERENCE PRIMARY READING     DEMONSTRATION   ANSWERED BY patient   RELATIONSHIP SELF       Abuse Screening:  Abuse Screening Questionnaire 6/12/2019   Do you ever feel afraid of your partner? N   Are you in a relationship with someone who physically or mentally threatens you? N   Is it safe for you to go home? Y       Fall Risk  Fall Risk Assessment, last 12 mths 12/20/2019   Able to walk? Yes   Fall in past 12 months? Yes   Number of falls in past 12 months 8 or more   Fall with injury? 1         Coordination of Care:  1. Have you been to the ER, urgent care clinic since your last visit? Hospitalized since your last visit? No    2. Have you seen or consulted any other health care providers outside of the [address] [place] [address] since your last visit? Include any pap smears or colon screening.  Dr Nicky Briggs
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
## 2021-01-28 ENCOUNTER — TELEPHONE (OUTPATIENT)
Dept: INTERNAL MEDICINE CLINIC | Age: 61
End: 2021-01-28

## 2021-01-28 LAB
A1AT SERPL-MCNC: 147 MG/DL (ref 101–187)
CENTROMERE B AB SER-ACNC: <0.2 AI (ref 0–0.9)
CHROMATIN AB SERPL-ACNC: <0.2 AI (ref 0–0.9)
DSDNA AB SER-ACNC: <1 IU/ML (ref 0–9)
ENA JO1 AB SER-ACNC: <0.2 AI (ref 0–0.9)
ENA RNP AB SER-ACNC: <0.2 AI (ref 0–0.9)
ENA SCL70 AB SER-ACNC: <0.2 AI (ref 0–0.9)
ENA SM AB SER-ACNC: <0.2 AI (ref 0–0.9)
ENA SM+RNP AB SER-ACNC: <0.2 AI (ref 0–0.9)
ENA SS-A AB SER-ACNC: <0.2 AI (ref 0–0.9)
ENA SS-B AB SER-ACNC: <0.2 AI (ref 0–0.9)
RIBOSOMAL P AB SER-ACNC: <0.2 AI (ref 0–0.9)
SEE BELOW:, 164879: NORMAL

## 2021-01-28 NOTE — TELEPHONE ENCOUNTER
Patient called inquiring about covid Vaccine, number to hotline [phone] was provided along with hours of the hotline. While on the phone she brought to our attention that her my chart was saying she had Zoster Vaccine on three different dates. The correct Dates should be 8/21/20 and 11/27/20 and not the 9/14/20 date. I told her that I would let the nurse know and see if we could correct it on our end.

## 2021-02-02 ENCOUNTER — TELEPHONE (OUTPATIENT)
Dept: CARDIOLOGY CLINIC | Age: 61
End: 2021-02-02

## 2021-02-02 NOTE — TELEPHONE ENCOUNTER
Called and left voicemail message to remind patient of echocardiogram appointment tomorrow.     Monse De Leon, RCS, RDCS

## 2021-02-04 DIAGNOSIS — I31.4 TAMPONADE: ICD-10-CM

## 2021-02-18 ENCOUNTER — TELEPHONE (OUTPATIENT)
Dept: CARDIOLOGY CLINIC | Age: 61
End: 2021-02-18

## 2021-02-18 NOTE — TELEPHONE ENCOUNTER
This has been fully explained to the patient, who indicates understanding. Patient stated she also has questions for Dr Cleo Porter and would to personally like to speak with her. Will make Dr Cleo Porter aware.

## 2021-02-18 NOTE — TELEPHONE ENCOUNTER
----- Message from Ivin Peabody, DO sent at 2/9/2021 10:11 AM EST -----  I thought we already told her results? But I dont see documented    Maybe just call and see how she's doing  Let her know echo shows pericardial effusion significantly reduced  Nothing different to do from our standpoint    Thanks  ----- Message -----  From: Nereida Best LPN  Sent: 2/1/8685  11:20 AM EST  To: Ivin Peabody, DO    Per your last note\" Peterson Barnard is a 62 y.o. with:     1.) Syncope, most likely vasovagal/ neurocardiogenic  2.) LDL >200!, (CAC 84);m will table further discussion of statin at this time  3.) +FH premature ASCVD  4.) Asthma, please dw pulm  5.) Large incidentally noted pericardial effusion without tamponade     1.) No acute symptoms- sating 98% on RA, with with some mild exertional sinus tach (ekg unchanged, low voltage, no pericarditis)  2.) Will repeat echo in one week, and decide if CTS referral for drainage  3.) Unclear underlying cause but would r/o malignancy/ autoimmune phenomenon but most likely idiopathetic/ viral etiology  4.) Will call with echo results next week and figure out next steps, call sooner if needed     Patients with a pericardial effusion who are hemodynamically stable with no evidence of cardiac tamponade do not require immediate drainage of the effusion for therapeutic purposes. However, sampling of the effusion may be indicated for diagnostic purposes in patients without a clear etiology for the effusion. Patients in whom the initial approach is observation should undergo serial echocardiographic studies (every five to seven days or sooner if clinically indicated), avoid volume depletion (including avoidance or judicious use of diuretics), and receive therapy aimed at the underlying cause (if known) of the pericardial effusion.     Treatment of hemodynamically stable pericardial effusions should focus on treatment of the underlying condition rather than the effusion itself.  This may result in clinical improvement and resolution of the effusion without the need for percutaneous or surgical drainage

## 2021-02-21 DIAGNOSIS — M81.0 AGE-RELATED OSTEOPOROSIS WITHOUT CURRENT PATHOLOGICAL FRACTURE: ICD-10-CM

## 2021-02-21 DIAGNOSIS — M85.80 OSTEOPENIA, UNSPECIFIED LOCATION: ICD-10-CM

## 2021-02-22 ENCOUNTER — TELEPHONE (OUTPATIENT)
Dept: PULMONOLOGY | Age: 61
End: 2021-02-22

## 2021-02-22 DIAGNOSIS — E78.5 HYPERLIPIDEMIA LDL GOAL <160: Primary | ICD-10-CM

## 2021-02-22 DIAGNOSIS — J45.40 MODERATE PERSISTENT ASTHMA WITHOUT COMPLICATION: ICD-10-CM

## 2021-02-22 RX ORDER — MONTELUKAST SODIUM 10 MG/1
10 TABLET ORAL DAILY
Qty: 90 TAB | Refills: 3 | Status: SHIPPED | OUTPATIENT
Start: 2021-02-22 | End: 2022-01-23

## 2021-02-22 RX ORDER — ROSUVASTATIN CALCIUM 40 MG/1
40 TABLET, COATED ORAL
Qty: 30 TAB | Refills: 5 | Status: SHIPPED | OUTPATIENT
Start: 2021-02-22 | End: 2021-07-01 | Stop reason: SDUPTHER

## 2021-02-23 ENCOUNTER — HOSPITAL ENCOUNTER (OUTPATIENT)
Dept: INFUSION THERAPY | Age: 61
Discharge: HOME | End: 2021-02-23
Payer: MEDICARE

## 2021-02-23 VITALS
HEART RATE: 86 BPM | SYSTOLIC BLOOD PRESSURE: 128 MMHG | OXYGEN SATURATION: 97 % | DIASTOLIC BLOOD PRESSURE: 80 MMHG | TEMPERATURE: 98 F

## 2021-02-23 LAB
ALBUMIN SERPL-MCNC: 4.1 G/DL (ref 3.4–5)
ALBUMIN/GLOB SERPL: 1.3 {RATIO} (ref 0.8–1.7)
ALP SERPL-CCNC: 29 U/L (ref 45–117)
ALT SERPL-CCNC: 43 U/L (ref 13–56)
ANION GAP SERPL CALC-SCNC: 4 MMOL/L (ref 3–18)
AST SERPL-CCNC: 20 U/L (ref 10–38)
BILIRUB SERPL-MCNC: 0.4 MG/DL (ref 0.2–1)
BUN SERPL-MCNC: 18 MG/DL (ref 7–18)
BUN/CREAT SERPL: 33 (ref 12–20)
CALCIUM SERPL-MCNC: 8.7 MG/DL (ref 8.5–10.1)
CHLORIDE SERPL-SCNC: 100 MMOL/L (ref 100–111)
CO2 SERPL-SCNC: 27 MMOL/L (ref 21–32)
CREAT SERPL-MCNC: 0.55 MG/DL (ref 0.6–1.3)
GLOBULIN SER CALC-MCNC: 3.2 G/DL (ref 2–4)
GLUCOSE SERPL-MCNC: 92 MG/DL (ref 74–99)
MAGNESIUM SERPL-MCNC: 2.4 MG/DL (ref 1.6–2.6)
PHOSPHATE SERPL-MCNC: 3.8 MG/DL (ref 2.5–4.9)
POTASSIUM SERPL-SCNC: 3.9 MMOL/L (ref 3.5–5.5)
PROT SERPL-MCNC: 7.3 G/DL (ref 6.4–8.2)
SODIUM SERPL-SCNC: 131 MMOL/L (ref 136–145)

## 2021-02-23 PROCEDURE — 83735 ASSAY OF MAGNESIUM: CPT

## 2021-02-23 PROCEDURE — 84100 ASSAY OF PHOSPHORUS: CPT

## 2021-02-23 PROCEDURE — 80053 COMPREHEN METABOLIC PANEL: CPT

## 2021-02-23 PROCEDURE — 36415 COLL VENOUS BLD VENIPUNCTURE: CPT

## 2021-02-23 NOTE — PROGRESS NOTES
[facility] OPIC Progress Note    Date: 2021    Name: Rosario Russ    MRN: [id number]         : 1960    Peripheral Lab Draw      Ms. Noramn to [facility], ambulatory at 1120 accompanied by self. Pt was assessed and education was provided. Ms. Richard Trevino vitals were reviewed and patient was observed for 5 minutes prior to treatment. Visit Vitals  /80 (BP 1 Location: Right arm, BP Patient Position: Sitting)   Pulse 86   Temp 98 °F (36.7 °C)   SpO2 97%     No results found for this or any previous visit (from the past 12 hour(s)). Blood obtained peripherally from [facility] first attempt with butterfly needle and sent to lab for CMP, Magnesium, Phosphorus per written orders. No bleeding or hematoma noted at site. Guaze and coban applied. Ms. Stefany Coronel tolerated the venipuncture, and had no complaints. Patient armband removed and shredded. Ms. Stefany Coronel was discharged from [place] [address] in stable condition at 1130.     Zackary Araujo Phlebotomist PCT  2021  11:34 AM

## 2021-02-24 ENCOUNTER — HOSPITAL ENCOUNTER (OUTPATIENT)
Dept: INFUSION THERAPY | Age: 61
Discharge: HOME | End: 2021-02-24
Payer: MEDICARE

## 2021-02-24 VITALS
SYSTOLIC BLOOD PRESSURE: 125 MMHG | OXYGEN SATURATION: 99 % | DIASTOLIC BLOOD PRESSURE: 87 MMHG | RESPIRATION RATE: 18 BRPM | HEART RATE: 99 BPM | TEMPERATURE: 98.2 F

## 2021-02-24 DIAGNOSIS — M85.80 OSTEOPENIA, UNSPECIFIED LOCATION: Primary | ICD-10-CM

## 2021-02-24 DIAGNOSIS — M81.0 AGE-RELATED OSTEOPOROSIS WITHOUT CURRENT PATHOLOGICAL FRACTURE: ICD-10-CM

## 2021-02-24 PROCEDURE — 96372 THER/PROPH/DIAG INJ SC/IM: CPT

## 2021-02-24 PROCEDURE — 74011250636 HC RX REV CODE- 250/636: Performed by: PSYCHIATRY & NEUROLOGY

## 2021-02-24 RX ORDER — DIPHENHYDRAMINE HYDROCHLORIDE 50 MG/ML
50 INJECTION, SOLUTION INTRAMUSCULAR; INTRAVENOUS AS NEEDED
Status: CANCELLED
Start: 2021-08-22

## 2021-02-24 RX ORDER — HYDROCORTISONE SODIUM SUCCINATE 100 MG/2ML
100 INJECTION, POWDER, FOR SOLUTION INTRAMUSCULAR; INTRAVENOUS AS NEEDED
Status: CANCELLED | OUTPATIENT
Start: 2021-08-22

## 2021-02-24 RX ORDER — ACETAMINOPHEN 325 MG/1
650 TABLET ORAL AS NEEDED
Status: CANCELLED
Start: 2021-08-22

## 2021-02-24 RX ORDER — ONDANSETRON 2 MG/ML
8 INJECTION INTRAMUSCULAR; INTRAVENOUS AS NEEDED
Status: CANCELLED | OUTPATIENT
Start: 2021-08-22

## 2021-02-24 RX ORDER — DIPHENHYDRAMINE HYDROCHLORIDE 50 MG/ML
25 INJECTION, SOLUTION INTRAMUSCULAR; INTRAVENOUS AS NEEDED
Status: CANCELLED
Start: 2021-08-22

## 2021-02-24 RX ORDER — EPINEPHRINE 1 MG/ML
0.3 INJECTION, SOLUTION, CONCENTRATE INTRAVENOUS AS NEEDED
Status: CANCELLED | OUTPATIENT
Start: 2021-08-22

## 2021-02-24 RX ORDER — ALBUTEROL SULFATE 0.83 MG/ML
2.5 SOLUTION RESPIRATORY (INHALATION) AS NEEDED
Status: CANCELLED
Start: 2021-08-22

## 2021-02-24 RX ADMIN — DENOSUMAB 60 MG: 60 INJECTION SUBCUTANEOUS at 11:12

## 2021-02-24 NOTE — PROGRESS NOTES
[facility] Progress Note    Date: 2021    Name: Bailee Guerra    MRN: [id number]         : 1960    Prolia      Ms. Norman to [facility], ambulatory at 1055. Pt was assessed and education was provided. Patient states she has been receiving Prolia for over 2 years with her last injection 6 months ago. Ms. Mota Case vitals were reviewed and patient was observed for 5 minutes prior to treatment. Visit Vitals  /87 (BP 1 Location: Left arm, BP Patient Position: Sitting)   Pulse 99   Temp 98.2 °F (36.8 °C)   Resp 18   SpO2 99%       Labs ok for injection, Calcium 8.7. Results for Kylie Barnard (MRN [id number])    Ref. Range 2021 11:30   Sodium Latest Ref Range: 136 - 145 mmol/L 131 (L)   Potassium Latest Ref Range: 3.5 - 5.5 mmol/L 3.9   Chloride Latest Ref Range: 100 - 111 mmol/L 100   CO2 Latest Ref Range: 21 - 32 mmol/L 27   Anion gap Latest Ref Range: 3.0 - 18 mmol/L 4   Glucose Latest Ref Range: 74 - 99 mg/dL 92   BUN Latest Ref Range: 7.0 - 18 MG/DL 18   Creatinine Latest Ref Range: 0.6 - 1.3 MG/DL 0.55 (L)   BUN/Creatinine ratio Latest Ref Range: 12 - 20   33 (H)   Calcium Latest Ref Range: 8.5 - 10.1 MG/DL 8.7   Phosphorus Latest Ref Range: 2.5 - 4.9 MG/DL 3.8   Magnesium Latest Ref Range: 1.6 - 2.6 mg/dL 2.4   GFR est non-AA Latest Ref Range: >60 ml/min/1.73m2 >60   GFR est AA Latest Ref Range: >60 ml/min/1.73m2 >60   Bilirubin, total Latest Ref Range: 0.2 - 1.0 MG/DL 0.4   Protein, total Latest Ref Range: 6.4 - 8.2 g/dL 7.3   Albumin Latest Ref Range: 3.4 - 5.0 g/dL 4.1   Globulin Latest Ref Range: 2.0 - 4.0 g/dL 3.2   A-G Ratio Latest Ref Range: 0.8 - 1.7   1.3   ALT Latest Ref Range: 13 - 56 U/L 43   AST Latest Ref Range: 10 - 38 U/L 20   Alk. phosphatase Latest Ref Range: 45 - 117 U/L 29 (L)     Prolia 60 mg given to right upper back of arm SQ. Tolerated well. No irritation or bleeding at site. . Bandaid to site    Patient was discharged in stable condition at 1120.  She is to return on 8/24/2021 at 1015 for her pre-prolia labs.       Maxine Yan RN  February 24, 2021

## 2021-03-30 ENCOUNTER — OFFICE VISIT (OUTPATIENT)
Dept: PULMONOLOGY | Age: 61
End: 2021-03-30
Payer: MEDICARE

## 2021-03-30 VITALS
DIASTOLIC BLOOD PRESSURE: 79 MMHG | OXYGEN SATURATION: 95 % | RESPIRATION RATE: 16 BRPM | TEMPERATURE: 98.1 F | HEART RATE: 88 BPM | BODY MASS INDEX: 25.73 KG/M2 | WEIGHT: 139.8 LBS | SYSTOLIC BLOOD PRESSURE: 129 MMHG | HEIGHT: 62 IN

## 2021-03-30 DIAGNOSIS — J45.30 MILD PERSISTENT ASTHMA WITHOUT COMPLICATION: Primary | ICD-10-CM

## 2021-03-30 DIAGNOSIS — J45.20 MILD INTERMITTENT ASTHMA WITHOUT COMPLICATION: ICD-10-CM

## 2021-03-30 DIAGNOSIS — J47.9 BRONCHIECTASIS WITHOUT COMPLICATION (HCC): ICD-10-CM

## 2021-03-30 DIAGNOSIS — G47.33 OSA (OBSTRUCTIVE SLEEP APNEA): ICD-10-CM

## 2021-03-30 PROCEDURE — G8419 CALC BMI OUT NRM PARAM NOF/U: HCPCS | Performed by: INTERNAL MEDICINE

## 2021-03-30 PROCEDURE — G9717 DOC PT DX DEP/BP F/U NT REQ: HCPCS | Performed by: INTERNAL MEDICINE

## 2021-03-30 PROCEDURE — G8427 DOCREV CUR MEDS BY ELIG CLIN: HCPCS | Performed by: INTERNAL MEDICINE

## 2021-03-30 PROCEDURE — G9899 SCRN MAM PERF RSLTS DOC: HCPCS | Performed by: INTERNAL MEDICINE

## 2021-03-30 PROCEDURE — 99214 OFFICE O/P EST MOD 30 MIN: CPT | Performed by: INTERNAL MEDICINE

## 2021-03-30 PROCEDURE — 3017F COLORECTAL CA SCREEN DOC REV: CPT | Performed by: INTERNAL MEDICINE

## 2021-03-30 NOTE — PROGRESS NOTES
Peterson Barnard presents today for   Chief Complaint   Patient presents with    Results     Labs  Alpha 1       Is someone accompanying this pt? No    Is the patient using any DME equipment during OV? No    -DME Company NA    Depression Screening:  3 most recent PHQ Screens 12/20/2019   PHQ Not Done -   Little interest or pleasure in doing things Not at all   Feeling down, depressed, irritable, or hopeless Not at all   Total Score PHQ 2 0       Learning Assessment:  Learning Assessment 6/12/2019   PRIMARY LEARNER Patient   BARRIERS PRIMARY LEARNER NONE   CO-LEARNER CAREGIVER No   PRIMARY LANGUAGE ENGLISH   LEARNER PREFERENCE PRIMARY READING     DEMONSTRATION   ANSWERED BY patient   RELATIONSHIP SELF       Abuse Screening:  Abuse Screening Questionnaire 6/12/2019   Do you ever feel afraid of your partner? N   Are you in a relationship with someone who physically or mentally threatens you? N   Is it safe for you to go home? Y       Fall Risk  Fall Risk Assessment, last 12 mths 12/20/2019   Able to walk? Yes   Fall in past 12 months? Yes   Number of falls in past 12 months 8 or more   Fall with injury? 1         Coordination of Care:  1. Have you been to the ER, urgent care clinic since your last visit? Hospitalized since your last visit? No    2. Have you seen or consulted any other health care providers outside of the [address] [place] [address] since your last visit? Include any pap smears or colon screening. Dr Cheryl Cavazos Pain [facility]     Patient has had first [employer] Vaccine 3/18/21 Current Flu  Vaccine.

## 2021-03-30 NOTE — LETTER
3/30/2021 Patient: Maribeth Yi YOB: 1960 Date of Visit: 3/30/2021 Rosalie Arias MD 
[place] [address] [id number] [place] [address] [address] [employer] Dear Rosalie Arias MD, Thank you for referring Ms. Maribeth Yi to [address] [place] [address] for evaluation. My notes for this consultation are attached. If you have questions, please do not hesitate to call me. I look forward to following your patient along with you.  
 
 
Sincerely, 
 
Augustin Carey MD

## 2021-05-17 ENCOUNTER — HOSPITAL ENCOUNTER (OUTPATIENT)
Dept: LAB | Age: 61
Discharge: HOME | End: 2021-05-17
Payer: MEDICARE

## 2021-05-17 ENCOUNTER — TELEPHONE (OUTPATIENT)
Dept: PULMONOLOGY | Age: 61
End: 2021-05-17

## 2021-05-17 ENCOUNTER — LAB ONLY (OUTPATIENT)
Dept: INTERNAL MEDICINE CLINIC | Age: 61
End: 2021-05-17

## 2021-05-17 DIAGNOSIS — E78.5 HYPERLIPIDEMIA LDL GOAL <160: Primary | ICD-10-CM

## 2021-05-17 DIAGNOSIS — E55.9 VITAMIN D DEFICIENCY: ICD-10-CM

## 2021-05-17 LAB
25(OH)D3 SERPL-MCNC: 93.4 NG/ML (ref 30–100)
ALBUMIN SERPL-MCNC: 4.2 G/DL (ref 3.4–5)
ALBUMIN/GLOB SERPL: 1.6 {RATIO} (ref 0.8–1.7)
ALP SERPL-CCNC: 31 U/L (ref 45–117)
ALT SERPL-CCNC: 35 U/L (ref 13–56)
ANION GAP SERPL CALC-SCNC: 5 MMOL/L (ref 3–18)
AST SERPL-CCNC: 24 U/L (ref 10–38)
BILIRUB SERPL-MCNC: 0.4 MG/DL (ref 0.2–1)
BUN SERPL-MCNC: 18 MG/DL (ref 7–18)
BUN/CREAT SERPL: 30 (ref 12–20)
CALCIUM SERPL-MCNC: 8.9 MG/DL (ref 8.5–10.1)
CHLORIDE SERPL-SCNC: 99 MMOL/L (ref 100–111)
CHOLEST SERPL-MCNC: 202 MG/DL
CO2 SERPL-SCNC: 29 MMOL/L (ref 21–32)
CREAT SERPL-MCNC: 0.61 MG/DL (ref 0.6–1.3)
GLOBULIN SER CALC-MCNC: 2.7 G/DL (ref 2–4)
GLUCOSE SERPL-MCNC: 87 MG/DL (ref 74–99)
HDLC SERPL-MCNC: 77 MG/DL (ref 40–60)
HDLC SERPL: 2.6 {RATIO} (ref 0–5)
LDLC SERPL CALC-MCNC: 107.2 MG/DL (ref 0–100)
LIPID PROFILE,FLP: ABNORMAL
POTASSIUM SERPL-SCNC: 4.3 MMOL/L (ref 3.5–5.5)
PROT SERPL-MCNC: 6.9 G/DL (ref 6.4–8.2)
SODIUM SERPL-SCNC: 133 MMOL/L (ref 136–145)
TRIGL SERPL-MCNC: 89 MG/DL (ref ?–150)
VLDLC SERPL CALC-MCNC: 17.8 MG/DL

## 2021-05-17 PROCEDURE — 82306 VITAMIN D 25 HYDROXY: CPT

## 2021-05-17 PROCEDURE — 80053 COMPREHEN METABOLIC PANEL: CPT

## 2021-05-17 PROCEDURE — 80061 LIPID PANEL: CPT

## 2021-05-17 RX ORDER — ALBUTEROL SULFATE 90 UG/1
2 AEROSOL, METERED RESPIRATORY (INHALATION)
Qty: 1 INHALER | Refills: 3 | Status: SHIPPED | OUTPATIENT
Start: 2021-05-17

## 2021-05-24 ENCOUNTER — OFFICE VISIT (OUTPATIENT)
Dept: INTERNAL MEDICINE CLINIC | Age: 61
End: 2021-05-24
Payer: MEDICARE

## 2021-05-24 VITALS
WEIGHT: 140 LBS | TEMPERATURE: 97.2 F | OXYGEN SATURATION: 97 % | BODY MASS INDEX: 25.76 KG/M2 | HEART RATE: 90 BPM | DIASTOLIC BLOOD PRESSURE: 79 MMHG | RESPIRATION RATE: 16 BRPM | SYSTOLIC BLOOD PRESSURE: 125 MMHG | HEIGHT: 62 IN

## 2021-05-24 DIAGNOSIS — F41.9 ANXIETY: ICD-10-CM

## 2021-05-24 DIAGNOSIS — R93.1 ELEVATED CORONARY ARTERY CALCIUM SCORE: ICD-10-CM

## 2021-05-24 DIAGNOSIS — M46.1 SACROILIITIS, NOT ELSEWHERE CLASSIFIED (HCC): ICD-10-CM

## 2021-05-24 DIAGNOSIS — Z00.00 INITIAL MEDICARE ANNUAL WELLNESS VISIT: Primary | ICD-10-CM

## 2021-05-24 DIAGNOSIS — E55.9 VITAMIN D DEFICIENCY: ICD-10-CM

## 2021-05-24 DIAGNOSIS — E78.5 HYPERLIPIDEMIA LDL GOAL <160: ICD-10-CM

## 2021-05-24 DIAGNOSIS — J45.40 MODERATE PERSISTENT ASTHMA WITHOUT COMPLICATION: ICD-10-CM

## 2021-05-24 PROCEDURE — 99214 OFFICE O/P EST MOD 30 MIN: CPT | Performed by: INTERNAL MEDICINE

## 2021-05-24 PROCEDURE — G9717 DOC PT DX DEP/BP F/U NT REQ: HCPCS | Performed by: INTERNAL MEDICINE

## 2021-05-24 PROCEDURE — G8419 CALC BMI OUT NRM PARAM NOF/U: HCPCS | Performed by: INTERNAL MEDICINE

## 2021-05-24 PROCEDURE — G0438 PPPS, INITIAL VISIT: HCPCS | Performed by: INTERNAL MEDICINE

## 2021-05-24 PROCEDURE — 3017F COLORECTAL CA SCREEN DOC REV: CPT | Performed by: INTERNAL MEDICINE

## 2021-05-24 PROCEDURE — G0463 HOSPITAL OUTPT CLINIC VISIT: HCPCS | Performed by: INTERNAL MEDICINE

## 2021-05-24 PROCEDURE — G8427 DOCREV CUR MEDS BY ELIG CLIN: HCPCS | Performed by: INTERNAL MEDICINE

## 2021-05-24 PROCEDURE — G9899 SCRN MAM PERF RSLTS DOC: HCPCS | Performed by: INTERNAL MEDICINE

## 2021-05-24 NOTE — PATIENT INSTRUCTIONS
Advance Directives: Care Instructions Overview An advance directive is a legal way to state your wishes at the end of your life. It tells your family and your doctor what to do if you can't say what you want. There are two main types of advance directives. You can change them any time your wishes change. Living will. This form tells your family and your doctor your wishes about life support and other treatment. The form is also called a declaration. Medical power of . This form lets you name a person to make treatment decisions for you when you can't speak for yourself. This person is called a health care agent (health care proxy, health care surrogate). The form is also called a durable power of  for health care. If you do not have an advance directive, decisions about your medical care may be made by a family member, or by a doctor or a  who doesn't know you. It may help to think of an advance directive as a gift to the people who care for you. If you have one, they won't have to make tough decisions by themselves. Follow-up care is a key part of your treatment and safety. Be sure to make and go to all appointments, and call your doctor if you are having problems. It's also a good idea to know your test results and keep a list of the medicines you take. What should you include in an advance directive? Many states have a unique advance directive form. (It may ask you to address specific issues.) Or you might use a universal form that's approved by many states. If your form doesn't tell you what to address, it may be hard to know what to include in your advance directive. Use the questions below to help you get started. · Who do you want to make decisions about your medical care if you are not able to? · What life-support measures do you want if you have a serious illness that gets worse over time or can't be cured? · What are you most afraid of that might happen?  (Maybe you're afraid of having pain, losing your independence, or being kept alive by machines.) · Where would you prefer to die? (Your home? A hospital? A nursing home?) · Do you want to donate your organs when you die? · Do you want certain Yazidism practices performed before you die? When should you call for help? Be sure to contact your doctor if you have any questions. Where can you learn more? Go to http://www.gray.com/ Enter R264 in the search box to learn more about \"Advance Directives: Care Instructions. \" Current as of: July 17, 2020               Content Version: 12.8 © 4123-5987 [employer]. Care instructions adapted under license by [employer] (which disclaims liability or warranty for this information). If you have questions about a medical condition or this instruction, always ask your healthcare professional. [address] any warranty or liability for your use of this information.

## 2021-05-24 NOTE — PROGRESS NOTES
This is an Initial Medicare Annual Wellness Exam (AWV) (Performed 12 months after IPPE or effective date of Medicare Part B enrollment, Once in a lifetime)    I have reviewed the patient's medical history in detail and updated the computerized patient record. Assessment/Plan   Education and counseling provided:  Are appropriate based on today's review and evaluation  End-of-Life planning (with patient's consent)    1. Initial Medicare annual wellness visit  2. Hyperlipidemia LDL goal <160  -     LIPID PANEL; Future  -     METABOLIC PANEL, COMPREHENSIVE; Future  3. Elevated coronary artery calcium score  4. Vitamin D deficiency  -     VITAMIN D, 25 HYDROXY; Future  5. Sacroiliitis, not elsewhere classified ([facility] [address]  6. Moderate persistent asthma without complication  7. Anxiety       Depression Risk Factor Screening     3 most recent PHQ Screens 12/20/2019   PHQ Not Done -   Little interest or pleasure in doing things Not at all   Feeling down, depressed, irritable, or hopeless Not at all   Total Score PHQ 2 0       Alcohol Risk Screen    Do you average more than 1 drink per night or more than 7 drinks a week:  No    On any one occasion in the past three months have you have had more than 3 drinks containing alcohol:  No         Functional Ability and Level of Safety    Hearing: Hearing is good. Activities of Daily Living: The home contains: no safety equipment. Patient does total self care     Ambulation: with no difficulty      Fall Risk:  Fall Risk Assessment, last 12 mths 12/20/2019   Able to walk? Yes   Fall in past 12 months? Yes   Number of falls in past 12 months 8 or more   Fall with injury? 1      Abuse Screen:  Patient is not abused       Cognitive Screening    Has your family/caregiver stated any concerns about your memory: no     Cognitive Screening: Normal - .     Health Maintenance Due     Health Maintenance Due   Topic Date Due    Pneumococcal 0-64 years (1 of 2 - PPSV23) 01/23/2017    COVID-19 Vaccine (2 - Pfizer 2-dose series) 04/08/2021       Patient Care Team   Patient Care Team:  Alana Brittle, MD as PCP - General (Internal Medicine)  Alana Brittle, MD as PCP - [facility]  Bhavya Boyd MD as Consulting Provider (Psychiatry)  Joaquin Paulson MD as Consulting Provider (Pulmonary Disease)  Pratima Nick MD as Consulting Provider (Pain Management)  Yan King MD as Consulting Provider (Obstetrics & Gynecology)  Bentley Kern MD (Urology)  Jayne Sandoval MD (Orthopedic Surgery)  Fatmata Casiano OD (Optometry)  Carolina Ocampo DO (Cardiology)    Glaucoma Screening-  UTD   Pneumonia Vaccine-  Needs Pneumovax 23  Shingles Vaccine- [facility] UTD  Tdap Vaccine-  12/2018  Colonoscopy-  2/2013 Dr Winnie Norris f/u 2/2023  Mammogram 1/2021 f/u 1/2022  Advance Directive-  Pt has one and will give office copy    History     Patient Active Problem List   Diagnosis Code    Chronic interstitial cystitis N30.10    Chronic depression F32.9    Anxiety F41.9    Stress F43.9    Asthma J45.909    Irritable bowel syndrome with diarrhea K58.0    Fibromyalgia M79.7    Lumbago M54.5    Sacroiliitis, not elsewhere classified ([facility] [address] M46.1    Pain in joint, multiple sites M25.50    Encounter for long-term (current) use of high-risk medication Z79.899    Vitamin D deficiency E55.9    Dyspnea R06.00    Anorgasmia of female F54.26    LUCRECIA (obstructive sleep apnea) G47.33    Osteoporosis M81.0    De Quervain's tenosynovitis, right M65.4    Mild intermittent asthma without complication B06.61    Chronic pain syndrome G89.4    Hyperlipidemia LDL goal <160 E78.5    Bronchitis with bronchospasm J20.9    Age-related osteoporosis without current pathological fracture M81.0    Osteopenia M85.80    Pericardial effusion I31.3    Generalized osteoarthritis M15.9    Non-smoker Z78.9    Other long term (current) drug therapy Z79.899    TMJ (temporomandibular joint syndrome) M26.609    Bronchiectasis [facility] complication ([facility] M72.8     Past Medical History:   Diagnosis Date    Allergy     Anxiety     Arthritis     Asthma     mild    Atypical ductal hyperplasia of breast     Atypical hyperplasia of breast 2003    right    Chronic depression     Chronic fatigue syndrome     Chronic interstitial cystitis     Chronic interstitial cystitis     Chronic pain     fibromyalgia    Constipation     Depression     Diarrhea     Difficulty walking     Dizziness     Dry eyes     Dry mouth     Dyspnea     Fainting episodes     Pt said she has a condition where she faints after urinating     Fibromyalgia     Fibromyalgia     Generalized stiffness     GERD (gastroesophageal reflux disease)     Headache(784.0)     Heartburn     Hyperlipidemia     IBS (irritable bowel syndrome)     IC (interstitial cystitis)     Imbalance     Incoordination     Insomnia     Irregular periods/menstrual cycles     Joint pain     Microscopic hematuria     Muscle pain     Myofascial pain syndrome     Numbness and tingling in hands     Ovarian cyst, left     Painful sex     Pneumonia     Psychiatric disorder     anxiety - depression    RLS (restless legs syndrome)     Stress     TMJ (temporomandibular joint syndrome)     Transient total loss of muscle tone     Uterine fibroid     Weakness generalized       Past Surgical History:   Procedure Laterality Date    ENDOSCOPY, COLON, DIAGNOSTIC  2013    10 years    HX BREAST BIOPSY  2003    right    HX COLONOSCOPY  2012    donohue    HX GYN      left ovarian cystectomy    HX GYN      laparoscopy, laparotomy, lysis of adhesions.     HX MYOMECTOMY      HX OVARIAN CYST REMOVAL  2003    HX UROLOGICAL  05/24/10    cystoscopy and hydrodilation     Current Outpatient Medications   Medication Sig Dispense Refill    albuterol (PROVENTIL HFA, VENTOLIN HFA, PROAIR HFA) 90 mcg/actuation inhaler Take 2 Puffs by inhalation every six (6) hours as needed for Wheezing. 1 Inhaler 3    mth-me blue-sod phos-phsal-hyo (UribeL) 118-10-40.8-36 mg cap capsule Take 1 Cap by mouth four (4) times daily as needed for Pain. 120 Cap 3    montelukast (Singulair) 10 mg tablet Take 1 Tab by mouth daily. 90 Tab 3    rosuvastatin (CRESTOR) 40 mg tablet Take 1 Tab by mouth nightly. 30 Tab 5    mometasone (Asmanex Twisthaler) 220 mcg/ actuation (30) inhaler Take 1 Puff by inhalation two (2) times a day. 90 Cap 3    tiotropium bromide (Spiriva Respimat) 2.5 mcg/actuation inhaler Take 2 Puffs by inhalation daily. Indications: prevention of bronchospasm with chronic bronchitis 3 Inhaler 3    omeprazole (PRILOSEC) 40 mg capsule Take 40 mg by mouth daily.  omeprazole (PRILOSEC) 40 mg capsule Take 1 Cap by mouth daily. 90 Cap 3    digestive 8-L.acidoph-pectin (DIGESTIVE ENZYME, ACIDOPH,PEC,) 50 million cell-100 mg tab Take 1 Tab by mouth daily.  gabapentin (NEURONTIN) 800 mg tablet take 1 tablet by mouth three times a day  0    denosumab (PROLIA) 60 mg/mL injection 60 mg by SubCUTAneous route. 2 times yearly      OXcarbazepine (TRILEPTAL) 150 mg tablet Take 1 Tab by mouth three (3) times daily. 90 Tab 2    calcium glycerophosphate (PRELIEF) 65 mg tab Take 1 Tab by mouth daily.  modafinil (PROVIGIL) 200 mg tablet take 1 tablet by mouth twice a day for 30 DAYS maximum daily dose of 2 tablets (Patient taking differently: Take 200 mg by mouth daily.) 60 Tab 5    OTHER Indications: Compound cream for inflammation      B.infantis-B.ani-B.long-B.bifi (PROBIOTIC 4X) 10-15 mg TbEC Take 1 Tab by mouth daily.  ENZYMES,DIGESTIVE, PLANT, (DIGESTIVE ENZYMES, PLANT, PO) Take 1 Tab by mouth daily.  calcium citrate-vitamin D3 (CITRACAL + D) tablet Take 1 Tab by mouth daily.  LORazepam (ATIVAN) 2 mg tablet Take 2 mg by mouth every eight (8) hours.  DULoxetine (CYMBALTA) 60 mg capsule Take 120 mg by mouth daily.       simethicone (PHAZYME) 180 mg cap Take 225 mg by mouth as needed.  trazodone (DESYREL) 100 mg tablet Take 150 mg by mouth nightly.  mth-me blue-sod phos-phsal-hyo (VILAMIT MB) 118-10-40.8-36 mg cap capsule Take 1 Cap by mouth four (4) times daily. 120 Cap 11    pentosan polysulfate sodium (ELMIRON) 100 mg capsule Take 1 Cap by mouth two (2) times a day. Indications: Bladder Pain, bladder wall inflammation (Patient not taking: Reported on 5/24/2021) 180 Cap 3    ergocalciferol (ERGOCALCIFEROL) 50,000 unit capsule take 1 capsule by mouth TWO (2) TIMES A WEEK  Indications: PREVENTION OF VITAMIN D DEFICIENCY (Patient not taking: Reported on 5/24/2021) 8 Cap 11     Allergies   Allergen Reactions    Codeine Rash and Itching     Other reaction(s): other/intolerance    Tussionex Itching       Family History   Problem Relation Age of Onset    Cancer Father         prostate and bone    Heart Disease Father     Heart Attack Father     Depression Mother     Osteoporosis Mother     Osteoporosis Sister     Diabetes Brother     Kidney Disease Brother         dialysis    Hypertension Brother     Diabetes Maternal Grandmother     Hypertension Brother     Stroke Other     Other Other         fibromyalgia    Schizophrenia Brother     Other Brother         obsessive compulsive disorder     Social History     Tobacco Use    Smoking status: Never Smoker    Smokeless tobacco: Never Used   Substance Use Topics    Alcohol use: Yes     Alcohol/week: 0.8 standard drinks     Types: 1 Cans of beer per week     Comment: socially     A comprehensive 5 year plan for medical care and screening exams was reviewed with pt and they received a copy of it.       Lauri Person LPN

## 2021-05-24 NOTE — PROGRESS NOTES
Patient is in the office today to establish care and medicare wellness visit. Do you have an Advance Directive no  Do you want more information : information given     1. Have you been to the ER, urgent care clinic since your last visit? Hospitalized since your last visit? No    2. Have you seen or consulted any other health care providers outside of the [address] [place] [address] since your last visit? Include any pap smears or colon screening. yes, Dr. Edi Blake, Dr. Brian Cantu Pulmonary and Dr. Blessing De León, and Dr. Papi Xiong.

## 2021-05-25 NOTE — PROGRESS NOTES
Jenni Marroquin is a 62 y.o.  female and presents with Establish Care, Annual Wellness Visit, Vitamin D Deficiency, Cholesterol Problem, and Leg Pain (right )      SUBJECTIVE:  Patient has history of very high LDL that has been over 200 in the past.  Patient had coronary artery calcium score done that was elevated at 84. Patient was evaluated by cardiology and had a mild pericardial effusion that is being monitored. Patient is now on Crestor 40 mg which has significantly improved her cholesterol and brought her total cholesterol down to 202 with LDL of 107. Her previous LDL was 265 with total cholesterol 371. Patient has a history of asthma moderate persistent for which she uses Asmanex and Singulair and Spiriva. Patient is followed by pulmonary. Patient is also followed by the spine center as well as pain management with Dr. Cecil Bailey. She has been diagnosed also with sacroiliitis which is also managed by pain management. She is on Trileptal and Neurontin to help manage chronic pain and fibromyalgia. She has some mild hyponatremia because of the Trileptal.  Patient is followed by Dr. Etta Benjamin from psychiatry and remains on Cymbalta 120 mg daily, trazodone and Ativan 2 mg 3 times a day. Patient also has interstitial cystitis for which she follows urology. Patient's vitamin D level is too high on vitamin D 50,000 units/week so we will try and decrease it to every other week to see if it is enough. May need to take off completely in the future      Patient has some mild right leg pain when she is laying on her side in the bed. Discussed with her that this may be due to some mild sciatica and if she continues to have issues would consider either chiropractor physical therapy.       Respiratory ROS: negative for - shortness of breath  Cardiovascular ROS: negative for - chest pain    Current Outpatient Medications   Medication Sig    albuterol (PROVENTIL HFA, VENTOLIN HFA, PROAIR HFA) 90 mcg/actuation inhaler Take 2 Puffs by inhalation every six (6) hours as needed for Wheezing.  [facility]-me blue-sod phos-phsal-hyo (UribeL) 118-10-40.8-36 mg cap capsule Take 1 Cap by mouth four (4) times daily as needed for Pain.  montelukast (Singulair) 10 mg tablet Take 1 Tab by mouth daily.  rosuvastatin (CRESTOR) 40 mg tablet Take 1 Tab by mouth nightly.  mometasone (Asmanex Twisthaler) 220 mcg/ actuation (30) inhaler Take 1 Puff by inhalation two (2) times a day.  tiotropium bromide (Spiriva Respimat) 2.5 mcg/actuation inhaler Take 2 Puffs by inhalation daily. Indications: prevention of bronchospasm with chronic bronchitis    omeprazole (PRILOSEC) 40 mg capsule Take 40 mg by mouth daily.  omeprazole (PRILOSEC) 40 mg capsule Take 1 Cap by mouth daily.  digestive 8-L.acidoph-pectin (DIGESTIVE ENZYME, ACIDOPH,PEC,) 50 million cell-100 mg tab Take 1 Tab by mouth daily.  gabapentin (NEURONTIN) 800 mg tablet take 1 tablet by mouth three times a day    denosumab (PROLIA) 60 mg/mL injection 60 mg by SubCUTAneous route. 2 times yearly    OXcarbazepine (TRILEPTAL) 150 mg tablet Take 1 Tab by mouth three (3) times daily.  calcium glycerophosphate (PRELIEF) 65 mg tab Take 1 Tab by mouth daily.  modafinil (PROVIGIL) 200 mg tablet take 1 tablet by mouth twice a day for 30 DAYS maximum daily dose of 2 tablets (Patient taking differently: Take 200 mg by mouth daily.)    OTHER Indications: Compound cream for inflammation    B.infantis-B.ani-B.long-B.bifi (PROBIOTIC 4X) 10-15 mg TbEC Take 1 Tab by mouth daily.  ENZYMES,DIGESTIVE, PLANT, (DIGESTIVE ENZYMES, PLANT, PO) Take 1 Tab by mouth daily.  calcium citrate-vitamin D3 (CITRACAL + D) tablet Take 1 Tab by mouth daily.  LORazepam (ATIVAN) 2 mg tablet Take 2 mg by mouth every eight (8) hours.  DULoxetine (CYMBALTA) 60 mg capsule Take 120 mg by mouth daily.  simethicone (PHAZYME) 180 mg cap Take 225 mg by mouth as needed.     trazodone (DESYREL) 100 mg tablet Take 150 mg by mouth nightly.  [facility]-me blue-sod phos-phsal-hyo (VILAMIT MB) 118-10-40.8-36 mg cap capsule Take 1 Cap by mouth four (4) times daily.  pentosan polysulfate sodium (ELMIRON) 100 mg capsule Take 1 Cap by mouth two (2) times a day. Indications: Bladder Pain, bladder wall inflammation (Patient not taking: Reported on 5/24/2021)    ergocalciferol (ERGOCALCIFEROL) 50,000 unit capsule take 1 capsule by mouth TWO (2) TIMES A WEEK  Indications: PREVENTION OF VITAMIN D DEFICIENCY (Patient not taking: Reported on 5/24/2021)     No current facility-administered medications for this visit.          OBJECTIVE:  alert, well appearing, and in no distress  Visit Vitals  /79 (BP 1 Location: Left arm, BP Patient Position: Sitting, BP Cuff Size: Adult)   Pulse 90   Temp 97.2 °F (36.2 °C) (Temporal)   Resp 16   Ht 5' 2\" (1.575 m)   Wt 140 lb (63.5 kg)   SpO2 97%   BMI 25.61 kg/m²      well developed and well nourished  Chest - clear to auscultation, no wheezes, rales or rhonchi, symmetric air entry  Heart - normal rate, regular rhythm, normal S1, S2, no murmurs, rubs, clicks or gallops  Extremities - peripheral pulses normal, no pedal edema, no clubbing or cyanosis        Labs:   Lab Results   Component Value Date/Time    WBC 6.6 11/16/2020 01:16 PM    HGB 14.2 11/16/2020 01:16 PM    Hemoglobin, POC 13.9 08/28/2019 11:13 AM    HCT 41.2 11/16/2020 01:16 PM    Hematocrit, POC 41 08/28/2019 11:13 AM    PLATELET 927 26/41/8621 01:16 PM    MCV 82.9 11/16/2020 01:16 PM     Lab Results   Component Value Date/Time    Cholesterol, total 202 (H) 05/17/2021 08:25 AM    HDL Cholesterol 77 (H) 05/17/2021 08:25 AM    LDL, calculated 107.2 (H) 05/17/2021 08:25 AM    Triglyceride 89 05/17/2021 08:25 AM    CHOL/HDL Ratio 2.6 05/17/2021 08:25 AM     Lab Results   Component Value Date/Time    TSH 1.10 11/16/2020 01:16 PM    T4, Free 0.8 11/16/2020 01:16 PM    T4, Total 8.1 08/29/2015 10:42 AM      Lab Results   Component Value Date/Time    Sodium 133 (L) 05/17/2021 08:25 AM    Potassium 4.3 05/17/2021 08:25 AM    Chloride 99 (L) 05/17/2021 08:25 AM    CO2 29 05/17/2021 08:25 AM    Anion gap 5 05/17/2021 08:25 AM    Glucose 87 05/17/2021 08:25 AM    BUN 18 05/17/2021 08:25 AM    Creatinine 0.61 05/17/2021 08:25 AM    BUN/Creatinine ratio 30 (H) 05/17/2021 08:25 AM    GFR est AA >60 05/17/2021 08:25 AM    GFR est non-AA >60 05/17/2021 08:25 AM    Calcium 8.9 05/17/2021 08:25 AM    Bilirubin, total 0.4 05/17/2021 08:25 AM    ALT (SGPT) 35 05/17/2021 08:25 AM    Alk. phosphatase 31 (L) 05/17/2021 08:25 AM    Protein, total 6.9 05/17/2021 08:25 AM    Albumin 4.2 05/17/2021 08:25 AM    Globulin 2.7 05/17/2021 08:25 AM    A-G Ratio 1.6 05/17/2021 08:25 AM          Assessment/Plan      ICD-10-CM ICD-9-CM    1. Initial Medicare annual wellness visit  Z00.00 V70.0    2. Hyperlipidemia LDL goal <160  E78.5 272.4  much improved on Crestor 40 mg daily. LIPID PANEL      METABOLIC PANEL, COMPREHENSIVE   3. Elevated coronary artery calcium score  R93.1 414.00  patient now on statin and being followed by cardiology. 4. Vitamin D deficiency  E55.9 268.9  patient's levels are too high will decrease vitamin D 50,000 units to every other week and recheck VITAMIN D, 25 HYDROXY   5. Sacroiliitis, not elsewhere classified ([address]  M46.1 720.2  patient followed by pain management with Dr. Wicho Hoover   6. Moderate persistent asthma without complication  F53.21 342.09  stable on multiple inhalers and followed by pulmonary   7. Anxiety  F41.9 300.00  stable on Cymbalta and Ativan and followed by psychiatry         Follow-up and Dispositions    · Return in about 3 months (around 8/24/2021) for labs 1 week before. Reviewed plan of care. Patient has provided input and agrees with goals.

## 2021-05-27 RX ORDER — OMEPRAZOLE 40 MG/1
40 CAPSULE, DELAYED RELEASE ORAL DAILY
Qty: 90 CAPSULE | Refills: 3 | Status: SHIPPED | OUTPATIENT
Start: 2021-05-27 | End: 2022-05-03

## 2021-05-27 NOTE — TELEPHONE ENCOUNTER
Last Visit: 5/24/21 with MD Castaneda  Next Appointment: 9/7/21 with MD Castaneda  Previous Refill Encounter(s): 6/8/20 #90 with 3 refills    Requested Prescriptions     Pending Prescriptions Disp Refills    omeprazole (PRILOSEC) 40 mg capsule 90 Capsule 3     Sig: Take 1 Capsule by mouth daily.

## 2021-06-15 ENCOUNTER — OFFICE VISIT (OUTPATIENT)
Dept: PULMONOLOGY | Age: 61
End: 2021-06-15
Payer: MEDICARE

## 2021-06-15 VITALS
BODY MASS INDEX: 25.65 KG/M2 | WEIGHT: 139.4 LBS | OXYGEN SATURATION: 97 % | HEIGHT: 62 IN | SYSTOLIC BLOOD PRESSURE: 126 MMHG | RESPIRATION RATE: 16 BRPM | DIASTOLIC BLOOD PRESSURE: 65 MMHG | TEMPERATURE: 98.3 F | HEART RATE: 88 BPM

## 2021-06-15 DIAGNOSIS — G47.33 OSA (OBSTRUCTIVE SLEEP APNEA): ICD-10-CM

## 2021-06-15 DIAGNOSIS — J45.30 MILD PERSISTENT ASTHMA WITHOUT COMPLICATION: Primary | ICD-10-CM

## 2021-06-15 DIAGNOSIS — J47.9 BRONCHIECTASIS WITHOUT COMPLICATION (HCC): ICD-10-CM

## 2021-06-15 PROCEDURE — 99214 OFFICE O/P EST MOD 30 MIN: CPT | Performed by: INTERNAL MEDICINE

## 2021-06-15 PROCEDURE — G8419 CALC BMI OUT NRM PARAM NOF/U: HCPCS | Performed by: INTERNAL MEDICINE

## 2021-06-15 PROCEDURE — G9717 DOC PT DX DEP/BP F/U NT REQ: HCPCS | Performed by: INTERNAL MEDICINE

## 2021-06-15 PROCEDURE — G8427 DOCREV CUR MEDS BY ELIG CLIN: HCPCS | Performed by: INTERNAL MEDICINE

## 2021-06-15 PROCEDURE — G9899 SCRN MAM PERF RSLTS DOC: HCPCS | Performed by: INTERNAL MEDICINE

## 2021-06-15 PROCEDURE — 3017F COLORECTAL CA SCREEN DOC REV: CPT | Performed by: INTERNAL MEDICINE

## 2021-06-15 RX ORDER — MELOXICAM 15 MG/1
TABLET ORAL
COMMUNITY
Start: 2021-05-23

## 2021-06-15 RX ORDER — FLUTICASONE FUROATE 100 UG/1
1 POWDER RESPIRATORY (INHALATION) DAILY
Qty: 30 BLISTER | Refills: 3 | Status: SHIPPED | OUTPATIENT
Start: 2021-06-15 | End: 2022-02-23 | Stop reason: SDUPTHER
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
## 2021-06-15 NOTE — LETTER
6/15/2021    Patient: Ena Nowak   YOB: 1960   Date of Visit: 6/15/2021     Brandan Meredith, [facility] [address]    Dear Brandan Meredith MD,      Thank you for referring Ms. Ena Nowak to [address] [place] [address] for evaluation. My notes for this consultation are attached. If you have questions, please do not hesitate to call me. I look forward to following your patient along with you.       Sincerely,    Feliciano Vicente MD

## 2021-06-15 NOTE — PROGRESS NOTES
Hali Denny presents today for   Chief Complaint   Patient presents with    Follow Up Chronic Condition    Asthma       Is someone accompanying this pt? No    Is the patient using any DME equipment during OV? No    -DME Company NA    Depression Screening:  3 most recent PHQ Screens 12/20/2019   PHQ Not Done -   Little interest or pleasure in doing things Not at all   Feeling down, depressed, irritable, or hopeless Not at all   Total Score PHQ 2 0       Learning Assessment:  Learning Assessment 6/12/2019   PRIMARY LEARNER Patient   BARRIERS PRIMARY LEARNER NONE   CO-LEARNER CAREGIVER No   PRIMARY LANGUAGE ENGLISH   LEARNER PREFERENCE PRIMARY READING     DEMONSTRATION   ANSWERED BY patient   RELATIONSHIP SELF       Abuse Screening:  Abuse Screening Questionnaire 5/24/2021   Do you ever feel afraid of your partner? N   Are you in a relationship with someone who physically or mentally threatens you? N   Is it safe for you to go home? Y       Fall Risk  Fall Risk Assessment, last 12 mths 12/20/2019   Able to walk? Yes   Fall in past 12 months? Yes   Number of falls in past 12 months 8 or more   Fall with injury? 1         Coordination of Care:  1. Have you been to the ER, urgent care clinic since your last visit? Hospitalized since your last visit? No    2. Have you seen or consulted any other health care providers outside of the [address] [place] [address] since your last visit? Include any pap smears or colon screening.  Dr Christianne Malone  Pain Management

## 2021-07-01 ENCOUNTER — OFFICE VISIT (OUTPATIENT)
Dept: CARDIOLOGY CLINIC | Age: 61
End: 2021-07-01
Payer: MEDICARE

## 2021-07-01 VITALS
BODY MASS INDEX: 25.58 KG/M2 | WEIGHT: 139 LBS | HEART RATE: 99 BPM | DIASTOLIC BLOOD PRESSURE: 82 MMHG | HEIGHT: 62 IN | SYSTOLIC BLOOD PRESSURE: 130 MMHG | OXYGEN SATURATION: 98 %

## 2021-07-01 DIAGNOSIS — I31.39 PERICARDIAL EFFUSION: ICD-10-CM

## 2021-07-01 DIAGNOSIS — R06.09 DYSPNEA ON EXERTION: Primary | ICD-10-CM

## 2021-07-01 PROCEDURE — 99215 OFFICE O/P EST HI 40 MIN: CPT | Performed by: INTERNAL MEDICINE

## 2021-07-01 PROCEDURE — 3017F COLORECTAL CA SCREEN DOC REV: CPT | Performed by: INTERNAL MEDICINE

## 2021-07-01 PROCEDURE — G8419 CALC BMI OUT NRM PARAM NOF/U: HCPCS | Performed by: INTERNAL MEDICINE

## 2021-07-01 PROCEDURE — G8427 DOCREV CUR MEDS BY ELIG CLIN: HCPCS | Performed by: INTERNAL MEDICINE

## 2021-07-01 PROCEDURE — G9717 DOC PT DX DEP/BP F/U NT REQ: HCPCS | Performed by: INTERNAL MEDICINE

## 2021-07-01 PROCEDURE — 93000 ELECTROCARDIOGRAM COMPLETE: CPT | Performed by: INTERNAL MEDICINE

## 2021-07-01 PROCEDURE — G9899 SCRN MAM PERF RSLTS DOC: HCPCS | Performed by: INTERNAL MEDICINE

## 2021-07-01 RX ORDER — ROSUVASTATIN CALCIUM 40 MG/1
40 TABLET, COATED ORAL
Qty: 30 TABLET | Refills: 5 | Status: SHIPPED | OUTPATIENT
Start: 2021-07-01 | End: 2022-02-25

## 2021-07-01 NOTE — PROGRESS NOTES
Khalida Allen presents today for   Chief Complaint   Patient presents with    Follow-up     6 month f/u       Khalida Allen preferred language for health care discussion is english/other. Is someone accompanying this pt? no    Is the patient using any DME equipment during [address]? no    Depression Screening:  3 most recent PHQ Screens 7/1/2021   PHQ Not Done -   Little interest or pleasure in doing things Not at all   Feeling down, depressed, irritable, or hopeless Not at all   Total Score PHQ 2 0       Learning Assessment:  Learning Assessment 6/12/2019   PRIMARY LEARNER Patient   BARRIERS PRIMARY LEARNER [address] CAREGIVER No   PRIMARY LANGUAGE ENGLISH   LEARNER PREFERENCE PRIMARY READING     DEMONSTRATION   ANSWERED BY patient   RELATIONSHIP SELF       Abuse Screening:  Abuse Screening Questionnaire 7/1/2021   Do you ever feel afraid of your partner? N   Are you in a relationship with someone who physically or mentally threatens you? N   Is it safe for you to go home? Y       Fall Risk  Fall Risk Assessment, last 12 mths 12/20/2019   Able to walk? Yes   Fall in past 12 months? Yes   Number of falls in past 12 months 8 or more   Fall with injury? 1       Pt currently taking Anticoagulant therapy? no    Coordination of Care:  1. Have you been to the ER, urgent care clinic since your last visit? Hospitalized since your last visit? no    2. Have you seen or consulted any other health care providers outside of the [address] [place] [address] since your last visit? Include any pap smears or colon screening.  no

## 2021-07-01 NOTE — PROGRESS NOTES
Ynes Moctezuma    F/u pericardial effusion, Hyperlipidemia    HPI    nYes Moctezuma is a 62 y.o. female with no known heart disease no hypertension or diabetes who was sent to me for initial evaluation of syncope. She was not feeling well and woke up with a headache. For some unknown reason she felt somewhat nauseous. She apparently then went to urinate in the bathroom and soon after had a syncopal episode. Like she lost consciousness and the first time it was not witnessed but her  was home and came running up stairs. Tried to kind of get her up and then shortly after she passed out again. He took her blood pressure and it was 90s over 60s. They did not go to the ER she just laid down and drink water and she felt back to normal soon after. Has not happened again but she does tell me she has interstitial cystitis and has noticed she has had to strain a little bit more when urinating. She apparently has asthma and does use inhalers and this is the only setting in which she is ever felt any discomfort in her chest at rest when she needs to use an inhaler. This is chronic. Otherwise, no travel,says barely leaves the house due to COVID,no sick contacts. Has been worked up for RA and says has fibromyalgia no other autoimmune disease. No trauma. She has no new SOB, no swelling, can lay flat. After above, I ordered an echo- which surprisingly showed a large pericardial effusion without tamponade. We had close follow up and effusion went to small to moderate quite quickly. After that was addressed,then tackled her lipids. She has had great improvement to her -->107. I'm aware she can wake up with sporadic \"aching\" on her right side and agree this is unlikely related/ myalgias. She's undergoing a lot of stress. CV RFs: +FH premature ASCVD in father?     Past Medical History:   Diagnosis Date    Allergy     Anxiety     Arthritis     Asthma     mild    Atypical ductal hyperplasia of breast     Atypical hyperplasia of breast 2003    right    Chronic depression     Chronic fatigue syndrome     Chronic interstitial cystitis     Chronic interstitial cystitis     Chronic pain     fibromyalgia    Constipation     Depression     Diarrhea     Difficulty walking     Dizziness     Dry eyes     Dry mouth     Dyspnea     Fainting episodes     Pt said she has a condition where she faints after urinating     Fibromyalgia     Fibromyalgia     Generalized stiffness     GERD (gastroesophageal reflux disease)     Headache(784.0)     Heartburn     Hyperlipidemia     IBS (irritable bowel syndrome)     IC (interstitial cystitis)     Imbalance     Incoordination     Insomnia     Irregular periods/menstrual cycles     Joint pain     Microscopic hematuria     Muscle pain     Myofascial pain syndrome     Numbness and tingling in hands     Ovarian cyst, left     Painful sex     Pneumonia     Psychiatric disorder     anxiety - depression    RLS (restless legs syndrome)     Stress     TMJ (temporomandibular joint syndrome)     Transient total loss of muscle tone     Uterine fibroid     Weakness generalized        Past Surgical History:   Procedure Laterality Date    ENDOSCOPY, COLON, DIAGNOSTIC  2013    10 years    HX BREAST BIOPSY  2003    right    HX COLONOSCOPY  2012    donohue    HX GYN      left ovarian cystectomy    HX GYN      laparoscopy, laparotomy, lysis of adhesions.  HX MYOMECTOMY      HX OVARIAN CYST REMOVAL  2003    HX UROLOGICAL  05/24/10    cystoscopy and hydrodilation       Current Outpatient Medications   Medication Sig Dispense Refill    rosuvastatin (CRESTOR) 40 mg tablet Take 1 Tablet by mouth nightly. 30 Tablet 5    meloxicam (MOBIC) 15 mg tablet take 1 tablet by mouth once daily      fluticasone furoate (Arnuity Ellipta) 100 mcg/actuation dsdv inhaler Take 1 Puff by inhalation daily.  30 Blister 3    omeprazole (PRILOSEC) 40 mg capsule Take 1 Capsule by mouth daily. 90 Capsule 3    albuterol (PROVENTIL HFA, VENTOLIN HFA, PROAIR HFA) 90 mcg/actuation inhaler Take 2 Puffs by inhalation every six (6) hours as needed for Wheezing. 1 Inhaler 3    mth-me blue-sod phos-phsal-hyo (UribeL) 118-10-40.8-36 mg cap capsule Take 1 Cap by mouth four (4) times daily as needed for Pain. 120 Cap 3    montelukast (Singulair) 10 mg tablet Take 1 Tab by mouth daily. 90 Tab 3    tiotropium bromide (Spiriva Respimat) 2.5 mcg/actuation inhaler Take 2 Puffs by inhalation daily. Indications: prevention of bronchospasm with chronic bronchitis 3 Inhaler 3    mth-me blue-sod phos-phsal-hyo (VILAMIT MB) 118-10-40.8-36 mg cap capsule Take 1 Cap by mouth four (4) times daily. 120 Cap 11    gabapentin (NEURONTIN) 800 mg tablet take 1 tablet by mouth three times a day  0    denosumab (PROLIA) 60 mg/mL injection 60 mg by SubCUTAneous route. 2 times yearly      OXcarbazepine (TRILEPTAL) 150 mg tablet Take 1 Tab by mouth three (3) times daily. 90 Tab 2    calcium glycerophosphate (PRELIEF) 65 mg tab Take 1 Tab by mouth daily.  modafinil (PROVIGIL) 200 mg tablet take 1 tablet by mouth twice a day for 30 DAYS maximum daily dose of 2 tablets (Patient taking differently: Take 200 mg by mouth daily.) 60 Tab 5    ergocalciferol (ERGOCALCIFEROL) 50,000 unit capsule take 1 capsule by mouth TWO (2) TIMES A WEEK  Indications: PREVENTION OF VITAMIN D DEFICIENCY 8 Cap 11    OTHER Indications: Compound cream for inflammation      B.infantis-B.ani-B.long-B.bifi (PROBIOTIC 4X) 10-15 mg TbEC Take 1 Tab by mouth daily.  calcium citrate-vitamin D3 (CITRACAL + D) tablet Take 1 Tab by mouth daily.  LORazepam (ATIVAN) 2 mg tablet Take 2 mg by mouth every eight (8) hours.  DULoxetine (CYMBALTA) 60 mg capsule Take 120 mg by mouth daily.  simethicone (PHAZYME) 180 mg cap Take 225 mg by mouth as needed.       trazodone (DESYREL) 100 mg tablet Take 150 mg by mouth nightly.  digestive 8-L.acidoph-pectin (DIGESTIVE ENZYME, ACIDOPH,PEC,) 50 million cell-100 mg tab Take 1 Tab by mouth daily. (Patient not taking: Reported on 6/15/2021)      pentosan polysulfate sodium (ELMIRON) 100 mg capsule Take 1 Cap by mouth two (2) times a day. Indications: Bladder Pain, bladder wall inflammation (Patient not taking: Reported on 5/24/2021) 180 Cap 3    ENZYMES,DIGESTIVE, PLANT, (DIGESTIVE ENZYMES, PLANT, PO) Take 1 Tab by mouth daily. (Patient not taking: Reported on 6/15/2021)         Allergies   Allergen Reactions    Codeine Rash and Itching     Other reaction(s): other/intolerance    Tussionex Itching       Social History     Socioeconomic History    Marital status:      Spouse name: Not on file    Number of children: Not on file    Years of education: Not on file    Highest education level: Not on file   Occupational History    Not on file   Tobacco Use    Smoking status: Never Smoker    Smokeless tobacco: Never Used   Substance and Sexual Activity    Alcohol use: Yes     Alcohol/week: 0.8 standard drinks     Types: 1 Cans of beer per week     Comment: socially    Drug use: No    Sexual activity: Yes     Partners: Male   Other Topics Concern    Not on file   Social History Narrative    Not on file     Social Determinants of Health     Financial Resource Strain:     Difficulty of Paying Living Expenses:    Food Insecurity:     Worried About Running Out of Food in the Last Year:     [address] in the Last Year:    Transportation Needs:     Lack of Transportation (Medical):      Lack of Transportation (Non-Medical):    Physical Activity:     Days of Exercise per Week:     Minutes of Exercise per Session:    Stress:     Feeling of Stress :    Social Connections:     Frequency of Communication with Friends and Family:     Frequency of Social Gatherings with Friends and Family:     Attends Hoahaoism Services:     Active Member of Clubs or Organizations:     Attends Club or Organization Meetings:     Marital Status:    Intimate Partner Violence:     Fear of Current or Ex-Partner:     Emotionally Abused:     Physically Abused:     Sexually Abused:         FH: father had heart attack in his 46s? Review of Systems    14 pt Review of Systems is negative unless otherwise mentioned in the HPI. Wt Readings from Last 3 Encounters:   07/01/21 63 kg (139 lb)   06/15/21 63.2 kg (139 lb 6.4 oz)   05/24/21 63.5 kg (140 lb)     Temp Readings from Last 3 Encounters:   06/15/21 98.3 °F (36.8 °C) (Oral)   05/24/21 97.2 °F (36.2 °C) (Temporal)   03/30/21 98.1 °F (36.7 °C) (Oral)     BP Readings from Last 3 Encounters:   07/01/21 130/82   06/15/21 126/65   05/24/21 125/79     Pulse Readings from Last 3 Encounters:   07/01/21 99   06/15/21 88   05/24/21 90       Physical Exam:    Visit Vitals  /82 (BP 1 Location: Left upper arm, BP Patient Position: Sitting, BP Cuff Size: Adult)   Pulse 99   Ht 5' 2\" (1.575 m)   Wt 63 kg (139 lb)   SpO2 98%   BMI 25.42 kg/m²      Physical Exam  HENT:      Head: Normocephalic and atraumatic. Eyes:      Pupils: Pupils are equal, round, and reactive to light. Cardiovascular:      Rate and Rhythm: Normal rate and regular rhythm. Heart sounds: Normal heart sounds. No murmur heard. No friction rub. No gallop. Pulmonary:      Effort: Pulmonary effort is normal. No respiratory distress. Breath sounds: Normal breath sounds. No wheezing or rales. Chest:      Chest wall: No tenderness. Abdominal:      General: Bowel sounds are normal.      Palpations: Abdomen is soft. Musculoskeletal:         General: No tenderness. Skin:     General: Skin is warm and dry. Neurological:      Mental Status: She is alert and oriented to person, place, and time.          EKG last: NSR, rightward axis and intervals, nonspecific ST segments, low voltage, similar to prior and c/w underlying pulm disease    Lab Results Component Value Date/Time    Cholesterol, total 202 (H) 05/17/2021 08:25 AM    HDL Cholesterol 77 (H) 05/17/2021 08:25 AM    LDL, calculated 107.2 (H) 05/17/2021 08:25 AM    VLDL, calculated 17.8 05/17/2021 08:25 AM    Triglyceride 89 05/17/2021 08:25 AM    CHOL/HDL Ratio 2.6 05/17/2021 08:25 AM     Lab Results   Component Value Date/Time    Sed rate (ESR) 3 08/29/2015 10:42 AM     Lab Results   Component Value Date/Time    TSH 1.10 11/16/2020 01:16 PM     Lab Results   Component Value Date/Time    WBC 6.6 11/16/2020 01:16 PM    Hemoglobin, POC 13.9 08/28/2019 11:13 AM    HGB 14.2 11/16/2020 01:16 PM    Hematocrit, POC 41 08/28/2019 11:13 AM    HCT 41.2 11/16/2020 01:16 PM    PLATELET 160 07/05/4710 01:16 PM    MCV 82.9 11/16/2020 01:16 PM       Impression and Plan:  Jordan Chiang is a 62 y.o. with:    1.) Syncope, most likely vasovagal/ neurocardiogenic  2.) LDL >265-->107 on HI statin! (CAC 84);  3.) +FH premature ASCVD  4.) Asthma, following with pulm  5.) Large incidentally noted pericardial effusion without tamponade    1.) limited echo to ensure effusion resolved  2.) Cont HI statin indefinitely, just needs FLP yearly now  3.) RTC yearly    >>50 mins spent    Thank you for allowing me to participate in the care of your patient, please do not hesitate to call with questions or concerns. Follow-up and Dispositions    · Return in about 1 year (around 7/1/2022).      [address],    Brittni Hathaway, DO

## 2021-07-21 ENCOUNTER — TELEPHONE (OUTPATIENT)
Dept: INTERNAL MEDICINE CLINIC | Age: 61
End: 2021-07-21

## 2021-07-21 NOTE — TELEPHONE ENCOUNTER
Patient called and wanted to know who Dr. Cait Beavers would refer to for low platelet count. Her  is being referred to [facility], but she just wanted to get Dr. Elsi Brody recommendation.

## 2021-08-03 ENCOUNTER — OFFICE VISIT (OUTPATIENT)
Dept: PULMONOLOGY | Age: 61
End: 2021-08-03
Payer: MEDICARE

## 2021-08-03 VITALS — WEIGHT: 140.8 LBS | BODY MASS INDEX: 24.95 KG/M2 | HEIGHT: 63 IN

## 2021-08-03 DIAGNOSIS — J45.30 MILD PERSISTENT ASTHMA WITHOUT COMPLICATION: ICD-10-CM

## 2021-08-03 DIAGNOSIS — R06.02 SHORTNESS OF BREATH: Primary | ICD-10-CM

## 2021-08-03 PROCEDURE — 94010 BREATHING CAPACITY TEST: CPT | Performed by: INTERNAL MEDICINE

## 2021-08-03 PROCEDURE — 94727 GAS DIL/WSHOT DETER LNG VOL: CPT | Performed by: INTERNAL MEDICINE

## 2021-08-03 PROCEDURE — 94729 DIFFUSING CAPACITY: CPT | Performed by: INTERNAL MEDICINE

## 2021-08-06 ENCOUNTER — TELEPHONE (OUTPATIENT)
Dept: CARDIOLOGY CLINIC | Age: 61
End: 2021-08-06

## 2021-08-06 NOTE — TELEPHONE ENCOUNTER
Verified patient name and , results and recommendations been fully explained to the patient, who indicates understanding.

## 2021-08-06 NOTE — TELEPHONE ENCOUNTER
----- Message from Gabriel Pressley DO sent at 8/6/2021 11:48 AM EDT -----  Her echo is overall unchanged from the last one  The effusion is small to moderate, similar to what it was before  Nothing different to do from heart standpoint   Agree with heme/onc consult she has coming up  ----- Message -----  From: Sera Conti LPN  Sent: 1/6/3445   1:04 PM EDT  To: Gabriel Pressley DO    Per your last note\" Impression and Plan:  Ynes Moctezuma is a 62 y.o. with:     1.) Syncope, most likely vasovagal/ neurocardiogenic  2.) LDL >265-->107 on HI statin! (CAC 84);  3.) +FH premature ASCVD  4.) Asthma, following with pulm  5.) Large incidentally noted pericardial effusion without tamponade     1.) limited echo to ensure effusion resolved  2.) Cont HI statin indefinitely, just needs FLP yearly now  3.) RTC yearly     >>50 mins spent     Thank you for allowing me to participate in the care of your patient, please do not hesitate to call with questions or concerns.

## 2021-08-09 ENCOUNTER — TELEPHONE (OUTPATIENT)
Dept: PULMONOLOGY | Age: 61
End: 2021-08-09

## 2021-08-16 DIAGNOSIS — M85.80 OSTEOPENIA, UNSPECIFIED LOCATION: Primary | ICD-10-CM

## 2021-08-16 DIAGNOSIS — M81.0 AGE-RELATED OSTEOPOROSIS WITHOUT CURRENT PATHOLOGICAL FRACTURE: ICD-10-CM

## 2021-08-19 ENCOUNTER — TELEPHONE (OUTPATIENT)
Dept: CARDIOLOGY CLINIC | Age: 61
End: 2021-08-19

## 2021-08-19 NOTE — TELEPHONE ENCOUNTER
Chronic moderate pericardial effusion on repeat echo  Still asymptomatic  Personally spoke to pt about echo results  Has already had neg TOPHER, CRP/ ESR, TSH  Heme/ Onc referral mentioned in her chart was for her   But she is uptodate with screenings- incl mammo    Would continue to manage conservatively with repeat echos  (vs pericardiocentesis)

## 2021-08-22 DIAGNOSIS — M85.80 OSTEOPENIA, UNSPECIFIED LOCATION: ICD-10-CM

## 2021-08-22 DIAGNOSIS — M81.0 AGE-RELATED OSTEOPOROSIS WITHOUT CURRENT PATHOLOGICAL FRACTURE: ICD-10-CM

## 2021-08-24 ENCOUNTER — HOSPITAL ENCOUNTER (OUTPATIENT)
Dept: INFUSION THERAPY | Age: 61
Discharge: HOME | End: 2021-08-24
Payer: MEDICARE

## 2021-08-24 VITALS
DIASTOLIC BLOOD PRESSURE: 65 MMHG | RESPIRATION RATE: 16 BRPM | OXYGEN SATURATION: 98 % | HEART RATE: 94 BPM | SYSTOLIC BLOOD PRESSURE: 112 MMHG | TEMPERATURE: 98.2 F

## 2021-08-24 LAB
ALBUMIN SERPL-MCNC: 3.9 G/DL (ref 3.4–5)
ALBUMIN/GLOB SERPL: 1.3 {RATIO} (ref 0.8–1.7)
ALP SERPL-CCNC: 28 U/L (ref 45–117)
ALT SERPL-CCNC: 42 U/L (ref 13–56)
ANION GAP SERPL CALC-SCNC: 3 MMOL/L (ref 3–18)
AST SERPL-CCNC: 25 U/L (ref 10–38)
BILIRUB SERPL-MCNC: 0.8 MG/DL (ref 0.2–1)
BUN SERPL-MCNC: 19 MG/DL (ref 7–18)
BUN/CREAT SERPL: 30 (ref 12–20)
CALCIUM SERPL-MCNC: 8.6 MG/DL (ref 8.5–10.1)
CHLORIDE SERPL-SCNC: 98 MMOL/L (ref 100–111)
CO2 SERPL-SCNC: 29 MMOL/L (ref 21–32)
CREAT SERPL-MCNC: 0.63 MG/DL (ref 0.6–1.3)
GLOBULIN SER CALC-MCNC: 2.9 G/DL (ref 2–4)
GLUCOSE SERPL-MCNC: 90 MG/DL (ref 74–99)
MAGNESIUM SERPL-MCNC: 2.2 MG/DL (ref 1.6–2.6)
PHOSPHATE SERPL-MCNC: 4.9 MG/DL (ref 2.5–4.9)
POTASSIUM SERPL-SCNC: 4.6 MMOL/L (ref 3.5–5.5)
PROT SERPL-MCNC: 6.8 G/DL (ref 6.4–8.2)
SODIUM SERPL-SCNC: 130 MMOL/L (ref 136–145)

## 2021-08-24 PROCEDURE — 83735 ASSAY OF MAGNESIUM: CPT

## 2021-08-24 PROCEDURE — 80053 COMPREHEN METABOLIC PANEL: CPT

## 2021-08-24 PROCEDURE — 84100 ASSAY OF PHOSPHORUS: CPT

## 2021-08-24 PROCEDURE — 36415 COLL VENOUS BLD VENIPUNCTURE: CPT

## 2021-08-24 NOTE — PROGRESS NOTES
[facility] Progress Note    Date: 2021    Name: Jasmin Carlton    MRN: [id number]         : 1960    Peripheral Lab Draw      Ms. Norman at [facility], ambulatory at 1050\. Pt was assessed and education was provided. Ms. Charmaine Clayton vitals were reviewed and patient was observed for 5 minutes prior to treatment. Visit Vitals  /65 (BP 1 Location: Right arm, BP Patient Position: Sitting)   Pulse 94   Temp 98.2 °F (36.8 °C)   Resp 16   SpO2 98%       Blood obtained peripherally from LEFT AC first attemp and sent to lab for CMP, Magnesium, Phosphorus per written orders. No bleeding or hematoma noted at site. Guaze and coban applied. Ms. Emil Almanza tolerated the venipuncture, and had no complaints. Patient armband removed and shredded. Discharge instructions discussed with Ms Emil Almanza and she verbalized understanding. Ms. Emil Almanza was discharged from [place] [address] in stable condition at 1100. Her next appt is tomorrow for Prolia.     Flo Barry RN Phlebotomist PCT  2021

## 2021-08-25 ENCOUNTER — HOSPITAL ENCOUNTER (OUTPATIENT)
Dept: INFUSION THERAPY | Age: 61
End: 2021-08-25

## 2021-08-30 ENCOUNTER — HOSPITAL ENCOUNTER (OUTPATIENT)
Dept: LAB | Age: 61
Discharge: HOME | End: 2021-08-30
Payer: MEDICARE

## 2021-08-30 ENCOUNTER — APPOINTMENT (OUTPATIENT)
Dept: INTERNAL MEDICINE CLINIC | Age: 61
End: 2021-08-30

## 2021-08-30 DIAGNOSIS — E78.5 HYPERLIPIDEMIA LDL GOAL <160: ICD-10-CM

## 2021-08-30 DIAGNOSIS — E55.9 VITAMIN D DEFICIENCY: ICD-10-CM

## 2021-08-30 LAB
25(OH)D3 SERPL-MCNC: 64.8 NG/ML (ref 30–100)
ALBUMIN SERPL-MCNC: 4 G/DL (ref 3.4–5)
ALBUMIN/GLOB SERPL: 1.4 {RATIO} (ref 0.8–1.7)
ALP SERPL-CCNC: 27 U/L (ref 45–117)
ALT SERPL-CCNC: 53 U/L (ref 13–56)
ANION GAP SERPL CALC-SCNC: 6 MMOL/L (ref 3–18)
AST SERPL-CCNC: 38 U/L (ref 10–38)
BILIRUB SERPL-MCNC: 0.5 MG/DL (ref 0.2–1)
BUN SERPL-MCNC: 19 MG/DL (ref 7–18)
BUN/CREAT SERPL: 27 (ref 12–20)
CALCIUM SERPL-MCNC: 9.1 MG/DL (ref 8.5–10.1)
CHLORIDE SERPL-SCNC: 102 MMOL/L (ref 100–111)
CHOLEST SERPL-MCNC: 209 MG/DL
CO2 SERPL-SCNC: 30 MMOL/L (ref 21–32)
CREAT SERPL-MCNC: 0.7 MG/DL (ref 0.6–1.3)
GLOBULIN SER CALC-MCNC: 2.8 G/DL (ref 2–4)
GLUCOSE SERPL-MCNC: 94 MG/DL (ref 74–99)
HDLC SERPL-MCNC: 86 MG/DL (ref 40–60)
HDLC SERPL: 2.4 {RATIO} (ref 0–5)
LDLC SERPL CALC-MCNC: 106.4 MG/DL (ref 0–100)
LIPID PROFILE,FLP: ABNORMAL
POTASSIUM SERPL-SCNC: 4.2 MMOL/L (ref 3.5–5.5)
PROT SERPL-MCNC: 6.8 G/DL (ref 6.4–8.2)
SODIUM SERPL-SCNC: 138 MMOL/L (ref 136–145)
TRIGL SERPL-MCNC: 83 MG/DL (ref ?–150)
VLDLC SERPL CALC-MCNC: 16.6 MG/DL

## 2021-08-30 PROCEDURE — 80061 LIPID PANEL: CPT

## 2021-08-30 PROCEDURE — 82306 VITAMIN D 25 HYDROXY: CPT

## 2021-08-30 PROCEDURE — 36415 COLL VENOUS BLD VENIPUNCTURE: CPT

## 2021-08-30 PROCEDURE — 80053 COMPREHEN METABOLIC PANEL: CPT

## 2021-09-02 DIAGNOSIS — I31.39 PERICARDIAL EFFUSION: Primary | ICD-10-CM

## 2021-09-07 ENCOUNTER — HOSPITAL ENCOUNTER (OUTPATIENT)
Dept: LAB | Age: 61
Discharge: HOME | End: 2021-09-07
Payer: MEDICARE

## 2021-09-07 ENCOUNTER — OFFICE VISIT (OUTPATIENT)
Dept: INTERNAL MEDICINE CLINIC | Age: 61
End: 2021-09-07
Payer: MEDICARE

## 2021-09-07 VITALS
BODY MASS INDEX: 25.76 KG/M2 | TEMPERATURE: 97.5 F | WEIGHT: 140 LBS | RESPIRATION RATE: 18 BRPM | OXYGEN SATURATION: 97 % | DIASTOLIC BLOOD PRESSURE: 85 MMHG | HEART RATE: 96 BPM | SYSTOLIC BLOOD PRESSURE: 121 MMHG | HEIGHT: 62 IN

## 2021-09-07 DIAGNOSIS — J45.40 MODERATE PERSISTENT ASTHMA WITHOUT COMPLICATION: ICD-10-CM

## 2021-09-07 DIAGNOSIS — F41.9 ANXIETY: ICD-10-CM

## 2021-09-07 DIAGNOSIS — E55.9 VITAMIN D DEFICIENCY: ICD-10-CM

## 2021-09-07 DIAGNOSIS — R93.1 ELEVATED CORONARY ARTERY CALCIUM SCORE: ICD-10-CM

## 2021-09-07 DIAGNOSIS — I31.39 PERICARDIAL EFFUSION: ICD-10-CM

## 2021-09-07 DIAGNOSIS — E78.5 HYPERLIPIDEMIA LDL GOAL <160: Primary | ICD-10-CM

## 2021-09-07 LAB — ERYTHROCYTE [SEDIMENTATION RATE] IN BLOOD: 10 MM/HR (ref 0–30)

## 2021-09-07 PROCEDURE — 86200 CCP ANTIBODY: CPT

## 2021-09-07 PROCEDURE — G8419 CALC BMI OUT NRM PARAM NOF/U: HCPCS | Performed by: INTERNAL MEDICINE

## 2021-09-07 PROCEDURE — G0463 HOSPITAL OUTPT CLINIC VISIT: HCPCS | Performed by: INTERNAL MEDICINE

## 2021-09-07 PROCEDURE — G8427 DOCREV CUR MEDS BY ELIG CLIN: HCPCS | Performed by: INTERNAL MEDICINE

## 2021-09-07 PROCEDURE — G9899 SCRN MAM PERF RSLTS DOC: HCPCS | Performed by: INTERNAL MEDICINE

## 2021-09-07 PROCEDURE — 3017F COLORECTAL CA SCREEN DOC REV: CPT | Performed by: INTERNAL MEDICINE

## 2021-09-07 PROCEDURE — G9717 DOC PT DX DEP/BP F/U NT REQ: HCPCS | Performed by: INTERNAL MEDICINE

## 2021-09-07 PROCEDURE — 85652 RBC SED RATE AUTOMATED: CPT

## 2021-09-07 PROCEDURE — 36415 COLL VENOUS BLD VENIPUNCTURE: CPT

## 2021-09-07 PROCEDURE — 99214 OFFICE O/P EST MOD 30 MIN: CPT | Performed by: INTERNAL MEDICINE

## 2021-09-07 PROCEDURE — 86235 NUCLEAR ANTIGEN ANTIBODY: CPT

## 2021-09-07 NOTE — PATIENT INSTRUCTIONS
High Blood Pressure: Care Instructions  Overview     It's normal for blood pressure to go up and down throughout the day. But if it stays up, you have high blood pressure. Another name for high blood pressure is hypertension. Despite what a lot of people think, high blood pressure usually doesn't cause headaches or make you feel dizzy or lightheaded. It usually has no symptoms. But it does increase your risk of stroke, heart attack, and other problems. You and your doctor will talk about your risks of these problems based on your blood pressure. Your doctor will give you a goal for your blood pressure. Your goal will be based on your health and your age. Lifestyle changes, such as eating healthy and being active, are always important to help lower blood pressure. You might also take medicine to reach your blood pressure goal.  Follow-up care is a key part of your treatment and safety. Be sure to make and go to all appointments, and call your doctor if you are having problems. It's also a good idea to know your test results and keep a list of the medicines you take. How can you care for yourself at home? Medical treatment  · If you stop taking your medicine, your blood pressure will go back up. You may take one or more types of medicine to lower your blood pressure. Be safe with medicines. Take your medicine exactly as prescribed. Call your doctor if you think you are having a problem with your medicine. · Talk to your doctor before you start taking aspirin every day. Aspirin can help certain people lower their risk of a heart attack or stroke. But taking aspirin isn't right for everyone, because it can cause serious bleeding. · See your doctor regularly. You may need to see the doctor more often at first or until your blood pressure comes down. · If you are taking blood pressure medicine, talk to your doctor before you take decongestants or anti-inflammatory medicine, such as ibuprofen.  Some of these medicines can raise blood pressure. · Learn how to check your blood pressure at home. Lifestyle changes  · Stay at a healthy weight. This is especially important if you put on weight around the waist. Losing even 10 pounds can help you lower your blood pressure. · If your doctor recommends it, get more exercise. Walking is a good choice. Bit by bit, increase the amount you walk every day. Try for at least 30 minutes on most days of the week. You also may want to swim, bike, or do other activities. · Avoid or limit alcohol. Talk to your doctor about whether you can drink any alcohol. · Try to limit how much sodium you eat to less than 2,300 milligrams (mg) a day. Your doctor may ask you to try to eat less than 1,500 mg a day. · Eat plenty of fruits (such as bananas and oranges), vegetables, legumes, whole grains, and low-fat dairy products. · Lower the amount of saturated fat in your diet. Saturated fat is found in animal products such as milk, cheese, and meat. Limiting these foods may help you lose weight and also lower your risk for heart disease. · Do not smoke. Smoking increases your risk for heart attack and stroke. If you need help quitting, talk to your doctor about stop-smoking programs and medicines. These can increase your chances of quitting for good. When should you call for help? Call  911 anytime you think you may need emergency care. This may mean having symptoms that suggest that your blood pressure is causing a serious heart or blood vessel problem. Your blood pressure may be over 180/120. For example, call 911 if:    · You have symptoms of a heart attack. These may include:  ? Chest pain or pressure, or a strange feeling in the chest.  ? Sweating. ? Shortness of breath. ? Nausea or vomiting. ? Pain, pressure, or a strange feeling in the back, neck, jaw, or upper belly or in one or both shoulders or arms. ? Lightheadedness or sudden weakness.   ? A fast or irregular heartbeat.     · You have symptoms of a stroke. These may include:  ? Sudden numbness, tingling, weakness, or loss of movement in your face, arm, or leg, especially on only one side of your body. ? Sudden vision changes. ? Sudden trouble speaking. ? Sudden confusion or trouble understanding simple statements. ? Sudden problems with walking or balance. ? A sudden, severe headache that is different from past headaches.     · You have severe back or belly pain. Do not wait until your blood pressure comes down on its own. Get help right away. Call your doctor now or seek immediate care if:    · Your blood pressure is much higher than normal (such as 180/120 or higher), but you don't have symptoms.     · You think high blood pressure is causing symptoms, such as:  ? Severe headache.  ? Blurry vision. Watch closely for changes in your health, and be sure to contact your doctor if:    · Your blood pressure measures higher than your doctor recommends at least 2 times. That means the top number is higher or the bottom number is higher, or both.     · You think you may be having side effects from your blood pressure medicine. Where can you learn more? Go to http://www.gray.com/  Enter [id number] in the search box to learn more about \"High Blood Pressure: Care Instructions. \"  Current as of: August 31, 2020               Content Version: 12.8  © 2006-2021 [employer]. Care instructions adapted under license by [employer] (which disclaims liability or warranty for this information). If you have questions about a medical condition or this instruction, always ask your healthcare professional. [place] [address] any warranty or liability for your use of this information.

## 2021-09-07 NOTE — PROGRESS NOTES
Patient is in the office today for a 3 month follow up. 1. Have you been to the ER, urgent care clinic since your last visit? Hospitalized since your last visit? No    2. Have you seen or consulted any other health care providers outside of the [address] [place] [address] since your last visit? Include any pap smears or colon screening. yes, Dr. Camilla Hyman, and Dr. Stephani Arrieta. and Dr. Too Sanz.

## 2021-09-07 NOTE — PROGRESS NOTES
Shell Campos is a 62 y.o.  female and presents with Cholesterol Problem, Vitamin D Deficiency, Anxiety, and Asthma      SUBJECTIVE:  Patient has history of very high LDL that has been over 200 in the past.  Patient had coronary artery calcium score done that was elevated at 84. Patient was evaluated by cardiology and had a mild pericardial effusion that is being monitored. Patient is now on Crestor 40 mg which has significantly improved her cholesterol and brought her total cholesterol down to 209 with LDL of 106. Her previous LDL was 265 with total cholesterol 371. Patient has a history of asthma moderate persistent for which she uses Asmanex and Singulair and Spiriva. Patient is followed by pulmonary. Patient is also followed by the spine center as well as pain management with Dr. Brie Bermudez. She has been diagnosed also with sacroiliitis which is also managed by pain management. She is on Trileptal and Neurontin to help manage chronic pain and fibromyalgia. She has some mild hyponatremia because of the Trileptal.  Patient is followed by Dr. Angella Salgado from psychiatry and remains on Cymbalta 120 mg daily, trazodone and Ativan 2 mg 3 times a day. Patient also has interstitial cystitis for which she follows urology. Patient's vitamin D level is controlled on vitamin D 50,000 units every other week. Patient's pericardial effusion etiology is unclear. Her rheumatological markers have been negative in the past but we will go ahead and recheck them today      Respiratory ROS: negative for - shortness of breath  Cardiovascular ROS: negative for - chest pain    Current Outpatient Medications   Medication Sig    mth-me blue-sod phos-phsal-hyo (Rosetta STONE) 118-10-40.8-36 mg cap capsule Take 1 Capsule by mouth four (4) times daily.  rosuvastatin (CRESTOR) 40 mg tablet Take 1 Tablet by mouth nightly.     meloxicam (MOBIC) 15 mg tablet take 1 tablet by mouth once daily    fluticasone furoate (Arnuity Ellipta) 100 mcg/actuation dsdv inhaler Take 1 Puff by inhalation daily.  omeprazole (PRILOSEC) 40 mg capsule Take 1 Capsule by mouth daily.  albuterol (PROVENTIL HFA, VENTOLIN HFA, PROAIR HFA) 90 mcg/actuation inhaler Take 2 Puffs by inhalation every six (6) hours as needed for Wheezing.  mth-me blue-sod phos-phsal-hyo (UribeL) 118-10-40.8-36 mg cap capsule Take 1 Cap by mouth four (4) times daily as needed for Pain.  montelukast (Singulair) 10 mg tablet Take 1 Tab by mouth daily.  tiotropium bromide (Spiriva Respimat) 2.5 mcg/actuation inhaler Take 2 Puffs by inhalation daily. Indications: prevention of bronchospasm with chronic bronchitis    gabapentin (NEURONTIN) 800 mg tablet take 1 tablet by mouth three times a day    denosumab (PROLIA) 60 mg/mL injection 60 mg by SubCUTAneous route. 2 times yearly    OXcarbazepine (TRILEPTAL) 150 mg tablet Take 1 Tab by mouth three (3) times daily.  calcium glycerophosphate (PRELIEF) 65 mg tab Take 1 Tab by mouth daily.  modafinil (PROVIGIL) 200 mg tablet take 1 tablet by mouth twice a day for 30 DAYS maximum daily dose of 2 tablets (Patient taking differently: Take 200 mg by mouth daily.)    ergocalciferol (ERGOCALCIFEROL) 50,000 unit capsule take 1 capsule by mouth TWO (2) TIMES A WEEK  Indications: PREVENTION OF VITAMIN D DEFICIENCY    OTHER Indications: Compound cream for inflammation    B.infantis-B.ani-B.long-B.bifi (PROBIOTIC 4X) 10-15 mg TbEC Take 1 Tab by mouth daily.  calcium citrate-vitamin D3 (CITRACAL + D) tablet Take 1 Tab by mouth daily.  LORazepam (ATIVAN) 2 mg tablet Take 2 mg by mouth every eight (8) hours.  DULoxetine (CYMBALTA) 60 mg capsule Take 120 mg by mouth daily.  simethicone (PHAZYME) 180 mg cap Take 225 mg by mouth as needed.  trazodone (DESYREL) 100 mg tablet Take 150 mg by mouth nightly.  digestive 8-L.acidoph-pectin (DIGESTIVE ENZYME, ACIDOPH,PEC,) 50 million cell-100 mg tab Take 1 Tab by mouth daily. (Patient not taking: Reported on 9/7/2021)    pentosan polysulfate sodium (ELMIRON) 100 mg capsule Take 1 Cap by mouth two (2) times a day. Indications: Bladder Pain, bladder wall inflammation (Patient not taking: Reported on 5/24/2021)    ENZYMES,DIGESTIVE, PLANT, (DIGESTIVE ENZYMES, PLANT, PO) Take 1 Tab by mouth daily. (Patient not taking: Reported on 6/15/2021)     No current facility-administered medications for this visit.          OBJECTIVE:  alert, well appearing, and in no distress  Visit Vitals  /85 (BP 1 Location: Left arm, BP Patient Position: Sitting, BP Cuff Size: Adult)   Pulse 96   Temp 97.5 °F (36.4 °C) (Temporal)   Resp 18   Ht 5' 2\" (1.575 m)   Wt 140 lb (63.5 kg)   SpO2 97%   BMI 25.61 kg/m²      well developed and well nourished  Chest - clear to auscultation, no wheezes, rales or rhonchi, symmetric air entry  Heart - normal rate, regular rhythm, normal S1, S2, no murmurs, rubs, clicks or gallops  Extremities - peripheral pulses normal, no pedal edema, no clubbing or cyanosis        Labs:   Lab Results   Component Value Date/Time    WBC 6.6 11/16/2020 01:16 PM    HGB 14.2 11/16/2020 01:16 PM    Hemoglobin, POC 13.9 08/28/2019 11:13 AM    HCT 41.2 11/16/2020 01:16 PM    Hematocrit, POC 41 08/28/2019 11:13 AM    PLATELET 557 77/16/5000 01:16 PM    MCV 82.9 11/16/2020 01:16 PM     Lab Results   Component Value Date/Time    Cholesterol, total 209 (H) 08/30/2021 07:47 AM    HDL Cholesterol 86 (H) 08/30/2021 07:47 AM    LDL, calculated 106.4 (H) 08/30/2021 07:47 AM    Triglyceride 83 08/30/2021 07:47 AM    CHOL/HDL Ratio 2.4 08/30/2021 07:47 AM     Lab Results   Component Value Date/Time    TSH 1.10 11/16/2020 01:16 PM    T4, Free 0.8 11/16/2020 01:16 PM    T4, Total 8.1 08/29/2015 10:42 AM      Lab Results   Component Value Date/Time    Sodium 138 08/30/2021 07:47 AM    Potassium 4.2 08/30/2021 07:47 AM    Chloride 102 08/30/2021 07:47 AM    CO2 30 08/30/2021 07:47 AM    Anion gap 6 08/30/2021 07:47 AM    Glucose 94 08/30/2021 07:47 AM    BUN 19 (H) 08/30/2021 07:47 AM    Creatinine 0.70 08/30/2021 07:47 AM    BUN/Creatinine ratio 27 (H) 08/30/2021 07:47 AM    GFR est AA >60 08/30/2021 07:47 AM    GFR est non-AA >60 08/30/2021 07:47 AM    Calcium 9.1 08/30/2021 07:47 AM    Bilirubin, total 0.5 08/30/2021 07:47 AM    ALT (SGPT) 53 08/30/2021 07:47 AM    Alk. phosphatase 27 (L) 08/30/2021 07:47 AM    Protein, total 6.8 08/30/2021 07:47 AM    Albumin 4.0 08/30/2021 07:47 AM    Globulin 2.8 08/30/2021 07:47 AM    A-G Ratio 1.4 08/30/2021 07:47 AM          Assessment/Plan    ICD-10-CM ICD-9-CM    1. Hyperlipidemia LDL goal <160  E78.5 272.4  improved on Crestor 40 mg daily LIPID PANEL      METABOLIC PANEL, COMPREHENSIVE   2. Elevated coronary artery calcium score  R93.1 414.00  patient continues to be followed by cardiology   3. Vitamin D deficiency  E55.9 268.9  well-controlled vitamin D 50,000units every other week VITAMIN D, 25 HYDROXY   4. Pericardial effusion  I31.3 423.9  etiology unclear will check RA + CCP ABS      TOPHER COMPREHENSIVE PANEL      SED RATE (ESR). Patient continuously followed by cardiology   5. Moderate persistent asthma without complication  O40.02 652.32  stable on current inhalers patient followed by pulmonary   6. Anxiety  F41.9 300.00          Follow-up and Dispositions    · Return in about 4 months (around 1/7/2022) for labs 1 week before. Reviewed plan of care. Patient has provided input and agrees with goals.

## 2021-09-08 LAB
CENTROMERE B AB SER-ACNC: <0.2 AI (ref 0–0.9)
CHROMATIN AB SERPL-ACNC: <0.2 AI (ref 0–0.9)
DSDNA AB SER-ACNC: <1 IU/ML (ref 0–9)
ENA JO1 AB SER-ACNC: <0.2 AI (ref 0–0.9)
ENA RNP AB SER-ACNC: <0.2 AI (ref 0–0.9)
ENA SCL70 AB SER-ACNC: <0.2 AI (ref 0–0.9)
ENA SM AB SER-ACNC: <0.2 AI (ref 0–0.9)
ENA SS-A AB SER-ACNC: <0.2 AI (ref 0–0.9)
ENA SS-B AB SER-ACNC: <0.2 AI (ref 0–0.9)
SEE BELOW, 164869: NORMAL

## 2021-09-09 LAB
CCP IGA+IGG SERPL IA-ACNC: 5 UNITS (ref 0–19)
RHEUMATOID FACT SERPL-ACNC: <10 IU/ML (ref 0–13.9)

## 2021-09-09 RX ORDER — EPINEPHRINE 1 MG/ML
0.3 INJECTION, SOLUTION, CONCENTRATE INTRAVENOUS AS NEEDED
Status: CANCELLED | OUTPATIENT
Start: 2021-09-16

## 2021-09-09 RX ORDER — DIPHENHYDRAMINE HYDROCHLORIDE 50 MG/ML
50 INJECTION, SOLUTION INTRAMUSCULAR; INTRAVENOUS AS NEEDED
Status: CANCELLED
Start: 2021-09-16

## 2021-09-09 RX ORDER — ACETAMINOPHEN 325 MG/1
650 TABLET ORAL AS NEEDED
Status: CANCELLED
Start: 2021-09-16

## 2021-09-09 RX ORDER — DIPHENHYDRAMINE HYDROCHLORIDE 50 MG/ML
25 INJECTION, SOLUTION INTRAMUSCULAR; INTRAVENOUS AS NEEDED
Status: CANCELLED
Start: 2021-09-16

## 2021-09-09 RX ORDER — ALBUTEROL SULFATE 0.83 MG/ML
2.5 SOLUTION RESPIRATORY (INHALATION) AS NEEDED
Status: CANCELLED
Start: 2021-09-16

## 2021-09-09 RX ORDER — HYDROCORTISONE SODIUM SUCCINATE 100 MG/2ML
100 INJECTION, POWDER, FOR SOLUTION INTRAMUSCULAR; INTRAVENOUS AS NEEDED
Status: CANCELLED | OUTPATIENT
Start: 2021-09-16

## 2021-09-09 RX ORDER — ONDANSETRON 2 MG/ML
8 INJECTION INTRAMUSCULAR; INTRAVENOUS AS NEEDED
Status: CANCELLED | OUTPATIENT
Start: 2021-09-16

## 2021-09-16 ENCOUNTER — HOSPITAL ENCOUNTER (OUTPATIENT)
Dept: INFUSION THERAPY | Age: 61
Discharge: HOME | End: 2021-09-16
Payer: MEDICARE

## 2021-09-16 VITALS
OXYGEN SATURATION: 96 % | TEMPERATURE: 99.9 F | HEART RATE: 95 BPM | RESPIRATION RATE: 16 BRPM | SYSTOLIC BLOOD PRESSURE: 113 MMHG | DIASTOLIC BLOOD PRESSURE: 71 MMHG

## 2021-09-16 DIAGNOSIS — M81.0 AGE-RELATED OSTEOPOROSIS WITHOUT CURRENT PATHOLOGICAL FRACTURE: ICD-10-CM

## 2021-09-16 DIAGNOSIS — M85.80 OSTEOPENIA, UNSPECIFIED LOCATION: Primary | ICD-10-CM

## 2021-09-16 PROCEDURE — 74011250636 HC RX REV CODE- 250/636

## 2021-09-16 PROCEDURE — 96372 THER/PROPH/DIAG INJ SC/IM: CPT

## 2021-09-16 RX ORDER — HYDROCORTISONE SODIUM SUCCINATE 100 MG/2ML
100 INJECTION, POWDER, FOR SOLUTION INTRAMUSCULAR; INTRAVENOUS AS NEEDED
Status: CANCELLED | OUTPATIENT
Start: 2022-03-17

## 2021-09-16 RX ORDER — ONDANSETRON 2 MG/ML
8 INJECTION INTRAMUSCULAR; INTRAVENOUS AS NEEDED
Status: CANCELLED | OUTPATIENT
Start: 2022-03-17

## 2021-09-16 RX ORDER — DIPHENHYDRAMINE HYDROCHLORIDE 50 MG/ML
50 INJECTION, SOLUTION INTRAMUSCULAR; INTRAVENOUS AS NEEDED
Status: CANCELLED
Start: 2022-03-17

## 2021-09-16 RX ORDER — ALBUTEROL SULFATE 0.83 MG/ML
2.5 SOLUTION RESPIRATORY (INHALATION) AS NEEDED
Status: CANCELLED
Start: 2022-03-17

## 2021-09-16 RX ORDER — DIPHENHYDRAMINE HYDROCHLORIDE 50 MG/ML
25 INJECTION, SOLUTION INTRAMUSCULAR; INTRAVENOUS AS NEEDED
Status: CANCELLED
Start: 2022-03-17

## 2021-09-16 RX ORDER — ACETAMINOPHEN 325 MG/1
650 TABLET ORAL AS NEEDED
Status: CANCELLED
Start: 2022-03-17

## 2021-09-16 RX ORDER — EPINEPHRINE 1 MG/ML
0.3 INJECTION, SOLUTION, CONCENTRATE INTRAVENOUS AS NEEDED
Status: CANCELLED | OUTPATIENT
Start: 2022-03-17

## 2021-09-16 RX ADMIN — DENOSUMAB 60 MG: 60 INJECTION SUBCUTANEOUS at 13:48

## 2021-09-16 NOTE — PROGRESS NOTES
[facility] Progress Note    Date: 2021    Name: Gonzalo Wilson    MRN: [id number]         : 1960    Ms. Elda Myers arrived in the [facility] today at 1330, in stable condition, here for her PROLIA Injection (Every 6 Months). She was assessed and education was provided. Ms. Fabiola Henriquez vitals were reviewed. Visit Vitals  /71 (BP 1 Location: Right upper arm, BP Patient Position: Sitting)   Pulse 95   Temp 99.9 °F (37.7 °C)   Resp 16   SpO2 96%   Breastfeeding No       Lab results were reviewed. Her most recent CMP, MG, & PHOS levels listed below, were all noted to be satisfactory for treatment today. Results for Liliya Rand (MRN [id number])    Ref. Range 2021 10:57 2021 07:47   Sodium Latest Ref Range: 136 - 145 mmol/L 130 (L) 138   Potassium Latest Ref Range: 3.5 - 5.5 mmol/L 4.6 4.2   Chloride Latest Ref Range: 100 - 111 mmol/L 98 (L) 102   CO2 Latest Ref Range: 21 - 32 mmol/L 29 30   Anion gap Latest Ref Range: 3.0 - 18 mmol/L 3 6   Glucose Latest Ref Range: 74 - 99 mg/dL 90 94   BUN Latest Ref Range: 7.0 - 18 MG/DL 19 (H) 19 (H)   Creatinine Latest Ref Range: 0.6 - 1.3 MG/DL 0.63 0.70   BUN/Creatinine ratio Latest Ref Range: 12 - 20   30 (H) 27 (H)   Calcium Latest Ref Range: 8.5 - 10.1 MG/DL 8.6 9.1   Phosphorus Latest Ref Range: 2.5 - 4.9 MG/DL 4.9    Magnesium Latest Ref Range: 1.6 - 2.6 mg/dL 2.2    GFR est non-AA Latest Ref Range: >60 ml/min/1.73m2 >60 >60   GFR est AA Latest Ref Range: >60 ml/min/1.73m2 >60 >60   Bilirubin, total Latest Ref Range: 0.2 - 1.0 MG/DL 0.8 0.5   Protein, total Latest Ref Range: 6.4 - 8.2 g/dL 6.8 6.8   Albumin Latest Ref Range: 3.4 - 5.0 g/dL 3.9 4.0   Globulin Latest Ref Range: 2.0 - 4.0 g/dL 2.9 2.8   A-G Ratio Latest Ref Range: 0.8 - 1.7   1.3 1.4   ALT Latest Ref Range: 13 - 56 U/L 42 53   AST Latest Ref Range: 10 - 38 U/L 25 38   Alk.  phosphatase Latest Ref Range: 45 - 117 U/L 28 (L) 27 (L)   Triglyceride Latest Ref Range: <150 MG/DL  83 Cholesterol, total Latest Ref Range: <200 MG/DL  209 (H)   HDL Cholesterol Latest Ref Range: 40 - 60 MG/DL  86 (H)   CHOL/HDL Ratio Latest Ref Range: 0 - 5.0    2.4   VLDL, calculated Latest Units: MG/DL  16.6   LDL, calculated Latest Ref Range: 0 - 100 MG/DL  106.4 (H)           Denosumab (PROLIA) 60 mg, was administered SQ, in the back of her right arm at 1348, per order and without incident. Ms. Sun Benitez tolerated well and voiced no complaints. Ms. Sun Benitez was discharged from [place] [address] in stable condition at 1350. Jimi May She is to return in 6 months, on Tuesday, 3-15-22 at 1330, for her next appointment, for pre-[facility]rol[facility]. And then, she is scheduled for her next PROLIA injection on Thursday, 3-17-22 at 1330.     Hemant Iglesias RN  September 16, 2021  1:46 PM

## 2021-09-28 ENCOUNTER — TRANSCRIBE ORDER (OUTPATIENT)
Dept: SCHEDULING | Age: 61
End: 2021-09-28

## 2021-09-28 DIAGNOSIS — J47.9 BRONCHIECTASIS WITHOUT COMPLICATION (HCC): ICD-10-CM

## 2021-09-28 DIAGNOSIS — R06.09 DYSPNEA ON EXERTION: ICD-10-CM

## 2021-09-28 DIAGNOSIS — Z12.31 VISIT FOR SCREENING MAMMOGRAM: Primary | ICD-10-CM

## 2021-09-28 DIAGNOSIS — J45.30 MILD PERSISTENT ASTHMA WITHOUT COMPLICATION: ICD-10-CM

## 2021-09-28 DIAGNOSIS — R06.02 SHORTNESS OF BREATH: Primary | ICD-10-CM

## 2021-09-28 NOTE — PROGRESS NOTES
Order placed for COVID-19 test, per Verbal Order from Dr. Fidel Holm on 9/28/2021. Last office visit: 6/15/2021  Follow up Visit: 10/20/2021    Provider is aware of last office visit and follow up. No further action requested from provider.

## 2021-10-15 ENCOUNTER — HOSPITAL ENCOUNTER (OUTPATIENT)
Dept: LAB | Age: 61
Discharge: HOME | End: 2021-10-15
Payer: MEDICARE

## 2021-10-15 DIAGNOSIS — R06.02 SHORTNESS OF BREATH: ICD-10-CM

## 2021-10-15 DIAGNOSIS — R06.09 DYSPNEA ON EXERTION: ICD-10-CM

## 2021-10-15 DIAGNOSIS — J47.9 BRONCHIECTASIS WITHOUT COMPLICATION (HCC): ICD-10-CM

## 2021-10-15 DIAGNOSIS — J45.30 MILD PERSISTENT ASTHMA WITHOUT COMPLICATION: ICD-10-CM

## 2021-10-15 PROCEDURE — U0003 INFECTIOUS AGENT DETECTION BY NUCLEIC ACID (DNA OR RNA); SEVERE ACUTE RESPIRATORY SYNDROME CORONAVIRUS 2 (SARS-COV-2) (CORONAVIRUS DISEASE [COVID-19]), AMPLIFIED PROBE TECHNIQUE, MAKING USE OF HIGH THROUGHPUT TECHNOLOGIES AS DESCRIBED BY CMS-2020-01-R: HCPCS

## 2021-10-16 LAB — SARS-COV-2, COV2NT: NOT DETECTED

## 2021-10-18 ENCOUNTER — DOCUMENTATION ONLY (OUTPATIENT)
Dept: PULMONOLOGY | Age: 61
End: 2021-10-18

## 2021-10-19 RX ORDER — MOMETASONE FUROATE 220 UG/1
INHALANT RESPIRATORY (INHALATION)
COMMUNITY
Start: 2021-01-27 | End: 2022-02-08

## 2021-10-19 RX ORDER — ONDANSETRON 4 MG/1
TABLET, FILM COATED ORAL
COMMUNITY
End: 2022-06-30

## 2021-10-20 ENCOUNTER — OFFICE VISIT (OUTPATIENT)
Dept: PULMONOLOGY | Age: 61
End: 2021-10-20

## 2021-10-20 VITALS
HEART RATE: 93 BPM | WEIGHT: 141.6 LBS | HEIGHT: 62 IN | TEMPERATURE: 98.6 F | SYSTOLIC BLOOD PRESSURE: 135 MMHG | RESPIRATION RATE: 16 BRPM | DIASTOLIC BLOOD PRESSURE: 72 MMHG | OXYGEN SATURATION: 97 % | BODY MASS INDEX: 26.06 KG/M2

## 2021-10-20 DIAGNOSIS — G89.4 CHRONIC PAIN SYNDROME: ICD-10-CM

## 2021-10-20 DIAGNOSIS — J45.20 MILD INTERMITTENT ASTHMA WITHOUT COMPLICATION: Primary | ICD-10-CM

## 2021-10-20 DIAGNOSIS — I31.39 PERICARDIAL EFFUSION: ICD-10-CM

## 2021-10-20 DIAGNOSIS — G47.33 OSA (OBSTRUCTIVE SLEEP APNEA): ICD-10-CM

## 2021-10-20 DIAGNOSIS — Z23 NEEDS FLU SHOT: ICD-10-CM

## 2021-10-20 PROCEDURE — G8419 CALC BMI OUT NRM PARAM NOF/U: HCPCS | Performed by: INTERNAL MEDICINE

## 2021-10-20 PROCEDURE — G9717 DOC PT DX DEP/BP F/U NT REQ: HCPCS | Performed by: INTERNAL MEDICINE

## 2021-10-20 PROCEDURE — 99214 OFFICE O/P EST MOD 30 MIN: CPT | Performed by: INTERNAL MEDICINE

## 2021-10-20 PROCEDURE — G9899 SCRN MAM PERF RSLTS DOC: HCPCS | Performed by: INTERNAL MEDICINE

## 2021-10-20 PROCEDURE — G8427 DOCREV CUR MEDS BY ELIG CLIN: HCPCS | Performed by: INTERNAL MEDICINE

## 2021-10-20 PROCEDURE — G0008 ADMIN INFLUENZA VIRUS VAC: HCPCS | Performed by: INTERNAL MEDICINE

## 2021-10-20 PROCEDURE — 90686 IIV4 VACC NO PRSV 0.5 ML IM: CPT | Performed by: INTERNAL MEDICINE

## 2021-10-20 PROCEDURE — 3017F COLORECTAL CA SCREEN DOC REV: CPT | Performed by: INTERNAL MEDICINE

## 2021-10-20 NOTE — PATIENT INSTRUCTIONS
Vaccine Information Statement    Influenza (Flu) Vaccine (Inactivated or Recombinant): What You Need to Know    Many vaccine information statements are available in [place] and other languages. See www.immunize.org/vis. Hojas de información sobre vacunas están disponibles en español y en muchos otros idiomas. Visite www.immunize.org/vis. 1. Why get vaccinated? Influenza vaccine can prevent influenza (flu). Flu is a contagious disease that spreads around the [facility] every year, usually between October and May. Anyone can get the flu, but it is more dangerous for some people. Infants and young children, people 72 years and older, pregnant people, and people with certain health conditions or a weakened immune system are at greatest risk of flu complications. Pneumonia, bronchitis, sinus infections, and ear infections are examples of flu-related complications. If you have a medical condition, such as heart disease, cancer, or diabetes, flu can make it worse. Flu can cause fever and chills, sore throat, muscle aches, fatigue, cough, headache, and runny or stuffy nose. Some people may have vomiting and diarrhea, though this is more common in children than adults. In an average year, thousands of people in the [facility] die from flu, and many more are hospitalized. Flu vaccine prevents millions of illnesses and flu-related visits to the doctor each year. 2. Influenza vaccines     CDC recommends everyone 6 months and older get vaccinated every flu season. Children 6 months through 6years of age may need 2 doses during a single flu season. Everyone else needs only 1 dose each flu season. It takes about 2 weeks for protection to develop after vaccination. There are many flu viruses, and they are always changing. Each year a new flu vaccine is made to protect against the influenza viruses believed to be likely to cause disease in the upcoming flu season.  Even when the vaccine doesnt exactly match these viruses, it may still provide some protection. Influenza vaccine does not cause flu. Influenza vaccine may be given at the same time as other vaccines. 3. Talk with your health care provider    Tell your vaccination provider if the person getting the vaccine:   Has had an allergic reaction after a previous dose of influenza vaccine, or has any severe, life-threatening allergies    Has ever had Guillain-Barré Syndrome (also called GBS)    In some cases, your health care provider may decide to postpone influenza vaccination until a future visit. Influenza vaccine can be administered at any time during pregnancy. People who are or will be pregnant during influenza season should receive inactivated influenza vaccine. People with minor illnesses, such as a cold, may be vaccinated. People who are moderately or severely ill should usually wait until they recover before getting influenza vaccine. Your health care provider can give you more information. 4. Risks of a vaccine reaction     Soreness, redness, and swelling where the shot is given, fever, muscle aches, and headache can happen after influenza vaccination.  There may be a very small increased risk of Guillain-Barré Syndrome (GBS) after inactivated influenza vaccine (the flu shot). [employer] children who get the flu shot along with pneumococcal vaccine (PCV13) and/or DTaP vaccine at the same time might be slightly more likely to have a seizure caused by fever. Tell your health care provider if a child who is getting flu vaccine has ever had a seizure. People sometimes faint after medical procedures, including vaccination. Tell your provider if you feel dizzy or have vision changes or ringing in the ears. As with any medicine, there is a very remote chance of a vaccine causing a severe allergic reaction, other serious injury, or death. 5. What if there is a serious problem?     An allergic reaction could occur after the vaccinated person leaves the clinic. If you see signs of a severe allergic reaction (hives, swelling of the face and throat, difficulty breathing, a fast heartbeat, dizziness, or weakness), call 9-1-1 and get the person to the nearest hospital.    For other signs that concern you, call your health care provider. Adverse reactions should be reported to the Vaccine Adverse Event Reporting System (VAERS). Your health care provider will usually file this report, or you can do it yourself. Visit the VAERS website at www.vaers. [facility].gov or call 3-947.410.2209. VAERS is only for reporting reactions, and VAERS staff members do not give medical advice. 6. The National Vaccine Injury Compensation Program    The [facility] Vaccine Injury Compensation Program (VICP) is a federal program that was created to compensate people who may have been injured by certain vaccines. Claims regarding alleged injury or death due to vaccination have a time limit for filing, which may be as short as two years. Visit the VICP website at www.[facility].gov/vaccinecompensation or call 4-559.796.4449 to learn about the program and about filing a claim. 7. How can I learn more?  Ask your health care provider.  Call your local or state health department.  Visit the website of the Food and Drug Administration (FDA) for vaccine package inserts and additional information at www.fda.gov/vaccines-blood-biologics/vaccines.  Contact the Centers for Disease Control and Prevention (CDC):  - Call 1-463.185.1962 (1-800-CDC-INFO) or  - Visit CDCs influenza website at www.cdc.gov/flu. Vaccine Information Statement   Inactivated Influenza Vaccine   8/6/2021  42 [address]ung 552XH-54   Department of Health and Human Services  Centers for Disease Control and Prevention    Office Use Only
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
## 2021-10-20 NOTE — PROGRESS NOTES
Elva Mccormick presents today for   Chief Complaint   Patient presents with    Results     PFT        Is someone accompanying this pt? No    Is the patient using any DME equipment during OV? No    -DME Company NA    Depression Screening:  3 most recent PHQ Screens 9/7/2021   PHQ Not Done -   Little interest or pleasure in doing things Not at all   Feeling down, depressed, irritable, or hopeless Not at all   Total Score PHQ 2 0       Learning Assessment:  Learning Assessment 6/12/2019   PRIMARY LEARNER Patient   BARRIERS PRIMARY LEARNER NONE   CO-LEARNER CAREGIVER No   PRIMARY LANGUAGE ENGLISH   LEARNER PREFERENCE PRIMARY READING     DEMONSTRATION   ANSWERED BY patient   RELATIONSHIP SELF       Abuse Screening:  Abuse Screening Questionnaire 9/7/2021   Do you ever feel afraid of your partner? N   Are you in a relationship with someone who physically or mentally threatens you? N   Is it safe for you to go home? Y       Fall Risk  Fall Risk Assessment, last 12 mths 12/20/2019   Able to walk? Yes   Fall in past 12 months? Yes   Number of falls in past 12 months 8 or more   Fall with injury? 1         Coordination of Care:  1. Have you been to the ER, urgent care clinic since your last visit? Hospitalized since your last visit? No    2. Have you seen or consulted any other health care providers outside of the [address] [place] [address] since your last visit? Include any pap smears or colon screening.  No

## 2021-10-20 NOTE — LETTER
10/20/2021    Patient: Brandon Doshi   YOB: 1960   Date of Visit: 10/20/2021     Anna Brunner, [address] [place] [address]    Dear Anna Brunner MD,      Thank you for referring Ms. Brandon Doshi to [address] [place] [address] for evaluation. My notes for this consultation are attached. If you have questions, please do not hesitate to call me. I look forward to following your patient along with you.       Sincerely,    Jonh Eastman MD

## 2021-11-09 DIAGNOSIS — I31.39 PERICARDIAL EFFUSION: Primary | ICD-10-CM

## 2021-12-08 ENCOUNTER — OFFICE VISIT (OUTPATIENT)
Dept: ORTHOPEDIC SURGERY | Age: 61
End: 2021-12-08
Payer: MEDICARE

## 2021-12-08 VITALS
WEIGHT: 144 LBS | HEIGHT: 63 IN | HEART RATE: 102 BPM | BODY MASS INDEX: 25.52 KG/M2 | OXYGEN SATURATION: 98 % | TEMPERATURE: 97.8 F

## 2021-12-08 DIAGNOSIS — M25.512 ACUTE PAIN OF LEFT SHOULDER: ICD-10-CM

## 2021-12-08 DIAGNOSIS — S46.012A TRAUMATIC TEAR OF LEFT ROTATOR CUFF, UNSPECIFIED TEAR EXTENT, INITIAL ENCOUNTER: Primary | ICD-10-CM

## 2021-12-08 PROCEDURE — G8419 CALC BMI OUT NRM PARAM NOF/U: HCPCS | Performed by: ORTHOPAEDIC SURGERY

## 2021-12-08 PROCEDURE — 99203 OFFICE O/P NEW LOW 30 MIN: CPT | Performed by: ORTHOPAEDIC SURGERY

## 2021-12-08 PROCEDURE — G9717 DOC PT DX DEP/BP F/U NT REQ: HCPCS | Performed by: ORTHOPAEDIC SURGERY

## 2021-12-08 PROCEDURE — G9899 SCRN MAM PERF RSLTS DOC: HCPCS | Performed by: ORTHOPAEDIC SURGERY

## 2021-12-08 PROCEDURE — 3017F COLORECTAL CA SCREEN DOC REV: CPT | Performed by: ORTHOPAEDIC SURGERY

## 2021-12-08 PROCEDURE — G8427 DOCREV CUR MEDS BY ELIG CLIN: HCPCS | Performed by: ORTHOPAEDIC SURGERY

## 2021-12-08 PROCEDURE — 73030 X-RAY EXAM OF SHOULDER: CPT | Performed by: ORTHOPAEDIC SURGERY

## 2021-12-08 NOTE — PROGRESS NOTES
Rosario Russ  1960   Chief Complaint   Patient presents with    Arm Pain     left arm        HISTORY OF PRESENT ILLNESS  Rosario Russ is a 62 y.o. female who presents today for evaluation of left arm. She rates her pain 5/10 today. Pain has been present since the end of September. Her sister fell and the pt was trying to catch her as well as another episode of her trying to help with lift her sister. 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 She notes the pain Is different than her fibromyalgia spells. Pain with internal rotation. The pain does not wake her up but notes the pain does increase over time. Has taken Mobic with no relief. Patient describes the pain as sharp that is Intermittent in nature. Symptoms are worse with Straightening; Stretching; Bending and is better with  nothing. Associated symptoms include nothing. Since problem started, it: is unchanged. Pain does not wake patient up at night. Has taken mobic for the problem. Has tried following treatments: Injections:NO; Brace:NO;  Therapy:NO; Cane/Crutch:NO       Allergies   Allergen Reactions    Codeine Rash and Itching     Other reaction(s): other/intolerance    Tussionex Itching        Past Medical History:   Diagnosis Date    Allergy     Anxiety     Arthritis     Asthma     mild    Atypical ductal hyperplasia of breast     Atypical hyperplasia of breast 2003    right    Chronic depression     Chronic fatigue syndrome     Chronic interstitial cystitis     Chronic interstitial cystitis     Chronic pain     fibromyalgia    Constipation     Depression     Diarrhea     Difficulty walking     Dizziness     Dry eyes     Dry mouth     Dyspnea     Fainting episodes     Pt said she has a condition where she faints after urinating     Fibromyalgia     Fibromyalgia     Generalized stiffness     GERD (gastroesophageal reflux disease)     Headache(784.0)     Heartburn     Hyperlipidemia     IBS (irritable bowel syndrome)     IC (interstitial cystitis)     Imbalance     Incoordination     Insomnia     Irregular periods/menstrual cycles     Joint pain     Microscopic hematuria     Muscle pain     Myofascial pain syndrome     Numbness and tingling in hands     Ovarian cyst, left     Painful sex     Pneumonia     Psychiatric disorder     anxiety - depression    RLS (restless legs syndrome)     Stress     TMJ (temporomandibular joint syndrome)     Transient total loss of muscle tone     Uterine fibroid     Weakness generalized       Social History     Socioeconomic History    Marital status:      Spouse name: Not on file    Number of children: Not on file    Years of education: Not on file    Highest education level: Not on file   Occupational History    Not on file   Tobacco Use    Smoking status: Never Smoker    Smokeless tobacco: Never Used   Substance and Sexual Activity    Alcohol use: Yes     Alcohol/week: 0.8 standard drinks     Types: 1 Cans of beer per week     Comment: socially    Drug use: No    Sexual activity: Yes     Partners: Male   Other Topics Concern    Not on file   Social History Narrative    Not on file     Social Determinants of Health     Financial Resource Strain:     Difficulty of Paying Living Expenses: Not on file   Food Insecurity:     Worried About Running Out of Food in the Last Year: Not on file    Albertina of Food in the Last Year: Not on file   Transportation Needs:     Lack of Transportation (Medical): Not on file    Lack of Transportation (Non-Medical):  Not on file   Physical Activity:     Days of Exercise per Week: Not on file    Minutes of Exercise per Session: Not on file   Stress:     Feeling of Stress : Not on file   Social Connections:     Frequency of Communication with Friends and Family: Not on file    Frequency of Social Gatherings with Friends and Family: Not on file    Attends Restorationist Services: Not on file   [employer] of Clubs or Organizations: Not on file    Attends Club or Organization Meetings: Not on file    Marital Status: Not on file   Intimate Partner Violence:     Fear of Current or Ex-Partner: Not on file    Emotionally Abused: Not on file    Physically Abused: Not on file    Sexually Abused: Not on file   Housing Stability:     Unable to Pay for Housing in the Last Year: Not on file    Number of [place] in the Last Year: Not on file    Unstable Housing in the Last Year: Not on file      Past Surgical History:   Procedure Laterality Date    ENDOSCOPY, COLON, DIAGNOSTIC  2013    10 years    HX BREAST BIOPSY  2003    right    HX COLONOSCOPY  2012    donohue    HX GYN      left ovarian cystectomy    HX GYN      laparoscopy, laparotomy, lysis of adhesions.  HX MYOMECTOMY      HX OVARIAN CYST REMOVAL  2003    HX UROLOGICAL  05/24/10    cystoscopy and hydrodilation      Family History   Problem Relation Age of Onset    Cancer Father         prostate and bone    Heart Disease Father     Heart Attack Father     Depression Mother     Osteoporosis Mother     Osteoporosis Sister     Diabetes Brother     Kidney Disease Brother         dialysis    Hypertension Brother     Diabetes Maternal Grandmother     Hypertension Brother     Stroke Other     Other Other         fibromyalgia    Schizophrenia Brother     Other Brother         obsessive compulsive disorder      Current Outpatient Medications   Medication Sig    mometasone (Asmanex Twisthaler) 220 mcg/ actuation (30) inhaler     ondansetron hcl (Zofran) 4 mg tablet     [facility]-me blue-sod phos-phsal-hyo (Vilamit MB) 118-10-40.8-36 mg cap capsule Take 1 Capsule by mouth four (4) times daily.  rosuvastatin (CRESTOR) 40 mg tablet Take 1 Tablet by mouth nightly.  meloxicam (MOBIC) 15 mg tablet take 1 tablet by mouth once daily    fluticasone furoate (Arnuity Ellipta) 100 mcg/actuation dsdv inhaler Take 1 Puff by inhalation daily.     omeprazole (PRILOSEC) 40 mg capsule Take 1 Capsule by mouth daily.  albuterol (PROVENTIL HFA, VENTOLIN HFA, PROAIR HFA) 90 mcg/actuation inhaler Take 2 Puffs by inhalation every six (6) hours as needed for Wheezing.  [facility]-me blue-sod phos-phsal-hyo (UribeL) 118-10-40.8-36 mg cap capsule Take 1 Cap by mouth four (4) times daily as needed for Pain.  montelukast (Singulair) 10 mg tablet Take 1 Tab by mouth daily.  tiotropium bromide (Spiriva Respimat) 2.5 mcg/actuation inhaler Take 2 Puffs by inhalation daily. Indications: prevention of bronchospasm with chronic bronchitis    gabapentin (NEURONTIN) 800 mg tablet take 1 tablet by mouth three times a day    denosumab (PROLIA) 60 mg/mL injection 60 mg by SubCUTAneous route. 2 times yearly    OXcarbazepine (TRILEPTAL) 150 mg tablet Take 1 Tab by mouth three (3) times daily.  calcium glycerophosphate (PRELIEF) 65 mg tab Take 1 Tab by mouth daily.  modafinil (PROVIGIL) 200 mg tablet take 1 tablet by mouth twice a day for 30 DAYS maximum daily dose of 2 tablets (Patient taking differently: Take 200 mg by mouth daily.)    ergocalciferol (ERGOCALCIFEROL) 50,000 unit capsule take 1 capsule by mouth TWO (2) TIMES A WEEK  Indications: PREVENTION OF VITAMIN D DEFICIENCY    OTHER Indications: Compound cream for inflammation    B.infantis-B.ani-B.long-B.bifi (PROBIOTIC 4X) 10-15 mg TbEC Take 1 Tab by mouth daily.  calcium citrate-vitamin D3 (CITRACAL + D) tablet Take 1 Tab by mouth daily.  LORazepam (ATIVAN) 2 mg tablet Take 2 mg by mouth every eight (8) hours.  DULoxetine (CYMBALTA) 60 mg capsule Take 120 mg by mouth daily.  simethicone (PHAZYME) 180 mg cap Take 225 mg by mouth as needed.  trazodone (DESYREL) 100 mg tablet Take 150 mg by mouth nightly.  digestive 8-L.acidoph-pectin (DIGESTIVE ENZYME, ACIDOPH,PEC,) 50 million cell-100 mg tab Take 1 Tab by mouth daily.  (Patient not taking: Reported on 9/7/2021)    pentosan polysulfate sodium (ELMIRON) 100 mg capsule Take 1 Cap by mouth two (2) times a day. Indications: Bladder Pain, bladder wall inflammation (Patient not taking: Reported on 5/24/2021)    ENZYMES,DIGESTIVE, PLANT, (DIGESTIVE ENZYMES, PLANT, PO) Take 1 Tab by mouth daily. (Patient not taking: Reported on 6/15/2021)     No current facility-administered medications for this visit. REVIEW OF SYSTEM   Patient denies: Weight loss, Fever/Chills, HA, Visual changes, Fatigue, Chest pain, SOB, Abdominal pain, N/V/D/C, Blood in stool or urine, Edema. Pertinent positive as above in HPI. All others were negative    PHYSICAL EXAM:   Visit Vitals  Pulse (!) 102   Temp 97.8 °F (36.6 °C) (Temporal)   Ht 5' 3\" (1.6 m)   Wt 144 lb (65.3 kg)   SpO2 98%   BMI 25.51 kg/m²     The patient is a well-developed, well-nourished female   in no acute distress. The patient is alert and oriented times three. The patient is alert and oriented times three. Mood and affect are normal.  LYMPHATIC: lymph nodes are not enlarged and are within normal limits  SKIN: normal in color and non tender to palpation. There are no bruises or abrasions noted. NEUROLOGICAL: Motor sensory exam is within normal limits. Reflexes are equal bilaterally. There is normal sensation to pinprick and light touch  MUSCULOSKELETAL:  Examination Left shoulder   Skin Intact   AC joint tenderness -   Biceps tenderness -   Forward flexion/Elevation    Active abduction    Glenohumeral abduction 80   External rotation ROM 30   Internal rotation ROM 30   Apprehension -   Noels Relocation -   Jerk -   Load and Shift -   Obriens -   Speeds -   Impingement sign +   Supraspinatus/Empty Can +   External Rotation Strength -, 5/5   Lift Off/Belly Press -, 5/5   Neurovascular Intact       IMAGING: XR of the left shoulder with 3 views obtained in the office dated 12/8/2021 was reviewed and read by Dr. Kidd Books: No acute abnormalities        IMPRESSION:      ICD-10-CM ICD-9-CM    1.  Traumatic tear of left rotator cuff, unspecified tear extent, initial encounter  S46.012A 840.4    2. Acute pain of left shoulder  M25.512 719.41 AMB POC XRAY, SHOULDER; COMPLETE, 2+        PLAN:  1. Patient presents today with left shoulder pain due to a possible RCT and I would like to order a MRI. Continue taking Mobic as prescribed. Return following MRI   Risk factors include: n/a  2. No ultrasound exam indicated today  3. No cortisone injection indicated today   4. No Physical/Occupational Therapy indicated today  5. Yes diagnostic test indicated today: L SHOULDER MRI   6. No durable medical equipment indicated today  7. No referral indicated today   8. No medications indicated today:   9. No Narcotic indicated today     RTC Following MRI       Scribed by [employer] [address]) as dictated by Carey Abrams MD    I, Dr. Carey bArams, confirm that all documentation is accurate.     Carey Abrams M.D.   [facility]

## 2021-12-10 ENCOUNTER — HOSPITAL ENCOUNTER (OUTPATIENT)
Dept: NON INVASIVE DIAGNOSTICS | Age: 61
Discharge: HOME | End: 2021-12-10
Attending: INTERNAL MEDICINE
Payer: MEDICARE

## 2021-12-10 VITALS
BODY MASS INDEX: 25.52 KG/M2 | HEIGHT: 63 IN | WEIGHT: 144 LBS | SYSTOLIC BLOOD PRESSURE: 135 MMHG | DIASTOLIC BLOOD PRESSURE: 72 MMHG

## 2021-12-10 DIAGNOSIS — I31.39 PERICARDIAL EFFUSION: ICD-10-CM

## 2021-12-10 LAB
ECHO LV INTERNAL DIMENSION DIASTOLIC: 4.53 CM (ref 3.9–5.3)
ECHO LV INTERNAL DIMENSION SYSTOLIC: 3.27 CM
ECHO TV REGURGITANT MAX VELOCITY: 242.65 CM/S
ECHO TV REGURGITANT PEAK GRADIENT: 23.55 MMHG

## 2021-12-10 PROCEDURE — 93306 TTE W/DOPPLER COMPLETE: CPT

## 2021-12-10 NOTE — PROGRESS NOTES
Per your last note\" Patience Charlie is a 62 y.o. with:     1.) Syncope, most likely vasovagal/ neurocardiogenic  2.) LDL >265-->107 on HI statin! (CAC 84);  3.) +FH premature ASCVD  4.) Asthma, following with pulm  5.) Large incidentally noted pericardial effusion without tamponade     1.) limited echo to ensure effusion resolved  2.) Cont HI statin indefinitely, just needs FLP yearly now  3.) RTC yearly    Normal gait / station

## 2021-12-14 ENCOUNTER — HOSPITAL ENCOUNTER (OUTPATIENT)
Age: 61
Discharge: HOME | End: 2021-12-14
Attending: ORTHOPAEDIC SURGERY
Payer: MEDICARE

## 2021-12-14 DIAGNOSIS — S46.012A TRAUMATIC TEAR OF LEFT ROTATOR CUFF, UNSPECIFIED TEAR EXTENT, INITIAL ENCOUNTER: ICD-10-CM

## 2021-12-14 PROCEDURE — 73221 MRI JOINT UPR EXTREM W/O DYE: CPT

## 2021-12-17 ENCOUNTER — TELEPHONE (OUTPATIENT)
Dept: CARDIOLOGY CLINIC | Age: 61
End: 2021-12-17

## 2021-12-17 NOTE — TELEPHONE ENCOUNTER
----- Message from Symone Mariee DO sent at 12/15/2021  2:15 PM EST -----  The effusion on her echo is unchanged, still small to moderate  Can continue to monitor  Thanks  ----- Message -----  From: Basil Contreras LPN  Sent: 04/36/0433  12:50 PM EST  To: Symone Mariee DO    Per your last note\" Dex Lombardi is a 62 y.o. with:     1.) Syncope, most likely vasovagal/ neurocardiogenic  2.) LDL >265-->107 on HI statin! (CAC 84);  3.) +FH premature ASCVD  4.) Asthma, following with pulm  5.) Large incidentally noted pericardial effusion without tamponade     1.) limited echo to ensure effusion resolved  2.) Cont HI statin indefinitely, just needs FLP yearly now  3.) RTC yearly

## 2021-12-22 ENCOUNTER — OFFICE VISIT (OUTPATIENT)
Dept: ORTHOPEDIC SURGERY | Age: 61
End: 2021-12-22
Payer: MEDICARE

## 2021-12-22 VITALS
HEART RATE: 98 BPM | TEMPERATURE: 98 F | WEIGHT: 144 LBS | HEIGHT: 63 IN | BODY MASS INDEX: 25.52 KG/M2 | OXYGEN SATURATION: 99 %

## 2021-12-22 DIAGNOSIS — M75.52 SUBACROMIAL BURSITIS OF LEFT SHOULDER JOINT: Primary | ICD-10-CM

## 2021-12-22 PROCEDURE — 3017F COLORECTAL CA SCREEN DOC REV: CPT | Performed by: ORTHOPAEDIC SURGERY

## 2021-12-22 PROCEDURE — 99213 OFFICE O/P EST LOW 20 MIN: CPT | Performed by: ORTHOPAEDIC SURGERY

## 2021-12-22 PROCEDURE — G8419 CALC BMI OUT NRM PARAM NOF/U: HCPCS | Performed by: ORTHOPAEDIC SURGERY

## 2021-12-22 PROCEDURE — G9717 DOC PT DX DEP/BP F/U NT REQ: HCPCS | Performed by: ORTHOPAEDIC SURGERY

## 2021-12-22 PROCEDURE — G9899 SCRN MAM PERF RSLTS DOC: HCPCS | Performed by: ORTHOPAEDIC SURGERY

## 2021-12-22 PROCEDURE — G8427 DOCREV CUR MEDS BY ELIG CLIN: HCPCS | Performed by: ORTHOPAEDIC SURGERY

## 2021-12-22 NOTE — PROGRESS NOTES
Ligia Mccollum  1960   Chief Complaint   Patient presents with    Shoulder Pain     mri left shoulder        HISTORY OF PRESENT ILLNESS  Ligia Mccollum is a 62 y.o. female who presents today for reevaluation and MRI review of left arm. She rates her pain 5/10 today. Pain has been present since the end of September. Her sister fell and the pt was trying to catch her as well as another episode of her trying to help with lift her sister. 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 She notes the pain Is different than her fibromyalgia spells. Pain with internal rotation. The pain does not wake her up but notes the pain does increase over time. Has taken Mobic with no relief. Patient denies any fever, chills, chest pain, shortness of breath or calf pain. The remainder of the review of systems is negative. There are no new illness or injuries to report since last seen in the office. There are no changes to medications, allergies, family or social history. PHYSICAL EXAM:   Visit Vitals  Pulse 98   Temp 98 °F (36.7 °C)   Ht 5' 3\" (1.6 m)   Wt 144 lb (65.3 kg)   SpO2 99%   BMI 25.51 kg/m²     The patient is a well-developed, well-nourished female   in no acute distress. The patient is alert and oriented times three. The patient is alert and oriented times three. Mood and affect are normal.  LYMPHATIC: lymph nodes are not enlarged and are within normal limits  SKIN: normal in color and non tender to palpation. There are no bruises or abrasions noted. NEUROLOGICAL: Motor sensory exam is within normal limits. Reflexes are equal bilaterally.  There is normal sensation to pinprick and light touch  MUSCULOSKELETAL:  Examination Left shoulder   Skin Intact   AC joint tenderness -   Biceps tenderness -   Forward flexion/Elevation    Active abduction    Glenohumeral abduction 80   External rotation ROM 30   Internal rotation ROM 30   Apprehension -   Noels Relocation -   Jerk -   Load and Shift -   Benna Slim -   Speeds -   Impingement sign +   Supraspinatus/Empty Can +   External Rotation Strength -, 5/5   Lift Off/Belly Press -, 5/5   Neurovascular Intact       IMAGING: MRI of the left shoulder dated 12/14/2021 was reviewed and read by Dr. Kidd Books:   IMPRESSION  1. No high-grade rotator cuff tear. Partial-thickness articular sided fraying  at the posterior supraspinatus tendon insertion (less than 50% thickness, 5  lumbar AP). Mild to moderate supraspinatus and infraspinatus tendinosis. 2.  Small subacromial spur. Moderate subacromial/subdeltoid bursitis. 3.  Mild acromioclavicular osteoarthritis. Minimal glenohumeral osteoarthritis. XR of the left shoulder with 3 views obtained in the office dated 12/8/2021 was reviewed and read by Dr. Kidd Books: No acute abnormalities        IMPRESSION:      ICD-10-CM ICD-9-CM    1. Subacromial bursitis of left shoulder joint  M75.52 726.19         PLAN:  1. Pt presents today with left shoulder pain due to MRI-documented glenohumeral arthritis and subacromial bursitis. I offered a cortisone injection, mobic and PT today but she would like to hold off for now. Return as needed. Risk factors include: n/a  2. No ultrasound exam indicated today  3. No cortisone injection indicated today   4. No Physical/Occupational Therapy indicated today  5. No diagnostic test indicated today  6. No durable medical equipment indicated today  7. No referral indicated today   8. No medications indicated today:   9. No Narcotic indicated today     RTC prn      Scribed by [facility]) as dictated by Arash Snider MD    I, Dr. Arash Snider, confirm that all documentation is accurate.     Arash Snider M.D.   [facility]

## 2021-12-27 ENCOUNTER — TELEPHONE (OUTPATIENT)
Dept: INTERNAL MEDICINE CLINIC | Age: 61
End: 2021-12-27

## 2021-12-27 NOTE — TELEPHONE ENCOUNTER
----- Message from Adonayjerald Karl sent at 12/27/2021  7:06 AM EST -----  Subject: Message to Provider    QUESTIONS  Information for Provider? PT called in this morning and cancelled labs due   to her eating breakfast by mistake. Please contact PT back to [facility]   for this week. Thank you  ---------------------------------------------------------------------------  --------------  CALL BACK INFO  What is the best way for the office to contact you? OK to leave message on   voicemail  Preferred Call Back Phone Number? [phone]  ---------------------------------------------------------------------------  --------------  SCRIPT ANSWERS  Relationship to Patient?  Self

## 2021-12-28 ENCOUNTER — HOSPITAL ENCOUNTER (OUTPATIENT)
Dept: LAB | Age: 61
Discharge: HOME | End: 2021-12-28
Payer: MEDICARE

## 2021-12-28 ENCOUNTER — APPOINTMENT (OUTPATIENT)
Dept: INTERNAL MEDICINE CLINIC | Age: 61
End: 2021-12-28

## 2021-12-28 DIAGNOSIS — E55.9 VITAMIN D DEFICIENCY: ICD-10-CM

## 2021-12-28 DIAGNOSIS — E78.5 HYPERLIPIDEMIA LDL GOAL <160: ICD-10-CM

## 2021-12-28 LAB
25(OH)D3 SERPL-MCNC: 82.6 NG/ML (ref 30–100)
ALBUMIN SERPL-MCNC: 4.2 G/DL (ref 3.4–5)
ALBUMIN/GLOB SERPL: 1.4 {RATIO} (ref 0.8–1.7)
ALP SERPL-CCNC: 30 U/L (ref 45–117)
ALT SERPL-CCNC: 53 U/L (ref 13–56)
ANION GAP SERPL CALC-SCNC: 5 MMOL/L (ref 3–18)
AST SERPL-CCNC: 28 U/L (ref 10–38)
BILIRUB SERPL-MCNC: 0.4 MG/DL (ref 0.2–1)
BUN SERPL-MCNC: 19 MG/DL (ref 7–18)
BUN/CREAT SERPL: 29 (ref 12–20)
CALCIUM SERPL-MCNC: 8.6 MG/DL (ref 8.5–10.1)
CHLORIDE SERPL-SCNC: 99 MMOL/L (ref 100–111)
CHOLEST SERPL-MCNC: 240 MG/DL
CO2 SERPL-SCNC: 30 MMOL/L (ref 21–32)
CREAT SERPL-MCNC: 0.66 MG/DL (ref 0.6–1.3)
GLOBULIN SER CALC-MCNC: 3 G/DL (ref 2–4)
GLUCOSE SERPL-MCNC: 84 MG/DL (ref 74–99)
HDLC SERPL-MCNC: 104 MG/DL (ref 40–60)
HDLC SERPL: 2.3 {RATIO} (ref 0–5)
LDLC SERPL CALC-MCNC: 121.4 MG/DL (ref 0–100)
LIPID PROFILE,FLP: ABNORMAL
POTASSIUM SERPL-SCNC: 3.9 MMOL/L (ref 3.5–5.5)
PROT SERPL-MCNC: 7.2 G/DL (ref 6.4–8.2)
SODIUM SERPL-SCNC: 134 MMOL/L (ref 136–145)
TRIGL SERPL-MCNC: 73 MG/DL (ref ?–150)
VLDLC SERPL CALC-MCNC: 14.6 MG/DL

## 2021-12-28 PROCEDURE — 80061 LIPID PANEL: CPT

## 2021-12-28 PROCEDURE — 82306 VITAMIN D 25 HYDROXY: CPT

## 2021-12-28 PROCEDURE — 36415 COLL VENOUS BLD VENIPUNCTURE: CPT

## 2021-12-28 PROCEDURE — 80053 COMPREHEN METABOLIC PANEL: CPT

## 2022-01-17 ENCOUNTER — HOSPITAL ENCOUNTER (OUTPATIENT)
Dept: MAMMOGRAPHY | Age: 62
Discharge: HOME | End: 2022-01-17
Attending: INTERNAL MEDICINE
Payer: MEDICARE

## 2022-01-17 DIAGNOSIS — Z12.31 VISIT FOR SCREENING MAMMOGRAM: ICD-10-CM

## 2022-01-17 PROCEDURE — 77063 BREAST TOMOSYNTHESIS BI: CPT

## 2022-01-22 DIAGNOSIS — J45.40 MODERATE PERSISTENT ASTHMA WITHOUT COMPLICATION: ICD-10-CM

## 2022-01-23 RX ORDER — MONTELUKAST SODIUM 10 MG/1
TABLET ORAL
Qty: 90 TABLET | Refills: 3 | Status: SHIPPED | OUTPATIENT
Start: 2022-01-23

## 2022-01-26 ENCOUNTER — OFFICE VISIT (OUTPATIENT)
Dept: ORTHOPEDIC SURGERY | Age: 62
End: 2022-01-26
Payer: MEDICARE

## 2022-01-26 VITALS
HEART RATE: 101 BPM | OXYGEN SATURATION: 96 % | HEIGHT: 63 IN | BODY MASS INDEX: 26.01 KG/M2 | WEIGHT: 146.8 LBS | TEMPERATURE: 97.1 F

## 2022-01-26 DIAGNOSIS — M79.7 FIBROMYALGIA: ICD-10-CM

## 2022-01-26 DIAGNOSIS — M17.12 PRIMARY OSTEOARTHRITIS OF LEFT KNEE: ICD-10-CM

## 2022-01-26 DIAGNOSIS — M75.52 CHRONIC BURSITIS OF LEFT SHOULDER: ICD-10-CM

## 2022-01-26 DIAGNOSIS — G89.29 CHRONIC LEFT SHOULDER PAIN: ICD-10-CM

## 2022-01-26 DIAGNOSIS — G89.29 CHRONIC PAIN OF LEFT KNEE: Primary | ICD-10-CM

## 2022-01-26 DIAGNOSIS — M25.512 CHRONIC LEFT SHOULDER PAIN: ICD-10-CM

## 2022-01-26 DIAGNOSIS — M25.562 CHRONIC PAIN OF LEFT KNEE: Primary | ICD-10-CM

## 2022-01-26 DIAGNOSIS — M17.11 PRIMARY OSTEOARTHRITIS OF RIGHT KNEE: ICD-10-CM

## 2022-01-26 PROCEDURE — 73562 X-RAY EXAM OF KNEE 3: CPT | Performed by: SPECIALIST

## 2022-01-26 PROCEDURE — 99213 OFFICE O/P EST LOW 20 MIN: CPT | Performed by: SPECIALIST

## 2022-01-26 PROCEDURE — G8419 CALC BMI OUT NRM PARAM NOF/U: HCPCS | Performed by: SPECIALIST

## 2022-01-26 PROCEDURE — G8427 DOCREV CUR MEDS BY ELIG CLIN: HCPCS | Performed by: SPECIALIST

## 2022-01-26 PROCEDURE — G9899 SCRN MAM PERF RSLTS DOC: HCPCS | Performed by: SPECIALIST

## 2022-01-26 PROCEDURE — G9717 DOC PT DX DEP/BP F/U NT REQ: HCPCS | Performed by: SPECIALIST

## 2022-01-26 PROCEDURE — 20610 DRAIN/INJ JOINT/BURSA W/O US: CPT | Performed by: SPECIALIST

## 2022-01-26 PROCEDURE — 3017F COLORECTAL CA SCREEN DOC REV: CPT | Performed by: SPECIALIST

## 2022-01-26 RX ORDER — BETAMETHASONE SODIUM PHOSPHATE AND BETAMETHASONE ACETATE 3; 3 MG/ML; MG/ML
3 INJECTION, SUSPENSION INTRA-ARTICULAR; INTRALESIONAL; INTRAMUSCULAR; SOFT TISSUE ONCE
Status: COMPLETED | OUTPATIENT
Start: 2022-01-26 | End: 2022-01-26

## 2022-01-26 RX ADMIN — BETAMETHASONE SODIUM PHOSPHATE AND BETAMETHASONE ACETATE 3 MG: 3; 3 INJECTION, SUSPENSION INTRA-ARTICULAR; INTRALESIONAL; INTRAMUSCULAR; SOFT TISSUE at 08:57

## 2022-01-26 NOTE — PROGRESS NOTES
Patient: Rommel Whitehead                MRN: [id number]       SSN: [id number]  YOB: 1960        AGE: 62 y.o. SEX: female    PCP: Geronimo Workman MD  01/26/22    Chief Complaint   Patient presents with    Knee Pain     Left     HISTORY:  Rommel Whitehead is a 62 y.o. female who is seen for left knee pain. She has been experiencing left knee pain for the past several months. She states that she originally injured her knee when she stepped in a yard pothole that her  did not fix this past summer. She also felt increased pain after she was on her knees polishing her kitchen cabinets during the holidays. Recently she aggravated her knee helping her brother move. She feels a sharp medial pain when walking and stair climbing. She experiences startup pain after sitting and has trouble getting off the floor. She has a h/o left shoulder pain and bursitis for which she sees Dr. Katy Hauser. She was previously seen for bilateral hand (R>L) and wrist pain. She was previously seen by Dr. Katie Alexander for bilateral hand numbness and tingling in September of 2015. She reports that she had an EMG/NCV study by Dr. Kenn Acosta several years ago. She underwent a previous course of hand therapy without benefit. She was previously seen for right knee pain on 2/22/12 -- doing well now. She responded to a previous right knee aspiration and cortisone injection on 2/8/12. She feels crackling and popping sensations. Occupation, etc:  Ms. Janell Rosado has received social security disability benefits for fibromyalgia since 2012. She has helped to take care of her sister who she states has Eosinophilic gastroenteritis for the past year. Current weight is 158 pounds. She is 5'3\" tall. She reports a h/o fibromyalgia for which she takes Meloxicam daily. She notes increased hand pains while gardening. She previously exercised at the [facility] regularly but states that she has not recently.   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  She states that she monitored overall health and safety of workers. She lives in the [facility] area of [place] with her  and part Jessica Kidd, part Edwar dog named Hamilton. She has been stressed out with moving her brother lately. Lab Results   Component Value Date/Time    Hemoglobin A1c 5.4 07/10/2012 12:00 PM     Weight Metrics 1/26/2022 12/22/2021 12/10/2021 12/8/2021 10/20/2021 9/7/2021 8/5/2021   Weight 146 lb 12.8 oz 144 lb 144 lb 144 lb 141 lb 9.6 oz 140 lb 140 lb   BMI 26 kg/m2 25.51 kg/m2 25.51 kg/m2 25.51 kg/m2 25.9 kg/m2 25.61 kg/m2 25.61 kg/m2     Patient Active Problem List   Diagnosis Code    Chronic interstitial cystitis N30.10    Chronic depression F32. A    Anxiety F41.9    Stress F43.9    Asthma J45.909    Irritable bowel syndrome with diarrhea K58.0    Fibromyalgia M79.7    Lumbago M54.50    Sacroiliitis, not elsewhere classified (HCC) M46.1    Pain in joint, multiple sites M25.50    Encounter for long-term (current) use of high-risk medication Z79.899    Vitamin D deficiency E55.9    Dyspnea R06.00    Anorgasmia of female F54.26    LUCRECIA (obstructive sleep apnea) G47.33    Osteoporosis M81.0    De Quervain's tenosynovitis, right M65.4    Mild intermittent asthma without complication Y29.86    Chronic pain syndrome G89.4    Hyperlipidemia LDL goal <160 E78.5    Bronchitis with bronchospasm J20.9    Age-related osteoporosis without current pathological fracture M81.0    Osteopenia M85.80    Pericardial effusion I31.3    Generalized osteoarthritis M15.9    Non-smoker Z78.9    Other long term (current) drug therapy Z79.899    TMJ (temporomandibular joint syndrome) M26.609    Bronchiectasis without complication (HCC) Y70.7     REVIEW OF SYSTEMS: All Below are Negative except: See HPI   Constitutional: negative for fever, chills, and weight loss.    Cardiovascular: negative for chest pain, claudication, leg swelling, SOB, FARMER   Gastrointestinal: Negative for pain, N/V/C/D, Blood in stool or urine, dysuria,  hematuria, incontinence, pelvic pain. Musculoskeletal: See HPI   Neurological: Negative for dizziness and weakness. Negative for headaches, Visual changes, confusion, seizures   Phychiatric/Behavioral: Negative for depression, memory loss, substance  abuse. Extremities: Negative for hair changes, rash, or skin lesion changes. Hematologic: Negative for bleeding problems, bruising, pallor or swollen lymph  nodes   Peripheral Vascular: No calf pain, no circulation deficits. Social History     Socioeconomic History    Marital status:      Spouse name: Not on file    Number of children: Not on file    Years of education: Not on file    Highest education level: Not on file   Occupational History    Not on file   Tobacco Use    Smoking status: Never Smoker    Smokeless tobacco: Never Used   Substance and Sexual Activity    Alcohol use: Yes     Alcohol/week: 0.8 standard drinks     Types: 1 Cans of beer per week     Comment: socially    Drug use: No    Sexual activity: Yes     Partners: Male   Other Topics Concern    Not on file   Social History Narrative    Not on file     Social Determinants of Health     Financial Resource Strain:     Difficulty of Paying Living Expenses: Not on file   Food Insecurity:     Worried About Running Out of Food in the Last Year: Not on file    Albertina of Food in the Last Year: Not on file   Transportation Needs:     Lack of Transportation (Medical): Not on file    Lack of Transportation (Non-Medical):  Not on file   Physical Activity: Unknown    Days of Exercise per Week: 0 days    Minutes of Exercise per Session: Not on file   Stress:     Feeling of Stress : Not on file   Social Connections:     Frequency of Communication with Friends and Family: Not on file    Frequency of Social Gatherings with Friends and Family: Not on file   Pasquale Attends Jewish Services: Not on file    Active Member of Clubs or Organizations: Not on file    Attends Club or Organization Meetings: Not on file    Marital Status: Not on file   Intimate Partner Violence: Not At Risk    Fear of Current or Ex-Partner: No    Emotionally Abused: No    Physically Abused: No    Sexually Abused: No   Housing Stability:     Unable to Pay for Housing in the Last Year: Not on file    Number of [place] in the Last Year: Not on file    Unstable Housing in the Last Year: Not on file      Allergies   Allergen Reactions    Codeine Rash and Itching     Other reaction(s): other/intolerance    Tussionex Itching      Current Outpatient Medications   Medication Sig    montelukast (SINGULAIR) 10 mg tablet take 1 tablet by mouth once daily    ondansetron hcl (Zofran) 4 mg tablet     mth-me blue-sod phos-phsal-hyo (Vilamit MB) 118-10-40.8-36 mg cap capsule Take 1 Capsule by mouth four (4) times daily.  rosuvastatin (CRESTOR) 40 mg tablet Take 1 Tablet by mouth nightly.  meloxicam (MOBIC) 15 mg tablet take 1 tablet by mouth once daily    fluticasone furoate (Arnuity Ellipta) 100 mcg/actuation dsdv inhaler Take 1 Puff by inhalation daily.  omeprazole (PRILOSEC) 40 mg capsule Take 1 Capsule by mouth daily.  albuterol (PROVENTIL HFA, VENTOLIN HFA, PROAIR HFA) 90 mcg/actuation inhaler Take 2 Puffs by inhalation every six (6) hours as needed for Wheezing.  mth-me blue-sod phos-phsal-hyo (UribeL) 118-10-40.8-36 mg cap capsule Take 1 Cap by mouth four (4) times daily as needed for Pain.  tiotropium bromide (Spiriva Respimat) 2.5 mcg/actuation inhaler Take 2 Puffs by inhalation daily. Indications: prevention of bronchospasm with chronic bronchitis    gabapentin (NEURONTIN) 800 mg tablet take 1 tablet by mouth three times a day    denosumab (PROLIA) 60 mg/mL injection 60 mg by SubCUTAneous route.  2 times yearly    OXcarbazepine (TRILEPTAL) 150 mg tablet Take 1 Tab by mouth three (3) times daily.  calcium glycerophosphate (PRELIEF) 65 mg tab Take 1 Tab by mouth daily.  modafinil (PROVIGIL) 200 mg tablet take 1 tablet by mouth twice a day for 30 DAYS maximum daily dose of 2 tablets (Patient taking differently: Take 200 mg by mouth daily.)    ergocalciferol (ERGOCALCIFEROL) 50,000 unit capsule take 1 capsule by mouth TWO (2) TIMES A WEEK  Indications: PREVENTION OF VITAMIN D DEFICIENCY    OTHER Indications: Compound cream for inflammation    B.infantis-B.ani-B.long-B.bifi (PROBIOTIC 4X) 10-15 mg TbEC Take 1 Tab by mouth daily.  calcium citrate-vitamin D3 (CITRACAL + D) tablet Take 1 Tab by mouth daily.  LORazepam (ATIVAN) 2 mg tablet Take 2 mg by mouth every eight (8) hours.  DULoxetine (CYMBALTA) 60 mg capsule Take 120 mg by mouth daily.  simethicone (PHAZYME) 180 mg cap Take 225 mg by mouth as needed.  trazodone (DESYREL) 100 mg tablet Take 150 mg by mouth nightly.  mometasone (Asmanex Twisthaler) 220 mcg/ actuation (30) inhaler     digestive 8-L.acidoph-pectin (DIGESTIVE ENZYME, ACIDOPH,PEC,) 50 million cell-100 mg tab Take 1 Tab by mouth daily. (Patient not taking: Reported on 9/7/2021)    pentosan polysulfate sodium (ELMIRON) 100 mg capsule Take 1 Cap by mouth two (2) times a day. Indications: Bladder Pain, bladder wall inflammation (Patient not taking: Reported on 5/24/2021)    ENZYMES,DIGESTIVE, PLANT, (DIGESTIVE ENZYMES, PLANT, PO) Take 1 Tab by mouth daily. (Patient not taking: Reported on 6/15/2021)     No current facility-administered medications for this visit.       PHYSICAL EXAMINATION:  Visit Vitals  Pulse (!) 101   Temp 97.1 °F (36.2 °C) (Temporal)   Ht 5' 3\" (1.6 m)   Wt 146 lb 12.8 oz (66.6 kg)   SpO2 96%   BMI 26.00 kg/m²      ORTHO EXAMINATION:  Examination Right knee Left knee   Skin Intact Intact   Range of motion 120-0 115-0   Effusion - +   Medial joint line tenderness - +   Lateral joint line tenderness - -   Popliteal tenderness - -   Osteophytes palpable + +   Bradys - -   Patella crepitus + +   Anterior drawer - -   Lateral laxity - -   Medial laxity - -   Varus deformity - -   Valgus deformity - -   Pretibial edema - -   Calf tenderness - -     TIME OUT:  Chart reviewed for the following:   Casey Cummings MD, have reviewed the History, Physical and updated the Allergic reactions for [address] performed immediately prior to start of procedure:  Casey Cummings MD, have performed the following reviews on [facility] prior to the start of the procedure:          * Patient was identified by name and date of birth   * Agreement on procedure being performed was verified  * Risks and Benefits explained to the patient  * Procedure site verified and marked as necessary  * Patient was positioned for comfort  * Consent was obtained     Time: 8:44 AM     Date of procedure: 1/26/2022  Procedure performed by:  Galindo Beltrán MD  Ms. Delmy Verduzco tolerated the procedure well with no complications. EMG/NCV STUDY  [facility] 1/27/16  IMPRESSION:  This is a normal electrodiagnostic study of both arms. There is no electrodiagnostic evidence of carpal tunnel syndrome, generalized peripheral neuropathy or cervical radiculopathy. RADIOGRAPHS:  XR LEFT KNEE 1/26/22 SOTO  IMPRESSION:  Three views with bilateral knees on AP view - No fractures, no effusion, moderate left and moderately severe right joint space narrowing, + osteophytes present. Kellgren Ishan grade 3. XR RIGHT HAND 3/21/15  IMPRESSION:  1. Decreased bone mineralization/osteopenia. 2. Nonspecific unchanged joint space narrowing of the fifth MCP joint. No radiographic finding for specific arthropathy. XR LEFT HAND 3/21/15  IMPRESSION:  1. Decreased bone mineralization/osteopenia. 2. Joint space narrowing and subchondral cysts at the fourth MCP joint, likely secondary degenerative joint disease.   Joint space narrowing at the fifth MCP joint, nonspecific. IMPRESSION:      ICD-10-CM ICD-9-CM    1. Chronic pain of left knee  M25.562 719.46 AMB POC X-RAY KNEE 3 VIEW    G89.29 338.29    2. Primary osteoarthritis of left knee  M17.12 715.16 betamethasone (CELESTONE) injection 3 mg      DRAIN/INJECT LARGE JOINT/BURSA      REFERRAL TO PHYSICAL THERAPY      PROCEDURE AUTHORIZATION TO    3. Primary osteoarthritis of right knee  M17.11 715.16    4. Chronic bursitis of left shoulder  M75.52 726.10 REFERRAL TO PHYSICAL THERAPY   5. Chronic left shoulder pain  M25.512 719.41     G89.29 338.29    6. Fibromyalgia  M79.7 729.1      PLAN: Consider visco supplementation if pain continues. She will start a brief course of outpatient aqua physical therapy. After discussing treatment options, patient's left knee was injected with 4 cc Marcaine and 1/2 cc Celestone. There is no need for surgery at this time. She will follow up as needed.       Scribed by Nichelle Hylton ([address]) as dictated by Janice Fulton MD

## 2022-01-27 ENCOUNTER — TELEPHONE (OUTPATIENT)
Dept: ORTHOPEDIC SURGERY | Age: 62
End: 2022-01-27

## 2022-01-27 NOTE — TELEPHONE ENCOUNTER
Patient called with concerns of recent referral to physical therapy. States she is unsure if she should have therapy on two different joints and would like to focus on shoulder if that is a problem.  Patient is requesting call back to discuss    n #: [phone]

## 2022-02-02 ENCOUNTER — OFFICE VISIT (OUTPATIENT)
Dept: INTERNAL MEDICINE CLINIC | Age: 62
End: 2022-02-02
Payer: MEDICARE

## 2022-02-02 VITALS
HEART RATE: 89 BPM | OXYGEN SATURATION: 98 % | BODY MASS INDEX: 25.87 KG/M2 | WEIGHT: 146 LBS | HEIGHT: 63 IN | SYSTOLIC BLOOD PRESSURE: 135 MMHG | TEMPERATURE: 97.5 F | RESPIRATION RATE: 20 BRPM | DIASTOLIC BLOOD PRESSURE: 76 MMHG

## 2022-02-02 DIAGNOSIS — R93.1 ELEVATED CORONARY ARTERY CALCIUM SCORE: ICD-10-CM

## 2022-02-02 DIAGNOSIS — E78.5 HYPERLIPIDEMIA LDL GOAL <160: Primary | ICD-10-CM

## 2022-02-02 DIAGNOSIS — M46.1 SACROILIITIS, NOT ELSEWHERE CLASSIFIED (HCC): ICD-10-CM

## 2022-02-02 DIAGNOSIS — R42 VERTIGO: ICD-10-CM

## 2022-02-02 DIAGNOSIS — E55.9 VITAMIN D DEFICIENCY: ICD-10-CM

## 2022-02-02 DIAGNOSIS — J41.0 SIMPLE CHRONIC BRONCHITIS (HCC): ICD-10-CM

## 2022-02-02 PROCEDURE — G9717 DOC PT DX DEP/BP F/U NT REQ: HCPCS | Performed by: INTERNAL MEDICINE

## 2022-02-02 PROCEDURE — G8427 DOCREV CUR MEDS BY ELIG CLIN: HCPCS | Performed by: INTERNAL MEDICINE

## 2022-02-02 PROCEDURE — G8419 CALC BMI OUT NRM PARAM NOF/U: HCPCS | Performed by: INTERNAL MEDICINE

## 2022-02-02 PROCEDURE — G9899 SCRN MAM PERF RSLTS DOC: HCPCS | Performed by: INTERNAL MEDICINE

## 2022-02-02 PROCEDURE — 99214 OFFICE O/P EST MOD 30 MIN: CPT | Performed by: INTERNAL MEDICINE

## 2022-02-02 PROCEDURE — G0463 HOSPITAL OUTPT CLINIC VISIT: HCPCS | Performed by: INTERNAL MEDICINE

## 2022-02-02 PROCEDURE — 3017F COLORECTAL CA SCREEN DOC REV: CPT | Performed by: INTERNAL MEDICINE

## 2022-02-02 RX ORDER — BUPRENORPHINE 7.5 UG/H
PATCH TRANSDERMAL
COMMUNITY
Start: 2021-12-28 | End: 2022-06-30

## 2022-02-02 RX ORDER — TRAMADOL HYDROCHLORIDE 50 MG/1
TABLET ORAL
COMMUNITY
Start: 2021-11-08 | End: 2022-06-30

## 2022-02-02 NOTE — PROGRESS NOTES
Rebecca Ramírez is a 62 y.o.  female and presents with Cholesterol Problem, Vitamin D Deficiency, Anxiety (f/u), Shoulder Pain (left f/u PT ), Knee Pain (left f/u PT ), and Dizziness      SUBJECTIVE:  Patient has history of very high LDL that has been over 200 in the past.  Patient had coronary artery calcium score done that was elevated at 84. Patient was evaluated by cardiology and had a mild pericardial effusion that is being monitored. Patient is now on Crestor 40 mg which has significantly improved her cholesterol. Patient has a history of asthma moderate persistent for which she uses Asmanex and Singulair and Spiriva. Patient is followed by pulmonary. Patient is also followed by the spine center as well as pain management with Dr. Shiela Hamman. She has been diagnosed also with sacroiliitis which is also managed by pain management. She is on Trileptal and Neurontin to help manage chronic pain and fibromyalgia. She has some mild hyponatremia because of the Trileptal.  Patient is followed by Dr. Jesus Pugh from psychiatry and remains on Cymbalta 120 mg daily, trazodone and Ativan 2 mg 3 times a day. Patient also has interstitial cystitis for which she follows urology. Patient's vitamin D level is controlled on vitamin D 50,000 units every other week. Patient's pericardial effusion etiology is unclear. Her rheumatological markers have been negative in the past.  She will continue to follow-up with cardiology. Patient has been having  recurrent dizziness that is probably due to benign positional vertigo. Will refer to ENT for further evaluation and may need to consider vestibular therapy in the future.     Patient is having multiple joint issues including left shoulder and left knee for which she will follow up with physical therapy through her different specialist.    Respiratory ROS: negative for - shortness of breath  Cardiovascular ROS: negative for - chest pain    Current Outpatient Medications Medication Sig    buprenorphine 7.5 mcg/hour ptwk Prescribed by Dr. Rafal Foster [facility]    traMADoL (ULTRAM) 50 mg tablet Prescribed by Dr. Arlette Samuels pain management    montelukast (SINGULAIR) 10 mg tablet take 1 tablet by mouth once daily    ondansetron hcl (Zofran) 4 mg tablet     mth-me blue-sod phos-phsal-hyo (Vilamit MB) 118-10-40.8-36 mg cap capsule Take 1 Capsule by mouth four (4) times daily.  rosuvastatin (CRESTOR) 40 mg tablet Take 1 Tablet by mouth nightly.  meloxicam (MOBIC) 15 mg tablet take 1 tablet by mouth once daily    fluticasone furoate (Arnuity Ellipta) 100 mcg/actuation dsdv inhaler Take 1 Puff by inhalation daily.  omeprazole (PRILOSEC) 40 mg capsule Take 1 Capsule by mouth daily.  albuterol (PROVENTIL HFA, VENTOLIN HFA, PROAIR HFA) 90 mcg/actuation inhaler Take 2 Puffs by inhalation every six (6) hours as needed for Wheezing.  mth-me blue-sod phos-phsal-hyo (UribeL) 118-10-40.8-36 mg cap capsule Take 1 Cap by mouth four (4) times daily as needed for Pain.  tiotropium bromide (Spiriva Respimat) 2.5 mcg/actuation inhaler Take 2 Puffs by inhalation daily. Indications: prevention of bronchospasm with chronic bronchitis    gabapentin (NEURONTIN) 800 mg tablet take 1 tablet by mouth three times a day    denosumab (PROLIA) 60 mg/mL injection 60 mg by SubCUTAneous route. 2 times yearly    OXcarbazepine (TRILEPTAL) 150 mg tablet Take 1 Tab by mouth three (3) times daily.  calcium glycerophosphate (PRELIEF) 65 mg tab Take 1 Tab by mouth daily.     modafinil (PROVIGIL) 200 mg tablet take 1 tablet by mouth twice a day for 30 DAYS maximum daily dose of 2 tablets (Patient taking differently: Take 200 mg by mouth daily.)    ergocalciferol (ERGOCALCIFEROL) 50,000 unit capsule take 1 capsule by mouth TWO (2) TIMES A WEEK  Indications: PREVENTION OF VITAMIN D DEFICIENCY    OTHER Indications: Compound cream for inflammation    B.infantis-B.ani-B.long-B.bifi (PROBIOTIC 4X) 10-15 mg TbEC Take 1 Tab by mouth daily.  calcium citrate-vitamin D3 (CITRACAL + D) tablet Take 1 Tab by mouth daily.  LORazepam (ATIVAN) 2 mg tablet Take 2 mg by mouth every eight (8) hours.  DULoxetine (CYMBALTA) 60 mg capsule Take 120 mg by mouth daily.  simethicone (PHAZYME) 180 mg cap Take 225 mg by mouth as needed.  trazodone (DESYREL) 100 mg tablet Take 150 mg by mouth nightly.  digestive 8-L.acidoph-pectin (DIGESTIVE ENZYME, ACIDOPH,PEC,) 50 million cell-100 mg tab Take 1 Tab by mouth daily. (Patient not taking: Reported on 9/7/2021)    ENZYMES,DIGESTIVE, PLANT, (DIGESTIVE ENZYMES, PLANT, PO) Take 1 Tab by mouth daily. (Patient not taking: Reported on 6/15/2021)     No current facility-administered medications for this visit.          OBJECTIVE:  alert, well appearing, and in no distress  Visit Vitals  /76 (BP 1 Location: Left arm, BP Patient Position: Sitting, BP Cuff Size: Adult)   Pulse 89   Temp 97.5 °F (36.4 °C) (Temporal)   Resp 20   Ht 5' 3\" (1.6 m)   Wt 146 lb (66.2 kg)   SpO2 98%   BMI 25.86 kg/m²      well developed and well nourished  Chest - clear to auscultation, no wheezes, rales or rhonchi, symmetric air entry  Heart - normal rate, regular rhythm, normal S1, S2, no murmurs, rubs, clicks or gallops  Extremities - peripheral pulses normal, no pedal edema, no clubbing or cyanosis        Labs:   Lab Results   Component Value Date/Time    WBC 6.6 11/16/2020 01:16 PM    HGB 14.2 11/16/2020 01:16 PM    Hemoglobin, POC 13.9 08/28/2019 11:13 AM    HCT 41.2 11/16/2020 01:16 PM    Hematocrit, POC 41 08/28/2019 11:13 AM    PLATELET 620 27/34/6169 01:16 PM    MCV 82.9 11/16/2020 01:16 PM     Lab Results   Component Value Date/Time    Cholesterol, total 240 (H) 12/28/2021 02:41 PM    HDL Cholesterol 104 (H) 12/28/2021 02:41 PM    LDL, calculated 121.4 (H) 12/28/2021 02:41 PM    Triglyceride 73 12/28/2021 02:41 PM    CHOL/HDL Ratio 2.3 12/28/2021 02:41 PM     Lab Results   Component Value Date/Time    TSH 1.10 11/16/2020 01:16 PM    T4, Free 0.8 11/16/2020 01:16 PM    T4, Total 8.1 08/29/2015 10:42 AM      Lab Results   Component Value Date/Time    Sodium 134 (L) 12/28/2021 02:41 PM    Potassium 3.9 12/28/2021 02:41 PM    Chloride 99 (L) 12/28/2021 02:41 PM    CO2 30 12/28/2021 02:41 PM    Anion gap 5 12/28/2021 02:41 PM    Glucose 84 12/28/2021 02:41 PM    BUN 19 (H) 12/28/2021 02:41 PM    Creatinine 0.66 12/28/2021 02:41 PM    BUN/Creatinine ratio 29 (H) 12/28/2021 02:41 PM    GFR est AA >60 12/28/2021 02:41 PM    GFR est non-AA >60 12/28/2021 02:41 PM    Calcium 8.6 12/28/2021 02:41 PM    Bilirubin, total 0.4 12/28/2021 02:41 PM    ALT (SGPT) 53 12/28/2021 02:41 PM    Alk. phosphatase 30 (L) 12/28/2021 02:41 PM    Protein, total 7.2 12/28/2021 02:41 PM    Albumin 4.2 12/28/2021 02:41 PM    Globulin 3.0 12/28/2021 02:41 PM    A-G Ratio 1.4 12/28/2021 02:41 PM          Assessment/Plan      ICD-10-CM ICD-9-CM    1. Hyperlipidemia LDL goal <160  E78.5 272.4  much improved on Crestor 40 mg daily LIPID PANEL      METABOLIC PANEL, COMPREHENSIVE   2. Elevated coronary artery calcium score  R93.1 414.00  managed with statin and aspirin. Patient followed by cardiology   3. Vitamin D deficiency  E55.9 268.9  well-controlled on vitamin D 50,000 units every other week VITAMIN D, 25 HYDROXY   4. Vertigo  R42 780.4 REFERRAL TO ENT-OTOLARYNGOLOGY   5. Simple chronic bronchitis (HCC)  J41.0 491.0  patient doing well on multiple inhalers and is followed by pulmonary   6. Sacroiliitis, not elsewhere classified ([facility] [address]  M46.1 720.2  patient stable on multiple medications and follow followed by pain management and spine center         Follow-up and Dispositions    · Return in about 6 months (around 8/2/2022) for labs 1 week before. Reviewed plan of care. Patient has provided input and agrees with goals.

## 2022-02-02 NOTE — PATIENT INSTRUCTIONS
Learning About High-Vitamin D Foods  What foods are high in vitamin D? The foods you eat contain nutrients, such as vitamins and minerals. Vitamin D is a nutrient. Your body needs the right amount to stay healthy and work as it should. You can use the list below to help you make choices about which foods to eat. Here are some foods that contain vitamin D. Fruits  · Orange juice, fortified with vitamin D  Grains  · Cereals, fortified with vitamin D  Dairy and dairy alternatives  · Milk, fortified with vitamin D  · Non-dairy milk (almond, rice, soy), fortified with vitamin D  · Yogurt, fortified with vitamin D  Protein foods  · Flounder  · Mackerel  · Sardines  · [place]  · Sole  · [place]  · [address]  · Cod liver oil  Work with your doctor to find out how much of this nutrient you need. Depending on your health, you may need more or less of it in your diet. Where can you learn more? Go to http://www.gray.com/  Enter V450 in the search box to learn more about \"Learning About High-Vitamin D Foods. \"  Current as of: December 17, 2020               Content Version: 13.0  © 5817-6368 [employer], [employer]. Care instructions adapted under license by [employer] (which disclaims liability or warranty for this information). If you have questions about a medical condition or this instruction, always ask your healthcare professional. [address] any warranty or liability for your use of this information.

## 2022-02-02 NOTE — PROGRESS NOTES
Patient is in the office today for a 4 month follow up. 1. \"Have you been to the ER, urgent care clinic since your last visit? Hospitalized since your last visit? \" No    2. \"Have you seen or consulted any other health care providers outside of the [address] [place] [address] since your last visit? \" yes, [facility]. Patient also sees Marcus Rivera. 3. For patients aged 39-70: Has the patient had a colonoscopy / FIT/ Cologuard? Yes - no Care Gap present      If the patient is female:    4. For patients aged 41-77: Has the patient had a mammogram within the past 2 years? No      5. For patients aged 21-65: Has the patient had a pap smear?  No

## 2022-02-16 ENCOUNTER — HOSPITAL ENCOUNTER (OUTPATIENT)
Dept: PHYSICAL THERAPY | Age: 62
Discharge: HOME | End: 2022-02-16
Payer: MEDICARE

## 2022-02-16 PROCEDURE — 97530 THERAPEUTIC ACTIVITIES: CPT

## 2022-02-16 PROCEDURE — 97162 PT EVAL MOD COMPLEX 30 MIN: CPT

## 2022-02-16 NOTE — PROGRESS NOTES
[facility], [address] [place] [address]  [phone] fax  Plan of Care/ Statement of Necessity for Physical Therapy Services    Patient name: Radha Skinner Start of Care: 2022   Referral source: Saddie Goodpasture, MD : 1960    Medical Diagnosis: Pain in left shoulder [M25.512]  Bursitis of left shoulder [M75.52]  Left knee pain [M25.562]  Unilateral primary osteoarthritis, left knee [M17.12]  Payor: [place] MEDICARE / Plan: [place] MEDICARE PART A & B / Product Type: Medicare /  Onset Date:22    Treatment Diagnosis: Left Shoulder, Left Knee Pain   Prior Hospitalization: see medical history Provider#: [id number]   Medications: Verified on Patient summary List    Comorbidities: Arthritis; Asthma; Depression; Fibromyalgia; Osteoporosis; hx Sacroiliitis; Interstitial Cystitis   Prior Level of Function: Lives in [place] home with spouse; functionally independent      The Plan of Care and following information is based on the information from the initial evaluation. Assessment/ key information: Pt is a 62 y.o. female who presents with c/o anterolateral left shoulder pain that began in 2021, after lifting ill sister, who was fainting, and has progressively worsened over time, as well as left anterior knee pain that has persisted since 2021, after stepping in divet in grass and prolonged kneeling to clean out kitchen cabinets. Symptoms have been further aggravated by assisting sibling with moving in the past month. Functional deficits include: difficulty internally rotating left shoulder to don shirts, jackets, pain with lifting objects, and reaching out to the side. Left knee start up pain noted with initial WB after prolonged sitting or lying, pain with prolonged standing/amb, and difficulty leaning or kneeling on left knee. Upon exam, Pt exhibited impaired ROM, flexibility, and UE/LE strength.   Pt would benefit from skilled PT to address above deficits to improve Pt's function and ability to return to premorbid status with less pain and limitations. Evaluation Complexity History HIGH Complexity :3+ comorbidities / personal factors will impact the outcome/ POC ; Examination MEDIUM Complexity : 3 Standardized tests and measures addressing body structure, function, activity limitation and / or participation in recreation  ;Presentation MEDIUM Complexity : Evolving with changing characteristics  ; Clinical Decision Making MEDIUM Complexity : FOTO score of 26-74  Overall Complexity Rating: MEDIUM  Problem List: pain affecting function, decrease ROM, decrease strength, edema affecting function, impaired gait/ balance, decrease ADL/ functional abilitiies, decrease activity tolerance, decrease flexibility/ joint mobility and decrease transfer abilities   Treatment Plan may include any combination of the following: Therapeutic exercise, Therapeutic activities, Neuromuscular re-education, Physical agent/modality, Gait/balance training, Manual therapy, Aquatic therapy, Patient education, Functional mobility training and Stair training  Patient / Family readiness to learn indicated by: asking questions and interest  Persons(s) to be included in education: patient (P)  Barriers to Learning/Limitations: None  Patient Goal (s): Avoid surgery; reduction in pain.   Patient Self Reported Health Status: fair  Rehabilitation Potential: good    Short Term Goals: To be accomplished in 1 weeks:  Goal: Pt to be compliant with initial HEP to improve left shoulder and knee mobility with less pain for ease of transfers, ADLs. Status at last note/certification: Established and reviewed with Pt  Long Term Goals: To be accomplished in 5 weeks:  Goal: Pt to increase left shoulder AROM in Flex, ABD to 150 deg without pain for ease of overhead reaching, donning shirts.   Status at last note/certification: Flex 504 deg,  deg - all with pain  Goal: Pt to increase left shoulder functional IR to T8 without pain for ease of donning jackets. Status at last note/certification: pain with IR (70 deg at 45 deg of ABD)  Goal: Pt to exhibit full left terminal knee extension without pain for ease of sleep and normalization of gait pattern. Status at last note/certification: resting -10 deg, able to push to -2 deg with pain  Goal: Pt to negotiate 4 steps with reciprocal pattern without pain or deviations for ease of navigating to room over garage in home. Status at last note/certification: step to pattern with UE support and pain  Goal: Pt to report < 5/10 pain at worst in left shoulder/knee to increase ease with ADLs. Status at last note/certification: 9/70 pain at worst  Goal: Pt to report FOTO score of 62 pts to show improved function and quality of life. Status at last note/certification: FOTO 49 pts     Frequency / Duration: Patient to be seen 2 times per week for 5 weeks. Patient/ Caregiver education and instruction: Diagnosis, prognosis, exercises   [x]  Plan of care has been reviewed with PTA    Certification Period: 2/14/22 - 3/16/22  Evelyn Nick, PT 2/16/2022 8:20 AM  _____________________________________________________________________  I certify that the above Therapy Services are being furnished while the patient is under my care. I agree with the treatment plan and certify that this therapy is necessary.     [de-identified] Signature:____________Date:_________TIME:________     Quang Art MD  ** Signature, Date and Time must be completed for valid certification **    Please sign and return to [facility], [address] [place] [address]  [phone] fax

## 2022-02-21 ENCOUNTER — HOSPITAL ENCOUNTER (OUTPATIENT)
Dept: PHYSICAL THERAPY | Age: 62
Discharge: HOME | End: 2022-02-21
Payer: MEDICARE

## 2022-02-21 PROCEDURE — 97140 MANUAL THERAPY 1/> REGIONS: CPT

## 2022-02-21 PROCEDURE — 97110 THERAPEUTIC EXERCISES: CPT

## 2022-02-21 NOTE — PROGRESS NOTES
PT DAILY TREATMENT NOTE     Patient Name: Gus Pierre  Date:2022  : 1960  [x]  Patient  Verified  Payor: VA MEDICARE / Plan: VA MEDICARE PART A & B / Product Type: Medicare /    In time:10:29  Out time:11:20  Total Treatment Time (min): 46  Visit #: 2 of 10    Medicare/BCBS Only   Total Timed Codes (min):  41 1:1 Treatment Time:  41       Treatment Area: Pain in left shoulder [M25.512]  Bursitis of left shoulder [M75.52]  Left knee pain [M25.562]  Unilateral primary osteoarthritis, left knee [M17.12]    SUBJECTIVE  Pain Level (0-10 scale): 7-8 left Sh; 6/10 left knee  Any medication changes, allergies to medications, adverse drug reactions, diagnosis change, or new procedure performed?: [x] No    [] Yes (see summary sheet for update)  Subjective functional status/changes:   [] No changes reported  \"The back of the right knee and the left knee are loosening up since I started the HEP. The left shoulder still hurts. After I did the exercises yesterday, the right hip flared a little. \"    OBJECTIVE    Modality rationale: decrease inflammation and decrease pain to improve the patients ability to reduce post therapy soreness   Min Type Additional Details    [] Estim:  []Unatt       []IFC  []Premod                        []Other:  []w/ice   []w/heat  Position:  Location:    [] Estim: []Att    []TENS instruct  []NMES                    []Other:  []w/US   []w/ice   []w/heat  Position:  Location:    []  Traction: [] Cervical       []Lumbar                       [] Prone          []Supine                       []Intermittent   []Continuous Lbs:  [] before manual  [] after manual    []  Ultrasound: []Continuous   [] Pulsed                           []1MHz   []3MHz W/cm2:  Location:    []  Iontophoresis with dexamethasone         Location: [] Take home patch   [] In clinic   10 [x]  Ice     []  heat  []  Ice massage  []  Laser   []  Anodyne Position: supine  Location: left shoulder, right hip    [] Laser with stim  []  Other:  Position:  Location:    []  Vasopneumatic Device    []  Right     []  Left  Pre-treatment girth:  Post-treatment girth:  Measured at (location):  Pressure:       [] lo [] med [] hi   Temperature: [] lo [] med [] hi   [x] Skin assessment post-treatment:  [x]intact []redness- no adverse reaction    []redness  adverse reaction:     29 min Therapeutic Exercise:  [] See flow sheet :   Rationale: increase ROM and increase strength to improve the patients ability to increase ease of transfers, performance of household chores, overhead reaching     12 min Manual Therapy:  Supine STM to left bicep, anterior deltoid, upper trapezius, pectoralis major mm; Inf/Post GHJ glides, Gr II; manual stretching with distraction all planes   The manual therapy interventions were performed at a separate and distinct time from the therapeutic activities interventions. Rationale: increase ROM, increase tissue extensibility and decrease trigger points to increase left shoulder AROM for ease of overhead reaching, ADL performance          With   [x] TE   [] TA   [] neuro   [] other: Patient Education: [x] Review HEP    [] Progressed/Changed HEP based on:   [] positioning   [] body mechanics   [] transfers   [] heat/ice application    [] other:      Other Objective/Functional Measures: Initiated treatment program per flow sheet. Pt given verbal cueing for proper technique. Pain Level (0-10 scale) post treatment: 8/10 left Sh; 6/10 left knee    ASSESSMENT/Changes in Function: Pt instructed in first follow up session after evaluation with focus on left shoulder mobility and B hip strengthening. Pt's exercises kept low in repetitions and alternated between UE and LEs to avoid increasing fatigue and pain flares as Pt has hx of FMS. Applied cold pack at end of session to address post exercise soreness.     Patient will continue to benefit from skilled PT services to address functional mobility deficits, address ROM deficits, address strength deficits, analyze and address soft tissue restrictions, analyze and cue movement patterns, analyze and modify body mechanics/ergonomics, assess and modify postural abnormalities and instruct in home and community integration to attain remaining goals. []  See Plan of Care  []  See progress note/recertification  []  See Discharge Summary         Progress towards goals / Updated goals:  Short Term Goals: To be accomplished in 1 weeks:  Goal: Pt to be compliant with initial HEP to improve left shoulder and knee mobility with less pain for ease of transfers, ADLs. Status at last note/certification: Established and reviewed with Pt  Current: met - Pt reports compliance (2/21/22)  Long Term Goals: To be accomplished in 5 weeks:  Goal: Pt to increase left shoulder AROM in Flex, ABD to 150 deg without pain for ease of overhead reaching, donning shirts. Status at last note/certification: Flex 869 deg,  deg - all with pain  Goal: Pt to increase left shoulder functional IR to T8 without pain for ease of donning jackets. Status at last note/certification: pain with IR (70 deg at 45 deg of ABD)  Goal: Pt to exhibit full left terminal knee extension without pain for ease of sleep and normalization of gait pattern. Status at last note/certification: resting -10 deg, able to push to -2 deg with pain  Goal: Pt to negotiate 4 steps with reciprocal pattern without pain or deviations for ease of navigating to room over garage in home. Status at last note/certification: step to pattern with UE support and pain  Goal: Pt to report < 5/10 pain at worst in left shoulder/knee to increase ease with ADLs. Status at last note/certification: 3/66 pain at worst  Goal: Pt to report FOTO score of 62 pts to show improved function and quality of life.   Status at last note/certification: FOTO 49 pts     PLAN  []  Upgrade activities as tolerated     [x]  Continue plan of care  []  Update interventions per flow sheet       []  Discharge due to:_  []  Other:_      Chetna Ncik, PT 2/21/2022  10:28 AM    Future Appointments   Date Time Provider Roslyn Luna   2/21/2022 10:30 AM Chetna Nick, [facility]   2/23/2022  9:45 AM [facility]   3/1/2022 12:00 PM Chetna Nick, [facility]   3/4/2022  9:45 AM Chetna Nick, [facility]   3/7/2022 11:30 AM Kirt Garcia MD [id number]   3/8/2022 11:15 AM Chetna Nick, [id number]   3/11/2022  9:00 AM Chetna Nick, [facility]   3/14/2022  9:45 AM Chetna Nick, [facility]   3/15/2022  1:30 PM [facility]   3/16/2022 10:30 AM Chetna Nick, PT [id number]   3/17/2022  1:30 PM [facility] FAST TRACK NURSE [facility]   3/21/2022  9:45 AM [facility]na Nick[facility]   6/30/2022 11:00 AM Jose Luis Stewart DO [facility] BS AMB   7/26/2022  8:35 AM [facility] LAB VISIT [facility] AMB   8/2/2022 10:20 AM Bina Castaneda MD [facility] BS AMB

## 2022-02-22 ENCOUNTER — TELEPHONE (OUTPATIENT)
Dept: PHYSICAL THERAPY | Age: 62
End: 2022-02-22

## 2022-02-23 ENCOUNTER — APPOINTMENT (OUTPATIENT)
Dept: PHYSICAL THERAPY | Age: 62
End: 2022-02-23
Payer: MEDICARE

## 2022-02-23 RX ORDER — FLUTICASONE FUROATE 100 UG/1
POWDER RESPIRATORY (INHALATION)
Qty: 30 BLISTER | Refills: 3 | Status: SHIPPED | OUTPATIENT
Start: 2022-02-23 | End: 2022-06-27

## 2022-02-28 ENCOUNTER — TELEPHONE (OUTPATIENT)
Dept: PHYSICAL THERAPY | Age: 62
End: 2022-02-28

## 2022-03-01 ENCOUNTER — APPOINTMENT (OUTPATIENT)
Dept: PHYSICAL THERAPY | Age: 62
End: 2022-03-01
Payer: MEDICARE

## 2022-03-02 RX ORDER — ROSUVASTATIN CALCIUM 40 MG/1
40 TABLET, COATED ORAL
Qty: 90 TABLET | Refills: 3 | Status: SHIPPED | OUTPATIENT
Start: 2022-03-02

## 2022-03-04 ENCOUNTER — HOSPITAL ENCOUNTER (OUTPATIENT)
Dept: PHYSICAL THERAPY | Age: 62
Discharge: HOME | End: 2022-03-04
Payer: MEDICARE

## 2022-03-04 PROCEDURE — 97110 THERAPEUTIC EXERCISES: CPT

## 2022-03-04 PROCEDURE — 97140 MANUAL THERAPY 1/> REGIONS: CPT

## 2022-03-04 NOTE — PROGRESS NOTES
PT DAILY TREATMENT NOTE     Patient Name: Cecil Coronado  Date:3/4/2022  : 1960  [x]  Patient  Verified  Payor: VA MEDICARE / Plan: VA MEDICARE PART A & B / Product Type: Medicare /    In time:9:49  Out time:10:40  Total Treatment Time (min): 46  Visit #: 3 of 10    Medicare/BCBS Only   Total Timed Codes (min):  41 1:1 Treatment Time:  41       Treatment Area: Pain in left shoulder [M25.512]  Bursitis of left shoulder [M75.52]  Left knee pain [M25.562]  Unilateral primary osteoarthritis, left knee [M17.12]    SUBJECTIVE  Pain Level (0-10 scale): 7/10 left Sh; 4/10 left knee  Any medication changes, allergies to medications, adverse drug reactions, diagnosis change, or new procedure performed?: [x] No    [] Yes (see summary sheet for update)  Subjective functional status/changes:   [] No changes reported  \"I had really bad bronchitis so I've been in bed coughing. I was happy to be able to come back to PT. I don't have as much pain in my left knee and the right side has calmed down. The left shoulder feels better and I don't have the severe, sharp pains that I was having but I still feel it when I lift the arm out to the side or internally rotate it when its lifted. \"    OBJECTIVE    Modality rationale: decrease inflammation and decrease pain to improve the patients ability to reduce post therapy soreness   Min Type Additional Details    [] Estim:  []Unatt       []IFC  []Premod                        []Other:  []w/ice   []w/heat  Position:  Location:    [] Estim: []Att    []TENS instruct  []NMES                    []Other:  []w/US   []w/ice   []w/heat  Position:  Location:    []  Traction: [] Cervical       []Lumbar                       [] Prone          []Supine                       []Intermittent   []Continuous Lbs:  [] before manual  [] after manual    []  Ultrasound: []Continuous   [] Pulsed                           []1MHz   []3MHz W/cm2:  Location:    []  Iontophoresis with dexamethasone Location: [] Take home patch   [] In clinic   10 [x]  Ice     []  heat  []  Ice massage  []  Laser   []  Anodyne Position: supine  Location: left shoulder    []  Laser with stim  []  Other:  Position:  Location:    []  Vasopneumatic Device    []  Right     []  Left  Pre-treatment girth:  Post-treatment girth:  Measured at (location):  Pressure:       [] lo [] med [] hi   Temperature: [] lo [] med [] hi   [x] Skin assessment post-treatment:  [x]intact []redness- no adverse reaction    []redness  adverse reaction:     26 min Therapeutic Exercise:  [] See flow sheet :   Rationale: increase ROM and increase strength to improve the patients ability to increase ease of transfers, performance of household chores, overhead reaching     15 min Manual Therapy:  Supine STM to left bicep, anterior deltoid, upper trapezius, pectoralis major mm; Inf/Post GHJ glides, Gr III; manual stretching with distraction all planes   The manual therapy interventions were performed at a separate and distinct time from the therapeutic activities interventions. Rationale: increase ROM, increase tissue extensibility and decrease trigger points to increase left shoulder AROM for ease of overhead reaching, ADL performance          With   [x] TE   [] TA   [] neuro   [] other: Patient Education: [x] Review HEP    [] Progressed/Changed HEP based on:   [] positioning   [] body mechanics   [] transfers   [] heat/ice application    [] other:      Other Objective/Functional Measures: Continued with exercises per flow sheet. Pain Level (0-10 scale) post treatment: 6/10 left Sh; 4/10 left knee    ASSESSMENT/Changes in Function: Pt able to perform increased repetitions of exercises today without increases in overall pain levels. Continued to alternate between UE and LEs to avoid increasing fatigue and pain flares as Pt has hx of FMS.   Pt noted still lingering tenderness along medial left knee but noted she is able to ambulate around home without episodes of sharp pain and she is no longer limping. Pt is able to sleep more comfortably and is able to reach left shoulder out and up with less pain but overall pain still present with painful arc around 75 deg. Applied cold pack at end of session to address post exercise soreness. Patient will continue to benefit from skilled PT services to address functional mobility deficits, address ROM deficits, address strength deficits, analyze and address soft tissue restrictions, analyze and cue movement patterns, analyze and modify body mechanics/ergonomics, assess and modify postural abnormalities and instruct in home and community integration to attain remaining goals. []  See Plan of Care  []  See progress note/recertification  []  See Discharge Summary         Progress towards goals / Updated goals:  Short Term Goals: To be accomplished in 1 weeks:  Goal: Pt to be compliant with initial HEP to improve left shoulder and knee mobility with less pain for ease of transfers, ADLs. Status at last note/certification: Established and reviewed with Pt  Current: met - Pt reports compliance (2/21/22)  Long Term Goals: To be accomplished in 5 weeks:  Goal: Pt to increase left shoulder AROM in Flex, ABD to 150 deg without pain for ease of overhead reaching, donning shirts. Status at last note/certification: Flex 777 deg,  deg - all with pain  Current:  Goal: Pt to increase left shoulder functional IR to T8 without pain for ease of donning jackets. Status at last note/certification: pain with IR (70 deg at 45 deg of ABD)  Current:  Goal: Pt to exhibit full left terminal knee extension without pain for ease of sleep and normalization of gait pattern. Status at last note/certification: resting -10 deg, able to push to -2 deg with pain  Current:  Goal: Pt to negotiate 4 steps with reciprocal pattern without pain or deviations for ease of navigating to room over garage in home.   Status at last note/certification: step to pattern with UE support and pain  Current:  Goal: Pt to report < 5/10 pain at worst in left shoulder/knee to increase ease with ADLs. Status at last note/certification: 3/94 pain at worst  Current: progressing - 7/10 pain at worst in left shoulder (3/4/22)  Goal: Pt to report FOTO score of 62 pts to show improved function and quality of life.   Status at last note/certification: FOTO 49 pts   Current: reassess at MD note (3/4/22)    PLAN  []  Upgrade activities as tolerated     [x]  Continue plan of care  []  Update interventions per flow sheet       []  Discharge due to:_  []  Other:_      Maritza Nick, PT 3/4/2022  10:28 AM    Future Appointments   Date Time Provider Roslyn Luna   3/7/2022 11:30 AM Kacie Echols MD [id number]   3/8/2022 11:15 AM Maritza Nick, PT [facility]   3/11/2022  9:00 AM Maritza Nick, PT [facility]   3/14/2022  9:45 AM Maritza Nick, PT [facility]   3/15/2022  1:30 PM [facility]LEBOTOM[facility]   3/16/2022 10:30 AM Maritza Nick, PT [facility]   3/17/2022  1:30 PM [facility] FAST TRACK NURSE [facility]   3/21/2022  9:45 AM Maritza Nick, PT [facility]   6/30/2022 11:00 AM Brigida Carroll DO [facility] BS AMB   7/26/2022  8:35 AM [facility] LAB VISIT [facility] BS AMB   8/2/2022 10:20 AM Kirsten Castaneda MD [facility] AMB

## 2022-03-08 ENCOUNTER — HOSPITAL ENCOUNTER (OUTPATIENT)
Dept: PHYSICAL THERAPY | Age: 62
Discharge: HOME | End: 2022-03-08
Payer: MEDICARE

## 2022-03-08 PROCEDURE — 97110 THERAPEUTIC EXERCISES: CPT

## 2022-03-08 NOTE — PROGRESS NOTES
PT DAILY TREATMENT NOTE     Patient Name: Cecil Coronado  Date:3/8/2022  : 1960  [x]  Patient  Verified  Payor: [place] MEDICARE / Plan: [place] MEDICARE PART A & B / Product Type: Medicare /    In time:11:15  Out time:12:10  Total Treatment Time (min): 54  Visit #: 4 of 10    Medicare/BCBS Only   Total Timed Codes (min):  45 1:1 Treatment Time:  39       Treatment Area: Pain in left shoulder [M25.512]  Bursitis of left shoulder [M75.52]  Left knee pain [M25.562]  Unilateral primary osteoarthritis, left knee [M17.12]    SUBJECTIVE  Pain Level (0-10 scale): 7/10 left Sh; 4/10 left knee  Any medication changes, allergies to medications, adverse drug reactions, diagnosis change, or new procedure performed?: [x] No    [] Yes (see summary sheet for update)  Subjective functional status/changes:   [] No changes reported  \"I feel like I took a step back after last visit. I told you to lighten up with the hands on and you did but I still felt sore afterwards. I have a little more pain in the front of the shoulder. I didn't do my HEP since I had more pain. \"    OBJECTIVE    Modality rationale: decrease inflammation and decrease pain to improve the patients ability to reduce post therapy soreness   Min Type Additional Details    [] Estim:  []Unatt       []IFC  []Premod                        []Other:  []w/ice   []w/heat  Position:  Location:    [] Estim: []Att    []TENS instruct  []NMES                    []Other:  []w/US   []w/ice   []w/heat  Position:  Location:    []  Traction: [] Cervical       []Lumbar                       [] Prone          []Supine                       []Intermittent   []Continuous Lbs:  [] before manual  [] after manual    []  Ultrasound: []Continuous   [] Pulsed                           []1MHz   []3MHz W/cm2:  Location:    []  Iontophoresis with dexamethasone         Location: [] Take home patch   [] In clinic   10 [x]  Ice     []  heat  []  Ice massage  []  Laser   []  Anodyne Position: supine  Location: left shoulder    []  Laser with stim  []  Other:  Position:  Location:    []  Vasopneumatic Device    []  Right     []  Left  Pre-treatment girth:  Post-treatment girth:  Measured at (location):  Pressure:       [] lo [] med [] hi   Temperature: [] lo [] med [] hi   [x] Skin assessment post-treatment:  [x]intact []redness- no adverse reaction    []redness  adverse reaction:     45 min Therapeutic Exercise:  [] See flow sheet :   Rationale: increase ROM and increase strength to improve the patients ability to increase ease of transfers, performance of household chores, overhead reaching           With   [x] TE   [] TA   [] neuro   [] other: Patient Education: [x] Review HEP    [] Progressed/Changed HEP based on:   [] positioning   [] body mechanics   [] transfers   [] heat/ice application    [] other:      Other Objective/Functional Measures: Held manual therapy to avoid pain aggravation. Added pulleys, fingerladder for AAROM left shoulder. Pain Level (0-10 scale) post treatment: 7/10 left Sh; 4/10 left knee    ASSESSMENT/Changes in Function: Due to Pt report of increased soreness in left anterior shoulder, held manual interventions as Pt with hx of FMS and most likely increases in soreness from increased pressure from manual interventions. Focused session on left shoulder ROM and scapular stabilization along with left hip/knee strengthening. Pt able to report reduction in right shoulder pain and soreness and was able to raise arm overhead, although pain levels remained unchanged per Pt report post session. Will continue with updated plan with minimal manual interventions and focus on functional mobility to restore left shoulder ROM and ease of ADLs.     Patient will continue to benefit from skilled PT services to address functional mobility deficits, address ROM deficits, address strength deficits, analyze and address soft tissue restrictions, analyze and cue movement patterns, analyze and modify body mechanics/ergonomics, assess and modify postural abnormalities and instruct in home and community integration to attain remaining goals. []  See Plan of Care  []  See progress note/recertification  []  See Discharge Summary         Progress towards goals / Updated goals:  Short Term Goals: To be accomplished in 1 weeks:  Goal: Pt to be compliant with initial HEP to improve left shoulder and knee mobility with less pain for ease of transfers, ADLs. Status at last note/certification: Established and reviewed with Pt  Current: met - Pt reports compliance (2/21/22)  Long Term Goals: To be accomplished in 5 weeks:  Goal: Pt to increase left shoulder AROM in Flex, ABD to 150 deg without pain for ease of overhead reaching, donning shirts. Status at last note/certification: Flex 039 deg,  deg - all with pain  Current:  Goal: Pt to increase left shoulder functional IR to T8 without pain for ease of donning jackets. Status at last note/certification: pain with IR (70 deg at 45 deg of ABD)  Current:  Goal: Pt to exhibit full left terminal knee extension without pain for ease of sleep and normalization of gait pattern. Status at last note/certification: resting -10 deg, able to push to -2 deg with pain  Current:  Goal: Pt to negotiate 4 steps with reciprocal pattern without pain or deviations for ease of navigating to room over garage in home. Status at last note/certification: step to pattern with UE support and pain  Current:  Goal: Pt to report < 5/10 pain at worst in left shoulder/knee to increase ease with ADLs. Status at last note/certification: 4/33 pain at worst  Current: progressing - 7/10 pain at worst in left shoulder (3/4/22)  Goal: Pt to report FOTO score of 62 pts to show improved function and quality of life.   Status at last note/certification: FOTO 49 pts   Current: reassess at MD note (3/4/22)    PLAN  []  Upgrade activities as tolerated     [x]  Continue plan of care  [] Update interventions per flow sheet       []  Discharge due to:_  [x]  Other:_ goals next visit     Tammy Nick PT 3/8/2022  10:28 AM    Future Appointments   Date Time Provider Roslyn Luna   3/11/2022  9:00 AM Tammy Nick, [facility]   3/14/2022  9:45 AM Tammy Nick, [facility]   3/15/2022  1:30 PM [facility] INFUSION PHLEBOTOMIST [facility]   3/16/2022 10:30 AM Tammy Nick, [facility]   3/17/2022  1:30 PM HBV FAST TRACK NURSE [facility]   3/21/2022  9:45 AM Tammy Nick, [facility]   3/24/2022  3:00 PM [facility]   3/29/2022 11:15 AM Tammy Nick, PT [facility]   6/30/2022 11:00 AM Glenwood Lefort, DO [facility] BS AMB   7/26/2022  8:35 AM [facility] LAB VISIT [facility] BS AMB   8/2/2022 10:20 AM Saniya Castaneda MD [facility] BS AMB   1/25/2023 10:20 AM KANDY Hancock [address] [facility]

## 2022-03-11 ENCOUNTER — HOSPITAL ENCOUNTER (OUTPATIENT)
Dept: PHYSICAL THERAPY | Age: 62
Discharge: HOME | End: 2022-03-11
Payer: MEDICARE

## 2022-03-11 PROCEDURE — 97110 THERAPEUTIC EXERCISES: CPT

## 2022-03-11 NOTE — PROGRESS NOTES
PT DAILY TREATMENT NOTE     Patient Name: Jacinta Dunaway  Date:3/11/2022  : 1960  [x]  Patient  Verified  Payor: [place] MEDICARE / Plan: VA MEDICARE PART A & B / Product Type: Medicare /    In time:9:01  Out time:10:00  Total Treatment Time (min): 61  Visit #: 5 of 10    Medicare/BCBS Only   Total Timed Codes (min):  49 1:1 Treatment Time:  52       Treatment Area: Pain in left shoulder [M25.512]  Bursitis of left shoulder [M75.52]  Left knee pain [M25.562]  Unilateral primary osteoarthritis, left knee [M17.12]    SUBJECTIVE  Pain Level (0-10 scale): 7/10 left Sh; 4/10 left knee  Any medication changes, allergies to medications, adverse drug reactions, diagnosis change, or new procedure performed?: [x] No    [] Yes (see summary sheet for update)  Subjective functional status/changes:   [] No changes reported  \"I felt better after last session and I could tell with each day since the shoulder was feeling a little better. I still have trouble with internal rotation so the pain is the same. The knee is a little sore at the kneecap because I had to go up/down some stairs the other day. \"    OBJECTIVE    Modality rationale: decrease inflammation and decrease pain to improve the patients ability to reduce post therapy soreness   Min Type Additional Details    [] Estim:  []Unatt       []IFC  []Premod                        []Other:  []w/ice   []w/heat  Position:  Location:    [] Estim: []Att    []TENS instruct  []NMES                    []Other:  []w/US   []w/ice   []w/heat  Position:  Location:    []  Traction: [] Cervical       []Lumbar                       [] Prone          []Supine                       []Intermittent   []Continuous Lbs:  [] before manual  [] after manual    []  Ultrasound: []Continuous   [] Pulsed                           []1MHz   []3MHz W/cm2:  Location:    []  Iontophoresis with dexamethasone         Location: [] Take home patch   [] In clinic   10 [x]  Ice     []  heat  []  Ice massage  []  Laser   []  Anodyne Position: supine  Location: left shoulder    []  Laser with stim  []  Other:  Position:  Location:    []  Vasopneumatic Device    []  Right     []  Left  Pre-treatment girth:  Post-treatment girth:  Measured at (location):  Pressure:       [] lo [] med [] hi   Temperature: [] lo [] med [] hi   [x] Skin assessment post-treatment:  [x]intact []redness- no adverse reaction    []redness  adverse reaction:     49 min Therapeutic Exercise:  [] See flow sheet :   Rationale: increase ROM and increase strength to improve the patients ability to increase ease of transfers, performance of household chores, overhead reaching           With   [x] TE   [] TA   [] neuro   [] other: Patient Education: [x] Review HEP    [] Progressed/Changed HEP based on:   [] positioning   [] body mechanics   [] transfers   [] heat/ice application    [] other:      Other Objective/Functional Measures: Continued to hold manual interventions and focus on functional shoulder mobility and strengthening. Added sleeper stretch for IR mobility, doorway stretch, and wall slides for shoulder mobility. Pain Level (0-10 scale) post treatment: 6/10 left Sh; 4/10 left knee    ASSESSMENT/Changes in Function:  Pt exhibited improved left shoulder mobility this date into all planes and noted improved left shoulder IR with less sharp pains at end of session. Pt still notes pain with shoulder elevation but was able to report slight decrease in overall pain levels. Pt given sleeper stretch for HEP to continue to improve IR mobility with less pain.     Patient will continue to benefit from skilled PT services to address functional mobility deficits, address ROM deficits, address strength deficits, analyze and address soft tissue restrictions, analyze and cue movement patterns, analyze and modify body mechanics/ergonomics, assess and modify postural abnormalities and instruct in home and community integration to attain remaining goals.     []  See Plan of Care  []  See progress note/recertification  []  See Discharge Summary         Progress towards goals / Updated goals:  Short Term Goals: To be accomplished in 1 weeks:  Goal: Pt to be compliant with initial HEP to improve left shoulder and knee mobility with less pain for ease of transfers, ADLs. Status at last note/certification: Established and reviewed with Pt  Current: met - Pt reports compliance (2/21/22)  Long Term Goals: To be accomplished in 5 weeks:  Goal: Pt to increase left shoulder AROM in Flex, ABD to 150 deg without pain for ease of overhead reaching, donning shirts. Status at last note/certification: Flex 468 deg,  deg - all with pain  Current: reassess next visit  Goal: Pt to increase left shoulder functional IR to T8 without pain for ease of donning jackets. Status at last note/certification: pain with IR (70 deg at 45 deg of ABD)  Current: reassess next visit  Goal: Pt to exhibit full left terminal knee extension without pain for ease of sleep and normalization of gait pattern. Status at last note/certification: resting -10 deg, able to push to -2 deg with pain  Current: reassess next visit  Goal: Pt to negotiate 4 steps with reciprocal pattern without pain or deviations for ease of navigating to room over garage in home. Status at last note/certification: step to pattern with UE support and pain  Current: progressing - reciprocal or step to pattern depending on pain levels (3/11/22)  Goal: Pt to report < 5/10 pain at worst in left shoulder/knee to increase ease with ADLs. Status at last note/certification: 5/75 pain at worst  Current: progressing - 7/10 pain at worst in left shoulder (3/4/22)  Goal: Pt to report FOTO score of 62 pts to show improved function and quality of life.   Status at last note/certification: FOTO 49 pts   Current: reassess at MD note (3/4/22)    PLAN  []  Upgrade activities as tolerated     [x]  Continue plan of care  []  Update interventions per flow sheet       []  Discharge due to:_  [x]  Other:_ goals next visit     Sanchez Nick, PT 3/11/2022  10:28 AM    Future Appointments   Date Time Provider Roslyn Luna   3/14/2022  9:45 AM Sanchez Nick, [facility]   3/15/2022  1:30 PM [facility] INFUSION PHLEBOTOMIST [facility]   3/16/2022 10:30 AM Sanchez Nick, [facility]   3/17/2022  1:30 PM HBV FAST TRACK NURSE [facility]   3/21/2022  9:45 AM Sanchez Nick, [facility]   3/24/2022  3:00 PM Sanchez Nick, [facility]   3/29/2022 11:15 AM Sanchez Nick, [facility]   6/30/2022 11:00 AM Surekha Tanner DO [facility] BS AMB   7/26/2022  8:35 AM [facility] LAB VISIT [facility] BS AMB   8/2/2022 10:20 AM Wes Chiang MD [facility] BS AMB   9/20/2022  1:30 PM HBV INFUSION PHLEBOTOMIST [facility]   9/22/2022  1:30 PM HBV FAST TRACK NURSE [facility]   1/25/2023 10:20 AM Carolina Johnston 28

## 2022-03-14 ENCOUNTER — HOSPITAL ENCOUNTER (OUTPATIENT)
Dept: PHYSICAL THERAPY | Age: 62
Discharge: HOME | End: 2022-03-14
Payer: MEDICARE

## 2022-03-14 PROCEDURE — 97110 THERAPEUTIC EXERCISES: CPT

## 2022-03-14 NOTE — PROGRESS NOTES
PT DAILY TREATMENT NOTE     Patient Name: Rebecca Ramírez  Date:3/14/2022  : 1960  [x]  Patient  Verified  Payor: [place] MEDICARE / Plan: VA MEDICARE PART A & B / Product Type: Medicare /    In time:9:48  Out time:10:36  Total Treatment Time (min): 48  Visit #: 5 of 10    Medicare/BCBS Only   Total Timed Codes (min):  38 1:1 Treatment Time:  38       Treatment Area: Pain in left shoulder [M25.512]  Bursitis of left shoulder [M75.52]  Left knee pain [M25.562]  Unilateral primary osteoarthritis, left knee [M17.12]    SUBJECTIVE  Pain Level (0-10 scale): 6/10 left Sh; 4/10 left knee  Any medication changes, allergies to medications, adverse drug reactions, diagnosis change, or new procedure performed?: [x] No    [] Yes (see summary sheet for update)  Subjective functional status/changes:   [] No changes reported  \"I feel better in the front of the shoulder but I still have pain when I try to internally rotate the arm. \"    OBJECTIVE    Modality rationale: decrease inflammation and decrease pain to improve the patients ability to reduce post therapy soreness   Min Type Additional Details    [] Estim:  []Unatt       []IFC  []Premod                        []Other:  []w/ice   []w/heat  Position:  Location:    [] Estim: []Att    []TENS instruct  []NMES                    []Other:  []w/US   []w/ice   []w/heat  Position:  Location:    []  Traction: [] Cervical       []Lumbar                       [] Prone          []Supine                       []Intermittent   []Continuous Lbs:  [] before manual  [] after manual    []  Ultrasound: []Continuous   [] Pulsed                           []1MHz   []3MHz W/cm2:  Location:    []  Iontophoresis with dexamethasone         Location: [] Take home patch   [] In clinic   10 [x]  Ice     []  heat  []  Ice massage  []  Laser   []  Anodyne Position: supine  Location: left shoulder    []  Laser with stim  []  Other:  Position:  Location:    []  Vasopneumatic Device    []  Right []  Left  Pre-treatment girth:  Post-treatment girth:  Measured at (location):  Pressure:       [] lo [] med [] hi   Temperature: [] lo [] med [] hi   [x] Skin assessment post-treatment:  [x]intact []redness- no adverse reaction    []redness  adverse reaction:     38 min Therapeutic Exercise:  [] See flow sheet :   Rationale: increase ROM and increase strength to improve the patients ability to increase ease of transfers, performance of household chores, overhead reaching           With   [x] TE   [] TA   [] neuro   [] other: Patient Education: [x] Review HEP    [] Progressed/Changed HEP based on:   [] positioning   [] body mechanics   [] transfers   [] heat/ice application    [] other:      Other Objective/Functional Measures: Continued with shoulder focus for today's session, per Pt request.  Held manual and focused on functional mobility, strength interventions. Pain Level (0-10 scale) post treatment: 6/10 left Sh; 4/10 left knee    ASSESSMENT/Changes in Function:  Pt continues to exhibit progressive gains in left shoulder AROM all planes but still notes some discomfort and fatigue in left middle deltoid, most likely due to deconditioning. Will reassess goals at next visit for update on progress and re-certification note to continue with skilled therapy measures. Patient will continue to benefit from skilled PT services to address functional mobility deficits, address ROM deficits, address strength deficits, analyze and address soft tissue restrictions, analyze and cue movement patterns, analyze and modify body mechanics/ergonomics, assess and modify postural abnormalities and instruct in home and community integration to attain remaining goals. []  See Plan of Care  []  See progress note/recertification  []  See Discharge Summary         Progress towards goals / Updated goals:  Short Term Goals:  To be accomplished in 1 weeks:  Goal: Pt to be compliant with initial HEP to improve left shoulder and knee mobility with less pain for ease of transfers, ADLs. Status at last note/certification: Established and reviewed with Pt  Current: met - Pt reports compliance (2/21/22)  Long Term Goals: To be accomplished in 5 weeks:  Goal: Pt to increase left shoulder AROM in Flex, ABD to 150 deg without pain for ease of overhead reaching, donning shirts. Status at last note/certification: Flex 486 deg,  deg - all with pain  Current: reassess next visit  Goal: Pt to increase left shoulder functional IR to T8 without pain for ease of donning jackets. Status at last note/certification: pain with IR (70 deg at 45 deg of ABD)  Current: reassess next visit  Goal: Pt to exhibit full left terminal knee extension without pain for ease of sleep and normalization of gait pattern. Status at last note/certification: resting -10 deg, able to push to -2 deg with pain  Current: reassess next visit  Goal: Pt to negotiate 4 steps with reciprocal pattern without pain or deviations for ease of navigating to room over garage in home. Status at last note/certification: step to pattern with UE support and pain  Current: progressing - reciprocal or step to pattern depending on pain levels (3/11/22)  Goal: Pt to report < 5/10 pain at worst in left shoulder/knee to increase ease with ADLs. Status at last note/certification: 5/77 pain at worst  Current: progressing - 7/10 pain at worst in left shoulder (3/4/22)  Goal: Pt to report FOTO score of 62 pts to show improved function and quality of life.   Status at last note/certification: FOTO 49 pts   Current: reassess at MD note (3/4/22)    PLAN  [x]  Upgrade activities as tolerated     [x]  Continue plan of care  []  Update interventions per flow sheet       []  Discharge due to:_  [x]  Other:_ goals, recert next visit     Alana Nick, PT 3/14/2022  10:28 AM    Future Appointments   Date Time Provider Roslyn Luna   3/15/2022  1:30 PM [facility] INFUSION PHLEBOTOMIST [facility] 3/16/2022 10:30 AM ParkMaritza swenson Fuse, [facility]   3/17/2022  1:30 PM HBV FAST TRACK NURSE [facility]   3/21/2022  9:45 AM ParkMaritza swenson Fuse, [facility]   3/24/2022  3:00 PM ParkMaritza swenson Fuse, PT [facility]   3/29/2022 11:15 AM ParkMaritza swenson Fuse, PT [facility]   6/30/2022 11:00 AM Brigida Carroll [facility] BS AMB   7/26/2022  8:35 AM [facility] LAB VISIT [facility] BS AMB   8/2/2022 10:20 AM Kirsten Castaneda MD [facility] BS AMB   9/20/2022  1:30 PM HBV INFUSION PHLEBOTOMIST [facility]   9/22/2022  1:30 PM HBV FAST TRACK NURSE [facility]   1/25/2023 10:20 AM Carolina Moffett 28

## 2022-03-15 ENCOUNTER — HOSPITAL ENCOUNTER (OUTPATIENT)
Dept: INFUSION THERAPY | Age: 62
Discharge: HOME | End: 2022-03-15
Payer: MEDICARE

## 2022-03-15 VITALS
RESPIRATION RATE: 16 BRPM | TEMPERATURE: 98 F | DIASTOLIC BLOOD PRESSURE: 77 MMHG | OXYGEN SATURATION: 98 % | SYSTOLIC BLOOD PRESSURE: 126 MMHG | HEART RATE: 96 BPM

## 2022-03-15 LAB
ALBUMIN SERPL-MCNC: 3.9 G/DL (ref 3.4–5)
ALBUMIN/GLOB SERPL: 1.4 {RATIO} (ref 0.8–1.7)
ALP SERPL-CCNC: 31 U/L (ref 45–117)
ALT SERPL-CCNC: 41 U/L (ref 13–56)
ANION GAP SERPL CALC-SCNC: 5 MMOL/L (ref 3–18)
AST SERPL-CCNC: 24 U/L (ref 10–38)
BILIRUB SERPL-MCNC: 0.4 MG/DL (ref 0.2–1)
BUN SERPL-MCNC: 18 MG/DL (ref 7–18)
BUN/CREAT SERPL: 30 (ref 12–20)
CALCIUM SERPL-MCNC: 7.5 MG/DL (ref 8.5–10.1)
CHLORIDE SERPL-SCNC: 98 MMOL/L (ref 100–111)
CO2 SERPL-SCNC: 30 MMOL/L (ref 21–32)
CREAT SERPL-MCNC: 0.6 MG/DL (ref 0.6–1.3)
GLOBULIN SER CALC-MCNC: 2.8 G/DL (ref 2–4)
GLUCOSE SERPL-MCNC: 107 MG/DL (ref 74–99)
MAGNESIUM SERPL-MCNC: 1.8 MG/DL (ref 1.6–2.6)
PHOSPHATE SERPL-MCNC: 4.6 MG/DL (ref 2.5–4.9)
POTASSIUM SERPL-SCNC: 4 MMOL/L (ref 3.5–5.5)
PROT SERPL-MCNC: 6.7 G/DL (ref 6.4–8.2)
SODIUM SERPL-SCNC: 133 MMOL/L (ref 136–145)

## 2022-03-15 PROCEDURE — 83735 ASSAY OF MAGNESIUM: CPT

## 2022-03-15 PROCEDURE — 80053 COMPREHEN METABOLIC PANEL: CPT

## 2022-03-15 PROCEDURE — 84100 ASSAY OF PHOSPHORUS: CPT

## 2022-03-15 PROCEDURE — 36415 COLL VENOUS BLD VENIPUNCTURE: CPT

## 2022-03-15 NOTE — PROGRESS NOTES
[facility] Lab Visit:    Michael Alex  1960  [id number]    1009 Pt arrived ambulatory w/o assist. Labs drawn per order via Right AC venipuncture x1 attempt. Pt tolerated well without complaints. Gauze/ coban to site. Pt departed [facility] ambulatory and in no distress at 1330.      Visit Vitals  /77 (BP 1 Location: Right upper arm, BP Patient Position: Sitting)   Pulse 96   Temp 98 °F (36.7 °C)   Resp 16   SpO2 98%

## 2022-03-16 ENCOUNTER — HOSPITAL ENCOUNTER (OUTPATIENT)
Dept: PHYSICAL THERAPY | Age: 62
Discharge: HOME | End: 2022-03-16
Payer: MEDICARE

## 2022-03-16 PROCEDURE — 97110 THERAPEUTIC EXERCISES: CPT

## 2022-03-16 PROCEDURE — 97530 THERAPEUTIC ACTIVITIES: CPT

## 2022-03-16 PROCEDURE — 97112 NEUROMUSCULAR REEDUCATION: CPT

## 2022-03-16 NOTE — PROGRESS NOTES
[facility], [address] [place] [address]  [phone] fax    Continued Plan of Care/ Re-certification for Physical Therapy Services      Patient name: Jerome Delgado Start of Care: 2022   Referral source: Angela Sheikh MD : 1960               Medical Diagnosis: Pain in left shoulder [M25.512]  Bursitis of left shoulder [M75.52]  Left knee pain [M25.562]  Unilateral primary osteoarthritis, left knee [M17.12]  Payor: [place] MEDICARE / Plan: [place] MEDICARE PART A & B / Product Type: Medicare /  Onset Date:22               Treatment Diagnosis: Left Shoulder, Left Knee Pain   Prior Hospitalization: see medical history Provider#: [id number]   Medications: Verified on Patient summary List    Comorbidities: Arthritis; Asthma; Depression; Fibromyalgia; Osteoporosis; hx Sacroiliitis; Interstitial Cystitis   Prior Level of Function: Lives in [place] home with spouse; functionally independent    Visits from Start of Care: 7    Missed Visits: 0    The Plan of Care and following information is based on the patient's current status:    Short Term Goals: To be accomplished in 1 weeks:  Goal: Pt to be compliant with initial HEP to improve left shoulder and knee mobility with less pain for ease of transfers, ADLs. Status at last note/certification: Established and reviewed with Pt  Current: met - Pt reports compliance (22)  Long Term Goals: To be accomplished in 5 weeks:  Goal: Pt to increase left shoulder AROM in Flex, ABD to 150 deg without pain for ease of overhead reaching, donning shirts. Status at last note/certification: Flex 677 deg,  deg - all with pain  Current: progressing - Flex 125 deg,  deg  Goal: Pt to increase left shoulder functional IR to T8 without pain for ease of donning jackets.   Status at last note/certification: pain with IR (70 deg at 45 deg of ABD)  Current: met - functional IR T8 (3/16/22)  Goal: Pt to exhibit full left terminal knee extension without pain for ease of sleep and normalization of gait pattern. Status at last note/certification: resting -10 deg, able to push to -2 deg with pain  Current: met - left knee TKE 0 deg without pain (3/16/22)  Goal: Pt to negotiate 4 steps with reciprocal pattern without pain or deviations for ease of navigating to room over garage in home. Status at last note/certification: step to pattern with UE support and pain  Current: met - reciprocal pattern without difficulty (3/16/22)  Goal: Pt to report < 5/10 pain at worst in left shoulder/knee to increase ease with ADLs. Status at last note/certification: 4/83 pain at worst  Current: progressing - 2/10 left knee at worst;   (3/16/22)  Goal: Pt to report FOTO score of 62 pts to show improved function and quality of life. Status at last note/certification: FOTO 49 pts   Current: progressing - FOTO 56 pts (3/16/22)    Key functional changes:  Pt has attended skilled therapy consistently for left shoulder and left knee pain. Pt exhibits improvements in left knee AROM, exhibiting full TKE without pain, is able to negotiate stairs with reciprocal pattern, reports resolution of sharp pains along medial knee, and ability to kneel and return to standing from kneeling position without issue. Pt exhibits gains in left shoulder AROM all planes with less pain, continuing to note most difficulty with functional IR of left shoulder.        Problems/ barriers to goal attainment: None     Problem List: pain affecting function, decrease ROM, decrease strength, edema affecting function, impaired gait/ balance, decrease ADL/ functional abilitiies, decrease activity tolerance, decrease flexibility/ joint mobility and decrease transfer abilities    Treatment Plan: Therapeutic exercise, Therapeutic activities, Neuromuscular re-education, Physical agent/modality, Gait/balance training, Manual therapy, Aquatic therapy, Patient education, Functional mobility training    Patient Goal (s) has been updated and includes: \"To be able to move left arm without pain. \"     Goals for this certification period to be accomplished in 4 weeks:  Goal: Pt to increase left shoulder AROM in Flex, ABD to 150 deg without pain for ease of overhead reaching, donning shirts. Status at last note/certification: Flex 755 deg,  deg  Current:   Goal: Pt to report < 5/10 pain at worst in left shoulder/knee to increase ease with ADLs. Status at last note/certification: 5/50 left knee at worst; 6/10 left shoulder at worst (3/16/22)  Current:   Goal: Pt to report FOTO score of 62 pts to show improved function and quality of life. Status at last note/certification: FOTO 56 pts (3/16/22)  Current:     Frequency / Duration: Patient to be seen 2 times per week for 4 weeks:    Assessment / Recommendations: Pt would benefit from continued skilled therapy to address left shoulder pain, limited ROM, and strength to return to performance of ADLs without increased difficulty. Certification Period: 3/17/22 - 4/15/22    Evelyn Nick, PT 3/16/2022 2:02 PM    ________________________________________________________________________  I certify that the above Therapy Services are being furnished while the patient is under my care. I agree with the treatment plan and certify that this therapy is necessary. [] I have read the above and request that my patient continue as recommended.   [] I have read the above report and request that my patient continue therapy with the following changes/special instructions: ______________________________________  [] I have read the above report and request that my patient be discharged from therapy    Physician's Signature:____________Date:_________TIME:________    ** Signature, Date and Time must be completed for valid certification **    Please sign and return to [facility]  [address], [address] [facility] [address] [phone] fax

## 2022-03-16 NOTE — PROGRESS NOTES
PT DAILY TREATMENT NOTE     Patient Name: Glen Clayton  Date:3/16/2022  : 1960  [x]  Patient  Verified  Payor: [place] MEDICARE / Plan: [place] MEDICARE PART A & B / Product Type: Medicare /    In time:10:32  Out time:11:20  Total Treatment Time (min): 48  Visit #: 7 of 10    Medicare/BCBS Only   Total Timed Codes (min):  48 1:1 Treatment Time:  48       Treatment Area: Pain in left shoulder [M25.512]  Bursitis of left shoulder [M75.52]  Left knee pain [M25.562]  Unilateral primary osteoarthritis, left knee [M17.12]    SUBJECTIVE  Pain Level (0-10 scale): 6/10 left Sh; 4/10 left knee  Any medication changes, allergies to medications, adverse drug reactions, diagnosis change, or new procedure performed?: [x] No    [] Yes (see summary sheet for update)  Subjective functional status/changes:   [] No changes reported  \"I still have pain when I rotate my arm. I haven't done the HEP in the past couple of days for various reasons. \"    OBJECTIVE    23 min Therapeutic Exercise:  [] See flow sheet :   Rationale: increase ROM and increase strength to improve the patients ability to increase ease of transfers, performance of household chores, overhead reaching      15 min Therapeutic Activity:  []  See flow sheet : goals, FOTO   Rationale: increase ROM and increase strength  to improve the patients ability to increase ease of overhead reaching, reaching behind back, performing ADLs    10 min Manual Therapy:  Supine Gr II-III rhythmic, oscillatory mobs throughout ER, IR, Flex, Scaption motions to end range   The manual therapy interventions were performed at a separate and distinct time from the therapeutic activities interventions.   Rationale: decrease pain, increase ROM and increase tissue extensibility to increase left shoulder ROM and ease of reaching behind back without difficulty         With   [x] TE   [] TA   [] neuro   [] other: Patient Education: [x] Review HEP    [] Progressed/Changed HEP based on:   [] positioning   [] body mechanics   [] transfers   [] heat/ice application    [] other:      Other Objective/Functional Measures: Continued with shoulder focus for today's session, per Pt request.  Held manual and focused on functional mobility, strength interventions. Pain Level (0-10 scale) post treatment: 6/10 left Sh; 2/10 left knee    ASSESSMENT/Changes in Function:  Pt has attended skilled therapy consistently for left shoulder and left knee pain. Pt exhibits improvements in left knee AROM, exhibiting full TKE without pain, is able to negotiate stairs with reciprocal pattern, reports resolution of sharp pains along medial knee, and ability to kneel and return to standing from kneeling position without issue. Pt exhibits gains in left shoulder AROM all planes with less pain, continuing to note most difficulty with functional IR of left shoulder. Pt would benefit from continued skilled therapy to address left shoulder pain, limited ROM, and strength to return to performance of ADLs without increased difficulty. Patient will continue to benefit from skilled PT services to address functional mobility deficits, address ROM deficits, address strength deficits, analyze and address soft tissue restrictions, analyze and cue movement patterns, analyze and modify body mechanics/ergonomics, assess and modify postural abnormalities and instruct in home and community integration to attain remaining goals. []  See Plan of Care  [x]  See progress note/recertification  []  See Discharge Summary         Progress towards goals / Updated goals:  Short Term Goals: To be accomplished in 1 weeks:  Goal: Pt to be compliant with initial HEP to improve left shoulder and knee mobility with less pain for ease of transfers, ADLs. Status at last note/certification: Established and reviewed with Pt  Current: met - Pt reports compliance (2/21/22)  Long Term Goals:  To be accomplished in 5 weeks:  Goal: Pt to increase left shoulder AROM in Flex, ABD to 150 deg without pain for ease of overhead reaching, donning shirts. Status at last note/certification: Flex 514 deg,  deg - all with pain  Current: progressing - Flex 125 deg,  deg  Goal: Pt to increase left shoulder functional IR to T8 without pain for ease of donning jackets. Status at last note/certification: pain with IR (70 deg at 45 deg of ABD)  Current: met - functional IR T8 (3/16/22)  Goal: Pt to exhibit full left terminal knee extension without pain for ease of sleep and normalization of gait pattern. Status at last note/certification: resting -10 deg, able to push to -2 deg with pain  Current: met - left knee TKE 0 deg without pain (3/16/22)  Goal: Pt to negotiate 4 steps with reciprocal pattern without pain or deviations for ease of navigating to room over garage in home. Status at last note/certification: step to pattern with UE support and pain  Current: met - reciprocal pattern without difficulty (3/16/22)  Goal: Pt to report < 5/10 pain at worst in left shoulder/knee to increase ease with ADLs. Status at last note/certification: 2/35 pain at worst  Current: progressing - 2/10 left knee at worst;   (3/16/22)  Goal: Pt to report FOTO score of 62 pts to show improved function and quality of life.   Status at last note/certification: FOTO 49 pts   Current: progressing - FOTO 56 pts (3/16/22)    PLAN  [x]  Upgrade activities as tolerated     [x]  Continue plan of care  []  Update interventions per flow sheet       []  Discharge due to:_  []  Other:_    Elie Nick PT 3/16/2022  10:28 AM    Future Appointments   Date Time Provider [facility]lyn Luna   3/17/2022  1:30 PM [facility] FAST TRACK NURSE [facility]   3/21/2022  9:45 AM Elie Nick, PT [facility]   3/24/2022  3:00 PM Elie Nick, PT [facility]   3/29/2022 11:15 AM Elie Nick, PT [facility]   6/30/2022 11:00 AM Lima Jorgensen DO [facility] BS AMB   7/26/2022  8:35 AM [facility] LAB VISIT [facility] BS AMB   8/2/2022 10:20 AM Soniya Tyler MD [facility] BS AMB   9/20/2022  1:30 PM [facility] INFUSION PHLEBOTOMIST [facility]   9/22/2022  1:30 PM [facility] FAST TRACK NURSE [facility]   1/25/2023 10:20 AM Carolina Limon 27

## 2022-03-17 ENCOUNTER — HOSPITAL ENCOUNTER (OUTPATIENT)
Dept: INFUSION THERAPY | Age: 62
Discharge: HOME | End: 2022-03-17
Payer: MEDICARE

## 2022-03-17 VITALS
OXYGEN SATURATION: 94 % | RESPIRATION RATE: 16 BRPM | HEART RATE: 93 BPM | DIASTOLIC BLOOD PRESSURE: 83 MMHG | SYSTOLIC BLOOD PRESSURE: 136 MMHG | TEMPERATURE: 98.3 F

## 2022-03-17 DIAGNOSIS — M85.80 OSTEOPENIA, UNSPECIFIED LOCATION: Primary | ICD-10-CM

## 2022-03-17 DIAGNOSIS — M81.0 AGE-RELATED OSTEOPOROSIS WITHOUT CURRENT PATHOLOGICAL FRACTURE: ICD-10-CM

## 2022-03-17 PROCEDURE — 99211 OFF/OP EST MAY X REQ PHY/QHP: CPT

## 2022-03-17 RX ORDER — DIPHENHYDRAMINE HYDROCHLORIDE 50 MG/ML
50 INJECTION, SOLUTION INTRAMUSCULAR; INTRAVENOUS AS NEEDED
Status: CANCELLED
Start: 2022-09-15

## 2022-03-17 RX ORDER — ONDANSETRON 2 MG/ML
8 INJECTION INTRAMUSCULAR; INTRAVENOUS AS NEEDED
Status: CANCELLED | OUTPATIENT
Start: 2022-09-15

## 2022-03-17 RX ORDER — ACETAMINOPHEN 325 MG/1
650 TABLET ORAL AS NEEDED
Status: CANCELLED
Start: 2022-09-15

## 2022-03-17 RX ORDER — EPINEPHRINE 1 MG/ML
0.3 INJECTION, SOLUTION, CONCENTRATE INTRAVENOUS AS NEEDED
Status: CANCELLED | OUTPATIENT
Start: 2022-09-15

## 2022-03-17 RX ORDER — DIPHENHYDRAMINE HYDROCHLORIDE 50 MG/ML
25 INJECTION, SOLUTION INTRAMUSCULAR; INTRAVENOUS AS NEEDED
Status: CANCELLED
Start: 2022-09-15

## 2022-03-17 RX ORDER — ALBUTEROL SULFATE 0.83 MG/ML
2.5 SOLUTION RESPIRATORY (INHALATION) AS NEEDED
Status: CANCELLED
Start: 2022-09-15

## 2022-03-17 RX ORDER — HYDROCORTISONE SODIUM SUCCINATE 100 MG/2ML
100 INJECTION, POWDER, FOR SOLUTION INTRAMUSCULAR; INTRAVENOUS AS NEEDED
Status: CANCELLED | OUTPATIENT
Start: 2022-09-15

## 2022-03-17 NOTE — PROGRESS NOTES
[facility] Progress Note    Date: 2022    Name: Jacinta Dunaway    MRN: [id number]         : 1960    Ms. Ines Bravo arrived in the [facility] today at [phone], in stable condition, here for her PROLIA Injection (Every 6 Months). She was assessed and education was provided. Ms. Dahlia Bass vitals were reviewed. Visit Vitals  /83 (BP 1 Location: Right upper arm, BP Patient Position: Sitting)   Pulse 93   Temp 98.3 °F (36.8 °C)   Resp 16   SpO2 94%       Lab results were reviewed. Her most recent CMP, MG, & PHOS levels   from 3/15/22 Bobby 7.5 low. Patient unable to receive injection today. Spoke w/Osman,RPH and patient's albumin normal so unable to to receive to medication today. Patient admitted that she wasn't taking her calcium consistently. Patient to be rescheduled for labs in April. Ms. Ines Bravo tolerated well and voiced no complaints. Patient armband removed and shredded. Ms. Ines Bravo was discharged from [place] [address] in stable condition at 1350. She is to return on 22 at [phone] for her prolia lab appointment.     Quynh Kahn  2022  1:46 PM

## 2022-03-19 PROBLEM — Z79.899 OTHER LONG TERM (CURRENT) DRUG THERAPY: Status: ACTIVE | Noted: 2021-01-27

## 2022-03-19 PROBLEM — M85.80 OSTEOPENIA: Status: ACTIVE | Noted: 2018-06-11

## 2022-03-19 PROBLEM — J47.9 BRONCHIECTASIS WITHOUT COMPLICATION (HCC): Status: ACTIVE | Noted: 2021-01-27

## 2022-03-19 PROBLEM — M26.609 TMJ (TEMPOROMANDIBULAR JOINT SYNDROME): Status: ACTIVE | Noted: 2021-01-27

## 2022-03-19 PROBLEM — M81.0 AGE-RELATED OSTEOPOROSIS WITHOUT CURRENT PATHOLOGICAL FRACTURE: Status: ACTIVE | Noted: 2017-07-03

## 2022-03-19 PROBLEM — Z78.9 NON-SMOKER: Status: ACTIVE | Noted: 2021-01-27

## 2022-03-19 PROBLEM — J20.9 BRONCHITIS WITH BRONCHOSPASM: Status: ACTIVE | Noted: 2017-02-20

## 2022-03-19 PROBLEM — M15.9 GENERALIZED OSTEOARTHRITIS: Status: ACTIVE | Noted: 2021-01-27

## 2022-03-19 PROBLEM — I31.39 PERICARDIAL EFFUSION: Status: ACTIVE | Noted: 2021-01-27

## 2022-03-21 ENCOUNTER — HOSPITAL ENCOUNTER (OUTPATIENT)
Dept: PHYSICAL THERAPY | Age: 62
Discharge: HOME | End: 2022-03-21
Payer: MEDICARE

## 2022-03-21 PROCEDURE — 97110 THERAPEUTIC EXERCISES: CPT

## 2022-03-21 PROCEDURE — 97140 MANUAL THERAPY 1/> REGIONS: CPT

## 2022-03-21 NOTE — PROGRESS NOTES
PT DAILY TREATMENT NOTE     Patient Name: Cherri Deleon  Date:3/21/2022  : 1960  [x]  Patient  Verified  Payor: [place] MEDICARE / Plan: [place] MEDICARE PART A & B / Product Type: Medicare /    In time:9:46  Out time:10:45  Total Treatment Time (min): 61  Visit #: 1 of 8    Medicare/BCBS Only   Total Timed Codes (min):  49 1:1 Treatment Time:  52       Treatment Area: Pain in left shoulder [M25.512]  Bursitis of left shoulder [M75.52]  Left knee pain [M25.562]  Unilateral primary osteoarthritis, left knee [M17.12]    SUBJECTIVE  Pain Level (0-10 scale): shoulder 5/10  Any medication changes, allergies to medications, adverse drug reactions, diagnosis change, or new procedure performed?: [x] No    [] Yes (see summary sheet for update)  Subjective functional status/changes:   [] No changes reported  \"I can tell the arm is getting better. I have to raise it higher before I feel the pain now but when I feel the pain, its the same intensity. The pain is now more intermittent now as well. \"    OBJECTIVE    Modality rationale: decrease inflammation and decrease pain to improve the patients ability to increase ease of ADL function   Min Type Additional Details    [] Estim:  []Unatt       []IFC  []Premod                        []Other:  []w/ice   []w/heat  Position:  Location:    [] Estim: []Att    []TENS instruct  []NMES                    []Other:  []w/US   []w/ice   []w/heat  Position:  Location:    []  Traction: [] Cervical       []Lumbar                       [] Prone          []Supine                       []Intermittent   []Continuous Lbs:  [] before manual  [] after manual    []  Ultrasound: []Continuous   [] Pulsed                           []1MHz   []3MHz W/cm2:  Location:    []  Iontophoresis with dexamethasone         Location: [] Take home patch   [] In clinic   10 [x]  Ice     []  heat  []  Ice massage  []  Laser   []  Anodyne Position: supine  Location: left shoulder    []  Laser with stim  [] Other:  Position:  Location:    []  Vasopneumatic Device    []  Right     []  Left  Pre-treatment girth:  Post-treatment girth:  Measured at (location):  Pressure:       [] lo [] med [] hi   Temperature: [] lo [] med [] hi   [x] Skin assessment post-treatment:  [x]intact []redness- no adverse reaction    []redness  adverse reaction:     34 min Therapeutic Exercise:  [] See flow sheet :   Rationale: increase ROM and increase strength to improve the patients ability to increase ease of raising arm overhead, donning/doffing jackets and pants     15 min Manual Therapy:  Supine Gr II-III rhythmic, oscillatory mobs throughout ER, IR, Flex, Scaption motions to end range   The manual therapy interventions were performed at a separate and distinct time from the therapeutic activities interventions. Rationale: decrease pain, increase ROM and increase tissue extensibility to increase ease of raising left UE without difficulty for ease of ADLs          With   [] TE   [] TA   [] neuro   [] other: Patient Education: [x] Review HEP    [] Progressed/Changed HEP based on:   [] positioning   [] body mechanics   [] transfers   [] heat/ice application    [] other:      Other Objective/Functional Measures: Performed exercises per flow sheet. Pain Level (0-10 scale) post treatment: 5/10 shoulder    ASSESSMENT/Changes in Function: Continued focus on shoulder interventions as left knee is progressively improving. Pt is able to raise left UE to 85-90 deg prior to pain onset in internal rotation position. Pt still notes some discomfort with donning jackets but pain is less intense at this time. Will continue to progress per Pt tolerance to increase left shoulder mobility and strength without pain for ease of performing ADLs.     Patient will continue to benefit from skilled PT services to address functional mobility deficits, address ROM deficits, address strength deficits, analyze and address soft tissue restrictions, analyze and cue movement patterns, analyze and modify body mechanics/ergonomics, assess and modify postural abnormalities and instruct in home and community integration to attain remaining goals. []  See Plan of Care  []  See progress note/recertification  []  See Discharge Summary         Progress towards goals / Updated goals:  Goal: Pt to increase left shoulder AROM in Flex, ABD to 150 deg without pain for ease of overhead reaching, donning shirts. Status at last note/certification: Flex 798 deg,  deg  Current:   Goal: Pt to report < 5/10 pain at worst in left shoulder/knee to increase ease with ADLs. Status at last note/certification: 2/10 left knee at worst; 6/10 left shoulder at worst (3/16/22)  Current:   Goal: Pt to report FOTO score of 62 pts to show improved function and quality of life.   Status at last note/certification: FOTO 56 pts (3/16/22)  Current: reassess at MD note (3/21/22)    PLAN  [x]  Upgrade activities as tolerated     [x]  Continue plan of care  []  Update interventions per flow sheet       []  Discharge due to:_  []  Other:_      Damien Nick, PT 3/21/2022  10:05 AM    Future Appointments   Date Time Provider Roslyn Luna   3/24/2022  3:00 PM Damien Nick, PT [facility]   3/29/2022 11:15 AM Damien Nick, PT [facility]   4/18/2022  1:15 PM [facility] INFUSION PHLEBOTOMIST [facility]   4/20/2022  1:00 PM HBV FAST TRACK NURSE [facility]   6/30/2022 11:00 AM DO JAYMIE [facility] BS AMB   7/26/2022  8:35 AM [facility] LAB VISIT [facility] BS AMB   8/2/2022 10:20 AM Mona Castaneda MD [facility] BS AMB   1/25/2023 10:20 AM Carolina Nichols

## 2022-03-24 ENCOUNTER — HOSPITAL ENCOUNTER (OUTPATIENT)
Dept: PHYSICAL THERAPY | Age: 62
Discharge: HOME | End: 2022-03-24
Payer: MEDICARE

## 2022-03-24 PROCEDURE — 97530 THERAPEUTIC ACTIVITIES: CPT

## 2022-03-24 PROCEDURE — 97110 THERAPEUTIC EXERCISES: CPT

## 2022-03-24 NOTE — PROGRESS NOTES
PT DAILY TREATMENT NOTE     Patient Name: Glen Clayton  Date:3/24/2022  : 1960  [x]  Patient  Verified  Payor: [place] MEDICARE / Plan: [place] MEDICARE PART A & B / Product Type: Medicare /    In time:3:05  Out time:3:55  Total Treatment Time (min): 50  Visit #: 2 of 8    Medicare/BCBS Only   Total Timed Codes (min):  40 1:1 Treatment Time:  40       Treatment Area: Pain in left shoulder [M25.512]  Bursitis of left shoulder [M75.52]  Left knee pain [M25.562]  Unilateral primary osteoarthritis, left knee [M17.12]    SUBJECTIVE  Pain Level (0-10 scale): shoulder 7/10  Any medication changes, allergies to medications, adverse drug reactions, diagnosis change, or new procedure performed?: [x] No    [] Yes (see summary sheet for update)  Subjective functional status/changes:   [] No changes reported  \"I feel like I took a step back. I woke up this morning and the left shoulder just ached really bad. Last night, I had to get in bed from the other side because my dog was laying on my side of the bed and when I got in on my knees, that pressure caused a sharp pain in the knee. I had pain this morning turning or moving my arm any direction, which hadn't been the case. \"    OBJECTIVE    Modality rationale: decrease inflammation and decrease pain to improve the patients ability to increase ease of ADL function   Min Type Additional Details    [] Estim:  []Unatt       []IFC  []Premod                        []Other:  []w/ice   []w/heat  Position:  Location:    [] Estim: []Att    []TENS instruct  []NMES                    []Other:  []w/US   []w/ice   []w/heat  Position:  Location:    []  Traction: [] Cervical       []Lumbar                       [] Prone          []Supine                       []Intermittent   []Continuous Lbs:  [] before manual  [] after manual    []  Ultrasound: []Continuous   [] Pulsed                           []1MHz   []3MHz W/cm2:  Location:    []  Iontophoresis with dexamethasone Location: [] Take home patch   [] In clinic   10 [x]  Ice     []  heat  []  Ice massage  []  Laser   []  Anodyne Position: supine  Location: left shoulder, left knee    []  Laser with stim  []  Other:  Position:  Location:    []  Vasopneumatic Device    []  Right     []  Left  Pre-treatment girth:  Post-treatment girth:  Measured at (location):  Pressure:       [] lo [] med [] hi   Temperature: [] lo [] med [] hi   [x] Skin assessment post-treatment:  [x]intact []redness- no adverse reaction    []redness  adverse reaction:     32 min Therapeutic Exercise:  [] See flow sheet :   Rationale: increase ROM and increase strength to improve the patients ability to increase ease of raising arm overhead, squatting, increasing ease of ADLs     8 min Therapeutic Activity:  []  See flow sheet :   Rationale: increase ROM and increase strength  to improve the patients ability to increase left shoulder and knee mobility, strength for ease of ADLs          With   [] TE   [] TA   [] neuro   [] other: Patient Education: [x] Review HEP    [] Progressed/Changed HEP based on:   [] positioning   [] body mechanics   [] transfers   [] heat/ice application    [] other:      Other Objective/Functional Measures: Performed exercises per flow sheet. Pain Level (0-10 scale) post treatment: 6/10     ASSESSMENT/Changes in Function: Focused on interventions in pain free ranges and isometric contraction exercises today to calm pain and promote progressive movement in left shoulder and knee. Pt able to report decreases in pain and some improvement in movement post session. Updated Pt's HEP for interventions performed today to work back into movement with less pain. Pt reported 100% understanding.     Patient will continue to benefit from skilled PT services to address functional mobility deficits, address ROM deficits, address strength deficits, analyze and address soft tissue restrictions, analyze and cue movement patterns, analyze and modify body mechanics/ergonomics, assess and modify postural abnormalities and instruct in home and community integration to attain remaining goals. []  See Plan of Care  []  See progress note/recertification  []  See Discharge Summary         Progress towards goals / Updated goals:  Goal: Pt to increase left shoulder AROM in Flex, ABD to 150 deg without pain for ease of overhead reaching, donning shirts. Status at last note/certification: Flex 620 deg,  deg  Current: reassess next visit (3/24/22)  Goal: Pt to report < 5/10 pain at worst in left shoulder/knee to increase ease with ADLs. Status at last note/certification: 2/10 left knee at worst; 6/10 left shoulder at worst (3/16/22)  Current: not met - 7/10 pain at worst (3/24/22)  Goal: Pt to report FOTO score of 62 pts to show improved function and quality of life.   Status at last note/certification: FOTO 56 pts (3/16/22)  Current: reassess at MD note (3/21/22)    PLAN  [x]  Upgrade activities as tolerated     [x]  Continue plan of care  []  Update interventions per flow sheet       []  Discharge due to:_  []  Other:_      Chetna Nick, PT 3/24/2022  10:05 AM    Future Appointments   Date Time Provider Roslyn Luna   3/29/2022 11:15 AM Chetna Nick, [facility]   4/7/2022  9:00 AM Chetna Nick, [facility]   4/11/2022  9:45 AM Chetna Nick, [facility]   4/13/2022 10:30 AM Chetna Nick, [facility]   4/18/2022  1:15 PM [facility] INFUSION PHLEBOTOMIST [facility]   4/19/2022  1:30 PM Chetna Nick, PT [facility]   4/20/2022  1:00 PM [facility] FAST TRACK NURSE [facility]   4/22/2022  9:45 AM Chetna Nick, PT [facility]   6/30/2022 11:00 AM Jose Luis Stewart DO [facility] BS AMB   7/26/2022  8:35 AM [facility] LAB VISIT [facility] AM[facility]   8/2/2022 10:20 AM Bina Castaneda MD [facility] AMB   1/25/2023 10:20 AM Ramsey Dillon, [address]

## 2022-03-29 ENCOUNTER — HOSPITAL ENCOUNTER (OUTPATIENT)
Dept: PHYSICAL THERAPY | Age: 62
Discharge: HOME | End: 2022-03-29
Payer: MEDICARE

## 2022-03-29 PROCEDURE — 97530 THERAPEUTIC ACTIVITIES: CPT

## 2022-03-29 PROCEDURE — 97110 THERAPEUTIC EXERCISES: CPT

## 2022-03-29 NOTE — PROGRESS NOTES
PT DAILY TREATMENT NOTE     Patient Name: Kodi Dee  Date:3/29/2022  : 1960  [x]  Patient  Verified  Payor: [place] MEDICARE / Plan: [place] MEDICARE PART A & B / Product Type: Medicare /    In time:11:10  Out time: 12:01  Total Treatment Time (min): 51  Visit #: 3 of 8    Medicare/BCBS Only   Total Timed Codes (min):  41 1:1 Treatment Time:  41       Treatment Area: Pain in left shoulder [M25.512]  Bursitis of left shoulder [M75.52]  Left knee pain [M25.562]  Unilateral primary osteoarthritis, left knee [M17.12]    SUBJECTIVE  Pain Level (0-10 scale): shoulder 6/10 shoulder; 0/10 knee  Any medication changes, allergies to medications, adverse drug reactions, diagnosis change, or new procedure performed?: [x] No    [] Yes (see summary sheet for update)  Subjective functional status/changes:   [] No changes reported  \"I don't have the pain that I had last week and I'm back to being able to move my arm in multiple directions without pain. The only thing that still hurts is internally rotating the shoulder at 90 deg level. \"    OBJECTIVE    Modality rationale: decrease inflammation and decrease pain to improve the patients ability to increase ease of ADL function   Min Type Additional Details    [] Estim:  []Unatt       []IFC  []Premod                        []Other:  []w/ice   []w/heat  Position:  Location:    [] Estim: []Att    []TENS instruct  []NMES                    []Other:  []w/US   []w/ice   []w/heat  Position:  Location:    []  Traction: [] Cervical       []Lumbar                       [] Prone          []Supine                       []Intermittent   []Continuous Lbs:  [] before manual  [] after manual    []  Ultrasound: []Continuous   [] Pulsed                           []1MHz   []3MHz W/cm2:  Location:    []  Iontophoresis with dexamethasone         Location: [] Take home patch   [] In clinic   10 [x]  Ice     []  heat  []  Ice massage  []  Laser   []  Anodyne Position: supine  Location: left shoulder, left knee    []  Laser with stim  []  Other:  Position:  Location:    []  Vasopneumatic Device    []  Right     []  Left  Pre-treatment girth:  Post-treatment girth:  Measured at (location):  Pressure:       [] lo [] med [] hi   Temperature: [] lo [] med [] hi   [x] Skin assessment post-treatment:  [x]intact []redness- no adverse reaction    []redness  adverse reaction:     33 min Therapeutic Exercise:  [] See flow sheet :   Rationale: increase ROM and increase strength to improve the patients ability to increase ease of raising arm overhead, squatting, increasing ease of ADLs     8 min Therapeutic Activity:  []  See flow sheet :   Rationale: increase ROM and increase strength  to improve the patients ability to increase left shoulder and knee mobility, strength for ease of ADLs          With   [] TE   [] TA   [] neuro   [] other: Patient Education: [x] Review HEP    [] Progressed/Changed HEP based on:   [] positioning   [] body mechanics   [] transfers   [] heat/ice application    [] other:      Other Objective/Functional Measures: Performed exercises per flow sheet. Pain Level (0-10 scale) post treatment: 5/10     ASSESSMENT/Changes in Function:  Focused mainly on left shoulder interventions today to improve ROM, especially into IR. Pt noted muscle soreness but no onset or increase in pain levels. Pt able to report slight decrease in left shoulder pain post session. Will reassess next visit for response to care and adjust or modify treatments from that point. Patient will continue to benefit from skilled PT services to address functional mobility deficits, address ROM deficits, address strength deficits, analyze and address soft tissue restrictions, analyze and cue movement patterns, analyze and modify body mechanics/ergonomics, assess and modify postural abnormalities and instruct in home and community integration to attain remaining goals.      []  See Plan of Care  []  See progress note/recertification  []  See Discharge Summary         Progress towards goals / Updated goals:  Goal: Pt to increase left shoulder AROM in Flex, ABD to 150 deg without pain for ease of overhead reaching, donning shirts. Status at last note/certification: Flex 240 deg,  deg  Current: progressing - Flex 130 deg,  deg (3/29/22)  Goal: Pt to report < 5/10 pain at worst in left shoulder/knee to increase ease with ADLs. Status at last note/certification: 2/10 left knee at worst; 6/10 left shoulder at worst (3/16/22)  Current: not met - 7/10 pain at worst (3/29/22)  Goal: Pt to report FOTO score of 62 pts to show improved function and quality of life.   Status at last note/certification: FOTO 56 pts (3/16/22)  Current: reassess at MD note (3/21/22)    PLAN  [x]  Upgrade activities as tolerated     [x]  Continue plan of care  []  Update interventions per flow sheet       []  Discharge due to:_  []  Other:_      Viviana Nick, PT 3/29/2022  10:05 AM    Future Appointments   Date Time Provider Roslyn Luna   4/7/2022  9:00 AM Viviana Nick, PT [facility]   4/11/2022  9:45 AM Viviana Nick, PT [facility]   4/13/2022 10:30 AM Viviana Nick, PT [facility]   4/18/2022  1:15 PM [facility] INFUSION PHLEBOTOMIST [facility]   4/19/2022  1:30 PM Viviana Nick, PT [facility]   4/20/2022  1:00 PM [facility] FAST TRACK NURSE [facility]   4/22/2022  9:45 AM Viviana Nick, PT [facility]   6/30/2022 11:00 AM [facility]   7/26/2022  8:35 AM [facility]   8/2/2022 10:20 AM Beckie Castaneda MD [facility]   1/25/2023 10:20 AM Carolina Chacon
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
## 2022-04-07 ENCOUNTER — HOSPITAL ENCOUNTER (OUTPATIENT)
Dept: PHYSICAL THERAPY | Age: 62
Discharge: HOME | End: 2022-04-07
Payer: MEDICARE

## 2022-04-07 PROCEDURE — 97110 THERAPEUTIC EXERCISES: CPT

## 2022-04-07 PROCEDURE — 97530 THERAPEUTIC ACTIVITIES: CPT

## 2022-04-07 NOTE — PROGRESS NOTES
PT DAILY TREATMENT NOTE     Patient Name: Theresa Kaba  Date:2022  : 1960  [x]  Patient  Verified  Payor: [place] MEDICARE / Plan: [place] MEDICARE PART A & B / Product Type: Medicare /    In time:9:03  Out time: 9:42  Total Treatment Time (min): 39  Visit #: 4 of 8    Medicare/BCBS Only   Total Timed Codes (min):  29 1:1 Treatment Time:  29       Treatment Area: Pain in left shoulder [M25.512]  Bursitis of left shoulder [M75.52]  Left knee pain [M25.562]  Unilateral primary osteoarthritis, left knee [M17.12]    SUBJECTIVE  Pain Level (0-10 scale): 5/10 shoulder; 0/10 knee  Any medication changes, allergies to medications, adverse drug reactions, diagnosis change, or new procedure performed?: [x] No    [] Yes (see summary sheet for update)  Subjective functional status/changes:   [] No changes reported  \"I ordered the cold pack you told me about and it works better than the one I had. I don't have the shoulder pain I used to have but I still have that pain along the same spot on the inside of the arm when I put on jackets and turn the arm inward when I'm lifting something or washing dishes. \"    OBJECTIVE    Modality rationale: decrease inflammation and decrease pain to improve the patients ability to reduce post therapy soreness   Min Type Additional Details    [] Estim:  []Unatt       []IFC  []Premod                        []Other:  []w/ice   []w/heat  Position:  Location:    [] Estim: []Att    []TENS instruct  []NMES                    []Other:  []w/US   []w/ice   []w/heat  Position:  Location:    []  Traction: [] Cervical       []Lumbar                       [] Prone          []Supine                       []Intermittent   []Continuous Lbs:  [] before manual  [] after manual    []  Ultrasound: []Continuous   [] Pulsed                           []1MHz   []3MHz W/cm2:  Location:    []  Iontophoresis with dexamethasone         Location: [] Take home patch   [] In clinic   10 [x]  Ice     []  heat  [] Ice massage  []  Laser   []  Anodyne Position: supine  Location: left shoulder    []  Laser with stim  []  Other:  Position:  Location:    []  Vasopneumatic Device    []  Right     []  Left  Pre-treatment girth:  Post-treatment girth:  Measured at (location):  Pressure:       [] lo [] med [] hi   Temperature: [] lo [] med [] hi   [x] Skin assessment post-treatment:  [x]intact []redness- no adverse reaction    []redness  adverse reaction:     21 min Therapeutic Exercise:  [] See flow sheet :   Rationale: increase ROM and increase strength to improve the patients ability to increase ease of raising arm overhead, squatting, increasing ease of ADLs     8 min Therapeutic Activity:  []  See flow sheet :   Rationale: increase ROM and increase strength  to improve the patients ability to increase left shoulder and knee mobility, strength for ease of ADLs          With   [] TE   [] TA   [] neuro   [] other: Patient Education: [x] Review HEP    [] Progressed/Changed HEP based on:   [] positioning   [] body mechanics   [] transfers   [] heat/ice application    [] other:      Other Objective/Functional Measures: Performed exercises per flow sheet, adding wand ext, doorway ER stretch. Pain Level (0-10 scale) post treatment: 5/10     ASSESSMENT/Changes in Function:  Pt reports left knee sharp pains have resolved and she is able to walk and get into bed and place pressure on left knee without issue. Pt still notes main difficulty of internal rotation of left shoulder at level of 5/10 at worst.  Focused exercises on ROM and stretching to reduce pain with shoulder motions during ADLs. Applied cold pack for addressing post therapy soreness. Advised Pt to add doorway ER stretch into HEP for assisting with pain relief when moving left UE.     Patient will continue to benefit from skilled PT services to address functional mobility deficits, address ROM deficits, address strength deficits, analyze and address soft tissue restrictions, analyze and cue movement patterns, analyze and modify body mechanics/ergonomics, assess and modify postural abnormalities and instruct in home and community integration to attain remaining goals. []  See Plan of Care  []  See progress note/recertification  []  See Discharge Summary         Progress towards goals / Updated goals:  Goal: Pt to increase left shoulder AROM in Flex, ABD to 150 deg without pain for ease of overhead reaching, donning shirts. Status at last note/certification: Flex 369 deg,  deg  Current: progressing - Flex 130 deg,  deg (3/29/22)  Goal: Pt to report < 5/10 pain at worst in left shoulder/knee to increase ease with ADLs. Status at last note/certification: 2/10 left knee at worst; 6/10 left shoulder at worst (3/16/22)  Current: not met - 5/10 pain at worst left shoulder (4/7/22)  Goal: Pt to report FOTO score of 62 pts to show improved function and quality of life.   Status at last note/certification: FOTO 56 pts (3/16/22)  Current: reassess at MD note (3/21/22)    PLAN  [x]  Upgrade activities as tolerated     [x]  Continue plan of care  []  Update interventions per flow sheet       []  Discharge due to:_  []  Other:_      Brody Nick PT 4/7/2022  10:05 AM    Future Appointments   Date Time Provider Roslyn Luna   4/11/2022  9:45 AM Brody Nick [facility]   4/13/2022 10:30 AM Brody Nick [facility]   4/18/2022  1:15 PM HBV INFUSION PHLEBOTOMIST [facility]   4/19/2022  1:30 PM Brody Nick [facility]   4/20/2022 11:30 AM HBV FAST TRACK NURSE [facility]   4/22/2022  9:45 AM Brody Nick [facility]   6/30/2022 11:00 AM Pilar York DO [facility] BS AMB   7/26/2022  8:35 AM IOC LAB VISIT [facility] BS AMB   8/2/2022 10:20 AM Ines Castaneda MD [facility] BS AMB   1/25/2023 10:20 AM Carolina Figueroa

## 2022-04-11 ENCOUNTER — HOSPITAL ENCOUNTER (OUTPATIENT)
Dept: PHYSICAL THERAPY | Age: 62
Discharge: HOME | End: 2022-04-11
Payer: MEDICARE

## 2022-04-11 PROCEDURE — 97530 THERAPEUTIC ACTIVITIES: CPT

## 2022-04-11 PROCEDURE — 97110 THERAPEUTIC EXERCISES: CPT

## 2022-04-11 NOTE — PROGRESS NOTES
PT DAILY TREATMENT NOTE     Patient Name: Alan Hall  Date:2022  : 1960  [x]  Patient  Verified  Payor: VA MEDICARE / Plan: VA MEDICARE PART A & B / Product Type: Medicare /    In time:9:46  Out time: 10:24  Total Treatment Time (min): 38  Visit #: 5 of 8    Medicare/BCBS Only   Total Timed Codes (min):  28 1:1 Treatment Time:  28       Treatment Area: Pain in left shoulder [M25.512]  Bursitis of left shoulder [M75.52]  Left knee pain [M25.562]  Unilateral primary osteoarthritis, left knee [M17.12]    SUBJECTIVE  Pain Level (0-10 scale): 410 shoulder  Any medication changes, allergies to medications, adverse drug reactions, diagnosis change, or new procedure performed?: [x] No    [] Yes (see summary sheet for update)  Subjective functional status/changes:   [] No changes reported  \"I am always sore after exercise but I can tell I can raise the arm higher and turn the arm with less pain and pulling down the arm now. \"    OBJECTIVE    Modality rationale: decrease inflammation and decrease pain to improve the patients ability to reduce post therapy soreness   Min Type Additional Details    [] Estim:  []Unatt       []IFC  []Premod                        []Other:  []w/ice   []w/heat  Position:  Location:    [] Estim: []Att    []TENS instruct  []NMES                    []Other:  []w/US   []w/ice   []w/heat  Position:  Location:    []  Traction: [] Cervical       []Lumbar                       [] Prone          []Supine                       []Intermittent   []Continuous Lbs:  [] before manual  [] after manual    []  Ultrasound: []Continuous   [] Pulsed                           []1MHz   []3MHz W/cm2:  Location:    []  Iontophoresis with dexamethasone         Location: [] Take home patch   [] In clinic   10 [x]  Ice     []  heat  []  Ice massage  []  Laser   []  Anodyne Position: supine  Location: left shoulder    []  Laser with stim  []  Other:  Position:  Location:    []  Vasopneumatic Device []  Right     []  Left  Pre-treatment girth:  Post-treatment girth:  Measured at (location):  Pressure:       [] lo [] med [] hi   Temperature: [] lo [] med [] hi   [x] Skin assessment post-treatment:  [x]intact []redness- no adverse reaction    []redness  adverse reaction:     20 min Therapeutic Exercise:  [] See flow sheet :   Rationale: increase ROM and increase strength to improve the patients ability to increase ease of raising arm overhead, squatting, increasing ease of ADLs     8 min Therapeutic Activity:  []  See flow sheet :   Rationale: increase ROM and increase strength  to improve the patients ability to increase left shoulder and knee mobility, strength for ease of ADLs          With   [] TE   [] TA   [] neuro   [] other: Patient Education: [x] Review HEP    [] Progressed/Changed HEP based on:   [] positioning   [] body mechanics   [] transfers   [] heat/ice application    [] other:      Other Objective/Functional Measures: Performed exercises per flow sheet, adding wand ext, doorway ER stretch. Pain Level (0-10 scale) post treatment: 4/10     ASSESSMENT/Changes in Function:  Pt reports improved left shoulder movement since last session. Pt reports being able to raise arm higher and internally rotate with less pain but still notes some pain with donning jackets but its not as intense. Pt able to perform household chores without increased difficulty. Some shoulder soreness noted post session but no change in pain level noted. Patient will continue to benefit from skilled PT services to address functional mobility deficits, address ROM deficits, address strength deficits, analyze and address soft tissue restrictions, analyze and cue movement patterns, analyze and modify body mechanics/ergonomics, assess and modify postural abnormalities and instruct in home and community integration to attain remaining goals.      []  See Plan of Care  []  See progress note/recertification  []  See Discharge Summary         Progress towards goals / Updated goals:  Goal: Pt to increase left shoulder AROM in Flex, ABD to 150 deg without pain for ease of overhead reaching, donning shirts. Status at last note/certification: Flex 097 deg,  deg  Current: progressing - Flex 130 deg,  deg (3/29/22)  Goal: Pt to report < 5/10 pain at worst in left shoulder/knee to increase ease with ADLs. Status at last note/certification: 2/10 left knee at worst; 6/10 left shoulder at worst (3/16/22)  Current: not met - 5/10 pain at worst left shoulder (4/7/22)  Goal: Pt to report FOTO score of 62 pts to show improved function and quality of life.   Status at last note/certification: FOTO 56 pts (3/16/22)  Current: reassess at MD note (3/21/22)    PLAN  [x]  Upgrade activities as tolerated     [x]  Continue plan of care  []  Update interventions per flow sheet       []  Discharge due to:_  []  Other:_      Lara Nick PT 4/11/2022  10:05 AM    Future Appointments   Date Time Provider Roslyn Luna   4/13/2022 10:30 AM Lara Nick [facility]   4/18/2022  1:15 PM HBV INFUSION PHLEBOTOMIST [facility]   4/19/2022  1:30 PM Lara Nick [facility]   4/20/2022 11:30 AM HBV FAST TRACK NURSE [facility]   4/22/2022  9:45 AM Lara Nick [facility]   6/30/2022 11:00 AM DO JAYMIE Chavez [facility] BS AMB   7/26/2022  8:35 AM IOC LAB VISIT [facility] BS AMB   8/2/2022 10:20 AM Kanika Castaneda MD [facility] BS AMB   1/25/2023 10:20 AM [facility]

## 2022-04-12 ENCOUNTER — APPOINTMENT (OUTPATIENT)
Dept: INFUSION THERAPY | Age: 62
End: 2022-04-12

## 2022-04-13 ENCOUNTER — HOSPITAL ENCOUNTER (OUTPATIENT)
Dept: PHYSICAL THERAPY | Age: 62
Discharge: HOME | End: 2022-04-13
Payer: MEDICARE

## 2022-04-13 PROCEDURE — 97530 THERAPEUTIC ACTIVITIES: CPT

## 2022-04-13 PROCEDURE — 97140 MANUAL THERAPY 1/> REGIONS: CPT

## 2022-04-13 PROCEDURE — 97110 THERAPEUTIC EXERCISES: CPT

## 2022-04-13 NOTE — PROGRESS NOTES
PT DAILY TREATMENT NOTE     Patient Name: Kate Childress  PPHO:  : 1960  [x]  Patient  Verified  Payor: [place] MEDICARE / Plan: [place] MEDICARE PART A & B / Product Type: Medicare /    In time:10:30  Out time: 11:18  Total Treatment Time (min): 48  Visit #: 6 of 8    Medicare/BCBS Only   Total Timed Codes (min):  38 1:1 Treatment Time:  38       Treatment Area: Pain in left shoulder [M25.512]  Bursitis of left shoulder [M75.52]  Left knee pain [M25.562]  Unilateral primary osteoarthritis, left knee [M17.12]    SUBJECTIVE  Pain Level (0-10 scale): 4-5/10 shoulder  Any medication changes, allergies to medications, adverse drug reactions, diagnosis change, or new procedure performed?: [x] No    [] Yes (see summary sheet for update)  Subjective functional status/changes:   [] No changes reported  \"I'm just sore from the exercise and when I move it that certain way but its not as bad as it was. \"    OBJECTIVE    Modality rationale: decrease inflammation and decrease pain to improve the patients ability to reduce post therapy soreness   Min Type Additional Details    [] Estim:  []Unatt       []IFC  []Premod                        []Other:  []w/ice   []w/heat  Position:  Location:    [] Estim: []Att    []TENS instruct  []NMES                    []Other:  []w/US   []w/ice   []w/heat  Position:  Location:    []  Traction: [] Cervical       []Lumbar                       [] Prone          []Supine                       []Intermittent   []Continuous Lbs:  [] before manual  [] after manual    []  Ultrasound: []Continuous   [] Pulsed                           []1MHz   []3MHz W/cm2:  Location:    []  Iontophoresis with dexamethasone         Location: [] Take home patch   [] In clinic   10 [x]  Ice     []  heat  []  Ice massage  []  Laser   []  Anodyne Position: supine  Location: left shoulder    []  Laser with stim  []  Other:  Position:  Location:    []  Vasopneumatic Device    []  Right     [] Left  Pre-treatment girth:  Post-treatment girth:  Measured at (location):  Pressure:       [] lo [] med [] hi   Temperature: [] lo [] med [] hi   [x] Skin assessment post-treatment:  [x]intact []redness- no adverse reaction    []redness  adverse reaction:     15 min Therapeutic Exercise:  [] See flow sheet :   Rationale: increase ROM and increase strength to improve the patients ability to increase ease of raising arm overhead, squatting, increasing ease of ADLs     8 min Therapeutic Activity:  []  See flow sheet :   Rationale: increase ROM and increase strength  to improve the patients ability to increase left shoulder and knee mobility, strength for ease of ADLs     15 min Manual Therapy:  Supine STM to left coracobrachialis ms   The manual therapy interventions were performed at a separate and distinct time from the therapeutic activities interventions. Rationale: increase tissue extensibility and decrease trigger points to reduce pain with raising, internally rotating left UE         With   [] TE   [] TA   [] neuro   [] other: Patient Education: [x] Review HEP    [] Progressed/Changed HEP based on:   [] positioning   [] body mechanics   [] transfers   [] heat/ice application    [] other:      Other Objective/Functional Measures: Performed exercises per flow sheet. Pain Level (0-10 scale) post treatment: 4/10     ASSESSMENT/Changes in Function:  Pt reports improved left shoulder movement since last session. Pt reports being able to raise arm higher and internally rotate with less pain but still notes some pain with donning jackets but its not as intense. Pt able to perform household chores without increased difficulty. Pt left session in no apparent distress and with improved mobility with less pain.     Patient will continue to benefit from skilled PT services to address functional mobility deficits, address ROM deficits, address strength deficits, analyze and address soft tissue restrictions, analyze and cue movement patterns, analyze and modify body mechanics/ergonomics, assess and modify postural abnormalities and instruct in home and community integration to attain remaining goals. []  See Plan of Care  []  See progress note/recertification  []  See Discharge Summary         Progress towards goals / Updated goals:  Goal: Pt to increase left shoulder AROM in Flex, ABD to 150 deg without pain for ease of overhead reaching, donning shirts. Status at last note/certification: Flex 158 deg,  deg  Current: progressing - Flex 130 deg,  deg (3/29/22)  Goal: Pt to report < 5/10 pain at worst in left shoulder/knee to increase ease with ADLs. Status at last note/certification: 2/10 left knee at worst; 6/10 left shoulder at worst (3/16/22)  Current: not met - 5/10 pain at worst left shoulder (4/7/22)  Goal: Pt to report FOTO score of 62 pts to show improved function and quality of life.   Status at last note/certification: FOTO 56 pts (3/16/22)  Current: reassess at MD note (3/21/22)    PLAN  [x]  Upgrade activities as tolerated     [x]  Continue plan of care  []  Update interventions per flow sheet       []  Discharge due to:_  []  Other:_      Lakshmi Nick, PT 4/13/2022  10:05 AM    Future Appointments   Date Time Provider Roslyn Luna   4/18/2022  1:15 PM HBV INFUSION PHLEBOTOMIST [facility]   4/19/2022  1:30 PM Lakshmi Nick, PT [facility]   4/20/2022 11:30 AM HBV FAST TRACK NURSE [facility]   4/22/2022  9:45 AM Lakshmi Nick, [facility]   6/30/2022 11:00 AM Mindy Ramirez DO [facility] BS AMB   7/26/2022  8:35 AM IOC LAB VISIT [facility] BS AMB   8/2/2022 10:20 AM Marcelino Castaneda MD [facility] BS AMB   1/25/2023 10:20 AM Carolina Reynoso 28

## 2022-04-17 DIAGNOSIS — J45.40 MODERATE PERSISTENT ASTHMA WITHOUT COMPLICATION: ICD-10-CM

## 2022-04-17 DIAGNOSIS — R06.09 DYSPNEA ON EXERTION: ICD-10-CM

## 2022-04-18 ENCOUNTER — APPOINTMENT (OUTPATIENT)
Dept: INFUSION THERAPY | Age: 62
End: 2022-04-18

## 2022-04-18 RX ORDER — TIOTROPIUM BROMIDE INHALATION SPRAY 3.12 UG/1
SPRAY, METERED RESPIRATORY (INHALATION)
Qty: 12 G | Refills: 5 | Status: SHIPPED | OUTPATIENT
Start: 2022-04-18

## 2022-04-19 ENCOUNTER — HOSPITAL ENCOUNTER (OUTPATIENT)
Dept: PHYSICAL THERAPY | Age: 62
Discharge: HOME | End: 2022-04-19
Payer: MEDICARE

## 2022-04-19 PROCEDURE — 97530 THERAPEUTIC ACTIVITIES: CPT

## 2022-04-19 PROCEDURE — 97110 THERAPEUTIC EXERCISES: CPT

## 2022-04-19 NOTE — PROGRESS NOTES
[facility], [address] [place] [address]  [phone] fax    Continued Plan of Care/ Re-certification for Physical Therapy Services      Patient name: Cait SHULTZ [facility] of Care: 2022   Referral source: Cliff Arnold MD : 1960               Medical Diagnosis: Pain in left shoulder [M25.512]  Bursitis of left shoulder [M75.52]  Left knee pain [M25.562]  Unilateral primary osteoarthritis, left knee [M17.12]  Payor: [place] MEDICARE / Plan: VA MEDICARE PART A & B / Product Type: Medicare /  Onset Date:22               Treatment Diagnosis: Left Shoulder, Left Knee Pain   Prior Hospitalization: see medical history Provider#: [id number]   Medications: Verified on Patient summary List    Comorbidities: Arthritis; Asthma; Depression; Fibromyalgia; Osteoporosis; hx Sacroiliitis; Interstitial Cystitis   Prior Level of Function: Lives in 1-story home with spouse; functionally independent    Visits from Start of Care: 13    Missed Visits: 0    The Plan of Care and following information is based on the patient's current status:    Goal: Pt to increase left shoulder AROM in Flex, ABD to 150 deg without pain for ease of overhead reaching, donning shirts. Status at last note/certification: Flex 830 deg,  deg  Current: progressing - Flex 130 deg,  deg (3/29/22)  Goal: Pt to report < 5/10 pain at worst in left shoulder/knee to increase ease with ADLs. Status at last note/certification: 2/10 left knee at worst; 6/10 left shoulder at worst (3/16/22)  Current: not met - 5/10 pain at worst left shoulder (22)  Goal: Pt to report FOTO score of 62 pts to show improved function and quality of life. Status at last note/certification: FOTO 56 pts (3/16/22)  Current: reassess at MD note (3/21/22)    Key functional changes:  Pt reports no left knee pain and ability to squat, stoop, kneel and get into bed on knees without sharp pains.   Pt is able to raise left UE overhead but still notes discomfort with reaching overhead while carrying weighted objects as well as internally rotating shoulder with arm raised to 90 deg of flex/abd. Problems/ barriers to goal attainment: None     Problem List: pain affecting function, decrease ROM, decrease strength, edema affecting function, impaired gait/ balance, decrease ADL/ functional abilitiies, decrease activity tolerance, decrease flexibility/ joint mobility and decrease transfer abilities    Treatment Plan: Therapeutic exercise, Therapeutic activities, Neuromuscular re-education, Physical agent/modality, Gait/balance training, Manual therapy, Aquatic therapy, Patient education, Functional mobility training    Patient Goal (s) has been updated and includes: \"Be able to rotate arm without pain. \"     Goals for this certification period to be accomplished in 1 weeks:  Continue with unmet goals above. Frequency / Duration: Patient to be seen 1 times per week for 1 weeks:    Assessment / Recommendations: Pt would benefit from continued skilled therapy to improve left shoulder mobility and ensure readiness for discharge with updated HEP. Certification Period: 4/15/22 - 5/14/22    Evelyn Nick, PT 4/19/2022 2:42 PM    ________________________________________________________________________  I certify that the above Therapy Services are being furnished while the patient is under my care. I agree with the treatment plan and certify that this therapy is necessary. [] I have read the above and request that my patient continue as recommended.   [] I have read the above report and request that my patient continue therapy with the following changes/special instructions: ______________________________________  [] I have read the above report and request that my patient be discharged from therapy    Physician's Signature:____________Date:_________TIME:________    ** Signature, Date and Time must be completed for valid certification **    Please sign and return to [facility], [address] [place] [address]  [phone] fax

## 2022-04-19 NOTE — PROGRESS NOTES
PT DISCHARGE DAILY NOTE AND [id number]    Patient name: Cait SHULTZ [facility] of Care: 2022   Referral source: Grant Arnold MD : 1960               Medical Diagnosis: Pain in left shoulder [M25.512]  Bursitis of left shoulder [M75.52]  Left knee pain [M25.562]  Unilateral primary osteoarthritis, left knee [M17.12]  Payor: [place] MEDICARE / Plan: [place] MEDICARE PART A & B / Product Type: Medicare /  Onset Date:22               Treatment Diagnosis: Left Shoulder, Left Knee Pain   Prior Hospitalization: see medical history Provider#: [id number]   Medications: Verified on Patient summary List    Comorbidities: Arthritis; Asthma; Depression; Fibromyalgia; Osteoporosis; hx Sacroiliitis; Interstitial Cystitis   Prior Level of Function: Lives in 1-story home with spouse; functionally independent    Visits from Start of Care: 14    Missed Visits: 0    Reporting Period : 3/16/22 to 22    Date:2022  : 1960  [x]  Patient  Verified  Payor: Rosa Rachel / Plan: [place] MEDICARE PART A & B / Product Type: Medicare /    In time:1:32  Out time:2:15  Total Treatment Time (min): 43  Visit #: 1 of     Medicare/BCBS Only   Total Timed Codes (min):  43 1:1 Treatment Time:  43       SUBJECTIVE  Pain Level (0-10 scale): 4-5/10  Any medication changes, allergies to medications, adverse drug reactions, diagnosis change, or new procedure performed?: [x] No    [] Yes (see summary sheet for update)  Subjective functional status/changes:   [] No changes reported  \"I think I need a break. I need to take care of things for my family and then we have a vacation after that.   I am better but I still have trouble    OBJECTIVE    23 min Therapeutic Exercise:  [] See flow sheet :   Rationale: increase ROM and increase strength to improve the patients ability to increase ease of reaching, donning sweaters, jackets    20 min Therapeutic Activity:  []  See flow sheet :   Rationale: increase ROM, increase strength and improve coordination  to improve the patients ability to increase ease of ADLs           With   [] TE   [] TA   [] neuro   [] other: Patient Education: [x] Review HEP    [] Progressed/Changed HEP based on:   [] positioning   [] body mechanics   [] transfers   [] heat/ice application    [] other:      Other Objective/Functional Measures: Performed exercises per flow sheet. Reassessed goals for discharge. Pain Level (0-10 scale) post treatment: 4-5/10    Summary of Care:  Goal: Pt to increase left shoulder AROM in Flex, ABD to 150 deg without pain for ease of overhead reaching, donning shirts. Status at last note/certification: Flex 469 deg,  deg  Current: progressing - Flex 135 deg,  deg (4/19/22)  Goal: Pt to report < 5/10 pain at worst in left shoulder/knee to increase ease with ADLs. Status at last note/certification: 2/10 left knee at worst; 6/10 left shoulder at worst (3/16/22)  Current: progressing - 5/10 pain at worst left shoulder (4/19/22)  Goal: Pt to report FOTO score of 62 pts to show improved function and quality of life. Status at last note/certification: FOTO 56 pts (3/16/22)  Current: met - FOTO 64 pts (4/19/22)    ASSESSMENT/Changes in Function: Pt attended 14 visits consistently and made good progress with skilled physical therapy services. At time of last visit, Pt reported the following:  Functional Gains - no left knee pain, ability to squat and climb into bed without sharp pains in left knee, increased ease raising left UE overhead; Functional Deficits - pain with internally rotating left UE that improved but is still present; and 80% improvement since start of care. Pt has met or is progressing towards all goals and is compliant with comprehensive HEP. Pt is appropriate for D/C at this time to continue to manage care independently and maintain long term gains made with skilled therapy.   Thank you for this referral.      Thank you for this referral! PLAN  [x]Discontinue therapy: [x]Patient has reached or is progressing toward set goals      []Patient is non-compliant or has abdicated      []Due to lack of appreciable progress towards set goals    Skye Nick, PT 4/19/2022  1:38 PM

## 2022-04-19 NOTE — PROGRESS NOTES
Physical Therapy Discharge Instructions      [facility] TERRY JASONJoshua CAMPOSDANIEL [facility], [address] [place] [address]  [phone] fax    Patient: Chely Ham  : 1960      Continue Home Exercise Program 2 times per day for 1 weeks, then decrease to 2-3 times per week      Continue with    [x] Ice  as needed 1-2 times per day     [x] Heat           Follow up with MD:     [x] Upon completion of therapy     [] As needed      Recommendations:     [x]   Return to activity with home program    []   Return to activity with the following modifications:       []Post Rehab Program    []Join Independent aquatic program     []Return to/join local gym      Additional Comments: It has been a pleasure working with you! I'm glad you are doing better. Keep up with your home exercises, contact the MD as needed, work with pool exercises, and please contact us if we can be of further assistance to you.       Fidel Nick, PT 2022 2:10 PM

## 2022-04-20 ENCOUNTER — HOSPITAL ENCOUNTER (OUTPATIENT)
Dept: INFUSION THERAPY | Age: 62
Discharge: HOME | End: 2022-04-20
Payer: MEDICARE

## 2022-04-20 VITALS
RESPIRATION RATE: 16 BRPM | TEMPERATURE: 97.7 F | SYSTOLIC BLOOD PRESSURE: 118 MMHG | OXYGEN SATURATION: 98 % | DIASTOLIC BLOOD PRESSURE: 77 MMHG | HEART RATE: 85 BPM

## 2022-04-20 LAB
ALBUMIN SERPL-MCNC: 4.1 G/DL (ref 3.4–5)
ALBUMIN/GLOB SERPL: 1.5 {RATIO} (ref 0.8–1.7)
ALP SERPL-CCNC: 30 U/L (ref 45–117)
ALT SERPL-CCNC: 42 U/L (ref 13–56)
ANION GAP SERPL CALC-SCNC: 6 MMOL/L (ref 3–18)
AST SERPL-CCNC: 35 U/L (ref 10–38)
BILIRUB SERPL-MCNC: 0.4 MG/DL (ref 0.2–1)
BUN SERPL-MCNC: 17 MG/DL (ref 7–18)
BUN/CREAT SERPL: 27 (ref 12–20)
CALCIUM SERPL-MCNC: 9.7 MG/DL (ref 8.5–10.1)
CHLORIDE SERPL-SCNC: 95 MMOL/L (ref 100–111)
CO2 SERPL-SCNC: 30 MMOL/L (ref 21–32)
CREAT SERPL-MCNC: 0.64 MG/DL (ref 0.6–1.3)
GLOBULIN SER CALC-MCNC: 2.7 G/DL (ref 2–4)
GLUCOSE SERPL-MCNC: 91 MG/DL (ref 74–99)
MAGNESIUM SERPL-MCNC: 2.3 MG/DL (ref 1.6–2.6)
PHOSPHATE SERPL-MCNC: 4.6 MG/DL (ref 2.5–4.9)
POTASSIUM SERPL-SCNC: 4.4 MMOL/L (ref 3.5–5.5)
PROT SERPL-MCNC: 6.8 G/DL (ref 6.4–8.2)
SODIUM SERPL-SCNC: 131 MMOL/L (ref 136–145)

## 2022-04-20 PROCEDURE — 36415 COLL VENOUS BLD VENIPUNCTURE: CPT

## 2022-04-20 PROCEDURE — 83735 ASSAY OF MAGNESIUM: CPT

## 2022-04-20 PROCEDURE — 80053 COMPREHEN METABOLIC PANEL: CPT

## 2022-04-20 PROCEDURE — 84100 ASSAY OF PHOSPHORUS: CPT

## 2022-04-20 NOTE — PROGRESS NOTES
[facility] Progress Note    Date: 2022    Name: Beckie Matthews    MRN: [id number]         : 1960    Ms. Lara Tariq arrived in the [facility] today at [phone], here for her Donald. Krishan 73 labs. She was assessed and education was provided. Ms. Jhonny Tong vitals were reviewed. Visit Vitals  /77 (BP 1 Location: Right upper arm, BP Patient Position: Sitting)   Pulse 85   Temp 97.7 °F (36.5 °C)   Resp 16   SpO2 98%   Breastfeeding No       Blood for ordered labs (CMP, Mag, Phos) drawn via RAC x1 attempt. Gauze/coban applied. Labs sent out for processing. Ms. Lara Tariq tolerated well and voiced no complaints. Patient armband removed and shredded. Ms. Lara Tariq was discharged from [place] [address] in stable condition at 1135. She is to return on 22 at 0930 for her prolia injection.     Keegan Santiago RN  2022

## 2022-04-21 ENCOUNTER — APPOINTMENT (OUTPATIENT)
Dept: INFUSION THERAPY | Age: 62
End: 2022-04-21
Payer: MEDICARE

## 2022-04-22 ENCOUNTER — APPOINTMENT (OUTPATIENT)
Dept: PHYSICAL THERAPY | Age: 62
End: 2022-04-22
Payer: MEDICARE

## 2022-04-27 ENCOUNTER — HOSPITAL ENCOUNTER (OUTPATIENT)
Dept: INFUSION THERAPY | Age: 62
Discharge: HOME | End: 2022-04-27
Payer: MEDICARE

## 2022-04-27 VITALS
OXYGEN SATURATION: 98 % | RESPIRATION RATE: 18 BRPM | DIASTOLIC BLOOD PRESSURE: 71 MMHG | SYSTOLIC BLOOD PRESSURE: 105 MMHG | TEMPERATURE: 97.6 F | HEART RATE: 93 BPM

## 2022-04-27 DIAGNOSIS — M81.0 AGE-RELATED OSTEOPOROSIS WITHOUT CURRENT PATHOLOGICAL FRACTURE: ICD-10-CM

## 2022-04-27 DIAGNOSIS — M85.80 OSTEOPENIA, UNSPECIFIED LOCATION: Primary | ICD-10-CM

## 2022-04-27 PROCEDURE — 96372 THER/PROPH/DIAG INJ SC/IM: CPT

## 2022-04-27 PROCEDURE — 74011250636 HC RX REV CODE- 250/636

## 2022-04-27 RX ORDER — ONDANSETRON 2 MG/ML
8 INJECTION INTRAMUSCULAR; INTRAVENOUS AS NEEDED
Status: CANCELLED | OUTPATIENT
Start: 2022-09-14

## 2022-04-27 RX ORDER — ACETAMINOPHEN 325 MG/1
650 TABLET ORAL AS NEEDED
Status: CANCELLED
Start: 2022-09-14

## 2022-04-27 RX ORDER — DIPHENHYDRAMINE HYDROCHLORIDE 50 MG/ML
50 INJECTION, SOLUTION INTRAMUSCULAR; INTRAVENOUS AS NEEDED
Status: CANCELLED
Start: 2022-09-14

## 2022-04-27 RX ORDER — HYDROCORTISONE SODIUM SUCCINATE 100 MG/2ML
100 INJECTION, POWDER, FOR SOLUTION INTRAMUSCULAR; INTRAVENOUS AS NEEDED
Status: CANCELLED | OUTPATIENT
Start: 2022-09-14

## 2022-04-27 RX ORDER — EPINEPHRINE 1 MG/ML
0.3 INJECTION, SOLUTION, CONCENTRATE INTRAVENOUS AS NEEDED
Status: CANCELLED | OUTPATIENT
Start: 2022-09-14

## 2022-04-27 RX ORDER — DIPHENHYDRAMINE HYDROCHLORIDE 50 MG/ML
25 INJECTION, SOLUTION INTRAMUSCULAR; INTRAVENOUS AS NEEDED
Status: CANCELLED
Start: 2022-09-14

## 2022-04-27 RX ORDER — ALBUTEROL SULFATE 0.83 MG/ML
2.5 SOLUTION RESPIRATORY (INHALATION) AS NEEDED
Status: CANCELLED
Start: 2022-09-14

## 2022-04-27 RX ADMIN — DENOSUMAB 60 MG: 60 INJECTION SUBCUTANEOUS at 09:34

## 2022-04-27 NOTE — PROGRESS NOTES
[facility] Progress Note    Date: 2022    Name: Jesse [address]    MRN: [id number]         : 1960    Ms. Nan Salvador arrived in the [address] [place] [address] today at [phone], in stable condition, here for her PROLIA Injection (Every 6 Months). She was assessed and education was provided. Ms. Elder Ratliff vitals were reviewed. Visit Vitals  /71 (BP 1 Location: Right upper arm, BP Patient Position: Sitting)   Pulse 93   Temp 97.6 °F (36.4 °C)   Resp 18   SpO2 98%       Lab results were reviewed. Her most recent CMP, MG, & PHOS levels listed below, were all noted to be satisfactory for treatment today. Results for Edith Gaitan (MRN [id number]) as of 2022 12:34   Ref. Range 2022 11:31   Sodium Latest Ref Range: 136 - 145 mmol/L 131 (L)   Potassium Latest Ref Range: 3.5 - 5.5 mmol/L 4.4   Chloride Latest Ref Range: 100 - 111 mmol/L 95 (L)   CO2 Latest Ref Range: 21 - 32 mmol/L 30   Anion gap Latest Ref Range: 3.0 - 18 mmol/L 6   Glucose Latest Ref Range: 74 - 99 mg/dL 91   BUN Latest Ref Range: 7.0 - 18 MG/DL 17   Creatinine Latest Ref Range: 0.6 - 1.3 MG/DL 0.64   BUN/Creatinine ratio Latest Ref Range: 12 - 20   27 (H)   Calcium Latest Ref Range: 8.5 - 10.1 MG/DL 9.7   Phosphorus Latest Ref Range: 2.5 - 4.9 MG/DL 4.6   Magnesium Latest Ref Range: 1.6 - 2.6 mg/dL 2.3   GFR est non-AA Latest Ref Range: >60 ml/min/1.73m2 >60   GFR est AA Latest Ref Range: >60 ml/min/1.73m2 >60   Bilirubin, total Latest Ref Range: 0.2 - 1.0 MG/DL 0.4   Protein, total Latest Ref Range: 6.4 - 8.2 g/dL 6.8   Albumin Latest Ref Range: 3.4 - 5.0 g/dL 4.1   Globulin Latest Ref Range: 2.0 - 4.0 g/dL 2.7   A-G Ratio Latest Ref Range: 0.8 - 1.7   1.5   ALT Latest Ref Range: 13 - 56 U/L 42   AST Latest Ref Range: 10 - 38 U/L 35   Alk. phosphatase Latest Ref Range: 45 - 117 U/L 30 (L)       Denosumab (PROLIA) 60 mg, was administered SQ, in the back of her right arm per order and without incident.      Ms. Nan Salvador tolerated well and voiced no complaints. Ms. Santiago Purcell was discharged from [place] [address] in stable condition at 940. She is to return in 6 months, on Tuesday, 10-18-22 at 1015, for her next appointment, for pre-Prolia Labs. And then, she is scheduled for her next PROLIA injection on Thursday, 10-26-22 at 1130.     Dennis Lawler RN  April 27, 2022 Niacinamide Counseling: I recommended taking niacin or niacinamide, also know as vitamin B3, twice daily. Recent evidence suggests that taking vitamin B3 (500 mg twice daily) can reduce the risk of actinic keratoses and non-melanoma skin cancers. Side effects of vitamin B3 include flushing and headache.

## 2022-05-03 RX ORDER — OMEPRAZOLE 40 MG/1
CAPSULE, DELAYED RELEASE ORAL
Qty: 90 CAPSULE | Refills: 3 | Status: SHIPPED | OUTPATIENT
Start: 2022-05-03

## 2022-05-25 ENCOUNTER — OFFICE VISIT (OUTPATIENT)
Dept: PULMONOLOGY | Age: 62
End: 2022-05-25
Payer: MEDICARE

## 2022-05-25 VITALS
WEIGHT: 139 LBS | RESPIRATION RATE: 16 BRPM | HEART RATE: 91 BPM | DIASTOLIC BLOOD PRESSURE: 62 MMHG | HEIGHT: 63 IN | SYSTOLIC BLOOD PRESSURE: 110 MMHG | BODY MASS INDEX: 24.63 KG/M2 | TEMPERATURE: 97.7 F | OXYGEN SATURATION: 97 %

## 2022-05-25 DIAGNOSIS — G47.33 OSA (OBSTRUCTIVE SLEEP APNEA): ICD-10-CM

## 2022-05-25 DIAGNOSIS — J45.20 MILD INTERMITTENT ASTHMA WITHOUT COMPLICATION: Primary | ICD-10-CM

## 2022-05-25 DIAGNOSIS — J47.9 BRONCHIECTASIS WITHOUT COMPLICATION (HCC): ICD-10-CM

## 2022-05-25 PROCEDURE — 99214 OFFICE O/P EST MOD 30 MIN: CPT | Performed by: INTERNAL MEDICINE

## 2022-05-25 PROCEDURE — G9717 DOC PT DX DEP/BP F/U NT REQ: HCPCS | Performed by: INTERNAL MEDICINE

## 2022-05-25 PROCEDURE — G8427 DOCREV CUR MEDS BY ELIG CLIN: HCPCS | Performed by: INTERNAL MEDICINE

## 2022-05-25 PROCEDURE — G9899 SCRN MAM PERF RSLTS DOC: HCPCS | Performed by: INTERNAL MEDICINE

## 2022-05-25 PROCEDURE — G8420 CALC BMI NORM PARAMETERS: HCPCS | Performed by: INTERNAL MEDICINE

## 2022-05-25 PROCEDURE — 3017F COLORECTAL CA SCREEN DOC REV: CPT | Performed by: INTERNAL MEDICINE
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
## 2022-05-25 NOTE — PROGRESS NOTES
Mickle Shone presents today for   Chief Complaint   Patient presents with    Follow Up Chronic Condition       Is someone accompanying this pt? No    Is the patient using any DME equipment during OV? No    -DME Company NA    Depression Screening:  3 most recent PHQ Screens 2/2/2022   PHQ Not Done -   Little interest or pleasure in doing things Not at all   Feeling down, depressed, irritable, or hopeless Several days   Total Score PHQ 2 1       Learning Assessment:  Learning Assessment 6/12/2019   PRIMARY LEARNER Patient   BARRIERS PRIMARY LEARNER NONE   CO-LEARNER CAREGIVER No   PRIMARY LANGUAGE ENGLISH   LEARNER PREFERENCE PRIMARY READING     DEMONSTRATION   ANSWERED BY patient   RELATIONSHIP SELF       Abuse Screening:  Abuse Screening Questionnaire 9/7/2021   Do you ever feel afraid of your partner? N   Are you in a relationship with someone who physically or mentally threatens you? N   Is it safe for you to go home? Y       Fall Risk  Fall Risk Assessment, last 12 mths 12/20/2019   Able to walk? Yes   Fall in past 12 months? Yes   Number of falls in past 12 months 8 or more   Fall with injury? 1         Coordination of Care:  1. Have you been to the ER, urgent care clinic since your last visit? Hospitalized since your last visit? Yes; Name: [address] [place] [address] ED  2/22 Bronchitis    2. Have you seen or consulted any other health care providers outside of the [address] [place] [address] since your last visit? Include any pap smears or colon screening.  Pain Management

## 2022-05-25 NOTE — LETTER
5/25/2022    Patient: Mickle Shone   YOB: 1960   Date of Visit: 5/25/2022     Raul Vizcaino, [address] [place] [address]  [place]    Dear Raul Vizcaino MD,      Thank you for referring Ms. Mickle Shone to [facility] [place] for evaluation. My notes for this consultation are attached. If you have questions, please do not hesitate to call me. I look forward to following your patient along with you.       Sincerely,    Andrea Thompson MD

## 2022-06-27 RX ORDER — FLUTICASONE FUROATE 100 UG/1
POWDER RESPIRATORY (INHALATION)
Qty: 30 BLISTER | Refills: 3 | Status: SHIPPED | OUTPATIENT
Start: 2022-06-27

## 2022-06-30 ENCOUNTER — OFFICE VISIT (OUTPATIENT)
Dept: CARDIOLOGY CLINIC | Age: 62
End: 2022-06-30
Payer: MEDICARE

## 2022-06-30 VITALS
HEIGHT: 63 IN | OXYGEN SATURATION: 99 % | BODY MASS INDEX: 23.74 KG/M2 | SYSTOLIC BLOOD PRESSURE: 128 MMHG | DIASTOLIC BLOOD PRESSURE: 70 MMHG | WEIGHT: 134 LBS | HEART RATE: 82 BPM

## 2022-06-30 DIAGNOSIS — R06.09 DYSPNEA ON EXERTION: Primary | ICD-10-CM

## 2022-06-30 PROCEDURE — G9717 DOC PT DX DEP/BP F/U NT REQ: HCPCS | Performed by: INTERNAL MEDICINE

## 2022-06-30 PROCEDURE — 99214 OFFICE O/P EST MOD 30 MIN: CPT | Performed by: INTERNAL MEDICINE

## 2022-06-30 PROCEDURE — G8420 CALC BMI NORM PARAMETERS: HCPCS | Performed by: INTERNAL MEDICINE

## 2022-06-30 PROCEDURE — 3017F COLORECTAL CA SCREEN DOC REV: CPT | Performed by: INTERNAL MEDICINE

## 2022-06-30 PROCEDURE — 93000 ELECTROCARDIOGRAM COMPLETE: CPT | Performed by: INTERNAL MEDICINE

## 2022-06-30 PROCEDURE — G8427 DOCREV CUR MEDS BY ELIG CLIN: HCPCS | Performed by: INTERNAL MEDICINE

## 2022-06-30 PROCEDURE — G9899 SCRN MAM PERF RSLTS DOC: HCPCS | Performed by: INTERNAL MEDICINE

## 2022-06-30 NOTE — PROGRESS NOTES
Gonzalo Pearl    F/u pericardial effusion, Hyperlipidemia    HPI    Gonzalo Pearl is a 62 y.o. female with no known heart disease no hypertension or diabetes who was sent to me for initial evaluation of syncope. She was not feeling well and woke up with a headache. For some unknown reason she felt somewhat nauseous. She apparently then went to urinate in the bathroom and soon after had a syncopal episode. Like she lost consciousness and the first time it was not witnessed but her  was home and came running up stairs. Tried to kind of get her up and then shortly after she passed out again. He took her blood pressure and it was 90s over 60s. They did not go to the ER she just laid down and drink water and she felt back to normal soon after. Has not happened again but she does tell me she has interstitial cystitis and has noticed she has had to strain a little bit more when urinating. She apparently has asthma and does use inhalers and this is the only setting in which she is ever felt any discomfort in her chest at rest when she needs to use an inhaler. This is chronic. Otherwise, no travel,says barely leaves the house due to COVID,no sick contacts. Has been worked up for RA and says has fibromyalgia no other autoimmune disease. No trauma. She has no new SOB, no swelling, can lay flat. After above, I ordered an echo- which surprisingly showed a large pericardial effusion without tamponade. We had close follow up and effusion went to small to moderate quite quickly. After that was addressed,then tackled her lipids. She has had great improvement to her -->107. I'm aware she can wake up with sporadic \"aching\" on her right side and agree this is unlikely related/ myalgias. She's undergoing a lot of stress. CV RFs: +FH premature ASCVD in father?     Past Medical History:   Diagnosis Date    Allergy     Anxiety     Arthritis     Asthma     mild    Atypical ductal hyperplasia of breast     Atypical hyperplasia of breast 2003    right    Chronic depression     Chronic fatigue syndrome     Chronic interstitial cystitis     Chronic interstitial cystitis     Chronic pain     fibromyalgia    Constipation     Depression     Diarrhea     Difficulty walking     Dizziness     Dry eyes     Dry mouth     Dyspnea     Fainting episodes     Pt said she has a condition where she faints after urinating     Fibromyalgia     Fibromyalgia     Generalized stiffness     GERD (gastroesophageal reflux disease)     Headache(784.0)     Heartburn     Hyperlipidemia     IBS (irritable bowel syndrome)     IC (interstitial cystitis)     Imbalance     Incoordination     Insomnia     Irregular periods/menstrual cycles     Joint pain     Microscopic hematuria     Muscle pain     Myofascial pain syndrome     Numbness and tingling in hands     Ovarian cyst, left     Painful sex     Pneumonia     Psychiatric disorder     anxiety - depression    RLS (restless legs syndrome)     Stress     TMJ (temporomandibular joint syndrome)     Transient total loss of muscle tone     Uterine fibroid     Weakness generalized        Past Surgical History:   Procedure Laterality Date    ENDOSCOPY, COLON, DIAGNOSTIC  2013    10 years    HX BREAST BIOPSY  2003    right    HX COLONOSCOPY  2012    donohue    HX GYN      left ovarian cystectomy    HX GYN      laparoscopy, laparotomy, lysis of adhesions.     HX MYOMECTOMY      HX OVARIAN CYST REMOVAL  2003    HX UROLOGICAL  05/24/10    cystoscopy and hydrodilation       Current Outpatient Medications   Medication Sig Dispense Refill    Arnuity Ellipta 100 mcg/actuation dsdv inhaler inhale 1 puff by mouth and INTO THE LUNGS once daily 30 Blister 3    omeprazole (PRILOSEC) 40 mg capsule take 1 capsule by mouth once daily 90 Capsule 3    Spiriva Respimat 2.5 mcg/actuation inhaler inhale 2 puffs by mouth and INTO THE LUNGS once daily 12 g 5    rosuvastatin (CRESTOR) 40 mg tablet take 1 tablet by mouth nightly 90 Tablet 3    montelukast (SINGULAIR) 10 mg tablet take 1 tablet by mouth once daily 90 Tablet 3    mth-me blue-sod phos-phsal-hyo (Vilamit MB) 118-10-40.8-36 mg cap capsule Take 1 Capsule by mouth four (4) times daily. 120 Capsule 11    meloxicam (MOBIC) 15 mg tablet take 1 tablet by mouth once daily      albuterol (PROVENTIL HFA, VENTOLIN HFA, PROAIR HFA) 90 mcg/actuation inhaler Take 2 Puffs by inhalation every six (6) hours as needed for Wheezing. 1 Inhaler 3    mth-me blue-sod phos-phsal-hyo (UribeL) 118-10-40.8-36 mg cap capsule Take 1 Cap by mouth four (4) times daily as needed for Pain. 120 Cap 3    digestive 8-L.acidoph-pectin (DIGESTIVE ENZYME, ACIDOPH,PEC,) 50 million cell-100 mg tab Take 1 Tablet by mouth daily.  gabapentin (NEURONTIN) 800 mg tablet take 1 tablet by mouth three times a day  0    denosumab (PROLIA) 60 mg/mL injection 60 mg by SubCUTAneous route. 2 times yearly      OXcarbazepine (TRILEPTAL) 150 mg tablet Take 1 Tab by mouth three (3) times daily. 90 Tab 2    calcium glycerophosphate (PRELIEF) 65 mg tab Take 1 Tab by mouth daily.  modafinil (PROVIGIL) 200 mg tablet take 1 tablet by mouth twice a day for 30 DAYS maximum daily dose of 2 tablets (Patient taking differently: Take 200 mg by mouth daily.) 60 Tab 5    ergocalciferol (ERGOCALCIFEROL) 50,000 unit capsule take 1 capsule by mouth TWO (2) TIMES A WEEK  Indications: PREVENTION OF VITAMIN D DEFICIENCY 8 Cap 11    OTHER Indications: Compound cream for inflammation      B.infantis-B.ani-B.long-B.bifi (PROBIOTIC 4X) 10-15 mg TbEC Take 1 Tab by mouth daily.  calcium citrate-vitamin D3 (CITRACAL + D) tablet Take 1 Tablet by mouth daily.  LORazepam (ATIVAN) 2 mg tablet Take 2 mg by mouth every eight (8) hours.  DULoxetine (CYMBALTA) 60 mg capsule Take 120 mg by mouth daily.       simethicone (Phazyme) 250 mg cap Take 225 mg by mouth as needed.  trazodone (DESYREL) 100 mg tablet Take 150 mg by mouth nightly. Allergies   Allergen Reactions    Codeine Rash and Itching     Other reaction(s): other/intolerance    Tussionex Itching       Social History     Socioeconomic History    Marital status:      Spouse name: Not on file    Number of children: Not on file    Years of education: Not on file    Highest education level: Not on file   Occupational History    Not on file   Tobacco Use    Smoking status: Never Smoker    Smokeless tobacco: Never Used   Vaping Use    Vaping Use: Never used   Substance and Sexual Activity    Alcohol use: Yes     Alcohol/week: 0.8 standard drinks     Types: 1 Cans of beer per week     Comment: socially    Drug use: No    Sexual activity: Yes     Partners: Male   Other Topics Concern    Not on file   Social History Narrative    Not on file     Social Determinants of Health     Financial Resource Strain:     Difficulty of Paying Living Expenses: Not on file   Food Insecurity:     Worried About Running Out of Food in the Last Year: Not on file    [place] of Food in the Last Year: Not on file   Transportation Needs:     Lack of Transportation (Medical): Not on file    Lack of Transportation (Non-Medical): Not on file   Physical Activity: Unknown    Days of Exercise per Week: 0 days    Minutes of Exercise per Session: Not on file   Stress:     Feeling of Stress : Not on file   Social Connections:     Frequency of Communication with Friends and Family: Not on file    Frequency of Social Gatherings with Friends and Family: Not on file    Attends Mu-ism Services: Not on file    Active Member of Clubs or Organizations: Not on file    Attends Club or Organization Meetings: Not on file    Marital Status: Not on file   Intimate Partner Violence: Not At Risk    Fear of Current or Ex-Partner: No    Emotionally Abused: No    Physically Abused: No    Sexually Abused:  No Housing Stability:     Unable to Pay for Housing in the Last Year: Not on file    Number of Places Lived in the Last Year: Not on file    Unstable Housing in the Last Year: Not on file        FH: father had heart attack in his 46s? Review of Systems    14 pt Review of Systems is negative unless otherwise mentioned in the HPI. Wt Readings from Last 3 Encounters:   06/30/22 60.8 kg (134 lb)   05/25/22 63 kg (139 lb)   03/07/22 63.5 kg (140 lb)     Temp Readings from Last 3 Encounters:   05/25/22 97.7 °F (36.5 °C) (Oral)   04/27/22 97.6 °F (36.4 °C)   04/20/22 97.7 °F (36.5 °C)     BP Readings from Last 3 Encounters:   06/30/22 128/70   05/25/22 110/62   04/27/22 105/71     Pulse Readings from Last 3 Encounters:   06/30/22 82   05/25/22 91   04/27/22 93       Physical Exam:    Visit Vitals  /70 (BP 1 Location: Left upper arm, BP Patient Position: Sitting, BP Cuff Size: Adult)   Pulse 82   Ht 5' 3\" (1.6 m)   Wt 60.8 kg (134 lb)   SpO2 99%   BMI 23.74 kg/m²      Physical Exam  HENT:      Head: Normocephalic and atraumatic. Eyes:      Pupils: Pupils are equal, round, and reactive to light. Cardiovascular:      Rate and Rhythm: Normal rate and regular rhythm. Heart sounds: Normal heart sounds. No murmur heard. No friction rub. No gallop. Pulmonary:      Effort: Pulmonary effort is normal. No respiratory distress. Breath sounds: Normal breath sounds. No wheezing or rales. Chest:      Chest wall: No tenderness. Abdominal:      General: Bowel sounds are normal.      Palpations: Abdomen is soft. Musculoskeletal:         General: No tenderness. Skin:     General: Skin is warm and dry. Neurological:      Mental Status: She is alert and oriented to person, place, and time.          EKG last: NSR, rightward axis and intervals, nonspecific ST segments, low voltage, similar to prior and c/w underlying pulm disease    Lab Results   Component Value Date/Time    Cholesterol, total 240 (H) 12/28/2021 02:41 PM    HDL Cholesterol 104 (H) 12/28/2021 02:41 PM    LDL, calculated 121.4 (H) 12/28/2021 02:41 PM    VLDL, calculated 14.6 12/28/2021 02:41 PM    Triglyceride 73 12/28/2021 02:41 PM    CHOL/HDL Ratio 2.3 12/28/2021 02:41 PM     Lab Results   Component Value Date/Time    Sed rate (ESR) 3 08/29/2015 10:42 AM     Lab Results   Component Value Date/Time    TSH 1.10 11/16/2020 01:16 PM     Lab Results   Component Value Date/Time    WBC 6.6 11/16/2020 01:16 PM    Hemoglobin, POC 13.9 08/28/2019 11:13 AM    HGB 14.2 11/16/2020 01:16 PM    Hematocrit, POC 41 08/28/2019 11:13 AM    HCT 41.2 11/16/2020 01:16 PM    PLATELET 986 51/34/0155 01:16 PM    MCV 82.9 11/16/2020 01:16 PM       Impression and Plan:  Saran Desai is a 62 y.o. with:    1.) Syncope, most likely vasovagal/ neurocardiogenic  2.) LDL >265-->107 on HI statin! (CAC 84);  3.) +FH premature ASCVD  4.) Asthma, following with pulm  5.) Large incidentally noted pericardial effusion without tamponade    1.) limited echo to ensure effusion resolved  2.) Cont HI statin indefinitely, just needs FLP yearly now (upcoming labs with PCP next month)  3.) RTC yearly    35 mins    Thank you for allowing me to participate in the care of your patient, please do not hesitate to call with questions or concerns.     [address],    Marva Lee, DO

## 2022-06-30 NOTE — PROGRESS NOTES
Jason Snyder presents today for   Chief Complaint   Patient presents with    Follow-up     1 year follow up     Shortness of Breath     occasionally due to asthma        Jason Snyder preferred language for health care discussion is english/other. Is someone accompanying this pt? no    Is the patient using any DME equipment during [address]? no    Depression Screening:  3 most recent PHQ Screens 2/2/2022   PHQ Not Done -   Little interest or pleasure in doing things Not at all   Feeling down, depressed, irritable, or hopeless Several days   Total Score PHQ 2 1       Learning Assessment:  Learning Assessment 6/12/2019   PRIMARY LEARNER Patient   BARRIERS PRIMARY LEARNER NONE   CO-LEARNER CAREGIVER No   PRIMARY LANGUAGE ENGLISH   LEARNER PREFERENCE PRIMARY READING     DEMONSTRATION   ANSWERED BY patient   RELATIONSHIP SELF       Abuse Screening:  Abuse Screening Questionnaire 9/7/2021   Do you ever feel afraid of your partner? N   Are you in a relationship with someone who physically or mentally threatens you? N   Is it safe for you to go home? Y       Fall Risk  Fall Risk Assessment, last 12 mths 12/20/2019   Able to walk? Yes   Fall in past 12 months? Yes   Number of falls in past 12 months 8 or more   Fall with injury? 1       Pt currently taking Anticoagulant therapy? no    Coordination of Care:  1. Have you been to the ER, urgent care clinic since your last visit? Hospitalized since your last visit? no    2. Have you seen or consulted any other health care providers outside of the [address] [place] [address] since your last visit? Include any pap smears or colon screening.  no

## 2022-07-26 ENCOUNTER — APPOINTMENT (OUTPATIENT)
Dept: INTERNAL MEDICINE CLINIC | Age: 62
End: 2022-07-26

## 2022-07-26 ENCOUNTER — HOSPITAL ENCOUNTER (OUTPATIENT)
Dept: LAB | Age: 62
Discharge: HOME | End: 2022-07-26
Payer: MEDICARE

## 2022-07-26 DIAGNOSIS — E78.5 HYPERLIPIDEMIA LDL GOAL <160: ICD-10-CM

## 2022-07-26 DIAGNOSIS — E55.9 VITAMIN D DEFICIENCY: ICD-10-CM

## 2022-07-26 LAB
25(OH)D3 SERPL-MCNC: 113.1 NG/ML (ref 30–100)
ALBUMIN SERPL-MCNC: 4.2 G/DL (ref 3.4–5)
ALBUMIN/GLOB SERPL: 1.7 {RATIO} (ref 0.8–1.7)
ALP SERPL-CCNC: 24 U/L (ref 45–117)
ALT SERPL-CCNC: 44 U/L (ref 13–56)
ANION GAP SERPL CALC-SCNC: 8 MMOL/L (ref 3–18)
AST SERPL-CCNC: 28 U/L (ref 10–38)
BILIRUB SERPL-MCNC: 0.6 MG/DL (ref 0.2–1)
BUN SERPL-MCNC: 13 MG/DL (ref 7–18)
BUN/CREAT SERPL: 21 (ref 12–20)
CALCIUM SERPL-MCNC: 8.9 MG/DL (ref 8.5–10.1)
CHLORIDE SERPL-SCNC: 95 MMOL/L (ref 100–111)
CHOLEST SERPL-MCNC: 190 MG/DL
CO2 SERPL-SCNC: 29 MMOL/L (ref 21–32)
CREAT SERPL-MCNC: 0.62 MG/DL (ref 0.6–1.3)
GLOBULIN SER CALC-MCNC: 2.5 G/DL (ref 2–4)
GLUCOSE SERPL-MCNC: 90 MG/DL (ref 74–99)
HDLC SERPL-MCNC: 81 MG/DL (ref 40–60)
HDLC SERPL: 2.3 {RATIO} (ref 0–5)
LDLC SERPL CALC-MCNC: 95.6 MG/DL (ref 0–100)
LIPID PROFILE,FLP: ABNORMAL
POTASSIUM SERPL-SCNC: 4 MMOL/L (ref 3.5–5.5)
PROT SERPL-MCNC: 6.7 G/DL (ref 6.4–8.2)
SODIUM SERPL-SCNC: 132 MMOL/L (ref 136–145)
TRIGL SERPL-MCNC: 67 MG/DL (ref ?–150)
VLDLC SERPL CALC-MCNC: 13.4 MG/DL

## 2022-07-26 PROCEDURE — 82306 VITAMIN D 25 HYDROXY: CPT

## 2022-07-26 PROCEDURE — 36415 COLL VENOUS BLD VENIPUNCTURE: CPT

## 2022-07-26 PROCEDURE — 80061 LIPID PANEL: CPT

## 2022-07-26 PROCEDURE — 80053 COMPREHEN METABOLIC PANEL: CPT

## 2022-08-02 ENCOUNTER — OFFICE VISIT (OUTPATIENT)
Dept: INTERNAL MEDICINE CLINIC | Age: 62
End: 2022-08-02
Payer: MEDICARE

## 2022-08-02 VITALS
WEIGHT: 130 LBS | SYSTOLIC BLOOD PRESSURE: 126 MMHG | HEIGHT: 63 IN | BODY MASS INDEX: 23.04 KG/M2 | DIASTOLIC BLOOD PRESSURE: 72 MMHG | TEMPERATURE: 97.9 F | OXYGEN SATURATION: 98 % | RESPIRATION RATE: 18 BRPM | HEART RATE: 92 BPM

## 2022-08-02 DIAGNOSIS — R93.1 ELEVATED CORONARY ARTERY CALCIUM SCORE: ICD-10-CM

## 2022-08-02 DIAGNOSIS — E55.9 VITAMIN D DEFICIENCY: ICD-10-CM

## 2022-08-02 DIAGNOSIS — Z00.00 MEDICARE ANNUAL WELLNESS VISIT, SUBSEQUENT: Primary | ICD-10-CM

## 2022-08-02 DIAGNOSIS — E78.5 HYPERLIPIDEMIA LDL GOAL <160: ICD-10-CM

## 2022-08-02 PROCEDURE — G8427 DOCREV CUR MEDS BY ELIG CLIN: HCPCS | Performed by: INTERNAL MEDICINE

## 2022-08-02 PROCEDURE — 3017F COLORECTAL CA SCREEN DOC REV: CPT | Performed by: INTERNAL MEDICINE

## 2022-08-02 PROCEDURE — G9717 DOC PT DX DEP/BP F/U NT REQ: HCPCS | Performed by: INTERNAL MEDICINE

## 2022-08-02 PROCEDURE — G8420 CALC BMI NORM PARAMETERS: HCPCS | Performed by: INTERNAL MEDICINE

## 2022-08-02 PROCEDURE — G9899 SCRN MAM PERF RSLTS DOC: HCPCS | Performed by: INTERNAL MEDICINE

## 2022-08-02 PROCEDURE — G0439 PPPS, SUBSEQ VISIT: HCPCS | Performed by: INTERNAL MEDICINE

## 2022-08-02 NOTE — PROGRESS NOTES
Patient is in the office today for a 6 month follow up. Patient states Pain Management suggested she have PCP refill Vitamin D.  1. \"Have you been to the ER, urgent care clinic since your last visit? Hospitalized since your last visit? \" No    2. \"Have you seen or consulted any other health care providers outside of the [address] [place] [address] since your last visit? \"  Yes, Dr. Beverly Mon Pain Management. 3. For patients aged 39-70: Has the patient had a colonoscopy / FIT/ Cologuard? Yes - no Care Gap present      If the patient is female:    4. For patients aged 41-77: Has the patient had a mammogram within the past 2 years? Yes - no Care Gap present      5. For patients aged 21-65: Has the patient had a pap smear?  Yes - no Care Gap present

## 2022-08-02 NOTE — PATIENT INSTRUCTIONS

## 2022-08-11 ENCOUNTER — PATIENT MESSAGE (OUTPATIENT)
Dept: CARDIOLOGY CLINIC | Age: 62
End: 2022-08-11

## 2022-08-16 NOTE — TELEPHONE ENCOUNTER
Patient called in today in regards to concerns with Echo results seen on my chart. Advise Patient I would inform Dr Nannette Benavidez. Patient verbalized understanding.

## 2022-08-22 NOTE — TELEPHONE ENCOUNTER
Called Patient back in regards to concerns with Echo results. Patient sts she would prefer to s/w the Provider directly. Asked if there is anything I could help with. Patient sts she would like to s/w Dr Aviva Wayne. Sent message for Dr Aviva Wayne to contact patient.

## 2022-08-24 ENCOUNTER — TELEPHONE (OUTPATIENT)
Dept: CARDIOLOGY CLINIC | Age: 62
End: 2022-08-24

## 2022-08-24 NOTE — TELEPHONE ENCOUNTER
Pt requests to only speak to me about several questions (after goggling several terms she saw in [facility] in her echo report)    We again discussed her effusion at length. Her EF is low normal and while it is different for her, she also insists her SOB is related to her asthma and her chest tightness goes away after using her inhaler. So after intense discussion and shared decision making, we will recheck an echo in 6 months. If her EF is still <50% will order her a stress test at that time and move her follow up if needed. She also wants me to see her FLP- which for some reason says \"not released\" but tells me its greatly improved.

## 2022-08-25 DIAGNOSIS — I31.39 PERICARDIAL EFFUSION: Primary | ICD-10-CM

## 2022-09-15 ENCOUNTER — TELEPHONE (OUTPATIENT)
Dept: CARDIOLOGY CLINIC | Age: 62
End: 2022-09-15

## 2022-09-15 DIAGNOSIS — I31.39 PERICARDIAL EFFUSION: Primary | ICD-10-CM

## 2022-09-15 NOTE — TELEPHONE ENCOUNTER
----- Message from Deepa Chandra DO sent at 9/14/2022  4:16 PM EDT -----  Regarding: RE: ABN Trigger  Im not sure why this is!? Can we get a new order for echo  Indication is pericardial effusion  (It hopefully says in my note when we discussed it should be- 6 months from last?)    ----- Message -----  From: Lizeth Roldan  Sent: 9/14/2022   3:23 PM EDT  To: Deeap Chandra DO  Subject: ABN Trigger                                      Hello,    Patient will need a new order for a Echocardiogram. The existing order has an ABN (Advanced Beneficiary Notice) Trigger and the ICD-10 code does not meet medical necessity for Medicare. CMS website provides ICD guidelines.       Thanks

## 2022-09-20 ENCOUNTER — APPOINTMENT (OUTPATIENT)
Dept: INFUSION THERAPY | Age: 62
End: 2022-09-20
Payer: MEDICARE

## 2022-09-22 ENCOUNTER — APPOINTMENT (OUTPATIENT)
Dept: INFUSION THERAPY | Age: 62
End: 2022-09-22

## 2022-09-28 ENCOUNTER — TELEPHONE (OUTPATIENT)
Dept: INTERNAL MEDICINE CLINIC | Age: 62
End: 2022-09-28

## 2022-09-28 ENCOUNTER — PATIENT MESSAGE (OUTPATIENT)
Dept: INTERNAL MEDICINE CLINIC | Age: 62
End: 2022-09-28

## 2022-09-28 NOTE — TELEPHONE ENCOUNTER
----- Message from Ray Adin sent at 9/28/2022  3:47 PM EDT -----  Subject: Message to Provider    QUESTIONS  Information for Provider? Patient is needing a call to schedule her flu   shot. Please call back. ---------------------------------------------------------------------------  --------------  Bruce MCCORD  [phone]; OK to leave message on voicemail  ---------------------------------------------------------------------------  --------------  SCRIPT ANSWERS  Relationship to Patient?  Self

## 2022-10-18 ENCOUNTER — HOSPITAL ENCOUNTER (OUTPATIENT)
Dept: INFUSION THERAPY | Age: 62
Discharge: HOME | End: 2022-10-18
Payer: MEDICARE

## 2022-10-18 VITALS
RESPIRATION RATE: 16 BRPM | OXYGEN SATURATION: 97 % | TEMPERATURE: 98.3 F | HEART RATE: 90 BPM | SYSTOLIC BLOOD PRESSURE: 119 MMHG | DIASTOLIC BLOOD PRESSURE: 72 MMHG

## 2022-10-18 LAB
ALBUMIN SERPL-MCNC: 4.1 G/DL (ref 3.4–5)
ALBUMIN/GLOB SERPL: 1.5 {RATIO} (ref 0.8–1.7)
ALP SERPL-CCNC: 32 U/L (ref 45–117)
ALT SERPL-CCNC: 43 U/L (ref 13–56)
ANION GAP SERPL CALC-SCNC: 6 MMOL/L (ref 3–18)
AST SERPL-CCNC: 22 U/L (ref 10–38)
BILIRUB SERPL-MCNC: 0.7 MG/DL (ref 0.2–1)
BUN SERPL-MCNC: 19 MG/DL (ref 7–18)
BUN/CREAT SERPL: 28 (ref 12–20)
CALCIUM SERPL-MCNC: 9.2 MG/DL (ref 8.5–10.1)
CHLORIDE SERPL-SCNC: 95 MMOL/L (ref 100–111)
CO2 SERPL-SCNC: 30 MMOL/L (ref 21–32)
CREAT SERPL-MCNC: 0.67 MG/DL (ref 0.6–1.3)
GLOBULIN SER CALC-MCNC: 2.7 G/DL (ref 2–4)
GLUCOSE SERPL-MCNC: 103 MG/DL (ref 74–99)
MAGNESIUM SERPL-MCNC: 2.3 MG/DL (ref 1.6–2.6)
PHOSPHATE SERPL-MCNC: 5 MG/DL (ref 2.5–4.9)
POTASSIUM SERPL-SCNC: 3.7 MMOL/L (ref 3.5–5.5)
PROT SERPL-MCNC: 6.8 G/DL (ref 6.4–8.2)
SODIUM SERPL-SCNC: 131 MMOL/L (ref 136–145)

## 2022-10-18 PROCEDURE — 84100 ASSAY OF PHOSPHORUS: CPT

## 2022-10-18 PROCEDURE — 80053 COMPREHEN METABOLIC PANEL: CPT

## 2022-10-18 PROCEDURE — 83735 ASSAY OF MAGNESIUM: CPT

## 2022-10-18 PROCEDURE — 36415 COLL VENOUS BLD VENIPUNCTURE: CPT

## 2022-10-25 RX ORDER — HYDROCORTISONE SODIUM SUCCINATE 100 MG/2ML
100 INJECTION, POWDER, FOR SOLUTION INTRAMUSCULAR; INTRAVENOUS AS NEEDED
Status: CANCELLED | OUTPATIENT
Start: 2022-10-26

## 2022-10-25 RX ORDER — ACETAMINOPHEN 325 MG/1
650 TABLET ORAL AS NEEDED
Status: CANCELLED
Start: 2022-10-26

## 2022-10-25 RX ORDER — ALBUTEROL SULFATE 0.83 MG/ML
2.5 SOLUTION RESPIRATORY (INHALATION) AS NEEDED
Status: CANCELLED
Start: 2022-10-26

## 2022-10-25 RX ORDER — EPINEPHRINE 1 MG/ML
0.3 INJECTION, SOLUTION, CONCENTRATE INTRAVENOUS AS NEEDED
Status: CANCELLED | OUTPATIENT
Start: 2022-10-26

## 2022-10-25 RX ORDER — DIPHENHYDRAMINE HYDROCHLORIDE 50 MG/ML
25 INJECTION, SOLUTION INTRAMUSCULAR; INTRAVENOUS AS NEEDED
Status: CANCELLED
Start: 2022-10-26

## 2022-10-25 RX ORDER — DIPHENHYDRAMINE HYDROCHLORIDE 50 MG/ML
50 INJECTION, SOLUTION INTRAMUSCULAR; INTRAVENOUS AS NEEDED
Status: CANCELLED
Start: 2022-10-26

## 2022-10-25 RX ORDER — ONDANSETRON 2 MG/ML
8 INJECTION INTRAMUSCULAR; INTRAVENOUS AS NEEDED
Status: CANCELLED | OUTPATIENT
Start: 2022-10-26

## 2022-10-26 ENCOUNTER — HOSPITAL ENCOUNTER (OUTPATIENT)
Dept: INFUSION THERAPY | Age: 62
Discharge: HOME | End: 2022-10-26
Payer: MEDICARE

## 2022-10-26 VITALS
TEMPERATURE: 98.7 F | OXYGEN SATURATION: 98 % | RESPIRATION RATE: 18 BRPM | DIASTOLIC BLOOD PRESSURE: 74 MMHG | HEART RATE: 80 BPM | SYSTOLIC BLOOD PRESSURE: 114 MMHG

## 2022-10-26 DIAGNOSIS — M85.80 OSTEOPENIA, UNSPECIFIED LOCATION: Primary | ICD-10-CM

## 2022-10-26 DIAGNOSIS — M81.0 AGE-RELATED OSTEOPOROSIS WITHOUT CURRENT PATHOLOGICAL FRACTURE: ICD-10-CM

## 2022-10-26 PROCEDURE — 74011250636 HC RX REV CODE- 250/636: Performed by: PSYCHIATRY & NEUROLOGY

## 2022-10-26 PROCEDURE — 96372 THER/PROPH/DIAG INJ SC/IM: CPT

## 2022-10-26 RX ORDER — DIPHENHYDRAMINE HYDROCHLORIDE 50 MG/ML
25 INJECTION, SOLUTION INTRAMUSCULAR; INTRAVENOUS AS NEEDED
Start: 2023-04-26

## 2022-10-26 RX ORDER — ONDANSETRON 2 MG/ML
8 INJECTION INTRAMUSCULAR; INTRAVENOUS AS NEEDED
OUTPATIENT
Start: 2023-04-26

## 2022-10-26 RX ORDER — DIPHENHYDRAMINE HYDROCHLORIDE 50 MG/ML
50 INJECTION, SOLUTION INTRAMUSCULAR; INTRAVENOUS AS NEEDED
Start: 2023-04-26

## 2022-10-26 RX ORDER — EPINEPHRINE 1 MG/ML
0.3 INJECTION, SOLUTION, CONCENTRATE INTRAVENOUS AS NEEDED
OUTPATIENT
Start: 2023-04-26

## 2022-10-26 RX ORDER — HYDROCORTISONE SODIUM SUCCINATE 100 MG/2ML
100 INJECTION, POWDER, FOR SOLUTION INTRAMUSCULAR; INTRAVENOUS AS NEEDED
OUTPATIENT
Start: 2023-04-26

## 2022-10-26 RX ORDER — ALBUTEROL SULFATE 0.83 MG/ML
2.5 SOLUTION RESPIRATORY (INHALATION) AS NEEDED
Start: 2023-04-26

## 2022-10-26 RX ORDER — ACETAMINOPHEN 325 MG/1
650 TABLET ORAL AS NEEDED
Start: 2023-04-26

## 2022-10-26 RX ADMIN — DENOSUMAB 60 MG: 60 INJECTION SUBCUTANEOUS at 11:59

## 2022-10-26 NOTE — PROGRESS NOTES
[facility] Progress Note    Date: 2022    Name: Andrea Johnson    MRN: [id number]         : 1960    Ms. Zoila Reyes arrived in the [facility] today at [id number] [phone], in stable condition, here for her PROLIA Injection (Every 6 Months). She was assessed and education was provided. Ms. Finn Herrera vitals were reviewed. Visit Vitals  /74 (BP 1 Location: Right upper arm, BP Patient Position: Sitting)   Pulse 80   Temp 98.7 °F (37.1 °C)   Resp 18   SpO2 98%   Breastfeeding No       Lab results were reviewed. Her most recent CMP, MG, & PHOS levels listed below, were all noted to be satisfactory for treatment today. Latest Reference Range & Units 10/18/22 13:25   Sodium 136 - 145 mmol/L 131 (L)   Potassium 3.5 - 5.5 mmol/L 3.7   Chloride 100 - 111 mmol/L 95 (L)   CO2 21 - 32 mmol/L 30   Anion gap 3.0 - 18 mmol/L 6   Glucose 74 - 99 mg/dL 103 (H)   BUN 7.0 - 18 MG/DL 19 (H)   Creatinine 0.6 - 1.3 MG/DL 0.67   BUN/Creatinine ratio 12 - 20   28 (H)   Calcium 8.5 - 10.1 MG/DL 9.2   Phosphorus 2.5 - 4.9 MG/DL 5.0 (H)   Magnesium 1.6 - 2.6 mg/dL 2.3   Bilirubin, total 0.2 - 1.0 MG/DL 0.7   Protein, total 6.4 - 8.2 g/dL 6.8   Albumin 3.4 - 5.0 g/dL 4.1   Globulin 2.0 - 4.0 g/dL 2.7   A-G Ratio 0.8 - 1.7   1.5   ALT 13 - 56 U/L 43   AST 10 - 38 U/L 22   Alk. phosphatase 45 - 117 U/L 32 (L)   (L): Data is abnormally low  (H): Data is abnormally high        Denosumab (PROLIA) 60 mg, was administered SQ, in the back of her right arm per order and without incident. Ms. Zoila Reyes tolerated well and voiced no complaints. Ms. Zoila Reyes was discharged from [place] [address] in stable condition at 1205. She is to return in 6 months, on Tuesday, 23 at 1330, for her next appointment, for pre-[facility]. And then, she is scheduled for her next PROLIA injection on Thursday, 23 at 1130.     Susie Meredith RN  2022

## 2022-11-03 ENCOUNTER — HOSPITAL ENCOUNTER (OUTPATIENT)
Dept: GENERAL RADIOLOGY | Age: 62
Discharge: HOME | End: 2022-11-03
Payer: MEDICARE

## 2022-11-03 DIAGNOSIS — M25.552 LEFT HIP PAIN: ICD-10-CM

## 2022-11-03 PROCEDURE — 73502 X-RAY EXAM HIP UNI 2-3 VIEWS: CPT

## 2022-11-29 ENCOUNTER — OFFICE VISIT (OUTPATIENT)
Dept: PULMONOLOGY | Age: 62
End: 2022-11-29
Payer: MEDICARE

## 2022-11-29 VITALS
WEIGHT: 121 LBS | DIASTOLIC BLOOD PRESSURE: 74 MMHG | RESPIRATION RATE: 16 BRPM | BODY MASS INDEX: 21.44 KG/M2 | OXYGEN SATURATION: 98 % | SYSTOLIC BLOOD PRESSURE: 131 MMHG | HEART RATE: 93 BPM | HEIGHT: 63 IN | TEMPERATURE: 97.6 F

## 2022-11-29 DIAGNOSIS — J45.30 MILD PERSISTENT ASTHMA WITHOUT COMPLICATION: Primary | ICD-10-CM

## 2022-11-29 DIAGNOSIS — J20.9 BRONCHITIS WITH BRONCHOSPASM: ICD-10-CM

## 2022-11-29 DIAGNOSIS — J47.9 BRONCHIECTASIS WITHOUT COMPLICATION (HCC): ICD-10-CM

## 2022-11-29 PROCEDURE — G9899 SCRN MAM PERF RSLTS DOC: HCPCS | Performed by: INTERNAL MEDICINE

## 2022-11-29 PROCEDURE — G8427 DOCREV CUR MEDS BY ELIG CLIN: HCPCS | Performed by: INTERNAL MEDICINE

## 2022-11-29 PROCEDURE — G9717 DOC PT DX DEP/BP F/U NT REQ: HCPCS | Performed by: INTERNAL MEDICINE

## 2022-11-29 PROCEDURE — G8420 CALC BMI NORM PARAMETERS: HCPCS | Performed by: INTERNAL MEDICINE

## 2022-11-29 PROCEDURE — 99214 OFFICE O/P EST MOD 30 MIN: CPT | Performed by: INTERNAL MEDICINE

## 2022-11-29 PROCEDURE — 3017F COLORECTAL CA SCREEN DOC REV: CPT | Performed by: INTERNAL MEDICINE

## 2022-11-29 RX ORDER — FLUTICASONE FUROATE, UMECLIDINIUM BROMIDE AND VILANTEROL TRIFENATATE 100; 62.5; 25 UG/1; UG/1; UG/1
1 POWDER RESPIRATORY (INHALATION) DAILY
Qty: 14 EACH | Refills: 0 | Status: SHIPPED | COMMUNITY
Start: 2022-11-29
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
## 2022-11-29 NOTE — PROGRESS NOTES
Jess Castillo presents today for   Chief Complaint   Patient presents with    Shortness of Breath       Is someone accompanying this pt? No    Is the patient using any DME equipment during OV? No    -DME Company NA    Depression Screening:  3 most recent PHQ Screens 8/2/2022   PHQ Not Done -   Little interest or pleasure in doing things Not at all   Feeling down, depressed, irritable, or hopeless Not at all   Total Score PHQ 2 0       Learning Assessment:  Learning Assessment 6/12/2019   PRIMARY LEARNER Patient   BARRIERS PRIMARY LEARNER NONE   CO-LEARNER CAREGIVER No   PRIMARY LANGUAGE ENGLISH   LEARNER PREFERENCE PRIMARY READING     DEMONSTRATION   ANSWERED BY patient   RELATIONSHIP SELF       Abuse Screening:  Abuse Screening Questionnaire 9/7/2021   Do you ever feel afraid of your partner? N   Are you in a relationship with someone who physically or mentally threatens you? N   Is it safe for you to go home? Y       Fall Risk  Fall Risk Assessment, last 12 mths 12/20/2019   Able to walk? Yes   Fall in past 12 months? Yes   Number of falls in past 12 months 8 or more   Fall with injury? 1         Coordination of Care:  1. Have you been to the ER, urgent care clinic since your last visit? Hospitalized since your last visit? No    2. Have you seen or consulted any other health care providers outside of the [address] [place] [address] since your last visit? Include any pap smears or colon screening.  Dr Jan Neal   Pain Management

## 2022-11-29 NOTE — LETTER
11/29/2022    Patient: Lex Bowen   YOB: 1960   Date of Visit: 11/29/2022     Maribell Burnette, [facility]    Dear Maribell Burnette MD,      Thank you for referring Ms. Lex Bowen to [address] [place] [address] for evaluation. My notes for this consultation are attached. If you have questions, please do not hesitate to call me. I look forward to following your patient along with you.       Sincerely,    Lucas Issa MD

## 2022-12-09 RX ORDER — ALBUTEROL SULFATE 90 UG/1
AEROSOL, METERED RESPIRATORY (INHALATION)
Qty: 18 G | Refills: 4 | Status: SHIPPED | OUTPATIENT
Start: 2022-12-09

## 2022-12-16 ENCOUNTER — TELEPHONE (OUTPATIENT)
Dept: INTERNAL MEDICINE CLINIC | Age: 62
End: 2022-12-16

## 2022-12-16 DIAGNOSIS — Z12.31 ENCOUNTER FOR SCREENING MAMMOGRAM FOR MALIGNANT NEOPLASM OF BREAST: Primary | ICD-10-CM

## 2022-12-16 NOTE — TELEPHONE ENCOUNTER
----- Message from Sabrina Mesa sent at 12/15/2022  2:49 PM EST -----  Subject: Message to Provider    QUESTIONS  Information for Provider? patient needs a mammograms order placed, and   would like to go to [address] [place] [address], please contact patient to   advise when sent. Needs for after 01/17/2023  ---------------------------------------------------------------------------  --------------  Roni ALBRECHT  [phone]; OK to leave message on voicemail  ---------------------------------------------------------------------------  --------------  SCRIPT ANSWERS  Relationship to Patient?  Self

## 2022-12-25 DIAGNOSIS — J45.40 MODERATE PERSISTENT ASTHMA WITHOUT COMPLICATION: ICD-10-CM

## 2022-12-27 RX ORDER — MONTELUKAST SODIUM 10 MG/1
TABLET ORAL
Qty: 90 TABLET | Refills: 3 | Status: SHIPPED | OUTPATIENT
Start: 2022-12-27

## 2023-01-16 DIAGNOSIS — Z12.31 ENCOUNTER FOR SCREENING MAMMOGRAM FOR MALIGNANT NEOPLASM OF BREAST: ICD-10-CM

## 2023-01-19 ENCOUNTER — HOSPITAL ENCOUNTER (OUTPATIENT)
Dept: MAMMOGRAPHY | Age: 63
Discharge: HOME | End: 2023-01-19
Attending: INTERNAL MEDICINE
Payer: MEDICARE

## 2023-01-19 PROCEDURE — 77063 BREAST TOMOSYNTHESIS BI: CPT

## 2023-02-01 ENCOUNTER — APPOINTMENT (OUTPATIENT)
Dept: INTERNAL MEDICINE CLINIC | Age: 63
End: 2023-02-01

## 2023-02-01 ENCOUNTER — HOSPITAL ENCOUNTER (OUTPATIENT)
Dept: LAB | Age: 63
Discharge: HOME | End: 2023-02-01
Payer: MEDICARE

## 2023-02-01 DIAGNOSIS — E55.9 VITAMIN D DEFICIENCY: ICD-10-CM

## 2023-02-01 DIAGNOSIS — E78.5 HYPERLIPIDEMIA LDL GOAL <160: ICD-10-CM

## 2023-02-01 LAB
25(OH)D3 SERPL-MCNC: 42.2 NG/ML (ref 30–100)
ALBUMIN SERPL-MCNC: 4.2 G/DL (ref 3.4–5)
ALBUMIN/GLOB SERPL: 1.6 (ref 0.8–1.7)
ALP SERPL-CCNC: 32 U/L (ref 45–117)
ALT SERPL-CCNC: 37 U/L (ref 13–56)
ANION GAP SERPL CALC-SCNC: 4 MMOL/L (ref 3–18)
AST SERPL-CCNC: 29 U/L (ref 10–38)
BILIRUB SERPL-MCNC: 0.5 MG/DL (ref 0.2–1)
BUN SERPL-MCNC: 14 MG/DL (ref 7–18)
BUN/CREAT SERPL: 22 (ref 12–20)
CALCIUM SERPL-MCNC: 9.1 MG/DL (ref 8.5–10.1)
CHLORIDE SERPL-SCNC: 95 MMOL/L (ref 100–111)
CHOLEST SERPL-MCNC: 204 MG/DL
CO2 SERPL-SCNC: 31 MMOL/L (ref 21–32)
CREAT SERPL-MCNC: 0.63 MG/DL (ref 0.6–1.3)
GLOBULIN SER CALC-MCNC: 2.7 G/DL (ref 2–4)
GLUCOSE SERPL-MCNC: 91 MG/DL (ref 74–99)
HDLC SERPL-MCNC: 84 MG/DL (ref 40–60)
HDLC SERPL: 2.4 (ref 0–5)
LDLC SERPL CALC-MCNC: 102 MG/DL (ref 0–100)
LIPID PROFILE,FLP: ABNORMAL
POTASSIUM SERPL-SCNC: 3.8 MMOL/L (ref 3.5–5.5)
PROT SERPL-MCNC: 6.9 G/DL (ref 6.4–8.2)
SODIUM SERPL-SCNC: 130 MMOL/L (ref 136–145)
TRIGL SERPL-MCNC: 90 MG/DL (ref ?–150)
VLDLC SERPL CALC-MCNC: 18 MG/DL

## 2023-02-01 PROCEDURE — 80061 LIPID PANEL: CPT

## 2023-02-01 PROCEDURE — 36415 COLL VENOUS BLD VENIPUNCTURE: CPT

## 2023-02-01 PROCEDURE — 80053 COMPREHEN METABOLIC PANEL: CPT

## 2023-02-01 PROCEDURE — 82306 VITAMIN D 25 HYDROXY: CPT

## 2023-02-07 ENCOUNTER — OFFICE VISIT (OUTPATIENT)
Dept: INTERNAL MEDICINE CLINIC | Age: 63
End: 2023-02-07
Payer: MEDICARE

## 2023-02-07 VITALS
BODY MASS INDEX: 20.55 KG/M2 | RESPIRATION RATE: 20 BRPM | TEMPERATURE: 98.6 F | HEART RATE: 95 BPM | HEIGHT: 63 IN | OXYGEN SATURATION: 99 % | WEIGHT: 116 LBS | SYSTOLIC BLOOD PRESSURE: 117 MMHG | DIASTOLIC BLOOD PRESSURE: 80 MMHG

## 2023-02-07 DIAGNOSIS — M81.0 AGE-RELATED OSTEOPOROSIS WITHOUT CURRENT PATHOLOGICAL FRACTURE: ICD-10-CM

## 2023-02-07 DIAGNOSIS — R93.1 ELEVATED CORONARY ARTERY CALCIUM SCORE: ICD-10-CM

## 2023-02-07 DIAGNOSIS — E55.9 VITAMIN D DEFICIENCY: ICD-10-CM

## 2023-02-07 DIAGNOSIS — E78.5 HYPERLIPIDEMIA LDL GOAL <160: Primary | ICD-10-CM

## 2023-02-07 DIAGNOSIS — I65.23 BILATERAL CAROTID ARTERY STENOSIS: ICD-10-CM

## 2023-02-07 DIAGNOSIS — Z12.11 COLON CANCER SCREENING: ICD-10-CM

## 2023-02-07 DIAGNOSIS — I31.39 PERICARDIAL EFFUSION: ICD-10-CM

## 2023-02-07 PROCEDURE — G0463 HOSPITAL OUTPT CLINIC VISIT: HCPCS | Performed by: INTERNAL MEDICINE

## 2023-02-07 NOTE — PROGRESS NOTES
Katrin Landin is a 65 y.o.  female and presents with Cholesterol Problem, Vitamin D Deficiency (Follow up), and Osteoporosis      SUBJECTIVE:  Patient has history of very high LDL that has been over 200 in the past.  Patient had coronary artery calcium score done that was elevated at 84. Patient was evaluated by cardiology and had a mild pericardial effusion that is being monitored. Patient is now on Crestor 40 mg which has significantly improved her cholesterol. Patient did have [facility] screening that showed carotid stenosis. We will therefore get an official carotid duplex Doppler for baseline. Patient has a history of asthma moderate persistent for which she uses Asmanex and Singulair and Spiriva. Patient is followed by pulmonary. Patient is also followed by the spine center as well as pain management with Dr. Jordi Ontiveros. She has been diagnosed also with sacroiliitis which is also managed by pain management. She is on Trileptal and Neurontin to help manage chronic pain and fibromyalgia. She has some mild hyponatremia because of the Trileptal.  Patient is followed by Dr. Asia Young from psychiatry and remains on Cymbalta 120 mg daily, trazodone and Ativan 2 mg 3 times a day. Patient also has interstitial cystitis for which she follows urology. Patient's vitamin D level is controlled on vitamin D 50,000 units every other week. Patient's pericardial effusion etiology is unclear. Her rheumatological markers have been negative in the past.  She will continue to follow-up with cardiology. Respiratory ROS: negative for - shortness of breath  Cardiovascular ROS: negative for - chest pain    Current Outpatient Medications   Medication Sig    fluticasone furoate (Arnuity Ellipta) 100 mcg/actuation dsdv inhaler Take 1 Puff by inhalation daily.     calcium glycerophosphate (PRELIEF PO) Take  by mouth.    montelukast (SINGULAIR) 10 mg tablet take 1 tablet by mouth once daily    albuterol (PROVENTIL HFA, VENTOLIN HFA, PROAIR HFA) 90 mcg/actuation inhaler inhale 2 puffs by mouth and INTO THE LUNGS every 6 hours if needed for wheezing    Uro--10-40.8-36 mg cap capsule take 1 capsule by mouth four times a day    omeprazole (PRILOSEC) 40 mg capsule take 1 capsule by mouth once daily    Spiriva Respimat 2.5 mcg/actuation inhaler inhale 2 puffs by mouth and INTO THE LUNGS once daily    rosuvastatin (CRESTOR) 40 mg tablet take 1 tablet by mouth nightly    meloxicam (MOBIC) 15 mg tablet take 1 tablet by mouth once daily    [facility]-me blue-sod phos-phsal-hyo (UribeL) 118-10-40.8-36 mg cap capsule Take 1 Cap by mouth four (4) times daily as needed for Pain.    gabapentin (NEURONTIN) 800 mg tablet take 1 tablet by mouth three times a day    denosumab (PROLIA) 60 mg/mL injection 60 mg by SubCUTAneous route. 2 times yearly    OXcarbazepine (TRILEPTAL) 150 mg tablet Take 1 Tab by mouth three (3) times daily. modafinil (PROVIGIL) 200 mg tablet take 1 tablet by mouth twice a day for 30 DAYS maximum daily dose of 2 tablets (Patient taking differently: Take 200 mg by mouth daily.)    OTHER Indications: Compound cream for inflammation    LORazepam (ATIVAN) 2 mg tablet Take 2 mg by mouth every eight (8) hours. DULoxetine (CYMBALTA) 60 mg capsule Take 120 mg by mouth daily. simethicone 250 mg cap Take 225 mg by mouth as needed. trazodone (DESYREL) 100 mg tablet Take 150 mg by mouth nightly. fluticasone-umeclidinium-vilanterol (Trelegy Ellipta) 100-62.5-25 mcg inhaler Take 1 Puff by inhalation daily. No current facility-administered medications for this visit.          OBJECTIVE:  alert, well appearing, and in no distress  Visit Vitals  /80 (BP 1 Location: Left arm, BP Patient Position: Sitting, BP Cuff Size: Adult)   Pulse 95   Temp 98.6 °F (37 °C) (Temporal)   Resp 20   Ht 5' 3\" (1.6 m)   Wt 116 lb (52.6 kg)   SpO2 99%   BMI 20.55 kg/m²      well developed and well nourished  Chest - clear to auscultation, no wheezes, rales or rhonchi, symmetric air entry  Heart - normal rate, regular rhythm, normal S1, S2, no murmurs, rubs, clicks or gallops  Extremities - peripheral pulses normal, no pedal edema, no clubbing or cyanosis        Labs:   Lab Results   Component Value Date/Time    WBC 6.6 11/16/2020 01:16 PM    HGB 14.2 11/16/2020 01:16 PM    Hemoglobin, POC 13.9 08/28/2019 11:13 AM    HCT 41.2 11/16/2020 01:16 PM    Hematocrit, POC 41 08/28/2019 11:13 AM    PLATELET 667 63/60/6836 01:16 PM    MCV 82.9 11/16/2020 01:16 PM     Lab Results   Component Value Date/Time    Cholesterol, total 204 (H) 02/01/2023 07:59 AM    HDL Cholesterol 84 (H) 02/01/2023 07:59 AM    LDL, calculated 102 (H) 02/01/2023 07:59 AM    Triglyceride 90 02/01/2023 07:59 AM    CHOL/HDL Ratio 2.4 02/01/2023 07:59 AM     Lab Results   Component Value Date/Time    TSH 1.10 11/16/2020 01:16 PM    T4, Free 0.8 11/16/2020 01:16 PM    T4, Total 8.1 08/29/2015 10:42 AM      Lab Results   Component Value Date/Time    Sodium 130 (L) 02/01/2023 07:59 AM    Potassium 3.8 02/01/2023 07:59 AM    Chloride 95 (L) 02/01/2023 07:59 AM    CO2 31 02/01/2023 07:59 AM    Anion gap 4 02/01/2023 07:59 AM    Glucose 91 02/01/2023 07:59 AM    BUN 14 02/01/2023 07:59 AM    Creatinine 0.63 02/01/2023 07:59 AM    BUN/Creatinine ratio 22 (H) 02/01/2023 07:59 AM    GFR est AA >60 07/26/2022 08:19 AM    GFR est non-AA >60 07/26/2022 08:19 AM    Calcium 9.1 02/01/2023 07:59 AM    Bilirubin, total 0.5 02/01/2023 07:59 AM    ALT (SGPT) 37 02/01/2023 07:59 AM    Alk. phosphatase 32 (L) 02/01/2023 07:59 AM    Protein, total 6.9 02/01/2023 07:59 AM    Albumin 4.2 02/01/2023 07:59 AM    Globulin 2.7 02/01/2023 07:59 AM    A-G Ratio 1.6 02/01/2023 07:59 AM          Assessment/Plan    ICD-10-CM ICD-9-CM    1. Hyperlipidemia LDL goal <160  E78.5 272.4 Much improved on Crestor 40 mg daily LIPID PANEL      METABOLIC PANEL, COMPREHENSIVE      2.  Elevated coronary artery calcium score  R93.1 414.00 Patient has had cardiac evaluation and is doing fairly well. 3. Vitamin D deficiency  E55.9 268.9 Well-controlled current supplementation VITAMIN D, 25 HYDROXY      4. Pericardial effusion  I31.39 423.9 Patient continues to be monitored by cardiology      5. Bilateral carotid artery stenosis  I65.23 433.10 We will check DUPLEX CAROTID BILATERAL     433.30       6. Age-related osteoporosis without current pathological fracture  M81.0 733.01 Patient continues on Prolia and will check DEXA BONE DENSITY STUDY AXIAL      7. Colon cancer screening  Z12.11 V76.51 REFERRAL TO GASTROENTEROLOGY                Follow-up and Dispositions    Return in about 4 months (around 6/7/2023) for labs 1 week before. Reviewed plan of care. Patient has provided input and agrees with goals.

## 2023-02-07 NOTE — PROGRESS NOTES
Patient is in the office today for a 6 month follow up. 1. \"Have you been to the ER, urgent care clinic since your last visit? Hospitalized since your last visit? \" No    2. \"Have you seen or consulted any other health care providers outside of the [address] [place] [address] since your last visit? \" No     3. For patients aged 39-70: Has the patient had a colonoscopy / FIT/ Cologuard? Yes - no Care Gap present      If the patient is female:    4. For patients aged 41-77: Has the patient had a mammogram within the past 2 years? Yes - no Care Gap present      5. For patients aged 21-65: Has the patient had a pap smear?  Yes - no Care Gap present

## 2023-02-13 ENCOUNTER — TELEPHONE (OUTPATIENT)
Age: 63
End: 2023-02-13

## 2023-02-13 DIAGNOSIS — I31.39 PERICARDIAL EFFUSION (NONINFLAMMATORY): Primary | ICD-10-CM

## 2023-02-13 NOTE — TELEPHONE ENCOUNTER
----- Message from Robyn Elizalde sent at 2/11/2023  8:57 PM EST -----  Regarding: alkaline phosphatase levels  Dr. Carlos Cotton,   At my wellness visit last week, I forgot to ask you  about my alkaline phosphatase levels which were low according to the acceptable range. Do you consider  them a problem?     Respectfully,  Robyn Elizalde

## 2023-02-14 DIAGNOSIS — I65.23 BILATERAL CAROTID ARTERY STENOSIS: ICD-10-CM

## 2023-02-14 DIAGNOSIS — M81.0 AGE-RELATED OSTEOPOROSIS WITHOUT CURRENT PATHOLOGICAL FRACTURE: ICD-10-CM

## 2023-02-23 DIAGNOSIS — E78.5 HYPERLIPIDEMIA LDL GOAL <160: Primary | ICD-10-CM

## 2023-02-23 DIAGNOSIS — E55.9 VITAMIN D DEFICIENCY: Primary | ICD-10-CM

## 2023-02-23 DIAGNOSIS — M81.0 AGE-RELATED OSTEOPOROSIS WITHOUT CURRENT PATHOLOGICAL FRACTURE: Primary | ICD-10-CM

## 2023-02-24 ENCOUNTER — OFFICE VISIT (OUTPATIENT)
Age: 63
End: 2023-02-24
Payer: MEDICARE

## 2023-02-24 VITALS
RESPIRATION RATE: 16 BRPM | OXYGEN SATURATION: 98 % | HEIGHT: 63 IN | DIASTOLIC BLOOD PRESSURE: 65 MMHG | HEART RATE: 93 BPM | TEMPERATURE: 97.8 F | SYSTOLIC BLOOD PRESSURE: 124 MMHG | BODY MASS INDEX: 20.52 KG/M2 | WEIGHT: 115.8 LBS

## 2023-02-24 DIAGNOSIS — J45.20 MILD INTERMITTENT ASTHMA WITHOUT COMPLICATION: Primary | ICD-10-CM

## 2023-02-24 DIAGNOSIS — I31.39 PERICARDIAL EFFUSION: ICD-10-CM

## 2023-02-24 DIAGNOSIS — J47.9 BRONCHIECTASIS WITHOUT COMPLICATION (HCC): ICD-10-CM

## 2023-02-24 DIAGNOSIS — J41.0 SIMPLE CHRONIC BRONCHITIS (HCC): ICD-10-CM

## 2023-02-24 PROCEDURE — 99214 OFFICE O/P EST MOD 30 MIN: CPT | Performed by: INTERNAL MEDICINE

## 2023-02-24 PROCEDURE — G8484 FLU IMMUNIZE NO ADMIN: HCPCS | Performed by: INTERNAL MEDICINE

## 2023-02-24 PROCEDURE — G8420 CALC BMI NORM PARAMETERS: HCPCS | Performed by: INTERNAL MEDICINE

## 2023-02-24 PROCEDURE — G8428 CUR MEDS NOT DOCUMENT: HCPCS | Performed by: INTERNAL MEDICINE

## 2023-02-24 PROCEDURE — 1036F TOBACCO NON-USER: CPT | Performed by: INTERNAL MEDICINE

## 2023-02-24 PROCEDURE — 3017F COLORECTAL CA SCREEN DOC REV: CPT | Performed by: INTERNAL MEDICINE

## 2023-02-24 PROCEDURE — 3023F SPIROM DOC REV: CPT | Performed by: INTERNAL MEDICINE
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
## 2023-02-24 NOTE — PROGRESS NOTES
Berta Rao presents today for   Chief Complaint   Patient presents with    Asthma       Is someone accompanying this pt? No    Is the patient using any DME equipment during OV? No    -DME Company NA    Depression Screening:    PHQ-9 Questionaire 2/7/2023   Little interest or pleasure in doing things 0   Feeling down, depressed, or hopeless 0   PHQ-9 Total Score 0       Learning Needs Questionnaire:     No question data found. Fall Risk:     No flowsheet data found. Abuse Screening:     No flowsheet data found. Coordination of Care:    1. Have you been to the ER, urgent care clinic since your last visit? Hospitalized since your last visit? No    2. Have you seen or consulted anyNo other health care providers outside of the [address] [place] [address] since your last visit? Include any pap smears or colon screening. No    Medication list has been update per patient.

## 2023-02-28 RX ORDER — ROSUVASTATIN CALCIUM 40 MG/1
TABLET, COATED ORAL
Qty: 90 TABLET | Refills: 3 | Status: SHIPPED | OUTPATIENT
Start: 2023-02-28

## 2023-03-01 ENCOUNTER — HOSPITAL ENCOUNTER (OUTPATIENT)
Facility: HOSPITAL | Age: 63
Discharge: HOME | End: 2023-03-04
Payer: MEDICARE

## 2023-03-01 DIAGNOSIS — M81.0 AGE-RELATED OSTEOPOROSIS WITHOUT CURRENT PATHOLOGICAL FRACTURE: ICD-10-CM

## 2023-03-01 PROCEDURE — 77080 DXA BONE DENSITY AXIAL: CPT

## 2023-03-02 ENCOUNTER — TELEPHONE (OUTPATIENT)
Facility: HOSPITAL | Age: 63
End: 2023-03-02

## 2023-03-02 NOTE — TELEPHONE ENCOUNTER
Patient messaged with several questions and requests I call her personally.    She has a persistent pericardial effusion, we have been following. She had negative ALTHEA, sed rate etc. It improved since incidentally found but still 1-2 cm.    She has been adamant that she didn't have symptoms. She was blaming her symptoms on asthma and said can have chest heaviness/ dizziness but it goes away with inhalers.    After seeing her pulmonologist recently, she is admitting that she does have dyspnea on exertion just cleaning the house.    (She also wanted to know if her mitral valve regurg could be clinically significant- which it is not).    If she had no symptoms- I would have just monitored with periodic echos but now after dw with her for 45 mins let me talk to one of my partners to see if pericardiocentesis can be coordinated. Further recs to follow

## 2023-03-03 ENCOUNTER — HOSPITAL ENCOUNTER (OUTPATIENT)
Facility: HOSPITAL | Age: 63
End: 2023-03-03
Payer: MEDICARE

## 2023-03-03 ENCOUNTER — TELEPHONE (OUTPATIENT)
Age: 63
End: 2023-03-03

## 2023-03-03 DIAGNOSIS — I65.23 BILATERAL CAROTID ARTERY STENOSIS: ICD-10-CM

## 2023-03-03 LAB
VAS LEFT BULB EDV: 14.6 CM/S
VAS LEFT BULB PSV: 74.2 CM/S
VAS LEFT CCA DIST EDV: 22.4 CM/S
VAS LEFT CCA DIST PSV: 83.2 CM/S
VAS LEFT CCA MID EDV: 22.9 CM/S
VAS LEFT CCA MID PSV: 88.49 CM/S
VAS LEFT CCA PROX EDV: 20.8 CM/S
VAS LEFT CCA PROX PSV: 87.4 CM/S
VAS LEFT ECA EDV: 11.15 CM/S
VAS LEFT ECA PSV: 75.3 CM/S
VAS LEFT ICA DIST EDV: 17.1 CM/S
VAS LEFT ICA DIST PSV: 67.4 CM/S
VAS LEFT ICA MID EDV: 19 CM/S
VAS LEFT ICA MID PSV: 64.4 CM/S
VAS LEFT ICA PROX EDV: 25 CM/S
VAS LEFT ICA PROX PSV: 70.3 CM/S
VAS LEFT ICA/CCA PSV: 0.89 NO UNITS
VAS LEFT SUBCLAVIAN PROX EDV: 0 CM/S
VAS LEFT SUBCLAVIAN PROX PSV: 154.2 CM/S
VAS LEFT VERTEBRAL EDV: 17.97 CM/S
VAS LEFT VERTEBRAL PSV: 45 CM/S
VAS RIGHT BULB EDV: 9.9 CM/S
VAS RIGHT BULB PSV: 53.5 CM/S
VAS RIGHT CCA DIST EDV: 17.2 CM/S
VAS RIGHT CCA DIST PSV: 83.2 CM/S
VAS RIGHT CCA MID EDV: 21.1 CM/S
VAS RIGHT CCA MID PSV: 85.8 CM/S
VAS RIGHT CCA PROX EDV: 22.4 CM/S
VAS RIGHT CCA PROX PSV: 103.9 CM/S
VAS RIGHT ECA EDV: 8.99 CM/S
VAS RIGHT ECA PSV: 71.8 CM/S
VAS RIGHT ICA DIST EDV: 13.5 CM/S
VAS RIGHT ICA DIST PSV: 59.2 CM/S
VAS RIGHT ICA MID EDV: 20.1 CM/S
VAS RIGHT ICA MID PSV: 52.3 CM/S
VAS RIGHT ICA PROX EDV: 33 CM/S
VAS RIGHT ICA PROX PSV: 88.9 CM/S
VAS RIGHT ICA/CCA PSV: 1.1 NO UNITS
VAS RIGHT SUBCLAVIAN PROX EDV: 0 CM/S
VAS RIGHT SUBCLAVIAN PROX PSV: 106.3 CM/S
VAS RIGHT VERTEBRAL EDV: 10.82 CM/S
VAS RIGHT VERTEBRAL PSV: 33.3 CM/S

## 2023-03-03 PROCEDURE — 93880 EXTRACRANIAL BILAT STUDY: CPT

## 2023-03-03 PROCEDURE — 93880 EXTRACRANIAL BILAT STUDY: CPT | Performed by: STUDENT IN AN ORGANIZED HEALTH CARE EDUCATION/TRAINING PROGRAM

## 2023-03-03 NOTE — RESULT ENCOUNTER NOTE
Tel him/her she has mild plaque in both carotid arteries. Taking the Crestor will help things from progressing.

## 2023-03-03 NOTE — TELEPHONE ENCOUNTER
----- Message from Joan Carreon MD sent at 3/3/2023  1:14 PM EST -----  Tel him/her she has mild plaque in both carotid arteries. Taking the Crestor will help things from progressing.

## 2023-04-20 ENCOUNTER — APPOINTMENT (OUTPATIENT)
Dept: INFUSION THERAPY | Age: 63
End: 2023-04-20

## 2023-04-20 ENCOUNTER — HOSPITAL ENCOUNTER (OUTPATIENT)
Facility: HOSPITAL | Age: 63
Setting detail: INFUSION SERIES
End: 2023-04-20
Payer: MEDICARE

## 2023-04-20 VITALS
OXYGEN SATURATION: 96 % | RESPIRATION RATE: 16 BRPM | SYSTOLIC BLOOD PRESSURE: 104 MMHG | HEART RATE: 55 BPM | DIASTOLIC BLOOD PRESSURE: 70 MMHG | TEMPERATURE: 97.5 F

## 2023-04-20 PROBLEM — E83.42 HYPOMAGNESEMIA: Status: ACTIVE | Noted: 2023-04-20

## 2023-04-20 LAB
ALBUMIN SERPL-MCNC: 4 G/DL (ref 3.4–5)
ALBUMIN/GLOB SERPL: 1.7 (ref 0.8–1.7)
ALP SERPL-CCNC: 32 U/L (ref 45–117)
ALT SERPL-CCNC: 52 U/L (ref 13–56)
ANION GAP SERPL CALC-SCNC: 5 MMOL/L (ref 3–18)
AST SERPL-CCNC: 28 U/L (ref 10–38)
BILIRUB SERPL-MCNC: 0.3 MG/DL (ref 0.2–1)
BUN SERPL-MCNC: 17 MG/DL (ref 7–18)
BUN/CREAT SERPL: 33 (ref 12–20)
CALCIUM SERPL-MCNC: 9.1 MG/DL (ref 8.5–10.1)
CHLORIDE SERPL-SCNC: 96 MMOL/L (ref 100–111)
CO2 SERPL-SCNC: 30 MMOL/L (ref 21–32)
CREAT SERPL-MCNC: 0.52 MG/DL (ref 0.6–1.3)
GLOBULIN SER CALC-MCNC: 2.4 G/DL (ref 2–4)
GLUCOSE SERPL-MCNC: 94 MG/DL (ref 74–99)
MAGNESIUM SERPL-MCNC: 2.2 MG/DL (ref 1.6–2.6)
PHOSPHATE SERPL-MCNC: 4.5 MG/DL (ref 2.5–4.9)
POTASSIUM SERPL-SCNC: 3.9 MMOL/L (ref 3.5–5.5)
PROT SERPL-MCNC: 6.4 G/DL (ref 6.4–8.2)
SODIUM SERPL-SCNC: 131 MMOL/L (ref 136–145)

## 2023-04-20 PROCEDURE — 83735 ASSAY OF MAGNESIUM: CPT

## 2023-04-20 PROCEDURE — 80053 COMPREHEN METABOLIC PANEL: CPT

## 2023-04-20 PROCEDURE — 84100 ASSAY OF PHOSPHORUS: CPT

## 2023-04-20 PROCEDURE — 36415 COLL VENOUS BLD VENIPUNCTURE: CPT

## 2023-04-20 RX ORDER — SODIUM CHLORIDE 9 MG/ML
INJECTION, SOLUTION INTRAVENOUS CONTINUOUS
Status: CANCELLED | OUTPATIENT
Start: 2023-04-26

## 2023-04-20 RX ORDER — ACETAMINOPHEN 325 MG/1
650 TABLET ORAL
Status: CANCELLED | OUTPATIENT
Start: 2023-04-26

## 2023-04-20 RX ORDER — ALBUTEROL SULFATE 90 UG/1
4 AEROSOL, METERED RESPIRATORY (INHALATION) PRN
Status: CANCELLED | OUTPATIENT
Start: 2023-04-26

## 2023-04-20 RX ORDER — ONDANSETRON 2 MG/ML
8 INJECTION INTRAMUSCULAR; INTRAVENOUS
Status: CANCELLED | OUTPATIENT
Start: 2023-04-26

## 2023-04-20 RX ORDER — EPINEPHRINE 1 MG/ML
0.3 INJECTION, SOLUTION, CONCENTRATE INTRAVENOUS PRN
Status: CANCELLED | OUTPATIENT
Start: 2023-04-26

## 2023-04-20 RX ORDER — DIPHENHYDRAMINE HYDROCHLORIDE 50 MG/ML
50 INJECTION INTRAMUSCULAR; INTRAVENOUS
Status: CANCELLED | OUTPATIENT
Start: 2023-04-26

## 2023-04-20 NOTE — PROGRESS NOTES
[facility] Progress Note    Date: 2023    Name: Eleazar Byers    MRN: [id number]         : 1960      Ms. Kim to [facility], ambulatory at [phone], here for Prolia labs. Pt was assessed and education was provided. Ms. Jay Haas vitals were reviewed. Vitals:    23 1326   BP: 104/70   Pulse: 55   Resp: 16   Temp: 97.5 °F (36.4 °C)   SpO2: 96%     Blood for ordered CMP,Mag,Phos was drawn via LAC x1 attempt and sent out for processing. Gauze/coban applied to site. Ms. Esther Barbosa tolerated well and had no complaints at this time. Pt armband removed and shredded. Ms. Esther Barbosa was discharged from [place] [address] in stable condition at 1330. She is to return on 23 at 36 for her next Prolia appointment.     Adal Torres RN  2023  1:36 PM

## 2023-04-21 ENCOUNTER — OFFICE VISIT (OUTPATIENT)
Age: 63
End: 2023-04-21
Payer: MEDICARE

## 2023-04-21 VITALS
OXYGEN SATURATION: 96 % | BODY MASS INDEX: 21.62 KG/M2 | HEIGHT: 63 IN | HEART RATE: 91 BPM | DIASTOLIC BLOOD PRESSURE: 64 MMHG | WEIGHT: 122 LBS | SYSTOLIC BLOOD PRESSURE: 122 MMHG

## 2023-04-21 DIAGNOSIS — I31.39 PERICARDIAL EFFUSION: Primary | ICD-10-CM

## 2023-04-21 DIAGNOSIS — R07.9 CHEST PAIN, UNSPECIFIED TYPE: ICD-10-CM

## 2023-04-21 PROCEDURE — 1036F TOBACCO NON-USER: CPT | Performed by: INTERNAL MEDICINE

## 2023-04-21 PROCEDURE — G8420 CALC BMI NORM PARAMETERS: HCPCS | Performed by: INTERNAL MEDICINE

## 2023-04-21 PROCEDURE — G8428 CUR MEDS NOT DOCUMENT: HCPCS | Performed by: INTERNAL MEDICINE

## 2023-04-21 PROCEDURE — 3017F COLORECTAL CA SCREEN DOC REV: CPT | Performed by: INTERNAL MEDICINE

## 2023-04-21 PROCEDURE — 99204 OFFICE O/P NEW MOD 45 MIN: CPT | Performed by: INTERNAL MEDICINE

## 2023-04-21 NOTE — PROGRESS NOTES
[facility]    Radha Hernandez is 65 y.o. female     Patient is here today for evaluation. She denies prior history of MI or CHF  Patient is here today for second opinion regarding her ongoing dyspnea and pericardial effusion  Patient has medical effusion for last 2 years and having echocardiogram preoperatively to check the progression. Patient believes that she has chronic fatigue and also some shortness of breath. She is seeing pulmonary team however there is concern that her shortness of breath is reported to her asthma. She also has been complaining of some lower chest and epigastric discomfort. No nausea vomiting diaphoresis. She has also been told by her  that he has found her grabbing her left side of her chest with some pain. She does not have any radiation of this discomfort. She denies any palpitation, presyncope or syncope. No PND. Lower EXTR swelling  Denies any nausea, vomiting, abdominal pain, fever, chills, sputum production.  No hematuria or other bleeding complaints    Past Medical History:   Diagnosis Date    Allergy     Anxiety     Arthritis     Asthma     mild    Atypical ductal hyperplasia of breast     Atypical hyperplasia of breast 2003    right    Chronic depression     Chronic fatigue syndrome     Chronic interstitial cystitis     Depression     Diarrhea     Fibromyalgia     Generalized stiffness     GERD (gastroesophageal reflux disease)     Hyperlipidemia     IBS (irritable bowel syndrome)     Insomnia     Joint pain     Microscopic hematuria     Myofascial pain syndrome     Numbness and tingling in hands     Ovarian cyst, left     Painful sex     Pericardial effusion     Pneumonia     Psychiatric disorder     anxiety - depression    RLS (restless legs syndrome)     TMJ (temporomandibular joint syndrome)     Transient total loss of muscle tone     Uterine fibroid        Review of Systems:  Cardiac symptoms

## 2023-04-24 RX ORDER — OMEPRAZOLE 40 MG/1
CAPSULE, DELAYED RELEASE ORAL
Qty: 90 CAPSULE | Refills: 2 | Status: SHIPPED | OUTPATIENT
Start: 2023-04-24

## 2023-04-26 ENCOUNTER — HOSPITAL ENCOUNTER (OUTPATIENT)
Facility: HOSPITAL | Age: 63
Setting detail: INFUSION SERIES
End: 2023-04-26
Payer: MEDICARE

## 2023-04-26 ENCOUNTER — APPOINTMENT (OUTPATIENT)
Dept: INFUSION THERAPY | Age: 63
End: 2023-04-26

## 2023-04-26 VITALS
DIASTOLIC BLOOD PRESSURE: 75 MMHG | RESPIRATION RATE: 16 BRPM | TEMPERATURE: 98.2 F | HEART RATE: 85 BPM | OXYGEN SATURATION: 97 % | SYSTOLIC BLOOD PRESSURE: 111 MMHG

## 2023-04-26 DIAGNOSIS — E83.42 HYPOMAGNESEMIA: Primary | ICD-10-CM

## 2023-04-26 DIAGNOSIS — M81.0 AGE-RELATED OSTEOPOROSIS WITHOUT CURRENT PATHOLOGICAL FRACTURE: ICD-10-CM

## 2023-04-26 PROCEDURE — 96372 THER/PROPH/DIAG INJ SC/IM: CPT

## 2023-04-26 PROCEDURE — 6360000002 HC RX W HCPCS: Performed by: PSYCHIATRY & NEUROLOGY

## 2023-04-26 RX ORDER — ONDANSETRON 2 MG/ML
8 INJECTION INTRAMUSCULAR; INTRAVENOUS
Status: CANCELLED | OUTPATIENT
Start: 2023-10-22

## 2023-04-26 RX ORDER — DIPHENHYDRAMINE HYDROCHLORIDE 50 MG/ML
50 INJECTION INTRAMUSCULAR; INTRAVENOUS
Status: CANCELLED | OUTPATIENT
Start: 2023-10-22

## 2023-04-26 RX ORDER — ALBUTEROL SULFATE 90 UG/1
4 AEROSOL, METERED RESPIRATORY (INHALATION) PRN
Status: CANCELLED | OUTPATIENT
Start: 2023-10-22

## 2023-04-26 RX ORDER — SODIUM CHLORIDE 9 MG/ML
INJECTION, SOLUTION INTRAVENOUS CONTINUOUS
Status: CANCELLED | OUTPATIENT
Start: 2023-10-22

## 2023-04-26 RX ORDER — EPINEPHRINE 1 MG/ML
0.3 INJECTION, SOLUTION, CONCENTRATE INTRAVENOUS PRN
Status: CANCELLED | OUTPATIENT
Start: 2023-10-22

## 2023-04-26 RX ORDER — ACETAMINOPHEN 325 MG/1
650 TABLET ORAL
Status: CANCELLED | OUTPATIENT
Start: 2023-10-22

## 2023-04-26 RX ADMIN — DENOSUMAB 60 MG: 60 INJECTION SUBCUTANEOUS at 11:48

## 2023-04-26 ASSESSMENT — PAIN DESCRIPTION - DESCRIPTORS: DESCRIPTORS: ACHING

## 2023-04-26 ASSESSMENT — PAIN SCALES - GENERAL: PAINLEVEL_OUTOF10: 5

## 2023-04-26 ASSESSMENT — PAIN DESCRIPTION - LOCATION: LOCATION: HIP

## 2023-04-26 ASSESSMENT — PAIN DESCRIPTION - ORIENTATION: ORIENTATION: RIGHT

## 2023-04-26 ASSESSMENT — PAIN DESCRIPTION - PAIN TYPE: TYPE: CHRONIC PAIN

## 2023-04-26 NOTE — PROGRESS NOTES
[facility] Progress Note    Date: 2023    Name: Domenico Neville    MRN: [id number]         : 1960    Prolia Injection - Every 6 months    Ms. Jennifer Mccoy arrived to [facility] at [phone], ambulatory. Ms. Jennifer Mccoy was assessed and education was provided. Ms. Beryle Epps vitals were reviewed. Vitals:    23 1140   BP: 111/75   Pulse: 85   Resp: 16   Temp: 98.2 °F (36.8 °C)   SpO2: 97%       Lab results from 23 were obtained and reviewed. Mag 2.2  Phos 4.5  Calcium 9.1    Prolia 60mg was administered as ordered SQ in patient's right upper back of arm and band aid applied. Ms. Jennifer Mccoy tolerated well without complaints. Discharge/follow-up instructions discussed with pt. Pt verbalized understanding. Pt armband removed & shredded. Ms. Jennifer Mccoy was discharged from [place] [address] in stable condition at 1150. She is to return on 10/19/23 at 1330 for her next appointment for [facility].     Mic Purcell RN  2023

## 2023-05-04 ENCOUNTER — TELEPHONE (OUTPATIENT)
Age: 63
End: 2023-05-04

## 2023-05-04 NOTE — TELEPHONE ENCOUNTER
Patient called today. Requesting to get information on the procedure that was ordered for her drainage. Called her back to make her aware that we could clear this up and please call and speak to nurse @ [phone].

## 2023-05-08 ENCOUNTER — CLINICAL DOCUMENTATION (OUTPATIENT)
Age: 63
End: 2023-05-08

## 2023-05-09 ENCOUNTER — TELEPHONE (OUTPATIENT)
Age: 63
End: 2023-05-09

## 2023-06-07 ENCOUNTER — HOSPITAL ENCOUNTER (OUTPATIENT)
Facility: HOSPITAL | Age: 63
Setting detail: SPECIMEN
Discharge: HOME | End: 2023-06-10
Payer: MEDICARE

## 2023-06-07 DIAGNOSIS — E55.9 VITAMIN D DEFICIENCY: ICD-10-CM

## 2023-06-07 DIAGNOSIS — E78.5 HYPERLIPIDEMIA LDL GOAL <160: ICD-10-CM

## 2023-06-07 LAB
25(OH)D3 SERPL-MCNC: 53.2 NG/ML (ref 30–100)
ALBUMIN SERPL-MCNC: 4 G/DL (ref 3.4–5)
ALBUMIN/GLOB SERPL: 1.6 (ref 0.8–1.7)
ALP SERPL-CCNC: 26 U/L (ref 45–117)
ALT SERPL-CCNC: 40 U/L (ref 13–56)
ANION GAP SERPL CALC-SCNC: 5 MMOL/L (ref 3–18)
AST SERPL-CCNC: 23 U/L (ref 10–38)
BILIRUB SERPL-MCNC: 0.4 MG/DL (ref 0.2–1)
BUN SERPL-MCNC: 13 MG/DL (ref 7–18)
BUN/CREAT SERPL: 21 (ref 12–20)
CALCIUM SERPL-MCNC: 8.9 MG/DL (ref 8.5–10.1)
CHLORIDE SERPL-SCNC: 94 MMOL/L (ref 100–111)
CHOLEST SERPL-MCNC: 190 MG/DL
CO2 SERPL-SCNC: 30 MMOL/L (ref 21–32)
CREAT SERPL-MCNC: 0.62 MG/DL (ref 0.6–1.3)
GLOBULIN SER CALC-MCNC: 2.5 G/DL (ref 2–4)
GLUCOSE SERPL-MCNC: 86 MG/DL (ref 74–99)
HDLC SERPL-MCNC: 81 MG/DL (ref 40–60)
HDLC SERPL: 2.3 (ref 0–5)
LDLC SERPL CALC-MCNC: 94.2 MG/DL (ref 0–100)
LIPID PANEL: ABNORMAL
POTASSIUM SERPL-SCNC: 4.2 MMOL/L (ref 3.5–5.5)
PROT SERPL-MCNC: 6.5 G/DL (ref 6.4–8.2)
SODIUM SERPL-SCNC: 129 MMOL/L (ref 136–145)
TRIGL SERPL-MCNC: 74 MG/DL
VLDLC SERPL CALC-MCNC: 14.8 MG/DL

## 2023-06-07 PROCEDURE — 36415 COLL VENOUS BLD VENIPUNCTURE: CPT

## 2023-06-07 PROCEDURE — 80053 COMPREHEN METABOLIC PANEL: CPT

## 2023-06-07 PROCEDURE — 82306 VITAMIN D 25 HYDROXY: CPT

## 2023-06-07 PROCEDURE — 80061 LIPID PANEL: CPT

## 2023-06-19 SDOH — ECONOMIC STABILITY: FOOD INSECURITY: WITHIN THE PAST 12 MONTHS, YOU WORRIED THAT YOUR FOOD WOULD RUN OUT BEFORE YOU GOT MONEY TO BUY MORE.: NEVER TRUE

## 2023-06-19 SDOH — ECONOMIC STABILITY: HOUSING INSECURITY
IN THE LAST 12 MONTHS, WAS THERE A TIME WHEN YOU DID NOT HAVE A STEADY PLACE TO SLEEP OR SLEPT IN A SHELTER (INCLUDING NOW)?: NO

## 2023-06-19 SDOH — ECONOMIC STABILITY: TRANSPORTATION INSECURITY
IN THE PAST 12 MONTHS, HAS LACK OF TRANSPORTATION KEPT YOU FROM MEETINGS, WORK, OR FROM GETTING THINGS NEEDED FOR DAILY LIVING?: NO

## 2023-06-19 SDOH — ECONOMIC STABILITY: FOOD INSECURITY: WITHIN THE PAST 12 MONTHS, THE FOOD YOU BOUGHT JUST DIDN'T LAST AND YOU DIDN'T HAVE MONEY TO GET MORE.: NEVER TRUE

## 2023-06-19 SDOH — ECONOMIC STABILITY: INCOME INSECURITY: HOW HARD IS IT FOR YOU TO PAY FOR THE VERY BASICS LIKE FOOD, HOUSING, MEDICAL CARE, AND HEATING?: NOT HARD AT ALL

## 2023-06-20 ENCOUNTER — HOSPITAL ENCOUNTER (OUTPATIENT)
Facility: HOSPITAL | Age: 63
Setting detail: SPECIMEN
Discharge: HOME | End: 2023-06-23
Payer: MEDICARE

## 2023-06-20 ENCOUNTER — OFFICE VISIT (OUTPATIENT)
Age: 63
End: 2023-06-20
Payer: MEDICARE

## 2023-06-20 VITALS
WEIGHT: 110 LBS | HEART RATE: 76 BPM | OXYGEN SATURATION: 97 % | HEIGHT: 63 IN | BODY MASS INDEX: 19.49 KG/M2 | DIASTOLIC BLOOD PRESSURE: 79 MMHG | TEMPERATURE: 97.5 F | RESPIRATION RATE: 18 BRPM | SYSTOLIC BLOOD PRESSURE: 113 MMHG

## 2023-06-20 DIAGNOSIS — M46.1 SACROILIITIS, NOT ELSEWHERE CLASSIFIED (HCC): ICD-10-CM

## 2023-06-20 DIAGNOSIS — J45.40 MODERATE PERSISTENT ASTHMA, UNCOMPLICATED: ICD-10-CM

## 2023-06-20 DIAGNOSIS — E78.2 MIXED HYPERLIPIDEMIA: Primary | ICD-10-CM

## 2023-06-20 DIAGNOSIS — I31.39 OTHER PERICARDIAL EFFUSION (NONINFLAMMATORY): ICD-10-CM

## 2023-06-20 DIAGNOSIS — R30.9 URINARY PAIN: ICD-10-CM

## 2023-06-20 DIAGNOSIS — E55.9 VITAMIN D DEFICIENCY, UNSPECIFIED: ICD-10-CM

## 2023-06-20 LAB
BILIRUBIN, URINE, POC: NORMAL
BLOOD URINE, POC: NORMAL
GLUCOSE URINE, POC: NEGATIVE
KETONES, URINE, POC: NEGATIVE
LEUKOCYTE ESTERASE, URINE, POC: NORMAL
NITRITE, URINE, POC: POSITIVE
PH, URINE, POC: 6.5 (ref 4.6–8)
PROTEIN,URINE, POC: NORMAL
SPECIFIC GRAVITY, URINE, POC: 1.02 (ref 1–1.03)
URINALYSIS CLARITY, POC: NORMAL
URINALYSIS COLOR, POC: NORMAL
UROBILINOGEN, POC: NORMAL

## 2023-06-20 PROCEDURE — 99214 OFFICE O/P EST MOD 30 MIN: CPT | Performed by: INTERNAL MEDICINE

## 2023-06-20 PROCEDURE — 87086 URINE CULTURE/COLONY COUNT: CPT

## 2023-06-20 PROCEDURE — G8420 CALC BMI NORM PARAMETERS: HCPCS | Performed by: INTERNAL MEDICINE

## 2023-06-20 PROCEDURE — 1036F TOBACCO NON-USER: CPT | Performed by: INTERNAL MEDICINE

## 2023-06-20 PROCEDURE — PBSHW AMB POC URINALYSIS DIP STICK AUTO W/ MICRO: Performed by: INTERNAL MEDICINE

## 2023-06-20 PROCEDURE — 87186 SC STD MICRODIL/AGAR DIL: CPT

## 2023-06-20 PROCEDURE — 81001 URINALYSIS AUTO W/SCOPE: CPT | Performed by: INTERNAL MEDICINE

## 2023-06-20 PROCEDURE — 99211 OFF/OP EST MAY X REQ PHY/QHP: CPT | Performed by: INTERNAL MEDICINE

## 2023-06-20 PROCEDURE — G8427 DOCREV CUR MEDS BY ELIG CLIN: HCPCS | Performed by: INTERNAL MEDICINE

## 2023-06-20 PROCEDURE — 3017F COLORECTAL CA SCREEN DOC REV: CPT | Performed by: INTERNAL MEDICINE

## 2023-06-20 PROCEDURE — 87077 CULTURE AEROBIC IDENTIFY: CPT

## 2023-06-20 RX ORDER — NITROFURANTOIN 25; 75 MG/1; MG/1
100 CAPSULE ORAL 2 TIMES DAILY
Qty: 14 CAPSULE | Refills: 0 | Status: SHIPPED | OUTPATIENT
Start: 2023-06-20 | End: 2023-06-27

## 2023-06-23 LAB
BACTERIA SPEC CULT: ABNORMAL
CC UR VC: ABNORMAL
SERVICE CMNT-IMP: ABNORMAL

## 2023-06-29 ENCOUNTER — HOSPITAL ENCOUNTER (OUTPATIENT)
Facility: HOSPITAL | Age: 63
Discharge: HOME | End: 2023-06-29
Attending: INTERNAL MEDICINE | Admitting: RADIOLOGY
Payer: MEDICARE

## 2023-06-29 ENCOUNTER — ANESTHESIA EVENT (OUTPATIENT)
Facility: HOSPITAL | Age: 63
End: 2023-06-29
Payer: MEDICARE

## 2023-06-29 ENCOUNTER — ANESTHESIA (OUTPATIENT)
Facility: HOSPITAL | Age: 63
End: 2023-06-29
Payer: MEDICARE

## 2023-06-29 VITALS
WEIGHT: 110 LBS | BODY MASS INDEX: 19.49 KG/M2 | RESPIRATION RATE: 15 BRPM | SYSTOLIC BLOOD PRESSURE: 120 MMHG | TEMPERATURE: 97.8 F | OXYGEN SATURATION: 100 % | HEART RATE: 69 BPM | DIASTOLIC BLOOD PRESSURE: 70 MMHG

## 2023-06-29 DIAGNOSIS — I31.39 PERICARDIAL EFFUSION: ICD-10-CM

## 2023-06-29 LAB
ANION GAP SERPL CALC-SCNC: 2 MMOL/L (ref 3–18)
APTT PPP: 25.8 SEC (ref 23–36.4)
BASOPHILS # BLD: 0 K/UL (ref 0–0.1)
BASOPHILS NFR BLD: 0 % (ref 0–2)
BUN SERPL-MCNC: 16 MG/DL (ref 7–18)
BUN/CREAT SERPL: 25 (ref 12–20)
CALCIUM SERPL-MCNC: 8.8 MG/DL (ref 8.5–10.1)
CHLORIDE SERPL-SCNC: 101 MMOL/L (ref 100–111)
CO2 SERPL-SCNC: 31 MMOL/L (ref 21–32)
CREAT SERPL-MCNC: 0.65 MG/DL (ref 0.6–1.3)
DIFFERENTIAL METHOD BLD: ABNORMAL
EOSINOPHIL # BLD: 0.1 K/UL (ref 0–0.4)
EOSINOPHIL NFR BLD: 1 % (ref 0–5)
ERYTHROCYTE [DISTWIDTH] IN BLOOD BY AUTOMATED COUNT: 11.7 % (ref 11.6–14.5)
GLUCOSE SERPL-MCNC: 101 MG/DL (ref 74–99)
HCT VFR BLD AUTO: 38.4 % (ref 35–45)
HGB BLD-MCNC: 13.1 G/DL (ref 12–16)
IMM GRANULOCYTES # BLD AUTO: 0 K/UL (ref 0–0.04)
IMM GRANULOCYTES NFR BLD AUTO: 0 % (ref 0–0.5)
INR PPP: 1 (ref 0.8–1.2)
LYMPHOCYTES # BLD: 1.2 K/UL (ref 0.9–3.6)
LYMPHOCYTES NFR BLD: 23 % (ref 21–52)
MCH RBC QN AUTO: 29.4 PG (ref 24–34)
MCHC RBC AUTO-ENTMCNC: 34.1 G/DL (ref 31–37)
MCV RBC AUTO: 86.1 FL (ref 78–100)
MONOCYTES # BLD: 0.3 K/UL (ref 0.05–1.2)
MONOCYTES NFR BLD: 6 % (ref 3–10)
NEUTS SEG # BLD: 3.6 K/UL (ref 1.8–8)
NEUTS SEG NFR BLD: 70 % (ref 40–73)
NRBC # BLD: 0 K/UL (ref 0–0.01)
NRBC BLD-RTO: 0 PER 100 WBC
PLATELET # BLD AUTO: 246 K/UL (ref 135–420)
PMV BLD AUTO: 8.7 FL (ref 9.2–11.8)
POTASSIUM SERPL-SCNC: 3.2 MMOL/L (ref 3.5–5.5)
PROTHROMBIN TIME: 13.2 SEC (ref 11.5–15.2)
RBC # BLD AUTO: 4.46 M/UL (ref 4.2–5.3)
SODIUM SERPL-SCNC: 134 MMOL/L (ref 136–145)
WBC # BLD AUTO: 5.2 K/UL (ref 4.6–13.2)

## 2023-06-29 PROCEDURE — 7100000010 HC PHASE II RECOVERY - FIRST 15 MIN

## 2023-06-29 PROCEDURE — 80048 BASIC METABOLIC PNL TOTAL CA: CPT

## 2023-06-29 PROCEDURE — 71250 CT THORAX DX C-: CPT

## 2023-06-29 PROCEDURE — 3700000000 HC ANESTHESIA ATTENDED CARE

## 2023-06-29 PROCEDURE — 85025 COMPLETE CBC W/AUTO DIFF WBC: CPT

## 2023-06-29 PROCEDURE — 6360000002 HC RX W HCPCS: Performed by: NURSE ANESTHETIST, CERTIFIED REGISTERED

## 2023-06-29 PROCEDURE — 2580000003 HC RX 258: Performed by: RADIOLOGY

## 2023-06-29 PROCEDURE — 3700000001 HC ADD 15 MINUTES (ANESTHESIA)

## 2023-06-29 PROCEDURE — 2500000003 HC RX 250 WO HCPCS: Performed by: NURSE ANESTHETIST, CERTIFIED REGISTERED

## 2023-06-29 PROCEDURE — 85730 THROMBOPLASTIN TIME PARTIAL: CPT

## 2023-06-29 PROCEDURE — 85610 PROTHROMBIN TIME: CPT

## 2023-06-29 RX ORDER — LIDOCAINE HYDROCHLORIDE 20 MG/ML
INJECTION, SOLUTION EPIDURAL; INFILTRATION; INTRACAUDAL; PERINEURAL PRN
Status: DISCONTINUED | OUTPATIENT
Start: 2023-06-29 | End: 2023-06-29 | Stop reason: SDUPTHER

## 2023-06-29 RX ORDER — EPHEDRINE SULFATE/0.9% NACL/PF 50 MG/5 ML
SYRINGE (ML) INTRAVENOUS PRN
Status: DISCONTINUED | OUTPATIENT
Start: 2023-06-29 | End: 2023-06-29 | Stop reason: SDUPTHER

## 2023-06-29 RX ORDER — FENTANYL CITRATE 50 UG/ML
INJECTION, SOLUTION INTRAMUSCULAR; INTRAVENOUS PRN
Status: DISCONTINUED | OUTPATIENT
Start: 2023-06-29 | End: 2023-06-29 | Stop reason: SDUPTHER

## 2023-06-29 RX ORDER — HALOPERIDOL 5 MG/ML
1 INJECTION INTRAMUSCULAR
Status: DISCONTINUED | OUTPATIENT
Start: 2023-06-29 | End: 2023-06-29 | Stop reason: HOSPADM

## 2023-06-29 RX ORDER — FENTANYL CITRATE 50 UG/ML
50 INJECTION, SOLUTION INTRAMUSCULAR; INTRAVENOUS EVERY 5 MIN PRN
Status: DISCONTINUED | OUTPATIENT
Start: 2023-06-29 | End: 2023-06-29 | Stop reason: HOSPADM

## 2023-06-29 RX ORDER — FENTANYL CITRATE 50 UG/ML
25 INJECTION, SOLUTION INTRAMUSCULAR; INTRAVENOUS EVERY 5 MIN PRN
Status: DISCONTINUED | OUTPATIENT
Start: 2023-06-29 | End: 2023-06-29 | Stop reason: HOSPADM

## 2023-06-29 RX ORDER — SODIUM CHLORIDE 9 MG/ML
INJECTION, SOLUTION INTRAVENOUS PRN
Status: DISCONTINUED | OUTPATIENT
Start: 2023-06-29 | End: 2023-06-29 | Stop reason: HOSPADM

## 2023-06-29 RX ORDER — PROPOFOL 10 MG/ML
INJECTION, EMULSION INTRAVENOUS CONTINUOUS PRN
Status: DISCONTINUED | OUTPATIENT
Start: 2023-06-29 | End: 2023-06-29 | Stop reason: SDUPTHER

## 2023-06-29 RX ORDER — LIDOCAINE HYDROCHLORIDE 10 MG/ML
30 INJECTION, SOLUTION EPIDURAL; INFILTRATION; INTRACAUDAL; PERINEURAL ONCE
Status: DISCONTINUED | OUTPATIENT
Start: 2023-06-29 | End: 2023-06-29 | Stop reason: HOSPADM

## 2023-06-29 RX ORDER — SODIUM CHLORIDE 9 MG/ML
INJECTION, SOLUTION INTRAVENOUS CONTINUOUS
Status: DISCONTINUED | OUTPATIENT
Start: 2023-06-29 | End: 2023-06-29 | Stop reason: HOSPADM

## 2023-06-29 RX ORDER — SODIUM CHLORIDE 0.9 % (FLUSH) 0.9 %
5-40 SYRINGE (ML) INJECTION EVERY 12 HOURS SCHEDULED
Status: DISCONTINUED | OUTPATIENT
Start: 2023-06-29 | End: 2023-06-29 | Stop reason: HOSPADM

## 2023-06-29 RX ORDER — MAGNESIUM SULFATE HEPTAHYDRATE 500 MG/ML
INJECTION, SOLUTION INTRAMUSCULAR; INTRAVENOUS PRN
Status: DISCONTINUED | OUTPATIENT
Start: 2023-06-29 | End: 2023-06-29 | Stop reason: SDUPTHER

## 2023-06-29 RX ORDER — SODIUM CHLORIDE 0.9 % (FLUSH) 0.9 %
5-40 SYRINGE (ML) INJECTION PRN
Status: DISCONTINUED | OUTPATIENT
Start: 2023-06-29 | End: 2023-06-29 | Stop reason: HOSPADM

## 2023-06-29 RX ADMIN — SODIUM CHLORIDE: 9 INJECTION, SOLUTION INTRAVENOUS at 08:39

## 2023-06-29 RX ADMIN — FENTANYL CITRATE 25 MCG: 50 INJECTION INTRAMUSCULAR; INTRAVENOUS at 10:41

## 2023-06-29 RX ADMIN — Medication 10 MG: at 10:46

## 2023-06-29 RX ADMIN — FENTANYL CITRATE 25 MCG: 50 INJECTION INTRAMUSCULAR; INTRAVENOUS at 10:45

## 2023-06-29 RX ADMIN — MAGNESIUM SULFATE HEPTAHYDRATE 1 G: 500 INJECTION, SOLUTION INTRAMUSCULAR; INTRAVENOUS at 10:35

## 2023-06-29 RX ADMIN — LIDOCAINE HYDROCHLORIDE 100 MG: 20 INJECTION, SOLUTION EPIDURAL; INFILTRATION; INTRACAUDAL; PERINEURAL at 10:35

## 2023-06-29 RX ADMIN — PROPOFOL 120 MCG/KG/MIN: 10 INJECTION, EMULSION INTRAVENOUS at 10:35

## 2023-06-29 RX ADMIN — PROPOFOL 50 MG: 10 INJECTION, EMULSION INTRAVENOUS at 10:36

## 2023-06-29 ASSESSMENT — PAIN SCALES - GENERAL
PAINLEVEL_OUTOF10: 0

## 2023-06-29 ASSESSMENT — ENCOUNTER SYMPTOMS: SHORTNESS OF BREATH: 1

## 2023-06-29 ASSESSMENT — PAIN - FUNCTIONAL ASSESSMENT: PAIN_FUNCTIONAL_ASSESSMENT: 0-10

## 2023-07-13 ENCOUNTER — TELEPHONE (OUTPATIENT)
Age: 63
End: 2023-07-13

## 2023-07-13 DIAGNOSIS — E04.1 THYROID NODULE: Primary | ICD-10-CM

## 2023-07-13 NOTE — TELEPHONE ENCOUNTER
Pt wants to know if she needs to be seen by Dr. Victor Manuel Godinez before having another EMG conducted, she also wants to know if she needs a referral to the Dr that conducts the EMG

## 2023-07-13 NOTE — TELEPHONE ENCOUNTER
Patient called and states she was recently seen at [facility] and had a CT scan done. She was reviewing the results and she states that a left thyroid nodule was noted and that it said Left thyroid nodule can be assessed with ultrasound. She is calling to see what Dr Arlin Mccrary would like her to do? She can be reached at [phone]. Please advise, thank you.

## 2023-07-13 NOTE — TELEPHONE ENCOUNTER
Patient has not been seen by our office. Has patient had an EMG already? I also do not see a referral for the patient, Dr Carmen Perez cannot assess what is necessary until he has assessed the patient in person.

## 2023-07-14 NOTE — TELEPHONE ENCOUNTER
Called and spoke to patient, name and date of birth verified. Patient given Dr. Susan Patel message and voiced understanding. Patient was given the number to central scheduling and she will call and get ultrasound scheduled.

## 2023-07-22 ENCOUNTER — HOSPITAL ENCOUNTER (OUTPATIENT)
Facility: HOSPITAL | Age: 63
End: 2023-07-22
Attending: INTERNAL MEDICINE
Payer: MEDICARE

## 2023-07-22 DIAGNOSIS — E04.1 THYROID NODULE: ICD-10-CM

## 2023-07-22 PROCEDURE — 76536 US EXAM OF HEAD AND NECK: CPT

## 2023-07-24 ENCOUNTER — OFFICE VISIT (OUTPATIENT)
Age: 63
End: 2023-07-24
Payer: MEDICARE

## 2023-07-24 VITALS
WEIGHT: 109 LBS | SYSTOLIC BLOOD PRESSURE: 118 MMHG | OXYGEN SATURATION: 97 % | HEART RATE: 87 BPM | DIASTOLIC BLOOD PRESSURE: 80 MMHG | BODY MASS INDEX: 19.31 KG/M2

## 2023-07-24 DIAGNOSIS — I31.39 EFFUSION, PERICARDIUM: Primary | ICD-10-CM

## 2023-07-24 PROCEDURE — 99213 OFFICE O/P EST LOW 20 MIN: CPT | Performed by: INTERNAL MEDICINE

## 2023-07-24 PROCEDURE — G8420 CALC BMI NORM PARAMETERS: HCPCS | Performed by: INTERNAL MEDICINE

## 2023-07-24 PROCEDURE — G8428 CUR MEDS NOT DOCUMENT: HCPCS | Performed by: INTERNAL MEDICINE

## 2023-07-24 PROCEDURE — 3017F COLORECTAL CA SCREEN DOC REV: CPT | Performed by: INTERNAL MEDICINE

## 2023-07-24 PROCEDURE — 1036F TOBACCO NON-USER: CPT | Performed by: INTERNAL MEDICINE

## 2023-07-24 NOTE — PROGRESS NOTES
Perfusion Comments: LV perfusion is probably normal.    Perfusion Defect: There is a mild severity left ventricular stress perfusion defect that is small in size present in the basal inferoseptal segment(s) that is predominantly fixed. There is normal wall motion in the defect area. The defect appears to be an artifact. Stress Function: Left ventricular function post-stress is normal. Post-stress ejection fraction is 82%. Perfusion Conclusion: There is no evidence of transient ischemic dilation (TID). TID ratio is 1.38. ECG: Resting ECG demonstrates normal sinus rhythm. ECG: The ECG was negative for ischemia. Stress Test: A pharmacological stress test was performed using lexiscan. Hemodynamics are suboptimal due to medication. Blood pressure demonstrated a normal response and heart rate demonstrated a normal response to stress. The patient's heart rate recovery was normal.    IMPRESSION & PLAN:  Letty Mukherjee  is 59 y.o. female with    Pericardial effusion:  Patient has multiple pericardial effusion, fibrinous appearing since 2020. She does take meloxicam, NSAID on a regular basis for her chronic pain syndrome. Less likely to be inflammatory. ALTHEA in the past has been normal in past  CT guided drainage was attempted but was not done as CT showed reduced fluid compare to 2020. Will repeat limited echo in 6 months   Good HS and no JVD. BP stable    Hyperlipidemia: Currently on Crestor 40 m daily. Continue same    This plan was discussed with patient who is in agreement. Thank you for allowing me to participate in patient care. Please feel free to call me if you have any question or concern. Ioana Villanueva MD  Please note: This document has been produced using voice recognition software. Unrecognized errors in transcription may be present.

## 2023-07-28 ENCOUNTER — TELEPHONE (OUTPATIENT)
Age: 63
End: 2023-07-28

## 2023-07-28 DIAGNOSIS — E04.1 THYROID NODULE: Primary | ICD-10-CM

## 2023-07-28 NOTE — TELEPHONE ENCOUNTER
----- Message from RUBÉN Mccray sent at 7/28/2023  9:20 AM EDT -----  Subject: Results Request    QUESTIONS  Results: US THYROID; Ordered by: Madeleine Malloy   Date Performed: 2023-07-22  ---------------------------------------------------------------------------  --------------  Mayela Amos [facility]    [phone]; OK to leave message on voicemail  ---------------------------------------------------------------------------  --------------

## 2023-07-28 NOTE — TELEPHONE ENCOUNTER
Patient aware of the multiple nodules in her thyroid and we will go ahead and refer her to endocrinology further management    She has seen Dr Noa Cisse with [facility] so will place referral

## 2023-08-14 ENCOUNTER — OFFICE VISIT (OUTPATIENT)
Age: 63
End: 2023-08-14
Payer: MEDICARE

## 2023-08-14 VITALS
WEIGHT: 107.4 LBS | DIASTOLIC BLOOD PRESSURE: 75 MMHG | OXYGEN SATURATION: 94 % | HEIGHT: 63 IN | TEMPERATURE: 96.9 F | BODY MASS INDEX: 19.03 KG/M2 | RESPIRATION RATE: 16 BRPM | SYSTOLIC BLOOD PRESSURE: 126 MMHG | HEART RATE: 86 BPM

## 2023-08-14 DIAGNOSIS — J47.9 BRONCHIECTASIS WITHOUT COMPLICATION (HCC): Primary | ICD-10-CM

## 2023-08-14 DIAGNOSIS — G47.33 OSA (OBSTRUCTIVE SLEEP APNEA): ICD-10-CM

## 2023-08-14 DIAGNOSIS — J45.20 MILD INTERMITTENT ASTHMA WITHOUT COMPLICATION: ICD-10-CM

## 2023-08-14 PROCEDURE — 3017F COLORECTAL CA SCREEN DOC REV: CPT | Performed by: INTERNAL MEDICINE

## 2023-08-14 PROCEDURE — G8420 CALC BMI NORM PARAMETERS: HCPCS | Performed by: INTERNAL MEDICINE

## 2023-08-14 PROCEDURE — G8427 DOCREV CUR MEDS BY ELIG CLIN: HCPCS | Performed by: INTERNAL MEDICINE

## 2023-08-14 PROCEDURE — 99214 OFFICE O/P EST MOD 30 MIN: CPT | Performed by: INTERNAL MEDICINE

## 2023-08-14 PROCEDURE — 1036F TOBACCO NON-USER: CPT | Performed by: INTERNAL MEDICINE

## 2023-08-14 RX ORDER — FLUTICASONE FUROATE 100 UG/1
POWDER RESPIRATORY (INHALATION)
Qty: 3 EACH | Refills: 3 | Status: SHIPPED | OUTPATIENT
Start: 2023-08-14
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
## 2023-08-19 SDOH — HEALTH STABILITY: PHYSICAL HEALTH: ON AVERAGE, HOW MANY MINUTES DO YOU ENGAGE IN EXERCISE AT THIS LEVEL?: 0 MIN

## 2023-08-19 SDOH — HEALTH STABILITY: PHYSICAL HEALTH: ON AVERAGE, HOW MANY DAYS PER WEEK DO YOU ENGAGE IN MODERATE TO STRENUOUS EXERCISE (LIKE A BRISK WALK)?: 0 DAYS

## 2023-08-19 ASSESSMENT — SOCIAL DETERMINANTS OF HEALTH (SDOH)
WITHIN THE LAST YEAR, HAVE TO BEEN RAPED OR FORCED TO HAVE ANY KIND OF SEXUAL ACTIVITY BY YOUR PARTNER OR EX-PARTNER?: NO
WITHIN THE LAST YEAR, HAVE YOU BEEN HUMILIATED OR EMOTIONALLY ABUSED IN OTHER WAYS BY YOUR PARTNER OR EX-PARTNER?: NO
WITHIN THE LAST YEAR, HAVE YOU BEEN KICKED, HIT, SLAPPED, OR OTHERWISE PHYSICALLY HURT BY YOUR PARTNER OR EX-PARTNER?: NO
WITHIN THE LAST YEAR, HAVE YOU BEEN AFRAID OF YOUR PARTNER OR EX-PARTNER?: NO

## 2023-08-21 ENCOUNTER — OFFICE VISIT (OUTPATIENT)
Age: 63
End: 2023-08-21
Payer: MEDICARE

## 2023-08-21 VITALS — WEIGHT: 108 LBS | HEIGHT: 63 IN | BODY MASS INDEX: 19.14 KG/M2

## 2023-08-21 DIAGNOSIS — R20.2 NUMBNESS AND TINGLING IN BOTH HANDS: Primary | ICD-10-CM

## 2023-08-21 DIAGNOSIS — R20.0 NUMBNESS AND TINGLING IN BOTH HANDS: Primary | ICD-10-CM

## 2023-08-21 PROCEDURE — 3017F COLORECTAL CA SCREEN DOC REV: CPT | Performed by: ORTHOPAEDIC SURGERY

## 2023-08-21 PROCEDURE — G8420 CALC BMI NORM PARAMETERS: HCPCS | Performed by: ORTHOPAEDIC SURGERY

## 2023-08-21 PROCEDURE — 1036F TOBACCO NON-USER: CPT | Performed by: ORTHOPAEDIC SURGERY

## 2023-08-21 PROCEDURE — G8427 DOCREV CUR MEDS BY ELIG CLIN: HCPCS | Performed by: ORTHOPAEDIC SURGERY

## 2023-08-21 PROCEDURE — 99213 OFFICE O/P EST LOW 20 MIN: CPT | Performed by: ORTHOPAEDIC SURGERY

## 2023-08-21 NOTE — PROGRESS NOTES
So Maurice is a 59 y.o. female right handed. Worker's Compensation and legal considerations: none    Chief Complaint   Patient presents with    Numbness     BUE     Pain Score:   0 - No pain    HPI: Patient presents today for follow-up due to persistent bilateral upper extremity numbness. She had an EMG back in 2019 that was negative for any peripheral mononeuropathy. She reports the numbness only to be at nighttime. 12/26/2019 HPI: Patient comes in today with complaints of continued bilateral hand numbness and tingling whenever she is not wearing her braces. She recently had an EMG that did not show any peripheral mononeuropathy. Of note she has a history of fibromyalgia. She also sees a pain management specialist.     Initial (10/2019) HPI: Patient comes in today to establish care with a hand physician regarding her history of bilateral hand numbness and tingling. She has worn carpal tunnel braces at night that has helped her in the past.  She was recently on a steroid Dosepak for bronchitis. She reports no pain in her hands today. She also reports a history of bilateral thumb joint arthritis as well as fibromyalgia.     Date of onset: Chronic  Injury: No  Prior Treatment:  Yes: Comment: Nighttime braces with minimal benefit    ROS: Review of Systems - General ROS: negative except HPI    Past Medical History:   Diagnosis Date    Allergy     Anxiety     Arthritis     Asthma     mild    Atypical ductal hyperplasia of breast     Atypical hyperplasia of breast 2003    right    Chronic depression     Chronic fatigue syndrome     Chronic interstitial cystitis     Depression     Diarrhea     Fibromyalgia     Generalized stiffness     GERD (gastroesophageal reflux disease)     Hyperlipidemia     IBS (irritable bowel syndrome)     Insomnia     Joint pain     Microscopic hematuria     Myofascial pain syndrome     Numbness and tingling in hands     Ovarian cyst, left     Painful sex     Pericardial effusion

## 2023-10-18 ENCOUNTER — PROCEDURE VISIT (OUTPATIENT)
Age: 63
End: 2023-10-18
Payer: MEDICARE

## 2023-10-18 VITALS
HEIGHT: 63 IN | BODY MASS INDEX: 19.14 KG/M2 | DIASTOLIC BLOOD PRESSURE: 66 MMHG | SYSTOLIC BLOOD PRESSURE: 116 MMHG | RESPIRATION RATE: 16 BRPM | HEART RATE: 83 BPM | WEIGHT: 108 LBS | TEMPERATURE: 98.4 F | OXYGEN SATURATION: 98 %

## 2023-10-18 DIAGNOSIS — R20.0 NUMBNESS AND TINGLING IN BOTH HANDS: Primary | ICD-10-CM

## 2023-10-18 DIAGNOSIS — R20.2 NUMBNESS AND TINGLING IN BOTH HANDS: Primary | ICD-10-CM

## 2023-10-18 PROCEDURE — 95911 NRV CNDJ TEST 9-10 STUDIES: CPT | Performed by: PHYSICAL MEDICINE & REHABILITATION

## 2023-10-18 PROCEDURE — 95886 MUSC TEST DONE W/N TEST COMP: CPT | Performed by: PHYSICAL MEDICINE & REHABILITATION

## 2023-10-18 NOTE — PROGRESS NOTES
Sonny Marsh presents today for   Chief Complaint   Patient presents with    Hand Pain     Numbnes in both hands       Is someone accompanying this pt? no    Is the patient using any DME equipment during OV? no          Coordination of Care:  1. Have you been to the ER, urgent care clinic since your last visit? no  Hospitalized since your last visit? no    2. Have you seen or consulted any other health care providers outside of the [address] [place] [address] since your last visit? no Include any pap smears or colon screening.  no    Last  Checked   Last UDS Checked   Last Pain Agreement Signed
> 9 Wrist-Dig V 14 64 -    Right Ulnar Sensory   Wrist-Dig V 2.2  < 3.1 2.9  < 4.0 35  > 9 Wrist-Dig V 14 64 -      EMG+     Side Muscle Nerve Root Ins Act Fibs Psw Fascics Other Amp Dur Poly Recrt Activation Comment Misc   Right Biceps Musculocut C5-C6 Nml Nml Nml Nml 0 Nml Nml 0 Nml Nml     Right Triceps Radial C6-C8 Nml Nml Nml Nml 0 Nml Nml 0 Nml Nml     Right Pronator Teres Median C6-C7 Nml Nml Nml Nml 0 Nml Nml 0 Nml Nml     Right FDI Median,  Ulnar C8-T1 Nml Nml Nml Nml 0 Nml Nml 0 Nml Nml     Right APB Median C8-T1 Nml Nml Nml Nml 0 Nml Nml 0 Nml Nml     Left Biceps Musculocut C5-C6 Nml Nml Nml Nml 0 Nml Nml 0 Nml Nml     Left Triceps Radial C6-C8 Nml Nml Nml Nml 0 Nml Nml 0 Nml Nml     Left Pronator Teres Median C6-C7 Nml Nml Nml Nml 0 Nml Nml 0 Nml Nml     Left FDI Median,  Ulnar C8-T1 Nml Nml Nml Nml 0 Nml Nml 0 Nml Nml     Left APB Median C8-T1 Nml Nml Nml Nml 0 Nml Nml 0 Nml Nml             Waveforms:    Motor                Sensory                          [facility]  OFFICE PROCEDURE PROGRESS NOTE        Chart reviewed for the following:   Radha ANTONIO, have reviewed the History, Physical and updated the Allergic reactions for [facility] performed immediately prior to start of procedure:   Radha ANTONIO, have performed the following reviews on [facility] Areas prior to the start of the procedure:            * Patient was identified by name and date of birth   * Agreement on procedure being performed was verified  * Risks and Benefits explained to the patient  * Procedure site verified and marked as necessary  * Patient was positioned for comfort  * Consent was signed and verified     Time: 5:12 PM     Date of procedure: 10/18/2023    Procedure performed by:  Violet Ulloa MD    Provider accompanied by: Ranjana. Patient accompanied by: Self.     How tolerated by patient: tolerated the procedure well with no complications    Post

## 2023-10-19 ENCOUNTER — HOSPITAL ENCOUNTER (OUTPATIENT)
Facility: HOSPITAL | Age: 63
Setting detail: INFUSION SERIES
End: 2023-10-19
Payer: MEDICARE

## 2023-10-23 ENCOUNTER — OFFICE VISIT (OUTPATIENT)
Age: 63
End: 2023-10-23

## 2023-10-23 VITALS — BODY MASS INDEX: 19.13 KG/M2 | HEIGHT: 63 IN

## 2023-10-23 DIAGNOSIS — M25.551 RIGHT HIP PAIN: ICD-10-CM

## 2023-10-23 DIAGNOSIS — M15.1 DEGENERATIVE ARTHRITIS OF DISTAL INTERPHALANGEAL JOINT OF INDEX FINGER OF LEFT HAND: ICD-10-CM

## 2023-10-23 DIAGNOSIS — M25.511 ACUTE PAIN OF RIGHT SHOULDER: ICD-10-CM

## 2023-10-23 DIAGNOSIS — M67.442 DIGITAL MUCINOUS CYST OF FINGER OF LEFT HAND: ICD-10-CM

## 2023-10-23 DIAGNOSIS — G56.03 BILATERAL CARPAL TUNNEL SYNDROME: Primary | ICD-10-CM

## 2023-10-23 NOTE — PROGRESS NOTES
syndrome)     Transient total loss of muscle tone     Uterine fibroid        Past Surgical History:   Procedure Laterality Date    BREAST BIOPSY  2003    right    COLONOSCOPY  2013    10 years    COLONOSCOPY  2012    milian    GYN      left ovarian cystectomy    GYN      laparoscopy, laparotomy, lysis of adhesions.     MYOMECTOMY      OVARIAN CYST REMOVAL  2003    UROLOGICAL SURGERY  05/24/10    cystoscopy and hydrodilation        Current Outpatient Medications   Medication Sig Dispense Refill    Calcium Glycerophosphate (PRELIEF PO) Take by mouth      Probiotic Product (CULTURELLE PROBIOTICS PO) Take by mouth      Meth-Hyo-M Bl-Na Phos-Ph Sal (URIBEL) 118 MG CAPS Take 1 capsule by mouth 4 times daily 360 capsule 0    fluticasone (ARNUITY ELLIPTA) 100 MCG/ACT AEPB inhale 1 puff by mouth and INTO THE LUNGS once daily 3 each 3    tiotropium (SPIRIVA RESPIMAT) 2.5 MCG/ACT AERS inhaler inhale 2 puffs by mouth and INTO THE LUNGS once daily 3 each 3    omeprazole (PRILOSEC) 40 MG delayed release capsule take 1 capsule by mouth once daily 90 capsule 2    rosuvastatin (CRESTOR) 40 MG tablet take 1 tablet by mouth nightly 90 tablet 3    modafinil (PROVIGIL) 200 MG tablet take 1 tablet by mouth twice a day for 30 DAYS maximum daily dose of 2 tablets      montelukast (SINGULAIR) 10 MG tablet take 1 tablet by mouth once daily      OXcarbazepine (TRILEPTAL) 150 MG tablet Take 1 tablet by mouth 3 times daily      Simethicone 250 MG CAPS Take 225 mg by mouth as needed      traZODone (DESYREL) 100 MG tablet Take 75 mg by mouth      albuterol sulfate HFA (PROVENTIL;VENTOLIN;PROAIR) 108 (90 Base) MCG/ACT inhaler inhale 2 puffs by mouth and INTO THE LUNGS every 6 hours if needed for wheezing      Calcium Citrate-Vitamin D3 315-6.25 MG-MCG TABS Take 1 tablet by mouth daily      denosumab (PROLIA) 60 MG/ML SOSY SC injection Inject 1 mL into the skin      DULoxetine (CYMBALTA) 60 MG extended release capsule Take 2 capsules by mouth daily

## 2023-10-24 ENCOUNTER — HOSPITAL ENCOUNTER (OUTPATIENT)
Facility: HOSPITAL | Age: 63
Setting detail: INFUSION SERIES
Discharge: HOME | End: 2023-10-27
Payer: MEDICARE

## 2023-10-24 ENCOUNTER — OFFICE VISIT (OUTPATIENT)
Age: 63
End: 2023-10-24
Payer: MEDICARE

## 2023-10-24 VITALS — TEMPERATURE: 97 F | BODY MASS INDEX: 19.14 KG/M2 | HEIGHT: 63 IN | WEIGHT: 108 LBS

## 2023-10-24 VITALS
HEART RATE: 81 BPM | TEMPERATURE: 97.9 F | DIASTOLIC BLOOD PRESSURE: 73 MMHG | SYSTOLIC BLOOD PRESSURE: 129 MMHG | RESPIRATION RATE: 16 BRPM | OXYGEN SATURATION: 98 %

## 2023-10-24 DIAGNOSIS — M25.511 ACUTE PAIN OF RIGHT SHOULDER: Primary | ICD-10-CM

## 2023-10-24 DIAGNOSIS — M25.551 RIGHT HIP PAIN: ICD-10-CM

## 2023-10-24 DIAGNOSIS — S70.01XA CONTUSION OF RIGHT HIP, INITIAL ENCOUNTER: ICD-10-CM

## 2023-10-24 LAB
ANION GAP SERPL CALC-SCNC: 4 MMOL/L (ref 3–18)
BUN SERPL-MCNC: 14 MG/DL (ref 7–18)
BUN/CREAT SERPL: 23 (ref 12–20)
CALCIUM SERPL-MCNC: 9.1 MG/DL (ref 8.5–10.1)
CHLORIDE SERPL-SCNC: 96 MMOL/L (ref 100–111)
CO2 SERPL-SCNC: 32 MMOL/L (ref 21–32)
CREAT SERPL-MCNC: 0.61 MG/DL (ref 0.6–1.3)
GLUCOSE SERPL-MCNC: 81 MG/DL (ref 74–99)
MAGNESIUM SERPL-MCNC: 2.3 MG/DL (ref 1.6–2.6)
PHOSPHATE SERPL-MCNC: 4.8 MG/DL (ref 2.5–4.9)
POTASSIUM SERPL-SCNC: 4 MMOL/L (ref 3.5–5.5)
SODIUM SERPL-SCNC: 132 MMOL/L (ref 136–145)

## 2023-10-24 PROCEDURE — 36415 COLL VENOUS BLD VENIPUNCTURE: CPT

## 2023-10-24 PROCEDURE — 80048 BASIC METABOLIC PNL TOTAL CA: CPT

## 2023-10-24 PROCEDURE — 3017F COLORECTAL CA SCREEN DOC REV: CPT | Performed by: ORTHOPAEDIC SURGERY

## 2023-10-24 PROCEDURE — 83735 ASSAY OF MAGNESIUM: CPT

## 2023-10-24 PROCEDURE — 99214 OFFICE O/P EST MOD 30 MIN: CPT | Performed by: ORTHOPAEDIC SURGERY

## 2023-10-24 PROCEDURE — G8427 DOCREV CUR MEDS BY ELIG CLIN: HCPCS | Performed by: ORTHOPAEDIC SURGERY

## 2023-10-24 PROCEDURE — G8420 CALC BMI NORM PARAMETERS: HCPCS | Performed by: ORTHOPAEDIC SURGERY

## 2023-10-24 PROCEDURE — 1036F TOBACCO NON-USER: CPT | Performed by: ORTHOPAEDIC SURGERY

## 2023-10-24 PROCEDURE — G8484 FLU IMMUNIZE NO ADMIN: HCPCS | Performed by: ORTHOPAEDIC SURGERY

## 2023-10-24 PROCEDURE — 84100 ASSAY OF PHOSPHORUS: CPT

## 2023-10-24 ASSESSMENT — PAIN DESCRIPTION - LOCATION: LOCATION: HIP;ARM

## 2023-10-24 ASSESSMENT — PAIN SCALES - GENERAL: PAINLEVEL_OUTOF10: 6

## 2023-10-24 ASSESSMENT — PAIN DESCRIPTION - FREQUENCY: FREQUENCY: INTERMITTENT

## 2023-10-24 ASSESSMENT — PAIN DESCRIPTION - DESCRIPTORS: DESCRIPTORS: ACHING

## 2023-10-24 ASSESSMENT — PAIN DESCRIPTION - ONSET: ONSET: GRADUAL

## 2023-10-24 ASSESSMENT — PAIN DESCRIPTION - ORIENTATION: ORIENTATION: RIGHT

## 2023-10-24 ASSESSMENT — PAIN DESCRIPTION - PAIN TYPE: TYPE: ACUTE PAIN

## 2023-10-24 NOTE — PATIENT INSTRUCTIONS
You are being scheduled for a Right Hip MRI  You will be receiving a phone call from [facility] Joseph Chung , [phone]  If you do not receive a phone call in 24-48 hours, please call the above number to schedule. Once you have your appointment for the study, please call our main line to schedule a follow-up 7-10 business days after the study. We recommend you schedule your follow-up at the time you schedule your study for no delay in care.     Central Scheduling [address] [facility] [phone]

## 2023-10-25 ENCOUNTER — HOSPITAL ENCOUNTER (OUTPATIENT)
Facility: HOSPITAL | Age: 63
Setting detail: INFUSION SERIES
Discharge: HOME | End: 2023-10-28
Payer: MEDICARE

## 2023-10-25 VITALS
DIASTOLIC BLOOD PRESSURE: 73 MMHG | OXYGEN SATURATION: 98 % | HEART RATE: 82 BPM | SYSTOLIC BLOOD PRESSURE: 127 MMHG | RESPIRATION RATE: 20 BRPM | TEMPERATURE: 97.4 F

## 2023-10-25 DIAGNOSIS — E83.42 HYPOMAGNESEMIA: Primary | ICD-10-CM

## 2023-10-25 DIAGNOSIS — M81.0 AGE-RELATED OSTEOPOROSIS WITHOUT CURRENT PATHOLOGICAL FRACTURE: ICD-10-CM

## 2023-10-25 PROCEDURE — 96372 THER/PROPH/DIAG INJ SC/IM: CPT

## 2023-10-25 PROCEDURE — 6360000002 HC RX W HCPCS: Performed by: PSYCHIATRY & NEUROLOGY

## 2023-10-25 RX ORDER — SODIUM CHLORIDE 9 MG/ML
INJECTION, SOLUTION INTRAVENOUS CONTINUOUS
OUTPATIENT
Start: 2024-04-21

## 2023-10-25 RX ORDER — DIPHENHYDRAMINE HYDROCHLORIDE 50 MG/ML
50 INJECTION INTRAMUSCULAR; INTRAVENOUS
OUTPATIENT
Start: 2024-04-21

## 2023-10-25 RX ORDER — ALBUTEROL SULFATE 90 UG/1
4 AEROSOL, METERED RESPIRATORY (INHALATION) PRN
OUTPATIENT
Start: 2024-04-21

## 2023-10-25 RX ORDER — ONDANSETRON 2 MG/ML
8 INJECTION INTRAMUSCULAR; INTRAVENOUS
OUTPATIENT
Start: 2024-04-21

## 2023-10-25 RX ORDER — EPINEPHRINE 1 MG/ML
0.3 INJECTION, SOLUTION, CONCENTRATE INTRAVENOUS PRN
OUTPATIENT
Start: 2024-04-21

## 2023-10-25 RX ORDER — ACETAMINOPHEN 325 MG/1
650 TABLET ORAL
OUTPATIENT
Start: 2024-04-21

## 2023-10-25 RX ADMIN — DENOSUMAB 60 MG: 60 INJECTION SUBCUTANEOUS at 12:00

## 2023-10-25 ASSESSMENT — PAIN DESCRIPTION - DESCRIPTORS: DESCRIPTORS: ACHING

## 2023-10-25 ASSESSMENT — PAIN SCALES - GENERAL: PAINLEVEL_OUTOF10: 8

## 2023-10-25 ASSESSMENT — PAIN DESCRIPTION - PAIN TYPE: TYPE: ACUTE PAIN

## 2023-10-25 ASSESSMENT — PAIN DESCRIPTION - ONSET: ONSET: GRADUAL

## 2023-10-25 ASSESSMENT — PAIN DESCRIPTION - FREQUENCY: FREQUENCY: INTERMITTENT

## 2023-10-25 ASSESSMENT — PAIN DESCRIPTION - ORIENTATION: ORIENTATION: RIGHT

## 2023-10-25 ASSESSMENT — PAIN DESCRIPTION - LOCATION: LOCATION: ARM;HIP

## 2023-10-27 ENCOUNTER — HOSPITAL ENCOUNTER (OUTPATIENT)
Facility: HOSPITAL | Age: 63
End: 2023-10-27
Attending: ORTHOPAEDIC SURGERY
Payer: MEDICARE

## 2023-10-27 DIAGNOSIS — S70.01XA CONTUSION OF RIGHT HIP, INITIAL ENCOUNTER: ICD-10-CM

## 2023-10-27 PROCEDURE — 73721 MRI JNT OF LWR EXTRE W/O DYE: CPT

## 2023-11-14 ENCOUNTER — OFFICE VISIT (OUTPATIENT)
Age: 63
End: 2023-11-14
Payer: MEDICARE

## 2023-11-14 VITALS — WEIGHT: 108 LBS | BODY MASS INDEX: 19.14 KG/M2 | HEIGHT: 63 IN

## 2023-11-14 DIAGNOSIS — S70.01XA CONTUSION OF RIGHT HIP, INITIAL ENCOUNTER: ICD-10-CM

## 2023-11-14 DIAGNOSIS — M25.511 ACUTE PAIN OF RIGHT SHOULDER: Primary | ICD-10-CM

## 2023-11-14 DIAGNOSIS — M25.551 RIGHT HIP PAIN: ICD-10-CM

## 2023-11-14 PROCEDURE — 3017F COLORECTAL CA SCREEN DOC REV: CPT | Performed by: ORTHOPAEDIC SURGERY

## 2023-11-14 PROCEDURE — 1036F TOBACCO NON-USER: CPT | Performed by: ORTHOPAEDIC SURGERY

## 2023-11-14 PROCEDURE — G8420 CALC BMI NORM PARAMETERS: HCPCS | Performed by: ORTHOPAEDIC SURGERY

## 2023-11-14 PROCEDURE — G8484 FLU IMMUNIZE NO ADMIN: HCPCS | Performed by: ORTHOPAEDIC SURGERY

## 2023-11-14 PROCEDURE — G8427 DOCREV CUR MEDS BY ELIG CLIN: HCPCS | Performed by: ORTHOPAEDIC SURGERY

## 2023-11-14 PROCEDURE — 99213 OFFICE O/P EST LOW 20 MIN: CPT | Performed by: ORTHOPAEDIC SURGERY

## 2023-12-12 ENCOUNTER — HOSPITAL ENCOUNTER (OUTPATIENT)
Facility: HOSPITAL | Age: 63
Setting detail: SPECIMEN
Discharge: HOME | End: 2023-12-15
Payer: MEDICARE

## 2023-12-12 DIAGNOSIS — E55.9 VITAMIN D DEFICIENCY, UNSPECIFIED: ICD-10-CM

## 2023-12-12 DIAGNOSIS — E78.2 MIXED HYPERLIPIDEMIA: ICD-10-CM

## 2023-12-12 LAB
25(OH)D3 SERPL-MCNC: 50.9 NG/ML (ref 30–100)
ALBUMIN SERPL-MCNC: 4.1 G/DL (ref 3.4–5)
ALBUMIN/GLOB SERPL: 1.7 (ref 0.8–1.7)
ALP SERPL-CCNC: 25 U/L (ref 45–117)
ALT SERPL-CCNC: 54 U/L (ref 13–56)
ANION GAP SERPL CALC-SCNC: 5 MMOL/L (ref 3–18)
AST SERPL-CCNC: 24 U/L (ref 10–38)
BILIRUB SERPL-MCNC: 0.6 MG/DL (ref 0.2–1)
BUN SERPL-MCNC: 17 MG/DL (ref 7–18)
BUN/CREAT SERPL: 25 (ref 12–20)
CALCIUM SERPL-MCNC: 9 MG/DL (ref 8.5–10.1)
CHLORIDE SERPL-SCNC: 95 MMOL/L (ref 100–111)
CHOLEST SERPL-MCNC: 202 MG/DL
CO2 SERPL-SCNC: 32 MMOL/L (ref 21–32)
CREAT SERPL-MCNC: 0.67 MG/DL (ref 0.6–1.3)
GLOBULIN SER CALC-MCNC: 2.4 G/DL (ref 2–4)
GLUCOSE SERPL-MCNC: 93 MG/DL (ref 74–99)
HDLC SERPL-MCNC: 90 MG/DL (ref 40–60)
HDLC SERPL: 2.2 (ref 0–5)
LDLC SERPL CALC-MCNC: 94 MG/DL (ref 0–100)
LIPID PANEL: ABNORMAL
POTASSIUM SERPL-SCNC: 3.9 MMOL/L (ref 3.5–5.5)
PROT SERPL-MCNC: 6.5 G/DL (ref 6.4–8.2)
SODIUM SERPL-SCNC: 132 MMOL/L (ref 136–145)
TRIGL SERPL-MCNC: 90 MG/DL
VLDLC SERPL CALC-MCNC: 18 MG/DL

## 2023-12-12 PROCEDURE — 36415 COLL VENOUS BLD VENIPUNCTURE: CPT

## 2023-12-12 PROCEDURE — 80053 COMPREHEN METABOLIC PANEL: CPT

## 2023-12-12 PROCEDURE — 80061 LIPID PANEL: CPT

## 2023-12-12 PROCEDURE — 82306 VITAMIN D 25 HYDROXY: CPT

## 2024-01-19 RX ORDER — MONTELUKAST SODIUM 10 MG/1
10 TABLET ORAL DAILY
Qty: 30 TABLET | Refills: 5 | Status: SHIPPED | OUTPATIENT
Start: 2024-01-19

## 2024-01-19 RX ORDER — OMEPRAZOLE 40 MG/1
40 CAPSULE, DELAYED RELEASE ORAL DAILY
Qty: 90 CAPSULE | Refills: 2 | Status: SHIPPED | OUTPATIENT
Start: 2024-01-19

## 2024-01-19 NOTE — TELEPHONE ENCOUNTER
Patient is requesting new order for RX    montelukast (SINGULAIR) 10 MG tablet [[id number]]     Pharmacy is [facility]

## 2024-01-19 NOTE — TELEPHONE ENCOUNTER
----- Message from Yocasta Wall sent at 1/19/2024  9:56 AM EST -----  Subject: Refill Request    QUESTIONS  Name of Medication? omeprazole (PRILOSEC) 40 MG delayed release capsule  Patient-reported dosage and instructions? 40 mg  How many days do you have left? 7  Preferred Pharmacy? [id number]  Pharmacy phone number (if available)? [phone]  ---------------------------------------------------------------------------  --------------  CALL BACK INFO  What is the best way for the office to contact you? OK to leave message on   voicemail  Preferred Call Back Phone Number? [phone]  ---------------------------------------------------------------------------  --------------  SCRIPT ANSWERS  Relationship to Patient? Self

## 2024-01-25 ENCOUNTER — TELEPHONE (OUTPATIENT)
Age: 64
End: 2024-01-25

## 2024-01-25 NOTE — TELEPHONE ENCOUNTER
----- Message from Jason STOCK MD sent at 1/16/2024  8:49 AM EST -----  Please inform patient regarding abnormal test findings.     Pericardial effusion, unchanged    Thank you  SP

## 2024-01-25 NOTE — TELEPHONE ENCOUNTER
Called patient to inform of test results. Patient verified name and . Patient verbalized and understood results.

## 2024-01-31 NOTE — PROGRESS NOTES
[facility] Lab Visit:    Luli James  1960  [id number]    2958 Pt arrived ambulatory w/o assist. Labs drawn per order via Right AC venipuncture x1 attempt. Pt tolerated well without complaints. Gauze/ coban to site. Pt [facility] ambulatory and in no distress at 1335.      Visit Vitals  /72 (BP 1 Location: Right upper arm, BP Patient Position: Sitting)   Pulse 90   Temp 98.3 °F (36.8 °C)   Resp 16   SpO2 97%
Patient

## 2024-02-09 ENCOUNTER — OFFICE VISIT (OUTPATIENT)
Age: 64
End: 2024-02-09
Payer: MEDICARE

## 2024-02-09 VITALS
BODY MASS INDEX: 20.48 KG/M2 | TEMPERATURE: 97 F | WEIGHT: 115.6 LBS | HEART RATE: 77 BPM | RESPIRATION RATE: 16 BRPM | HEIGHT: 63 IN | SYSTOLIC BLOOD PRESSURE: 131 MMHG | OXYGEN SATURATION: 99 % | DIASTOLIC BLOOD PRESSURE: 75 MMHG

## 2024-02-09 DIAGNOSIS — J45.20 MILD INTERMITTENT ASTHMA WITHOUT COMPLICATION: ICD-10-CM

## 2024-02-09 DIAGNOSIS — J47.9 BRONCHIECTASIS WITHOUT COMPLICATION (HCC): Primary | ICD-10-CM

## 2024-02-09 DIAGNOSIS — M79.7 FIBROMYALGIA: ICD-10-CM

## 2024-02-09 PROCEDURE — G8427 DOCREV CUR MEDS BY ELIG CLIN: HCPCS | Performed by: INTERNAL MEDICINE

## 2024-02-09 PROCEDURE — 99214 OFFICE O/P EST MOD 30 MIN: CPT | Performed by: INTERNAL MEDICINE

## 2024-02-09 PROCEDURE — G8484 FLU IMMUNIZE NO ADMIN: HCPCS | Performed by: INTERNAL MEDICINE

## 2024-02-09 PROCEDURE — 3017F COLORECTAL CA SCREEN DOC REV: CPT | Performed by: INTERNAL MEDICINE

## 2024-02-09 PROCEDURE — 1036F TOBACCO NON-USER: CPT | Performed by: INTERNAL MEDICINE

## 2024-02-09 PROCEDURE — G8420 CALC BMI NORM PARAMETERS: HCPCS | Performed by: INTERNAL MEDICINE

## 2024-02-09 RX ORDER — ALBUTEROL SULFATE 90 UG/1
AEROSOL, METERED RESPIRATORY (INHALATION)
Qty: 18 G | Refills: 5 | Status: SHIPPED | OUTPATIENT
Start: 2024-02-09

## 2024-02-09 RX ORDER — GABAPENTIN 800 MG/1
800 TABLET ORAL 3 TIMES DAILY
COMMUNITY
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
## 2024-02-09 NOTE — PROGRESS NOTES
Latoya Kim presents today for   Chief Complaint   Patient presents with    Follow-up    Asthma       Is someone accompanying this pt? No    Is the patient using any DME equipment during OV? No    -DME Company NA    Depression Screenin/19/2023    10:22 AM   PHQ-9 Questionaire   Little interest or pleasure in doing things 0   Feeling down, depressed, or hopeless 0   Trouble falling or staying asleep, or sleeping too much 0   Feeling tired or having little energy 0   Poor appetite or overeating 0   Feeling bad about yourself - or that you are a failure or have let yourself or your family down 0   Trouble concentrating on things, such as reading the newspaper or watching television 0   Moving or speaking so slowly that other people could have noticed. Or the opposite - being so fidgety or restless that you have been moving around a lot more than usual 0   Thoughts that you would be better off dead, or of hurting yourself in some way 0   PHQ-9 Total Score 0   If you checked off any problems, how difficult have these problems made it for you to do your work, take care of things at home, or get along with other people? 0       Learning Needs Questionnaire:     No question data found.      Fall Risk:         2023    10:23 AM   Fall Risk   2 or more falls in past year? no   Fall with injury in past year? no        Abuse Screening:          No data to display                  Coordination of Care:    1. Have you been to the ER, urgent care clinic since your last visit? Hospitalized since your last visit? No    2. Have you seen or consulted any other health care providers outside of the [facility] since your last visit? Include any pap smears or colon screening. No    Medication list has been update per patient.

## 2024-02-20 RX ORDER — ROSUVASTATIN CALCIUM 40 MG/1
TABLET, COATED ORAL
Qty: 90 TABLET | Refills: 3 | Status: SHIPPED | OUTPATIENT
Start: 2024-02-20

## 2024-03-07 ENCOUNTER — HOSPITAL ENCOUNTER (OUTPATIENT)
Facility: HOSPITAL | Age: 64
Discharge: HOME | End: 2024-03-07
Attending: INTERNAL MEDICINE
Payer: MEDICARE

## 2024-03-07 VITALS — WEIGHT: 115.52 LBS | BODY MASS INDEX: 20.47 KG/M2 | HEIGHT: 63 IN

## 2024-03-07 DIAGNOSIS — Z12.31 VISIT FOR SCREENING MAMMOGRAM: ICD-10-CM

## 2024-03-07 PROCEDURE — 77067 SCR MAMMO BI INCL CAD: CPT

## 2024-04-22 ENCOUNTER — OFFICE VISIT (OUTPATIENT)
Age: 64
End: 2024-04-22
Payer: MEDICARE

## 2024-04-22 VITALS
HEART RATE: 85 BPM | WEIGHT: 118 LBS | BODY MASS INDEX: 20.91 KG/M2 | HEIGHT: 63 IN | OXYGEN SATURATION: 97 % | DIASTOLIC BLOOD PRESSURE: 64 MMHG | SYSTOLIC BLOOD PRESSURE: 120 MMHG

## 2024-04-22 DIAGNOSIS — I31.39 PERICARDIAL EFFUSION: Primary | ICD-10-CM

## 2024-04-22 DIAGNOSIS — R07.9 CHEST PAIN, UNSPECIFIED TYPE: ICD-10-CM

## 2024-04-22 PROCEDURE — 3017F COLORECTAL CA SCREEN DOC REV: CPT | Performed by: INTERNAL MEDICINE

## 2024-04-22 PROCEDURE — 99214 OFFICE O/P EST MOD 30 MIN: CPT | Performed by: INTERNAL MEDICINE

## 2024-04-22 PROCEDURE — G8420 CALC BMI NORM PARAMETERS: HCPCS | Performed by: INTERNAL MEDICINE

## 2024-04-22 PROCEDURE — G8428 CUR MEDS NOT DOCUMENT: HCPCS | Performed by: INTERNAL MEDICINE

## 2024-04-22 PROCEDURE — 1036F TOBACCO NON-USER: CPT | Performed by: INTERNAL MEDICINE

## 2024-04-22 NOTE — PROGRESS NOTES
hemodynamic compromise  Less likely to be inflammatory.  ALTHEA in the past has been normal in past  Good HS and no JVD. BP stable  Will repeat limited echo in 1 year and have follow-up after.  Symptoms of heart failure and pericardial effusion discussed with the patient in great detail    Hyperlipidemia: Currently on Crestor 40 m daily.  Continue same    Thyroid nodule.  According to patient following endocrine team.  Biopsy suggested benign nodule.  Defer to endocrine team    This plan was discussed with patient who is in agreement.    Thank you for allowing me to participate in patient care. Please feel free to call me if you have any question or concern.     Jason Hoang MD  Please note: This document has been produced using voice recognition software. Unrecognized errors in transcription may be present.

## 2024-04-23 ENCOUNTER — HOSPITAL ENCOUNTER (OUTPATIENT)
Facility: HOSPITAL | Age: 64
Setting detail: INFUSION SERIES
Discharge: HOME | End: 2024-04-26
Payer: MEDICARE

## 2024-04-23 VITALS
HEART RATE: 79 BPM | OXYGEN SATURATION: 100 % | TEMPERATURE: 98.1 F | RESPIRATION RATE: 18 BRPM | DIASTOLIC BLOOD PRESSURE: 74 MMHG | SYSTOLIC BLOOD PRESSURE: 109 MMHG

## 2024-04-23 LAB
ANION GAP SERPL CALC-SCNC: 4 MMOL/L (ref 3–18)
BUN SERPL-MCNC: 15 MG/DL (ref 7–18)
BUN/CREAT SERPL: 26 (ref 12–20)
CALCIUM SERPL-MCNC: 8.8 MG/DL (ref 8.5–10.1)
CHLORIDE SERPL-SCNC: 95 MMOL/L (ref 100–111)
CO2 SERPL-SCNC: 32 MMOL/L (ref 21–32)
CREAT SERPL-MCNC: 0.58 MG/DL (ref 0.6–1.3)
GLUCOSE SERPL-MCNC: 84 MG/DL (ref 74–99)
MAGNESIUM SERPL-MCNC: 2.3 MG/DL (ref 1.6–2.6)
PHOSPHATE SERPL-MCNC: 3.9 MG/DL (ref 2.5–4.9)
POTASSIUM SERPL-SCNC: 4.1 MMOL/L (ref 3.5–5.5)
SODIUM SERPL-SCNC: 131 MMOL/L (ref 136–145)

## 2024-04-23 PROCEDURE — 83735 ASSAY OF MAGNESIUM: CPT

## 2024-04-23 PROCEDURE — 36415 COLL VENOUS BLD VENIPUNCTURE: CPT

## 2024-04-23 PROCEDURE — 80048 BASIC METABOLIC PNL TOTAL CA: CPT

## 2024-04-23 PROCEDURE — 84100 ASSAY OF PHOSPHORUS: CPT

## 2024-04-23 NOTE — PROGRESS NOTES
[facility] Progress Note    Date: 2024    Name: Latoya Kim    MRN: [id number]         : 1960      Ms. Kim to [facility], ambulatory at 1030, here for Prolia labs. No complaints or concerns voiced.    Pt was assessed and education was provided.     Ms. Kim's vitals were reviewed.  Vitals:    24 1030   BP: 109/74   Pulse: 79   Resp: 18   Temp: 98.1 °F (36.7 °C)   SpO2: 100%     Blood for ordered BMP,Mag,Phos was drawn via LAC x1 attempt and sent out for processing. Bandaid applied to site. No bleeding or irritation at site.    Ms. Kim tolerated well and had no complaints.     Pt armband removed and shredded.    [facility]. [facility] in [facility] condition at 1040. She is to return on 24 at 1130 for her next Prolia appointment.    OSMIN BULLOCK RN  2024  10:43 AM

## 2024-04-24 ENCOUNTER — HOSPITAL ENCOUNTER (OUTPATIENT)
Facility: HOSPITAL | Age: 64
Setting detail: INFUSION SERIES
Discharge: HOME | End: 2024-04-27
Payer: MEDICARE

## 2024-04-24 VITALS
HEART RATE: 78 BPM | DIASTOLIC BLOOD PRESSURE: 78 MMHG | OXYGEN SATURATION: 96 % | TEMPERATURE: 98 F | SYSTOLIC BLOOD PRESSURE: 131 MMHG | RESPIRATION RATE: 16 BRPM

## 2024-04-24 DIAGNOSIS — E83.42 HYPOMAGNESEMIA: Primary | ICD-10-CM

## 2024-04-24 DIAGNOSIS — M81.0 AGE-RELATED OSTEOPOROSIS WITHOUT CURRENT PATHOLOGICAL FRACTURE: ICD-10-CM

## 2024-04-24 PROCEDURE — 96372 THER/PROPH/DIAG INJ SC/IM: CPT

## 2024-04-24 PROCEDURE — 6360000002 HC RX W HCPCS: Performed by: PSYCHIATRY & NEUROLOGY

## 2024-04-24 RX ORDER — EPINEPHRINE 1 MG/ML
0.3 INJECTION, SOLUTION, CONCENTRATE INTRAVENOUS PRN
OUTPATIENT
Start: 2024-10-20

## 2024-04-24 RX ORDER — DIPHENHYDRAMINE HYDROCHLORIDE 50 MG/ML
50 INJECTION INTRAMUSCULAR; INTRAVENOUS
OUTPATIENT
Start: 2024-10-20

## 2024-04-24 RX ORDER — ALBUTEROL SULFATE 90 UG/1
4 AEROSOL, METERED RESPIRATORY (INHALATION) PRN
OUTPATIENT
Start: 2024-10-20

## 2024-04-24 RX ORDER — SODIUM CHLORIDE 9 MG/ML
INJECTION, SOLUTION INTRAVENOUS CONTINUOUS
OUTPATIENT
Start: 2024-10-20

## 2024-04-24 RX ORDER — ACETAMINOPHEN 325 MG/1
650 TABLET ORAL
OUTPATIENT
Start: 2024-10-20

## 2024-04-24 RX ORDER — ONDANSETRON 2 MG/ML
8 INJECTION INTRAMUSCULAR; INTRAVENOUS
OUTPATIENT
Start: 2024-10-20

## 2024-04-24 RX ADMIN — DENOSUMAB 60 MG: 60 INJECTION SUBCUTANEOUS at 11:45

## 2024-04-24 ASSESSMENT — PAIN - FUNCTIONAL ASSESSMENT: PAIN_FUNCTIONAL_ASSESSMENT: NONE - DENIES PAIN

## 2024-04-24 NOTE — PROGRESS NOTES
[facility] Progress Note    Date: 2024    Name: Latoya Kim    MRN: [id number]         : 1960    Ms. Kim arrived in the [facility] today at 1125 in stable condition, here for her PROLIA INJECTION (EVERY 6 MONTHS). She was assessed and education was provided.     Ms. Kim's vitals were reviewed.  Vitals:    24 1125   BP: 131/78   Pulse: 78   Resp: 16   Temp: 98 °F (36.7 °C)   SpO2: 96%       Ms. Kim presented to the infusion center today stating that she was doing well, and voicing no major complaints or concerns.     And, she stated that she has been tolerating the PROLIA injections well, and without any side effects or issues.       Her Pre-Prolia labs obtained on 24 were all reviewed, and were all noted to be satisfactory for treatment today, and were as follows:       Latest Reference Range & Units 24 10:36   Sodium 136 - 145 mmol/L 131 (L)   Potassium 3.5 - 5.5 mmol/L 4.1   Chloride 100 - 111 mmol/L 95 (L)   CARBON DIOXIDE 21 - 32 mmol/L 32   BUN,BUNPL 7.0 - 18 MG/DL 15   Creatinine 0.6 - 1.3 MG/DL 0.58 (L)   Bun/Cre Ratio 12 - 20   26 (H)   Anion Gap 3.0 - 18 mmol/L 4   Est, Glom Filt Rate >60 ml/min/1.73m2 >90   Magnesium 1.6 - 2.6 mg/dL 2.3   Glucose, Random 74 - 99 mg/dL 84   Calcium, Total 8.5 - 10.1 MG/DL 8.8   Phosphorus 2.5 - 4.9 MG/DL 3.9   (L): Data is abnormally low  (H): Data is abnormally high          Denosumab (PROLIA) 60 mg, was administered SQ, in the back of her LEFT ARM at 1145, per order and without incident.           Ms. Kim tolerated treatment well today, and she voiced no complaints.     Ms. Kim was [facility] in stable condition at 1150.     She is to return in 6 months, on Wednesday, 10-23-24 at 1030, for her next appointment, to have her Pre-Prolia Labs drawn.     And then, she is scheduled to return on Thursday, 10-24-24 at 1030, to receive her next PROLIA injection.     Chato Ceja RN  ,  2024  11:43 AM

## 2024-05-14 ENCOUNTER — APPOINTMENT (OUTPATIENT)
Facility: HOSPITAL | Age: 64
End: 2024-05-14
Payer: MEDICARE

## 2024-05-15 ENCOUNTER — APPOINTMENT (OUTPATIENT)
Facility: HOSPITAL | Age: 64
End: 2024-05-15
Payer: MEDICARE

## 2024-05-18 ENCOUNTER — HOSPITAL ENCOUNTER (OUTPATIENT)
Facility: HOSPITAL | Age: 64
End: 2024-05-18
Payer: MEDICARE

## 2024-05-18 DIAGNOSIS — M19.90 ARTHRITIS: ICD-10-CM

## 2024-05-18 PROCEDURE — 73130 X-RAY EXAM OF HAND: CPT

## 2024-05-18 PROCEDURE — 73521 X-RAY EXAM HIPS BI 2 VIEWS: CPT

## 2024-06-14 ENCOUNTER — HOSPITAL ENCOUNTER (OUTPATIENT)
Facility: HOSPITAL | Age: 64
Setting detail: SPECIMEN
End: 2024-06-14
Payer: MEDICARE

## 2024-06-14 DIAGNOSIS — E78.2 MIXED HYPERLIPIDEMIA: ICD-10-CM

## 2024-06-14 DIAGNOSIS — E55.9 VITAMIN D DEFICIENCY, UNSPECIFIED: ICD-10-CM

## 2024-06-14 LAB
25(OH)D3 SERPL-MCNC: 45.2 NG/ML (ref 30–100)
ALBUMIN SERPL-MCNC: 4 G/DL (ref 3.4–5)
ALBUMIN/GLOB SERPL: 1.5 (ref 0.8–1.7)
ALP SERPL-CCNC: 27 U/L (ref 45–117)
ALT SERPL-CCNC: 46 U/L (ref 13–56)
ANION GAP SERPL CALC-SCNC: 4 MMOL/L (ref 3–18)
AST SERPL-CCNC: 31 U/L (ref 10–38)
BILIRUB SERPL-MCNC: 0.4 MG/DL (ref 0.2–1)
BUN SERPL-MCNC: 21 MG/DL (ref 7–18)
BUN/CREAT SERPL: 30 (ref 12–20)
CALCIUM SERPL-MCNC: 9.2 MG/DL (ref 8.5–10.1)
CHLORIDE SERPL-SCNC: 98 MMOL/L (ref 100–111)
CHOLEST SERPL-MCNC: 220 MG/DL
CO2 SERPL-SCNC: 31 MMOL/L (ref 21–32)
CREAT SERPL-MCNC: 0.69 MG/DL (ref 0.6–1.3)
GLOBULIN SER CALC-MCNC: 2.6 G/DL (ref 2–4)
GLUCOSE SERPL-MCNC: 95 MG/DL (ref 74–99)
HDLC SERPL-MCNC: 96 MG/DL (ref 40–60)
HDLC SERPL: 2.3 (ref 0–5)
LDLC SERPL CALC-MCNC: 114.8 MG/DL (ref 0–100)
LIPID PANEL: ABNORMAL
POTASSIUM SERPL-SCNC: 4.2 MMOL/L (ref 3.5–5.5)
PROT SERPL-MCNC: 6.6 G/DL (ref 6.4–8.2)
SODIUM SERPL-SCNC: 133 MMOL/L (ref 136–145)
TRIGL SERPL-MCNC: 46 MG/DL
VLDLC SERPL CALC-MCNC: 9.2 MG/DL

## 2024-06-14 PROCEDURE — 36415 COLL VENOUS BLD VENIPUNCTURE: CPT

## 2024-06-14 PROCEDURE — 82306 VITAMIN D 25 HYDROXY: CPT

## 2024-06-14 PROCEDURE — 80053 COMPREHEN METABOLIC PANEL: CPT

## 2024-06-14 PROCEDURE — 80061 LIPID PANEL: CPT

## 2024-06-21 ENCOUNTER — OFFICE VISIT (OUTPATIENT)
Facility: CLINIC | Age: 64
End: 2024-06-21
Payer: MEDICARE

## 2024-06-21 VITALS
OXYGEN SATURATION: 100 % | WEIGHT: 119 LBS | DIASTOLIC BLOOD PRESSURE: 83 MMHG | RESPIRATION RATE: 20 BRPM | HEART RATE: 83 BPM | HEIGHT: 63 IN | SYSTOLIC BLOOD PRESSURE: 134 MMHG | TEMPERATURE: 99.3 F | BODY MASS INDEX: 21.09 KG/M2

## 2024-06-21 DIAGNOSIS — E78.2 MIXED HYPERLIPIDEMIA: Primary | ICD-10-CM

## 2024-06-21 DIAGNOSIS — M46.1 SACROILIITIS, NOT ELSEWHERE CLASSIFIED (HCC): ICD-10-CM

## 2024-06-21 DIAGNOSIS — J45.40 MODERATE PERSISTENT ASTHMA, UNCOMPLICATED: ICD-10-CM

## 2024-06-21 DIAGNOSIS — E55.9 VITAMIN D DEFICIENCY, UNSPECIFIED: ICD-10-CM

## 2024-06-21 DIAGNOSIS — M79.7 FIBROMYALGIA: ICD-10-CM

## 2024-06-21 DIAGNOSIS — M81.0 AGE-RELATED OSTEOPOROSIS WITHOUT CURRENT PATHOLOGICAL FRACTURE: ICD-10-CM

## 2024-06-21 PROCEDURE — G8427 DOCREV CUR MEDS BY ELIG CLIN: HCPCS | Performed by: INTERNAL MEDICINE

## 2024-06-21 PROCEDURE — 1036F TOBACCO NON-USER: CPT | Performed by: INTERNAL MEDICINE

## 2024-06-21 PROCEDURE — G8420 CALC BMI NORM PARAMETERS: HCPCS | Performed by: INTERNAL MEDICINE

## 2024-06-21 PROCEDURE — 99214 OFFICE O/P EST MOD 30 MIN: CPT | Performed by: INTERNAL MEDICINE

## 2024-06-21 PROCEDURE — 3017F COLORECTAL CA SCREEN DOC REV: CPT | Performed by: INTERNAL MEDICINE

## 2024-06-21 SDOH — ECONOMIC STABILITY: FOOD INSECURITY: WITHIN THE PAST 12 MONTHS, YOU WORRIED THAT YOUR FOOD WOULD RUN OUT BEFORE YOU GOT MONEY TO BUY MORE.: NEVER TRUE

## 2024-06-21 SDOH — ECONOMIC STABILITY: INCOME INSECURITY: HOW HARD IS IT FOR YOU TO PAY FOR THE VERY BASICS LIKE FOOD, HOUSING, MEDICAL CARE, AND HEATING?: NOT HARD AT ALL

## 2024-06-21 SDOH — ECONOMIC STABILITY: FOOD INSECURITY: WITHIN THE PAST 12 MONTHS, THE FOOD YOU BOUGHT JUST DIDN'T LAST AND YOU DIDN'T HAVE MONEY TO GET MORE.: NEVER TRUE

## 2024-06-21 ASSESSMENT — PATIENT HEALTH QUESTIONNAIRE - PHQ9
3. TROUBLE FALLING OR STAYING ASLEEP: NOT AT ALL
10. IF YOU CHECKED OFF ANY PROBLEMS, HOW DIFFICULT HAVE THESE PROBLEMS MADE IT FOR YOU TO DO YOUR WORK, TAKE CARE OF THINGS AT HOME, OR GET ALONG WITH OTHER PEOPLE: NOT DIFFICULT AT ALL
SUM OF ALL RESPONSES TO PHQ9 QUESTIONS 1 & 2: 1
SUM OF ALL RESPONSES TO PHQ QUESTIONS 1-9: 1
2. FEELING DOWN, DEPRESSED OR HOPELESS: SEVERAL DAYS
1. LITTLE INTEREST OR PLEASURE IN DOING THINGS: NOT AT ALL
5. POOR APPETITE OR OVEREATING: NOT AT ALL
6. FEELING BAD ABOUT YOURSELF - OR THAT YOU ARE A FAILURE OR HAVE LET YOURSELF OR YOUR FAMILY DOWN: NOT AT ALL
SUM OF ALL RESPONSES TO PHQ QUESTIONS 1-9: 1
7. TROUBLE CONCENTRATING ON THINGS, SUCH AS READING THE NEWSPAPER OR WATCHING TELEVISION: NOT AT ALL
8. MOVING OR SPEAKING SO SLOWLY THAT OTHER PEOPLE COULD HAVE NOTICED. OR THE OPPOSITE, BEING SO FIGETY OR RESTLESS THAT YOU HAVE BEEN MOVING AROUND A LOT MORE THAN USUAL: NOT AT ALL
SUM OF ALL RESPONSES TO PHQ QUESTIONS 1-9: 1
9. THOUGHTS THAT YOU WOULD BE BETTER OFF DEAD, OR OF HURTING YOURSELF: NOT AT ALL
SUM OF ALL RESPONSES TO PHQ QUESTIONS 1-9: 1
4. FEELING TIRED OR HAVING LITTLE ENERGY: NOT AT ALL

## 2024-06-21 NOTE — PROGRESS NOTES
Latoya Kim is a 63 y.o.  female and presents with Cholesterol Problem, Vitamin D Deficiency (Follow up), and  Osteoporosis         SUBJECTIVE:   Patient has history of very high LDL that has been over 200 in the past.  Patient had coronary artery calcium score done that was elevated at 84.  Patient was evaluated by cardiology and had a mild  pericardial effusion that is being monitored.  Patient is now on Crestor 40 mg which has significantly improved her cholesterol.   Patient has a history of asthma moderate persistent for which she uses Asmanex and Singulair and Spiriva.  Patient is followed by pulmonary.  Patient is also followed by the spine center as well as pain management with Dr. Decker.  She  has been diagnosed also with sacroiliitis which is also managed by pain management.   She is on Trileptal and Neurontin to help manage chronic pain and fibromyalgia.  She has some mild hyponatremia because of the Trileptal.  Patient is followed by Dr. Roth from psychiatry and remains on Cymbalta 120 mg daily, trazodone and Ativan 2 mg 3 times a day.  Patient also has interstitial cystitis for which she follows urology.   Patient's vitamin D level is controlled on vitamin D 2000 units/day      Patient's pericardial effusion etiology is unclear.  Her rheumatological markers have been negative in the past.       Patient had a thyroid nodule that was biopsied with endocrine and was negative.  She will continue to follow-up with endocrine yearly for ultrasounds to monitor the size of the nodule.     Patient is on Prolia injections every 6 months for osteoporosis.      Respiratory ROS: negative for - shortness of breath   Cardiovascular ROS: negative for - chest pain        Current Outpatient Medications   Medication Sig    Meth-Hyo-M Bl-Na Phos-Ph Sal (URO-MP) 118 MG CAPS Take 1 capsule by mouth 4 times daily    rosuvastatin (CRESTOR) 40 MG tablet take 1 tablet by mouth nightly    gabapentin (NEURONTIN) 800

## 2024-06-21 NOTE — PROGRESS NOTES
Latoya Kim presents today for   Chief Complaint   Patient presents with    Cholesterol Problem     6 month follow up            \"Have you been to the ER, urgent care clinic since your last visit?  Hospitalized since your last visit?\"    NO    “Have you seen or consulted any other health care providers outside of [facility] since your last visit?”    NO        “Have you had a pap smear?”    NO    Date of last Cervical Cancer screen (HPV or PAP): 9/5/2013

## 2024-07-22 NOTE — TELEPHONE ENCOUNTER
Pt would like a refill on her singular (montelukast) going to [facility] on [address]. Pt stated that  pharmacy has not gotten approval to refill. Pt stated that she needs a 90 day supply pt does have an appt coming up on 8/20 w/[facility]. Please advise [phone]

## 2024-07-23 NOTE — TELEPHONE ENCOUNTER
Pt last seen  02/09/24 has a f/u with [facility] on 08/20/24 requesting refill of Montelukast. Refill pended. Please Advise.

## 2024-07-24 RX ORDER — MONTELUKAST SODIUM 10 MG/1
10 TABLET ORAL DAILY
Qty: 30 TABLET | Refills: 3 | Status: SHIPPED | OUTPATIENT
Start: 2024-07-24

## 2024-08-09 RX ORDER — CHLORHEXIDINE GLUCONATE ORAL RINSE 1.2 MG/ML
SOLUTION DENTAL
COMMUNITY
Start: 2024-07-15

## 2024-08-09 RX ORDER — TRAZODONE HYDROCHLORIDE 100 MG/1
TABLET ORAL
COMMUNITY
Start: 2024-06-28

## 2024-08-20 ENCOUNTER — OFFICE VISIT (OUTPATIENT)
Age: 64
End: 2024-08-20
Payer: MEDICARE

## 2024-08-20 VITALS
SYSTOLIC BLOOD PRESSURE: 146 MMHG | RESPIRATION RATE: 18 BRPM | HEART RATE: 85 BPM | WEIGHT: 122.6 LBS | DIASTOLIC BLOOD PRESSURE: 71 MMHG | OXYGEN SATURATION: 99 % | HEIGHT: 63 IN | TEMPERATURE: 97.2 F | BODY MASS INDEX: 21.72 KG/M2

## 2024-08-20 DIAGNOSIS — J47.9 BRONCHIECTASIS WITHOUT COMPLICATION (HCC): ICD-10-CM

## 2024-08-20 DIAGNOSIS — I31.39 PERICARDIAL EFFUSION: ICD-10-CM

## 2024-08-20 DIAGNOSIS — J45.20 MILD INTERMITTENT ASTHMA WITHOUT COMPLICATION: Primary | ICD-10-CM

## 2024-08-20 PROBLEM — J41.0 SIMPLE CHRONIC BRONCHITIS (HCC): Status: RESOLVED | Noted: 2023-02-24 | Resolved: 2024-08-20

## 2024-08-20 PROCEDURE — G8420 CALC BMI NORM PARAMETERS: HCPCS | Performed by: HOSPITALIST

## 2024-08-20 PROCEDURE — 1036F TOBACCO NON-USER: CPT | Performed by: HOSPITALIST

## 2024-08-20 PROCEDURE — 3017F COLORECTAL CA SCREEN DOC REV: CPT | Performed by: HOSPITALIST

## 2024-08-20 PROCEDURE — 99214 OFFICE O/P EST MOD 30 MIN: CPT | Performed by: HOSPITALIST

## 2024-08-20 PROCEDURE — G8428 CUR MEDS NOT DOCUMENT: HCPCS | Performed by: HOSPITALIST

## 2024-08-20 RX ORDER — MONTELUKAST SODIUM 10 MG/1
10 TABLET ORAL DAILY
Qty: 30 TABLET | Refills: 3 | Status: SHIPPED | OUTPATIENT
Start: 2024-08-20

## 2024-08-29 RX ORDER — ALBUTEROL SULFATE 90 UG/1
AEROSOL, METERED RESPIRATORY (INHALATION)
Qty: 18 G | Refills: 5 | Status: SHIPPED | OUTPATIENT
Start: 2024-08-29

## 2024-10-14 RX ORDER — OMEPRAZOLE 40 MG/1
40 CAPSULE, DELAYED RELEASE ORAL DAILY
Qty: 90 CAPSULE | Refills: 2 | Status: SHIPPED | OUTPATIENT
Start: 2024-10-14

## 2024-10-23 ENCOUNTER — HOSPITAL ENCOUNTER (OUTPATIENT)
Facility: HOSPITAL | Age: 64
Setting detail: INFUSION SERIES
Discharge: HOME | End: 2024-10-26
Payer: MEDICARE

## 2024-10-23 VITALS
OXYGEN SATURATION: 96 % | DIASTOLIC BLOOD PRESSURE: 74 MMHG | SYSTOLIC BLOOD PRESSURE: 116 MMHG | HEART RATE: 86 BPM | RESPIRATION RATE: 18 BRPM | TEMPERATURE: 98 F

## 2024-10-23 LAB
ALBUMIN SERPL-MCNC: 4.1 G/DL (ref 3.4–5)
ANION GAP SERPL CALC-SCNC: 7 MMOL/L (ref 3–18)
BUN SERPL-MCNC: 20 MG/DL (ref 7–18)
BUN/CREAT SERPL: 32 (ref 12–20)
CALCIUM SERPL-MCNC: 9.5 MG/DL (ref 8.5–10.1)
CHLORIDE SERPL-SCNC: 94 MMOL/L (ref 100–111)
CO2 SERPL-SCNC: 29 MMOL/L (ref 21–32)
CREAT SERPL-MCNC: 0.62 MG/DL (ref 0.6–1.3)
GLUCOSE SERPL-MCNC: 99 MG/DL (ref 74–99)
MAGNESIUM SERPL-MCNC: 2.1 MG/DL (ref 1.6–2.6)
PHOSPHATE SERPL-MCNC: 4.5 MG/DL (ref 2.5–4.9)
POTASSIUM SERPL-SCNC: 3.7 MMOL/L (ref 3.5–5.5)
SODIUM SERPL-SCNC: 130 MMOL/L (ref 136–145)

## 2024-10-23 PROCEDURE — 80069 RENAL FUNCTION PANEL: CPT

## 2024-10-23 PROCEDURE — 36415 COLL VENOUS BLD VENIPUNCTURE: CPT

## 2024-10-23 PROCEDURE — 83735 ASSAY OF MAGNESIUM: CPT

## 2024-10-23 ASSESSMENT — PAIN DESCRIPTION - LOCATION: LOCATION: ELBOW

## 2024-10-23 ASSESSMENT — PAIN SCALES - GENERAL: PAINLEVEL_OUTOF10: 6

## 2024-10-23 ASSESSMENT — PAIN DESCRIPTION - ORIENTATION: ORIENTATION: RIGHT

## 2024-10-23 NOTE — PROGRESS NOTES
[facility] Lab Visit:    Latoya Kim  1960  [id number]    1330: Pt arrived ambulatory w/o assist. Labs drawn per order via left AC venipuncture x1 attempt. Pt tolerated well without complaints. Gauze/ coban to site. Pt departed [facility] ambulatory and in no distress at 1335. Pt is scheduled to return for her next appointment on 10/24/24 at 1030.    Vitals:    10/23/24 1329   BP: 116/74   Pulse: 86   Resp: 18   Temp: 98 °F (36.7 °C)   SpO2: 96%

## 2024-10-24 ENCOUNTER — HOSPITAL ENCOUNTER (OUTPATIENT)
Facility: HOSPITAL | Age: 64
Setting detail: INFUSION SERIES
Discharge: HOME | End: 2024-10-27
Payer: MEDICARE

## 2024-10-24 VITALS
TEMPERATURE: 98.4 F | OXYGEN SATURATION: 97 % | SYSTOLIC BLOOD PRESSURE: 121 MMHG | RESPIRATION RATE: 16 BRPM | HEART RATE: 81 BPM | DIASTOLIC BLOOD PRESSURE: 77 MMHG

## 2024-10-24 DIAGNOSIS — M81.0 AGE-RELATED OSTEOPOROSIS WITHOUT CURRENT PATHOLOGICAL FRACTURE: Primary | ICD-10-CM

## 2024-10-24 DIAGNOSIS — E83.42 HYPOMAGNESEMIA: ICD-10-CM

## 2024-10-24 PROCEDURE — 96372 THER/PROPH/DIAG INJ SC/IM: CPT

## 2024-10-24 PROCEDURE — 6360000002 HC RX W HCPCS: Performed by: PSYCHIATRY & NEUROLOGY

## 2024-10-24 RX ORDER — ONDANSETRON 2 MG/ML
8 INJECTION INTRAMUSCULAR; INTRAVENOUS
OUTPATIENT
Start: 2025-04-20

## 2024-10-24 RX ORDER — EPINEPHRINE 1 MG/ML
0.3 INJECTION, SOLUTION, CONCENTRATE INTRAVENOUS PRN
OUTPATIENT
Start: 2025-04-20

## 2024-10-24 RX ORDER — ACETAMINOPHEN 325 MG/1
650 TABLET ORAL
OUTPATIENT
Start: 2025-04-20

## 2024-10-24 RX ORDER — ALBUTEROL SULFATE 90 UG/1
4 INHALANT RESPIRATORY (INHALATION) PRN
OUTPATIENT
Start: 2025-04-20

## 2024-10-24 RX ORDER — SODIUM CHLORIDE 9 MG/ML
INJECTION, SOLUTION INTRAVENOUS CONTINUOUS
OUTPATIENT
Start: 2025-04-20

## 2024-10-24 RX ORDER — DIPHENHYDRAMINE HYDROCHLORIDE 50 MG/ML
50 INJECTION INTRAMUSCULAR; INTRAVENOUS
OUTPATIENT
Start: 2025-04-20

## 2024-10-24 RX ADMIN — DENOSUMAB 60 MG: 60 INJECTION SUBCUTANEOUS at 11:42

## 2024-10-24 ASSESSMENT — PAIN - FUNCTIONAL ASSESSMENT: PAIN_FUNCTIONAL_ASSESSMENT: 0-10

## 2024-10-24 ASSESSMENT — PAIN DESCRIPTION - DESCRIPTORS: DESCRIPTORS: ACHING;SHARP

## 2024-10-24 NOTE — PROGRESS NOTES
[facility] Progress Note    Date: 2024    Name: Latoya Kim    MRN: [id number]         : 1960    Ms. Kim arrived in the [facility] today at 1035 in stable condition, here for her PROLIA INJECTION (EVERY 6 MONTHS). She was assessed and education was provided.     Ms. Kim's vitals were reviewed.  Vitals:    10/24/24 1035   BP: 121/77   Pulse: 81   Resp: 16   Temp: 98.4 °F (36.9 °C)   SpO2: 97%       Ms. Kim presented to the infusion center today stating that she was doing well, and voicing no major complaints or concerns.     And, she stated that she has been tolerating the PROLIA injections well, and without any side effects or issues.           Her Pre-Prolia labs obtained on 10-23-24 were all reviewed, and were all noted to be satisfactory for treatment today, and were as follows:     Latest Reference Range & Units 10/23/24 13:35   Sodium 136 - 145 mmol/L 130 (L)   Potassium 3.5 - 5.5 mmol/L 3.7   Chloride 100 - 111 mmol/L 94 (L)   CARBON DIOXIDE 21 - 32 mmol/L 29   BUN,BUNPL 7.0 - 18 MG/DL 20 (H)   Creatinine 0.6 - 1.3 MG/DL 0.62   Bun/Cre 12 - 20   32 (H)   Anion Gap 3.0 - 18 mmol/L 7   Est, Glom Filt Rate >60 ml/min/1.73m2 >90   Magnesium 1.6 - 2.6 mg/dL 2.1   Glucose 74 - 99 mg/dL 99   Calcium 8.5 - 10.1 MG/DL 9.5   Phosphorus 2.5 - 4.9 MG/DL 4.5   Albumin 3.4 - 5.0 g/dL 4.1   (L): Data is abnormally low  (H): Data is abnormally high              Please note that her treatment today was slightly delayed, because we had to wait for the PROLIA to be delivered here to us, from the main hospital.        Denosumab (PROLIA) 60 mg, was administered SQ, in the back of her LEFT ARM at 1142, per order and without incident.           Ms. Kim tolerated treatment well today, and she voiced no complaints.     [facility]. Kim [facility] in stable condition at 1145.     She is to return in 6 months, on Wednesday, 25 at 1030, for her next appointment, to have her

## 2024-11-20 RX ORDER — MONTELUKAST SODIUM 10 MG/1
10 TABLET ORAL DAILY
Qty: 30 TABLET | Refills: 3 | Status: SHIPPED | OUTPATIENT
Start: 2024-11-20

## 2024-11-21 RX ORDER — ROSUVASTATIN CALCIUM 40 MG/1
TABLET, COATED ORAL
Qty: 90 TABLET | Refills: 3 | Status: SHIPPED | OUTPATIENT
Start: 2024-11-21

## 2024-12-09 SDOH — HEALTH STABILITY: PHYSICAL HEALTH: ON AVERAGE, HOW MANY DAYS PER WEEK DO YOU ENGAGE IN MODERATE TO STRENUOUS EXERCISE (LIKE A BRISK WALK)?: 0 DAYS

## 2024-12-09 SDOH — HEALTH STABILITY: PHYSICAL HEALTH: ON AVERAGE, HOW MANY MINUTES DO YOU ENGAGE IN EXERCISE AT THIS LEVEL?: 0 MIN

## 2024-12-09 ASSESSMENT — LIFESTYLE VARIABLES
HOW OFTEN DO YOU HAVE A DRINK CONTAINING ALCOHOL: 1
HOW OFTEN DO YOU HAVE SIX OR MORE DRINKS ON ONE OCCASION: 1
HOW MANY STANDARD DRINKS CONTAINING ALCOHOL DO YOU HAVE ON A TYPICAL DAY: 0
HOW MANY STANDARD DRINKS CONTAINING ALCOHOL DO YOU HAVE ON A TYPICAL DAY: PATIENT DOES NOT DRINK
HOW OFTEN DO YOU HAVE A DRINK CONTAINING ALCOHOL: NEVER

## 2024-12-09 ASSESSMENT — PATIENT HEALTH QUESTIONNAIRE - PHQ9
SUM OF ALL RESPONSES TO PHQ QUESTIONS 1-9: 1
SUM OF ALL RESPONSES TO PHQ QUESTIONS 1-9: 1
1. LITTLE INTEREST OR PLEASURE IN DOING THINGS: NOT AT ALL
SUM OF ALL RESPONSES TO PHQ QUESTIONS 1-9: 1
SUM OF ALL RESPONSES TO PHQ9 QUESTIONS 1 & 2: 1
2. FEELING DOWN, DEPRESSED OR HOPELESS: SEVERAL DAYS
SUM OF ALL RESPONSES TO PHQ QUESTIONS 1-9: 1

## 2024-12-20 ENCOUNTER — HOSPITAL ENCOUNTER (OUTPATIENT)
Facility: HOSPITAL | Age: 64
Setting detail: SPECIMEN
Discharge: HOME | End: 2024-12-23
Payer: MEDICARE

## 2024-12-20 DIAGNOSIS — E78.2 MIXED HYPERLIPIDEMIA: ICD-10-CM

## 2024-12-20 DIAGNOSIS — E55.9 VITAMIN D DEFICIENCY, UNSPECIFIED: ICD-10-CM

## 2024-12-20 LAB
25(OH)D3 SERPL-MCNC: 38.4 NG/ML (ref 30–100)
ALBUMIN SERPL-MCNC: 4.3 G/DL (ref 3.4–5)
ALBUMIN/GLOB SERPL: 1.7 (ref 0.8–1.7)
ALP SERPL-CCNC: 28 U/L (ref 45–117)
ALT SERPL-CCNC: 49 U/L (ref 13–56)
ANION GAP SERPL CALC-SCNC: 5 MMOL/L (ref 3–18)
AST SERPL-CCNC: 33 U/L (ref 10–38)
BASOPHILS # BLD: 0 K/UL (ref 0–0.1)
BASOPHILS NFR BLD: 0 % (ref 0–2)
BILIRUB SERPL-MCNC: 0.5 MG/DL (ref 0.2–1)
BUN SERPL-MCNC: 19 MG/DL (ref 7–18)
BUN/CREAT SERPL: 30 (ref 12–20)
CALCIUM SERPL-MCNC: 9.1 MG/DL (ref 8.5–10.1)
CHLORIDE SERPL-SCNC: 98 MMOL/L (ref 100–111)
CHOLEST SERPL-MCNC: 238 MG/DL
CO2 SERPL-SCNC: 32 MMOL/L (ref 21–32)
CREAT SERPL-MCNC: 0.64 MG/DL (ref 0.6–1.3)
DIFFERENTIAL METHOD BLD: ABNORMAL
EOSINOPHIL # BLD: 0.1 K/UL (ref 0–0.4)
EOSINOPHIL NFR BLD: 2 % (ref 0–5)
ERYTHROCYTE [DISTWIDTH] IN BLOOD BY AUTOMATED COUNT: 11.5 % (ref 11.6–14.5)
GLOBULIN SER CALC-MCNC: 2.5 G/DL (ref 2–4)
GLUCOSE SERPL-MCNC: 90 MG/DL (ref 74–99)
HCT VFR BLD AUTO: 39.7 % (ref 35–45)
HDLC SERPL-MCNC: 104 MG/DL (ref 40–60)
HDLC SERPL: 2.3 (ref 0–5)
HGB BLD-MCNC: 13.4 G/DL (ref 12–16)
IMM GRANULOCYTES # BLD AUTO: 0 K/UL (ref 0–0.04)
IMM GRANULOCYTES NFR BLD AUTO: 0 % (ref 0–0.5)
LDLC SERPL CALC-MCNC: 116.8 MG/DL (ref 0–100)
LIPID PANEL: ABNORMAL
LYMPHOCYTES # BLD: 1.2 K/UL (ref 0.9–3.6)
LYMPHOCYTES NFR BLD: 25 % (ref 21–52)
MCH RBC QN AUTO: 29.7 PG (ref 24–34)
MCHC RBC AUTO-ENTMCNC: 33.8 G/DL (ref 31–37)
MCV RBC AUTO: 88 FL (ref 78–100)
MONOCYTES # BLD: 0.4 K/UL (ref 0.05–1.2)
MONOCYTES NFR BLD: 8 % (ref 3–10)
NEUTS SEG # BLD: 3.2 K/UL (ref 1.8–8)
NEUTS SEG NFR BLD: 66 % (ref 40–73)
NRBC # BLD: 0 K/UL (ref 0–0.01)
NRBC BLD-RTO: 0 PER 100 WBC
PLATELET # BLD AUTO: 246 K/UL (ref 135–420)
PMV BLD AUTO: 9.4 FL (ref 9.2–11.8)
POTASSIUM SERPL-SCNC: 4 MMOL/L (ref 3.5–5.5)
PROT SERPL-MCNC: 6.8 G/DL (ref 6.4–8.2)
RBC # BLD AUTO: 4.51 M/UL (ref 4.2–5.3)
SODIUM SERPL-SCNC: 135 MMOL/L (ref 136–145)
TRIGL SERPL-MCNC: 86 MG/DL
VLDLC SERPL CALC-MCNC: 17.2 MG/DL
WBC # BLD AUTO: 4.9 K/UL (ref 4.6–13.2)

## 2024-12-20 PROCEDURE — 82306 VITAMIN D 25 HYDROXY: CPT

## 2024-12-20 PROCEDURE — 85025 COMPLETE CBC W/AUTO DIFF WBC: CPT

## 2024-12-20 PROCEDURE — 80061 LIPID PANEL: CPT

## 2024-12-20 PROCEDURE — 80053 COMPREHEN METABOLIC PANEL: CPT

## 2024-12-20 PROCEDURE — 36415 COLL VENOUS BLD VENIPUNCTURE: CPT

## 2024-12-23 ENCOUNTER — OFFICE VISIT (OUTPATIENT)
Facility: CLINIC | Age: 64
End: 2024-12-23

## 2024-12-23 VITALS
TEMPERATURE: 96.7 F | DIASTOLIC BLOOD PRESSURE: 68 MMHG | HEART RATE: 83 BPM | SYSTOLIC BLOOD PRESSURE: 117 MMHG | OXYGEN SATURATION: 98 % | RESPIRATION RATE: 18 BRPM | HEIGHT: 63 IN | BODY MASS INDEX: 21.97 KG/M2 | WEIGHT: 124 LBS

## 2024-12-23 DIAGNOSIS — M79.7 FIBROMYALGIA: ICD-10-CM

## 2024-12-23 DIAGNOSIS — K13.70 ORAL LESION: ICD-10-CM

## 2024-12-23 DIAGNOSIS — Z00.00 MEDICARE ANNUAL WELLNESS VISIT, SUBSEQUENT: Primary | ICD-10-CM

## 2024-12-23 DIAGNOSIS — M19.90 ARTHRITIS: ICD-10-CM

## 2024-12-23 DIAGNOSIS — E78.2 MIXED HYPERLIPIDEMIA: ICD-10-CM

## 2024-12-23 DIAGNOSIS — R42 VERTIGO: ICD-10-CM

## 2024-12-23 DIAGNOSIS — J45.40 MODERATE PERSISTENT ASTHMA, UNCOMPLICATED: ICD-10-CM

## 2024-12-23 DIAGNOSIS — E53.8 VITAMIN B 12 DEFICIENCY: ICD-10-CM

## 2024-12-23 DIAGNOSIS — E04.1 THYROID NODULE: ICD-10-CM

## 2024-12-23 DIAGNOSIS — M81.0 AGE-RELATED OSTEOPOROSIS WITHOUT CURRENT PATHOLOGICAL FRACTURE: ICD-10-CM

## 2024-12-23 DIAGNOSIS — E55.9 VITAMIN D DEFICIENCY, UNSPECIFIED: ICD-10-CM

## 2024-12-23 NOTE — PATIENT INSTRUCTIONS
device in the bathroom with you.   Where can you learn more?  Go to https://www.BlogRadio.net/patientEd and enter G117 to learn more about \"Preventing Falls: Care Instructions.\"  Current as of: July 17, 2023  Content Version: 14.2  © 2024 [employer].   Care instructions adapted under license by [employer]. If you have questions about a medical condition or this instruction, always ask your healthcare professional. [employer] disclaims any warranty or liability for your use of this information.           Fatigue: Care Instructions  Overview     Fatigue is a feeling of tiredness, exhaustion, or lack of energy. You may feel fatigue because of too much or not enough activity. It can also come from stress, lack of sleep, boredom, and poor diet. Many medical problems, such as viral infections, can cause fatigue. Emotional problems, especially depression, are often the cause of fatigue.  Fatigue is most often a symptom of another problem. Treatment for fatigue depends on the cause. For example, if you have fatigue because you have a certain health problem, treating this problem also treats your fatigue. If depression or anxiety is the cause, treatment may help.  Follow-up care is a key part of your treatment and safety. Be sure to make and go to all appointments, and call your doctor if you are having problems. It's also a good idea to know your test results and keep a list of the medicines you take.  How can you care for yourself at home?  Get regular exercise. But try not to overdo it. It may help to go back and forth between rest and exercise.  Get plenty of rest.  Eat a variety of healthy foods. Try not to skip any meals.  Avoid or try to cut back on your use of caffeine, tobacco, and alcohol. Caffeine is most often found in coffee, tea, cola drinks, and energy drinks.  Limit medicines that can cause fatigue. These include medicines such as cold and allergy medicines.  When should you call

## 2024-12-23 NOTE — PROGRESS NOTES
Latoya Kim presents today for   Chief Complaint   Patient presents with    Medicare AWV           \"Have you been to the ER, urgent care clinic since your last visit?  Hospitalized since your last visit?\"    NO    “Have you seen or consulted any other health care providers outside of [facility] since your last visit?”    NO            
vestibular therapy in the past with some improvement.  She has had some reoccurrence so we will go ahead and refer her to ENT for further evaluation.  Patient also has torus palatinus but will refer to ENT to confirm.      Respiratory ROS: negative for - shortness of breath   Cardiovascular ROS: negative for - chest pain        Current Outpatient Medications   Medication Sig    rosuvastatin (CRESTOR) 40 MG tablet TAKE 1 TABLET BY MOUTH NIGHTLY    montelukast (SINGULAIR) 10 MG tablet Take 1 tablet by mouth daily    omeprazole (PRILOSEC) 40 MG delayed release capsule TAKE 1 CAPSULE BY MOUTH DAILY    albuterol sulfate HFA (PROVENTIL;VENTOLIN;PROAIR) 108 (90 Base) MCG/ACT inhaler inhale 2 puffs by mouth and INTO THE LUNGS every 6 hours if needed for wheezing    traZODone (DESYREL) 100 MG tablet     Meth-Hyo-M Bl-Na Phos-Ph Sal (URO-MP) 118 MG CAPS Take 1 capsule by mouth 4 times daily    gabapentin (NEURONTIN) 800 MG tablet Take 1 tablet by mouth 3 times daily.    Cholecalciferol (VITAMIN D) 50 MCG (2000 UT) CAPS capsule Take 1 capsule by mouth daily    Calcium Glycerophosphate (PRELIEF PO) Take by mouth    Probiotic Product (CULTURELLE PROBIOTICS PO) Take by mouth    fluticasone (ARNUITY ELLIPTA) 100 MCG/ACT AEPB inhale 1 puff by mouth and INTO THE LUNGS once daily    tiotropium (SPIRIVA RESPIMAT) 2.5 MCG/ACT AERS inhaler inhale 2 puffs by mouth and INTO THE LUNGS once daily    modafinil (PROVIGIL) 200 MG tablet take 1 tablet by mouth twice a day for 30 DAYS maximum daily dose of 2 tablets    OXcarbazepine (TRILEPTAL) 150 MG tablet Take 1 tablet by mouth 3 times daily    Simethicone 500 MG CAPS Take 500 mg by mouth as needed    denosumab (PROLIA) 60 MG/ML SOSY SC injection Inject 1 mL into the skin    DULoxetine (CYMBALTA) 60 MG extended release capsule Take 2 capsules by mouth daily    LORazepam (ATIVAN) 2 MG tablet Take 1 tablet by mouth in the morning and 1 tablet at noon and 1 tablet in the evening.    meloxicam 
(PROVENTIL;VENTOLIN;PROAIR) 108 (90 Base) MCG/ACT inhaler inhale 2 puffs by mouth and INTO THE LUNGS every 6 hours if needed for wheezing Yes Francisco Gaxiola MD   traZODone (DESYREL) 100 MG tablet  Yes Regan Lee MD   Meth-Hyo-M Bl-Na Phos-Ph Sal (URO-MP) 118 MG CAPS Take 1 capsule by mouth 4 times daily Yes Daniela Holguin APRN - NP   gabapentin (NEURONTIN) 800 MG tablet Take 1 tablet by mouth 3 times daily. Yes Regan Lee MD   Cholecalciferol (VITAMIN D) 50 MCG (2000 UT) CAPS capsule Take 1 capsule by mouth daily Yes Regan Lee MD   Calcium Glycerophosphate (PRELIEF PO) Take by mouth Yes Regan Lee MD   Probiotic Product (CULTURELLE PROBIOTICS PO) Take by mouth Yes Regan Lee MD   fluticasone (ARNUITY ELLIPTA) 100 MCG/ACT AEPB inhale 1 puff by mouth and INTO THE LUNGS once daily Yes Laura Hoang MD   tiotropium (SPIRIVA RESPIMAT) 2.5 MCG/ACT AERS inhaler inhale 2 puffs by mouth and INTO THE LUNGS once daily Yes Laura Hoang MD   modafinil (PROVIGIL) 200 MG tablet take 1 tablet by mouth twice a day for 30 DAYS maximum daily dose of 2 tablets Yes Automatic Reconciliation, Ar   OXcarbazepine (TRILEPTAL) 150 MG tablet Take 1 tablet by mouth 3 times daily Yes Automatic Reconciliation, Ar   Simethicone 500 MG CAPS Take 500 mg by mouth as needed Yes Automatic Reconciliation, Ar   denosumab (PROLIA) 60 MG/ML SOSY SC injection Inject 1 mL into the skin Yes Automatic Reconciliation, Ar   DULoxetine (CYMBALTA) 60 MG extended release capsule Take 2 capsules by mouth daily Yes Automatic Reconciliation, Ar   LORazepam (ATIVAN) 2 MG tablet Take 1 tablet by mouth in the morning and 1 tablet at noon and 1 tablet in the evening. Yes Automatic Reconciliation, Ar   meloxicam (MOBIC) 15 MG tablet take 1 tablet by mouth once daily Yes Automatic Reconciliation, Ar   chlorhexidine (PERIDEX) 0.12 % solution   Regan Lee MD       CareTeam (Including outside

## 2025-01-10 ENCOUNTER — HOSPITAL ENCOUNTER (OUTPATIENT)
Facility: HOSPITAL | Age: 65
Discharge: HOME | End: 2025-01-13
Attending: INTERNAL MEDICINE

## 2025-01-10 DIAGNOSIS — E78.2 MIXED HYPERLIPIDEMIA: ICD-10-CM

## 2025-01-10 PROCEDURE — 75571 CT HRT W/O DYE W/CA TEST: CPT

## 2025-01-16 ENCOUNTER — TELEPHONE (OUTPATIENT)
Facility: CLINIC | Age: 65
End: 2025-01-16

## 2025-01-16 ENCOUNTER — PATIENT MESSAGE (OUTPATIENT)
Facility: CLINIC | Age: 65
End: 2025-01-16

## 2025-01-16 DIAGNOSIS — R93.1 AGATSTON CAC SCORE, >400: Primary | ICD-10-CM

## 2025-01-16 NOTE — TELEPHONE ENCOUNTER
Patient here today for nurse blood pressure check.  Last dose of BP medication taken:  Pt has not been taking    BP Readings from Last 1 Encounters:   04/18/22 1000 (!) 184/108   04/18/22 0900 (!) 140/91   04/18/22 0800 (!) 147/111   04/18/22 0700 (!) 183/88   04/18/22 0600 (!) 162/92   04/18/22 0500 (!) 170/106   04/18/22 0400 (!) 160/87   04/18/22 0300 (!) 176/94   04/18/22 0200 (!) 161/98   04/18/22 0100 (!) 154/91   04/18/22 0000 (!) 176/101   04/17/22 2300 (!) 154/86   04/17/22 2200 (!) 170/81   04/17/22 2100 (!) 178/112   04/17/22 2000 (!) 187/79   04/17/22 1900 (!) 189/101   04/17/22 1800 (!) 191/100   04/17/22 1700 (!) 187/101   04/17/22 1600 (!) 175/97   04/17/22 1300 (!) 171/104   04/17/22 1200 (!) 178/107   04/17/22 1100 (!) 175/103   04/17/22 1000 (!) 185/103   04/17/22 0900 (!) 158/91   04/17/22 0800 (!) 183/105   04/17/22 0700 (!) 178/89   04/17/22 0600 (!) 188/107   04/17/22 0500 (!) 146/85   04/17/22 0400 138/61   04/17/22 0300 (!) 190/101   04/17/22 0200 (!) 155/75   04/17/22 0100 (!) 166/88   04/17/22 0000 (!) 181/95   04/16/22 2300 (!) 172/88   04/16/22 2200 (!) 178/96   04/16/22 2100 (!) 175/94   04/16/22 2000 (!) 190/100   04/16/22 1900 (!) 179/94   04/16/22 1800 (!) 175/103   04/16/22 1700 (!) 165/95   04/16/22 1600 (!) 184/92   04/16/22 1500 (!) 170/88   04/16/22 1400 (!) 178/93   04/16/22 1300 (!) 168/92   04/16/22 1200 (!) 157/99   04/16/22 1000 (!) 158/105   04/16/22 0900 (!) 172/102   04/16/22 0800 (!) 213/97   04/16/22 0700 (!) 156/79   04/16/22 0600 (!) 167/78   04/16/22 0500 (!) 169/87   04/16/22 0400 (!) 162/95   04/16/22 0300 (!) 145/83   04/16/22 0200 (!) 161/82   04/16/22 0100 (!) 154/81   04/16/22 0000 (!) 165/88   04/15/22 2300 (!) 190/88   04/15/22 2200 (!) 199/92   04/15/22 2100 (!) 175/107   04/15/22 2000 (!) 161/88   04/15/22 1900 (!) 182/99   04/15/22 1800 (!) 176/112   04/15/22 1700 (!) 174/82   04/15/22 1600 (!) 180/100   04/15/22 1500 (!) 189/107   04/15/22 1415 (!)  Pt had significant increase in her Coronary artery calcium score to 516 from 86 a few years ago. She normally sees Dr LESLIE Hoang and has appt for April. But would like for her to be seen sooner if possible    186/119   04/15/22 1330 (!) 185/114   04/15/22 1200 (!) 187/100   04/15/22 1100 (!) 182/104   04/15/22 1000 (!) 165/109   04/15/22 0900 (!) 156/89   04/15/22 0800 (!) 159/89   04/15/22 0700 (!) 189/101   04/15/22 0600 (!) 171/85   04/15/22 0500 (!) 191/98   04/15/22 0400 (!) 169/97   04/15/22 0300 (!) 173/97   04/15/22 0200 (!) 149/79   04/15/22 0100 (!) 161/82   04/15/22 0000 (!) 159/85   04/14/22 2300 (!) 178/89   04/14/22 2200 (!) 181/88   04/14/22 2100 (!) 163/82   04/14/22 2000 (!) 198/103   04/14/22 1900 (!) 161/83   04/14/22 1800 (!) 191/102   04/14/22 1700 (!) 208/109   04/14/22 1600 (!) 187/94   04/14/22 1500 (!) 184/108   04/14/22 1300 (!) 189/98   04/14/22 1200 (!) 182/96   04/14/22 1100 (!) 203/99   04/14/22 1000 (!) 194/96   04/14/22 0900 (!) 172/91   04/14/22 0800 (!) 201/93   04/14/22 0700 (!) 195/96   04/14/22 0600 (!) 186/98   04/14/22 0500 (!) 181/84   04/14/22 0400 (!) 176/95   04/14/22 0300 (!) 181/95   04/14/22 0200 (!) 189/98   04/14/22 0100 (!) 185/86   04/14/22 0000 (!) 188/98   04/13/22 2300 (!) 157/86   04/13/22 2200 (!) 181/78   04/13/22 2100 (!) 194/92   04/13/22 2000 (!) 196/100   04/13/22 1900 (!) 190/87   04/13/22 1800 (!) 185/103   04/13/22 1700 (!) 187/94   04/13/22 1600 (!) 187/91   04/13/22 1500 (!) 175/86   04/13/22 1400 (!) 156/76   04/13/22 1300 (!) 153/75   04/13/22 1200 (!) 183/88   04/13/22 1100 (!) 192/91   04/13/22 1000 (!) 202/93   04/13/22 0900 (!) 200/89   04/13/22 0800 (!) 192/87   04/13/22 0700 (!) 216/98   04/13/22 0600 (!) 172/83   04/13/22 0530 (!) 156/72   04/13/22 0500 (!) 223/101   04/13/22 0400 (!) 199/95   04/13/22 0300 (!) 181/98   04/13/22 0200 (!) 178/95   04/13/22 0100 (!) 179/93   04/13/22 0000 (!) 194/91   04/12/22 2300 (!) 190/92   04/12/22 2200 (!) 178/89   04/12/22 2100 (!) 178/81   04/12/22 2000 (!) 207/97   04/12/22 1900 (!) 193/92   04/12/22 1800 (!) 187/89   04/12/22 1730 (!) 183/85   04/12/22 1700 (!) 183/84   04/12/22 1630 (!) 168/81   04/12/22  1600 (!) 173/89   04/12/22 1530 (!) 171/79   04/12/22 1500 (!) 181/93   04/12/22 1430 (!) 200/91   04/12/22 1400 (!) 197/91   04/12/22 1330 (!) 188/83   04/12/22 1300 (!) 174/90   04/12/22 1200 (!) 214/94   04/12/22 1130 (!) 220/96   04/12/22 1100 (!) 203/94   04/12/22 1030 (!) 201/98   04/12/22 1000 (!) 191/93   04/12/22 0930 (!) 186/94   04/12/22 0900 (!) 175/79   04/12/22 0830 (!) 194/124   04/12/22 0800 (!) 198/91   04/12/22 0730 (!) 190/88   04/12/22 0700 (!) 190/87   04/12/22 0600 (!) 186/88   04/12/22 0530 (!) 179/81   04/12/22 0500 (!) 188/88   04/12/22 0430 (!) 196/88   04/12/22 0400 (!) 195/89   04/12/22 0335 (!) 186/89   04/12/22 0330 (!) 186/89   04/12/22 0300 (!) 182/88   04/12/22 0230 (!) 175/84   04/12/22 0200 (!) 174/87   04/12/22 0100 (!) 171/84   04/12/22 0030 (!) 164/74   04/12/22 0000 (!) 175/89   04/11/22 2345 (!) 166/77   04/11/22 2334 (!) 190/90   04/11/22 2330 (!) 190/90   04/11/22 2300 (!) 188/86   04/11/22 2230 (!) 191/70   04/11/22 2200 (!) 184/99   04/11/22 2100 (!) 179/81   04/11/22 2000 (!) 172/73   04/11/22 1900 (!) 173/104   04/11/22 1800 (!) 167/83   04/11/22 1700 (!) 168/73   04/11/22 1600 (!) 169/80   04/11/22 1500 (!) 172/81   04/11/22 1400 (!) 172/77   04/11/22 1300 (!) 163/72   04/11/22 1200 (!) 159/77   04/11/22 1100 (!) 171/72   04/11/22 1000 (!) 164/73   04/11/22 0945 (!) 157/75   04/11/22 0930 (!) 160/82   04/11/22 0915 (!) 162/67   04/11/22 0900 (!) 157/67   04/11/22 0845 (!) 141/65   04/11/22 0830 (!) 146/63   04/11/22 0815 (!) 160/70   04/11/22 0800 (!) 169/77   04/11/22 0745 (!) 148/66   04/11/22 0730 (!) 146/58   04/11/22 0715 (!) 151/67   04/11/22 0700 (!) 155/67   04/11/22 0645 (!) 152/63   04/11/22 0630 (!) 158/72   04/11/22 0615 (!) 163/72   04/11/22 0600 (!) 153/70   04/11/22 0545 (!) 159/70   04/11/22 0530 (!) 158/67   04/11/22 0515 (!) 156/68   04/11/22 0500 (!) 154/67   04/11/22 0445 (!) 142/65   04/11/22 0430 (!) 149/68   04/11/22 0415 (!) 156/70   04/11/22  0400 (!) 154/72   04/11/22 0345 (!) 165/72   04/11/22 0330 (!) 162/72   04/11/22 0315 (!) 159/75   04/11/22 0300 (!) 147/72   04/11/22 0245 (!) 156/80   04/11/22 0230 (!) 156/71   04/11/22 0215 (!) 146/68   04/11/22 0200 (!) 149/71   04/11/22 0145 (!) 155/67   04/11/22 0130 (!) 164/70   04/11/22 0115 (!) 156/68   04/11/22 0100 (!) 161/51   04/11/22 0045 (!) 156/67   04/11/22 0030 (!) 160/55   04/11/22 0015 (!) 149/67   04/11/22 0000 138/62   04/10/22 2345 (!) 159/72   04/10/22 2330 (!) 152/59   04/10/22 2315 (!) 158/70   04/10/22 2300 (!) 159/67   04/10/22 2245 (!) 150/68   04/10/22 2230 (!) 153/67   04/10/22 2215 (!) 159/68   04/10/22 2200 (!) 159/67   04/10/22 2145 (!) 155/67   04/10/22 2130 (!) 153/69   04/10/22 2115 (!) 154/67   04/10/22 2100 (!) 152/65   04/10/22 2045 138/62   04/10/22 2030 (!) 141/63   04/10/22 2015 133/61   04/10/22 2000 (!) 155/66   04/10/22 1945 (!) 161/67   04/10/22 1930 (!) 160/67   04/10/22 1915 (!) 173/73   04/10/22 1900 (!) 172/74   04/10/22 1845 (!) 177/74   04/10/22 1830 (!) 173/75   04/10/22 1815 (!) 161/72   04/10/22 1800 (!) 175/81   04/10/22 1700 (!) 164/72   04/10/22 1600 (!) 162/76   04/10/22 1500 (!) 156/86   04/10/22 1400 (!) 152/68   04/10/22 1300 (!) 164/70   04/10/22 1200 (!) 171/73   04/10/22 1100 (!) 158/67   04/10/22 1000 (!) 148/67   04/10/22 0900 (!) 146/64   04/10/22 0800 134/68   04/10/22 0715 133/63   04/10/22 0700 136/55   04/10/22 0645 130/59   04/10/22 0630 133/58   04/10/22 0615 131/62   04/10/22 0600 96/52   04/10/22 0535 (!) 169/73   04/10/22 0500 (!) 157/68   04/10/22 0400 (!) 152/66   04/10/22 0330 (!) 147/65   04/10/22 0300 (!) 147/65   04/10/22 0200 (!) 148/64   04/10/22 0100 (!) 146/64   04/10/22 0000 (!) 142/60   04/09/22 2300 138/65   04/09/22 2200 130/58   04/09/22 2100 135/60   04/09/22 2000 128/58   04/09/22 1900 130/66   04/09/22 1800 123/57   04/09/22 1700 126/59   04/09/22 1600 125/59   04/09/22 1500 130/61   04/09/22 1446 (!) 171/64   04/09/22  1400 130/62   04/09/22 1356 (!) 166/68   04/09/22 1300 123/59   04/09/22 1215 133/60   04/09/22 1200 124/60   04/09/22 1145 123/56   04/09/22 1130 128/58   04/09/22 1115 123/59   04/09/22 1100 132/60   04/09/22 1045 132/63   04/09/22 1030 130/58   04/09/22 1015 134/59   04/09/22 1000 (!) 142/63   04/09/22 0945 139/65   04/09/22 0930 134/55   04/09/22 0915 135/60   04/09/22 0900 133/60   04/09/22 0845 128/61   04/09/22 0830 128/60   04/09/22 0815 118/57   04/09/22 0800 121/57   04/09/22 0745 121/60   04/09/22 0730 118/55   04/09/22 0700 117/59   04/09/22 0645 115/54   04/09/22 0630 114/54   04/09/22 0615 111/54   04/09/22 0600 109/54   04/09/22 0545 111/58   04/09/22 0530 107/59   04/09/22 0515 102/58   04/09/22 0500 112/58   04/09/22 0430 110/53   04/09/22 0415 110/57   04/09/22 0400 113/56   04/09/22 0345 110/56   04/09/22 0330 112/56   04/09/22 0315 112/53   04/09/22 0300 112/56   04/09/22 0245 117/57   04/09/22 0230 112/55   04/09/22 0215 112/58   04/09/22 0200 112/54   04/09/22 0145 113/55   04/09/22 0130 113/55   04/09/22 0115 115/55   04/09/22 0100 114/59   04/09/22 0045 112/55   04/09/22 0030 114/58   04/09/22 0015 110/53   04/09/22 0000 109/54   04/08/22 2345 114/56   04/08/22 2330 112/57   04/08/22 2315 107/58   04/08/22 2300 116/59   04/08/22 2245 112/59   04/08/22 2230 111/58   04/08/22 2215 111/58   04/08/22 2200 116/56   04/08/22 2145 110/58   04/08/22 2130 103/56   04/08/22 2115 116/57   04/08/22 2100 114/56   04/08/22 2045 117/57   04/08/22 2030 128/58   04/08/22 1615 123/61   04/08/22 1600 (!) 144/74   04/08/22 1545 (!) 145/71   04/08/22 1530 123/59   04/08/22 1525 (!) 171/65   04/08/22 1515 120/59   04/08/22 1500 (!) 149/69   04/08/22 1445 131/64   04/08/22 1430 (!) 182/70   04/08/22 1400 (!) 155/80   04/08/22 1100 (!) 140/66   04/08/22 1045 (!) 159/74   04/08/22 1030 (!) 156/79   04/08/22 1015 (!) 180/81   04/08/22 1000 (!) 178/81   04/08/22 0955 (!) 155/79   04/08/22 0945 (!) 155/79   04/08/22  0930 (!) 165/75   04/08/22 0900 (!) 162/76   04/08/22 0845 (!) 157/96   04/08/22 0840 (!) 176/80   04/08/22 0800 (!) 170/108   04/08/22 0755 (!) 181/112   04/08/22 0750 (!) 167/99   04/08/22 0745 (!) 193/105   04/08/22 0740 (!) 184/115   04/08/22 0630 (!) 166/101   04/08/22 0605 (!) 167/95     Pulse Readings from Last 1 Encounters:   04/18/22 (!) 130     Patient Reported Vitals    There is no flowsheet data to display.          Last Clinician Visit for this condition: 4/4/22  Per that visit, plan of care: take bp meds  Next office visit is scheduled for: Visit date not found    Current BP Medications are: nifedipine  Last refilled: 4/4/22    Please review and advise if there are any changes to current plan of care.  Next Nurse BP visit scheduled: No    Pt advised to start taking the nifedipine. Pt will start taking once daily in the am. She checks her bp very frequently at home.

## 2025-01-17 NOTE — TELEPHONE ENCOUNTER
Patient is aware of message and states she has an appointment with cardiology in 3 weeks as well as they moved her ECHO up to 1/2025.

## 2025-02-07 ENCOUNTER — OFFICE VISIT (OUTPATIENT)
Age: 65
End: 2025-02-07
Payer: MEDICARE

## 2025-02-07 VITALS
HEART RATE: 88 BPM | BODY MASS INDEX: 21.26 KG/M2 | OXYGEN SATURATION: 98 % | SYSTOLIC BLOOD PRESSURE: 110 MMHG | DIASTOLIC BLOOD PRESSURE: 80 MMHG | WEIGHT: 120 LBS | HEIGHT: 63 IN

## 2025-02-07 DIAGNOSIS — R06.02 SHORTNESS OF BREATH: Primary | ICD-10-CM

## 2025-02-07 DIAGNOSIS — E78.5 HYPERLIPIDEMIA LDL GOAL <160: ICD-10-CM

## 2025-02-07 PROCEDURE — 99214 OFFICE O/P EST MOD 30 MIN: CPT | Performed by: INTERNAL MEDICINE

## 2025-02-07 PROCEDURE — 3017F COLORECTAL CA SCREEN DOC REV: CPT | Performed by: INTERNAL MEDICINE

## 2025-02-07 PROCEDURE — G8420 CALC BMI NORM PARAMETERS: HCPCS | Performed by: INTERNAL MEDICINE

## 2025-02-07 PROCEDURE — G8428 CUR MEDS NOT DOCUMENT: HCPCS | Performed by: INTERNAL MEDICINE

## 2025-02-07 PROCEDURE — 1036F TOBACCO NON-USER: CPT | Performed by: INTERNAL MEDICINE

## 2025-02-07 RX ORDER — ASPIRIN 81 MG/1
81 TABLET ORAL DAILY
Qty: 90 TABLET | Refills: 2 | Status: SHIPPED | OUTPATIENT
Start: 2025-02-07

## 2025-02-07 RX ORDER — EZETIMIBE 10 MG/1
10 TABLET ORAL DAILY
Qty: 90 TABLET | Refills: 2 | Status: SHIPPED | OUTPATIENT
Start: 2025-02-07

## 2025-02-07 NOTE — PROGRESS NOTES
[facility]    Latoya Kim is 64 y.o. female     Patient is here today for follow-up appointment   She denies prior history of MI or CHF  Patient has pericardial effusion dated back in 2020 by echo mild to moderate range  Because of nonspecific symptoms, patient was attempted to have CT-guided fluid drainage however CT showed decrease pericardial fluid accumulation compared to CT back in 2020 so it was not performed.  Echo in 01/2024 showed small-medium effusion without any echo evidence of hemodynamic compromise  And repeat echo in 01/2025 showed mild-moderate pericardial effusion, unchanged without any hemodynamic instability    Denies any resting or exertional chest pain or chest pressure to suggest angina or any dyspnea to suggest heart failure.   No presyncope or syncope  Denies any PND or LE edema.   Taking all medications regularly.  Questioning about increasing calcium score recently  Denies any nausea, vomiting, abdominal pain, fever, chills, sputum production. No hematuria or other bleeding complaints    Past Medical History:   Diagnosis Date    Allergy     Anxiety     Arthritis     Asthma     mild    Atypical hyperplasia of breast 2003    right    Chronic depression     Chronic fatigue syndrome     Chronic interstitial cystitis     Colitis     Depression     Fibromyalgia     Generalized stiffness     GERD (gastroesophageal reflux disease)     Hyperlipidemia     IBS (irritable bowel syndrome)     Lump in thyroid     Microscopic hematuria     Myofascial pain syndrome     Ovarian cyst, left     Pericardial effusion     Psychiatric disorder     anxiety - depression    RLS (restless legs syndrome)     Senile osteoporosis     Thyroid nodule     TMJ (temporomandibular joint syndrome)     Transient total loss of muscle tone     Uterine fibroid     UTI (urinary tract infection) 2023       Review of Systems:  Cardiac symptoms as noted above in

## 2025-02-16 RX ORDER — MONTELUKAST SODIUM 10 MG/1
10 TABLET ORAL DAILY
Qty: 30 TABLET | Refills: 3 | Status: SHIPPED | OUTPATIENT
Start: 2025-02-16

## 2025-02-18 ENCOUNTER — TRANSCRIBE ORDERS (OUTPATIENT)
Facility: HOSPITAL | Age: 65
End: 2025-02-18

## 2025-02-18 DIAGNOSIS — Z12.31 VISIT FOR SCREENING MAMMOGRAM: Primary | ICD-10-CM

## 2025-04-18 RX ORDER — ALBUTEROL SULFATE 90 UG/1
4 INHALANT RESPIRATORY (INHALATION) PRN
Status: CANCELLED | OUTPATIENT
Start: 2025-04-20

## 2025-04-18 RX ORDER — EPINEPHRINE 1 MG/ML
0.3 INJECTION, SOLUTION, CONCENTRATE INTRAVENOUS PRN
Status: CANCELLED | OUTPATIENT
Start: 2025-04-20

## 2025-04-18 RX ORDER — HYDROCORTISONE SODIUM SUCCINATE 100 MG/2ML
100 INJECTION INTRAMUSCULAR; INTRAVENOUS
Status: CANCELLED | OUTPATIENT
Start: 2025-04-20

## 2025-04-18 RX ORDER — SODIUM CHLORIDE 9 MG/ML
INJECTION, SOLUTION INTRAVENOUS CONTINUOUS
Status: CANCELLED | OUTPATIENT
Start: 2025-04-20

## 2025-04-18 RX ORDER — ACETAMINOPHEN 325 MG/1
650 TABLET ORAL
Status: CANCELLED | OUTPATIENT
Start: 2025-04-20

## 2025-04-18 RX ORDER — DIPHENHYDRAMINE HYDROCHLORIDE 50 MG/ML
50 INJECTION, SOLUTION INTRAMUSCULAR; INTRAVENOUS
Status: CANCELLED | OUTPATIENT
Start: 2025-04-20

## 2025-04-18 RX ORDER — ONDANSETRON 2 MG/ML
8 INJECTION INTRAMUSCULAR; INTRAVENOUS
Status: CANCELLED | OUTPATIENT
Start: 2025-04-20

## 2025-04-23 ENCOUNTER — HOSPITAL ENCOUNTER (OUTPATIENT)
Facility: HOSPITAL | Age: 65
Setting detail: INFUSION SERIES
Discharge: HOME | End: 2025-04-26
Payer: MEDICARE

## 2025-04-23 VITALS
DIASTOLIC BLOOD PRESSURE: 68 MMHG | SYSTOLIC BLOOD PRESSURE: 107 MMHG | OXYGEN SATURATION: 98 % | HEART RATE: 89 BPM | RESPIRATION RATE: 16 BRPM | TEMPERATURE: 98.5 F

## 2025-04-23 LAB
ALBUMIN SERPL-MCNC: 3.6 G/DL (ref 3.4–5)
ANION GAP SERPL CALC-SCNC: 6 MMOL/L (ref 3–18)
BUN SERPL-MCNC: 13 MG/DL (ref 7–18)
BUN/CREAT SERPL: 21 (ref 12–20)
CALCIUM SERPL-MCNC: 8.5 MG/DL (ref 8.5–10.1)
CHLORIDE SERPL-SCNC: 98 MMOL/L (ref 100–111)
CO2 SERPL-SCNC: 31 MMOL/L (ref 21–32)
CREAT SERPL-MCNC: 0.62 MG/DL (ref 0.6–1.3)
GLUCOSE SERPL-MCNC: 83 MG/DL (ref 74–99)
MAGNESIUM SERPL-MCNC: 2.2 MG/DL (ref 1.6–2.6)
PHOSPHATE SERPL-MCNC: 4 MG/DL (ref 2.5–4.9)
POTASSIUM SERPL-SCNC: 4.2 MMOL/L (ref 3.5–5.5)
SODIUM SERPL-SCNC: 135 MMOL/L (ref 136–145)

## 2025-04-23 PROCEDURE — 80069 RENAL FUNCTION PANEL: CPT

## 2025-04-23 PROCEDURE — 36415 COLL VENOUS BLD VENIPUNCTURE: CPT

## 2025-04-23 PROCEDURE — 83735 ASSAY OF MAGNESIUM: CPT

## 2025-04-23 ASSESSMENT — PAIN DESCRIPTION - DESCRIPTORS: DESCRIPTORS: ACHING

## 2025-04-23 ASSESSMENT — PAIN DESCRIPTION - FREQUENCY: FREQUENCY: INTERMITTENT

## 2025-04-23 ASSESSMENT — PAIN DESCRIPTION - PAIN TYPE: TYPE: CHRONIC PAIN

## 2025-04-23 ASSESSMENT — PAIN SCALES - GENERAL: PAINLEVEL_OUTOF10: 8

## 2025-04-23 ASSESSMENT — PAIN DESCRIPTION - LOCATION: LOCATION: GENERALIZED

## 2025-04-23 ASSESSMENT — PAIN DESCRIPTION - ONSET: ONSET: ON-GOING

## 2025-04-23 NOTE — PROGRESS NOTES
[facility] Progress Note    Date: 2025    Name: Latoya Kim    MRN: [id number]         : 1960      Ms. Kim to [facility], ambulatory at 1028, here for Prolia labs. Took pain medication for generalized pain 8/10. Pt was assessed and education was provided.     Ms. Kim's vitals were reviewed.  Vitals:    25 1029   BP: 107/68   Pulse: 89   Resp: 16   Temp: 98.5 °F (36.9 °C)   SpO2: 98%     Blood for ordered renal panel,Mag,Phos was drawn via LAC x1 attempt and sent out for processing. Gauze/coban applied to site. No bleeding or irritation at site.    Ms. Kim tolerated well and had no complaints.     Pt armband removed and shredded.    [facility] at 1035. She is to return on 25 at 1030 for her next Prolia appointment.    OSMIN BULLOCK RN  2025  10:48 AM

## 2025-04-24 ENCOUNTER — HOSPITAL ENCOUNTER (OUTPATIENT)
Facility: HOSPITAL | Age: 65
Setting detail: INFUSION SERIES
Discharge: HOME | End: 2025-04-27
Payer: MEDICARE

## 2025-04-24 ENCOUNTER — APPOINTMENT (OUTPATIENT)
Facility: HOSPITAL | Age: 65
End: 2025-04-24
Payer: MEDICARE

## 2025-04-24 VITALS
HEART RATE: 79 BPM | SYSTOLIC BLOOD PRESSURE: 111 MMHG | DIASTOLIC BLOOD PRESSURE: 70 MMHG | RESPIRATION RATE: 16 BRPM | TEMPERATURE: 97.8 F | OXYGEN SATURATION: 97 %

## 2025-04-24 DIAGNOSIS — E83.42 HYPOMAGNESEMIA: Primary | ICD-10-CM

## 2025-04-24 DIAGNOSIS — M81.0 AGE-RELATED OSTEOPOROSIS WITHOUT CURRENT PATHOLOGICAL FRACTURE: ICD-10-CM

## 2025-04-24 PROCEDURE — 6360000002 HC RX W HCPCS: Performed by: PSYCHIATRY & NEUROLOGY

## 2025-04-24 PROCEDURE — 96372 THER/PROPH/DIAG INJ SC/IM: CPT

## 2025-04-24 RX ORDER — ALBUTEROL SULFATE 90 UG/1
4 INHALANT RESPIRATORY (INHALATION) PRN
OUTPATIENT
Start: 2025-10-19

## 2025-04-24 RX ORDER — HYDROCORTISONE SODIUM SUCCINATE 100 MG/2ML
100 INJECTION INTRAMUSCULAR; INTRAVENOUS
OUTPATIENT
Start: 2025-10-19

## 2025-04-24 RX ORDER — DIPHENHYDRAMINE HYDROCHLORIDE 50 MG/ML
50 INJECTION, SOLUTION INTRAMUSCULAR; INTRAVENOUS
OUTPATIENT
Start: 2025-10-19

## 2025-04-24 RX ORDER — ONDANSETRON 2 MG/ML
8 INJECTION INTRAMUSCULAR; INTRAVENOUS
OUTPATIENT
Start: 2025-10-19

## 2025-04-24 RX ORDER — EPINEPHRINE 1 MG/ML
0.3 INJECTION, SOLUTION, CONCENTRATE INTRAVENOUS PRN
OUTPATIENT
Start: 2025-10-19

## 2025-04-24 RX ORDER — ACETAMINOPHEN 325 MG/1
650 TABLET ORAL
OUTPATIENT
Start: 2025-10-19

## 2025-04-24 RX ORDER — SODIUM CHLORIDE 9 MG/ML
INJECTION, SOLUTION INTRAVENOUS CONTINUOUS
OUTPATIENT
Start: 2025-10-19

## 2025-04-24 RX ADMIN — DENOSUMAB 60 MG: 60 INJECTION SUBCUTANEOUS at 10:45

## 2025-04-24 ASSESSMENT — PAIN - FUNCTIONAL ASSESSMENT: PAIN_FUNCTIONAL_ASSESSMENT: 0-10

## 2025-04-24 NOTE — PROGRESS NOTES
[facility] Progress Note    Date: 2025    Name: Latoya Kim    MRN: [id number]         : 1960    Ms. Kim arrived in the [facility] today at 1035 in stable condition, here for her PROLIA INJECTION (EVERY 6 MONTHS). She was assessed and education was provided.     Ms. Kim's vitals were reviewed.  Vitals:    25 1035   BP: 111/70   Pulse: 79   Resp: 16   Temp: 97.8 °F (36.6 °C)   SpO2: 97%     Pt stated that she has tolerated all PROLIA injections well, and without any side effects or issues.     Her Prolia labs obtained on 25 were all reviewed, and were all noted to be satisfactory for treatment today, and were as follows:  Hospital Outpatient Visit on 2025   Component Date Value Ref Range Status    Sodium 2025 135 (L)  136 - 145 mmol/L Final    Potassium 2025 4.2  3.5 - 5.5 mmol/L Final    Chloride 2025 98 (L)  100 - 111 mmol/L Final    CO2 2025 31  21 - 32 mmol/L Final    Anion Gap 2025 6  3.0 - 18 mmol/L Final    Glucose 2025 83  74 - 99 mg/dL Final    BUN 2025 13  7.0 - 18 MG/DL Final    Creatinine 2025 0.62  0.6 - 1.3 MG/DL Final    BUN/Creatinine Ratio 2025 21 (H)  12 - 20   Final    Est, Glom Filt Rate 2025 >90  >60 ml/min/1.73m2 Final    Comment:    Pediatric calculator link: https://www.kidney.org/professionals/kdoqi/gfr_calculatorped     These results are not intended for use in patients <18 years of age.     eGFR results are calculated without a race factor using  the  CKD-EPI equation. Careful clinical correlation is recommended, particularly when comparing to results calculated using previous equations.  The CKD-EPI equation is less accurate in patients with extremes of muscle mass, extra-renal metabolism of creatinine, excessive creatine ingestion, or following therapy that affects renal tubular secretion.      Calcium 2025 8.5  8.5 - 10.1 MG/DL Final    Phosphorus 2025 4.0  2.5 - 4.9 MG/DL Final     Albumin 04/23/2025 3.6  3.4 - 5.0 g/dL Final    Magnesium 04/23/2025 2.2  1.6 - 2.6 mg/dL Final        Denosumab (PROLIA) 60 mg was administered SQ in the back of her LEFT ARM  per order and without incident.       Ms. Kim tolerated treatment well today, and she voiced no complaints.     [facility] at 1050.     She is to return in 6 months, on 10/23/25 at 1030 to have her Pre-Prolia Labs drawn.     Meera Moyer RN  April 24, 2025  10:44 AM

## 2025-04-28 ENCOUNTER — HOSPITAL ENCOUNTER (OUTPATIENT)
Facility: HOSPITAL | Age: 65
Discharge: HOME | End: 2025-05-01
Attending: INTERNAL MEDICINE
Payer: MEDICARE

## 2025-04-28 VITALS — BODY MASS INDEX: 21.26 KG/M2 | WEIGHT: 120 LBS | HEIGHT: 63 IN

## 2025-04-28 DIAGNOSIS — Z12.31 VISIT FOR SCREENING MAMMOGRAM: ICD-10-CM

## 2025-04-28 PROCEDURE — 77063 BREAST TOMOSYNTHESIS BI: CPT

## 2025-05-07 ENCOUNTER — HOSPITAL ENCOUNTER (OUTPATIENT)
Age: 65
Discharge: HOME | End: 2025-05-07
Payer: MEDICARE

## 2025-05-07 PROCEDURE — 80061 LIPID PANEL: CPT

## 2025-05-07 PROCEDURE — 36415 COLL VENOUS BLD VENIPUNCTURE: CPT

## 2025-05-08 LAB
CHOLEST SERPL-MCNC: 186 MG/DL
HDLC SERPL-MCNC: 74 MG/DL (ref 40–60)
HDLC SERPL: ABNORMAL {RATIO}
LDLC SERPL CALC-MCNC: ABNORMAL MG/DL
LDLC/HDLC SERPL: ABNORMAL {RATIO}
LIPID PANEL: ABNORMAL
TRIGL SERPL-MCNC: 80 MG/DL (ref 0–150)
VLDLC SERPL CALC-MCNC: ABNORMAL MG/DL

## 2025-06-05 ENCOUNTER — OFFICE VISIT (OUTPATIENT)
Age: 65
End: 2025-06-05

## 2025-06-05 VITALS
HEIGHT: 63 IN | OXYGEN SATURATION: 96 % | SYSTOLIC BLOOD PRESSURE: 105 MMHG | WEIGHT: 120.2 LBS | TEMPERATURE: 97.2 F | HEART RATE: 92 BPM | BODY MASS INDEX: 21.3 KG/M2 | RESPIRATION RATE: 12 BRPM | DIASTOLIC BLOOD PRESSURE: 63 MMHG

## 2025-06-05 DIAGNOSIS — J47.9 BRONCHIECTASIS WITHOUT COMPLICATION (HCC): ICD-10-CM

## 2025-06-05 DIAGNOSIS — J45.20 MILD INTERMITTENT ASTHMA WITHOUT COMPLICATION: Primary | ICD-10-CM

## 2025-06-05 RX ORDER — ALBUTEROL SULFATE 90 UG/1
INHALANT RESPIRATORY (INHALATION)
Qty: 18 G | Refills: 5 | Status: SHIPPED | OUTPATIENT
Start: 2025-06-05

## 2025-06-05 RX ORDER — FLUTICASONE FUROATE 100 UG/1
POWDER RESPIRATORY (INHALATION)
Qty: 3 EACH | Refills: 3 | Status: SHIPPED | OUTPATIENT
Start: 2025-06-05

## 2025-06-05 NOTE — PROGRESS NOTES
Latoya Kim presents today for   Chief Complaint   Patient presents with    Asthma       Is someone accompanying this pt? N    Is the patient using any DME equipment during OV? N    -DME Company N    Depression Screenin/9/2024    10:43 AM   PHQ-9 Questionaire   Little interest or pleasure in doing things 0   Feeling down, depressed, or hopeless 1   PHQ-9 Total Score 1        Patient-reported       Learning Assessment:    Failed to redirect to the Timeline version of the [employer] SmartLink.    Abuse Screening:         No data to display                Fall Risk    Failed to redirect to the Timeline version of the [employer] SmartLink.    Coordination of Care:    1. Have you been to the ER, urgent care clinic since your last visit? Hospitalized since your last visit?     2. Have you seen or consulted any other health care providers outside of the [facility] since your last visit? Include any pap smears or colon screening. N    Medication list has been update per patient.  
history, tests done prior to arrival, additional time reviewing clinical data, imaging, outside records and test results as well as time spent in ordering tests, treatments and referring patient for further care was a total of_13 minutes.  Additional time-counseling with patient and family members regarding care plan 19 minutes.    Overall time spent on encounter 32 minutes

## 2025-06-24 ENCOUNTER — HOSPITAL ENCOUNTER (OUTPATIENT)
Facility: HOSPITAL | Age: 65
Setting detail: SPECIMEN
Discharge: HOME | End: 2025-06-27
Payer: MEDICARE

## 2025-06-24 DIAGNOSIS — E53.8 VITAMIN B 12 DEFICIENCY: ICD-10-CM

## 2025-06-24 DIAGNOSIS — E78.2 MIXED HYPERLIPIDEMIA: ICD-10-CM

## 2025-06-24 DIAGNOSIS — E55.9 VITAMIN D DEFICIENCY, UNSPECIFIED: ICD-10-CM

## 2025-06-24 DIAGNOSIS — E04.1 THYROID NODULE: ICD-10-CM

## 2025-06-24 DIAGNOSIS — M79.7 FIBROMYALGIA: ICD-10-CM

## 2025-06-24 LAB
25(OH)D3 SERPL-MCNC: 41.1 NG/ML (ref 30–100)
ALBUMIN SERPL-MCNC: 3.8 G/DL (ref 3.4–5)
ALBUMIN/GLOB SERPL: 1.6 (ref 0.8–1.7)
ALP SERPL-CCNC: 25 U/L (ref 45–117)
ALT SERPL-CCNC: 100 U/L (ref 10–35)
ANION GAP SERPL CALC-SCNC: 10 MMOL/L (ref 3–18)
AST SERPL-CCNC: 72 U/L (ref 10–38)
BASOPHILS # BLD: 0.02 K/UL (ref 0–0.1)
BASOPHILS NFR BLD: 0.4 % (ref 0–2)
BILIRUB SERPL-MCNC: 0.3 MG/DL (ref 0.2–1)
BUN SERPL-MCNC: 14 MG/DL (ref 6–23)
BUN/CREAT SERPL: 20 (ref 12–20)
CALCIUM SERPL-MCNC: 9.1 MG/DL (ref 8.5–10.1)
CHLORIDE SERPL-SCNC: 98 MMOL/L (ref 98–107)
CHOLEST SERPL-MCNC: 171 MG/DL
CO2 SERPL-SCNC: 28 MMOL/L (ref 21–32)
CREAT SERPL-MCNC: 0.7 MG/DL (ref 0.6–1.3)
DIFFERENTIAL METHOD BLD: NORMAL
EOSINOPHIL # BLD: 0.11 K/UL (ref 0–0.4)
EOSINOPHIL NFR BLD: 2 % (ref 0–5)
ERYTHROCYTE [DISTWIDTH] IN BLOOD BY AUTOMATED COUNT: 12.2 % (ref 11.6–14.5)
GLOBULIN SER CALC-MCNC: 2.4 G/DL (ref 2–4)
GLUCOSE SERPL-MCNC: 90 MG/DL (ref 74–108)
HCT VFR BLD AUTO: 41.5 % (ref 35–45)
HDLC SERPL-MCNC: 75 MG/DL (ref 40–60)
HDLC SERPL: 2.3 (ref 0–5)
HGB BLD-MCNC: 13.1 G/DL (ref 12–16)
IMM GRANULOCYTES # BLD AUTO: 0.01 K/UL (ref 0–0.04)
IMM GRANULOCYTES NFR BLD AUTO: 0.2 % (ref 0–0.5)
LDLC SERPL CALC-MCNC: 78 MG/DL (ref 0–100)
LYMPHOCYTES # BLD: 1.6 K/UL (ref 0.9–3.6)
LYMPHOCYTES NFR BLD: 28.4 % (ref 21–52)
MCH RBC QN AUTO: 27.8 PG (ref 24–34)
MCHC RBC AUTO-ENTMCNC: 31.6 G/DL (ref 31–37)
MCV RBC AUTO: 87.9 FL (ref 78–100)
MONOCYTES # BLD: 0.38 K/UL (ref 0.05–1.2)
MONOCYTES NFR BLD: 6.7 % (ref 3–10)
NEUTS SEG # BLD: 3.52 K/UL (ref 1.8–8)
NEUTS SEG NFR BLD: 62.3 % (ref 40–73)
NRBC # BLD: 0 K/UL (ref 0–0.01)
NRBC BLD-RTO: 0 PER 100 WBC
PLATELET # BLD AUTO: 241 K/UL (ref 135–420)
PMV BLD AUTO: 9.3 FL (ref 9.2–11.8)
POTASSIUM SERPL-SCNC: 3.9 MMOL/L (ref 3.5–5.5)
PROT SERPL-MCNC: 6.2 G/DL (ref 6.4–8.2)
RBC # BLD AUTO: 4.72 M/UL (ref 4.2–5.3)
SODIUM SERPL-SCNC: 137 MMOL/L (ref 136–145)
TRIGL SERPL-MCNC: 90 MG/DL (ref 0–150)
TSH, 3RD GENERATION: 3.68 UIU/ML (ref 0.27–4.2)
VIT B12 SERPL-MCNC: 574 PG/ML (ref 211–911)
VLDLC SERPL CALC-MCNC: 18 MG/DL
WBC # BLD AUTO: 5.6 K/UL (ref 4.6–13.2)

## 2025-06-24 PROCEDURE — 84443 ASSAY THYROID STIM HORMONE: CPT

## 2025-06-24 PROCEDURE — 80053 COMPREHEN METABOLIC PANEL: CPT

## 2025-06-24 PROCEDURE — 86141 C-REACTIVE PROTEIN HS: CPT

## 2025-06-24 PROCEDURE — 82306 VITAMIN D 25 HYDROXY: CPT

## 2025-06-24 PROCEDURE — 82607 VITAMIN B-12: CPT

## 2025-06-24 PROCEDURE — 85025 COMPLETE CBC W/AUTO DIFF WBC: CPT

## 2025-06-24 PROCEDURE — 80061 LIPID PANEL: CPT

## 2025-06-24 PROCEDURE — 36415 COLL VENOUS BLD VENIPUNCTURE: CPT

## 2025-06-25 ENCOUNTER — TELEPHONE (OUTPATIENT)
Age: 65
End: 2025-06-25

## 2025-06-25 LAB — CRP SERPL HS-MCNC: 0.8 MG/L

## 2025-06-25 NOTE — TELEPHONE ENCOUNTER
Pt stated that she need a 90 day singular sent it to [facility] pharmacy. For further information please call [phone]

## 2025-06-26 RX ORDER — MONTELUKAST SODIUM 10 MG/1
TABLET ORAL
Qty: 30 TABLET | Refills: 3 | OUTPATIENT
Start: 2025-06-26

## 2025-06-26 RX ORDER — MONTELUKAST SODIUM 10 MG/1
10 TABLET ORAL DAILY
Qty: 90 TABLET | Refills: 3 | Status: SHIPPED | OUTPATIENT
Start: 2025-06-26

## 2025-06-28 SDOH — ECONOMIC STABILITY: INCOME INSECURITY: IN THE LAST 12 MONTHS, WAS THERE A TIME WHEN YOU WERE NOT ABLE TO PAY THE MORTGAGE OR RENT ON TIME?: NO

## 2025-06-28 SDOH — ECONOMIC STABILITY: FOOD INSECURITY: WITHIN THE PAST 12 MONTHS, THE FOOD YOU BOUGHT JUST DIDN'T LAST AND YOU DIDN'T HAVE MONEY TO GET MORE.: NEVER TRUE

## 2025-06-28 SDOH — ECONOMIC STABILITY: FOOD INSECURITY: WITHIN THE PAST 12 MONTHS, YOU WORRIED THAT YOUR FOOD WOULD RUN OUT BEFORE YOU GOT MONEY TO BUY MORE.: NEVER TRUE

## 2025-06-28 SDOH — ECONOMIC STABILITY: TRANSPORTATION INSECURITY
IN THE PAST 12 MONTHS, HAS THE LACK OF TRANSPORTATION KEPT YOU FROM MEDICAL APPOINTMENTS OR FROM GETTING MEDICATIONS?: NO

## 2025-07-01 ENCOUNTER — OFFICE VISIT (OUTPATIENT)
Facility: CLINIC | Age: 65
End: 2025-07-01

## 2025-07-01 VITALS
HEIGHT: 63 IN | OXYGEN SATURATION: 98 % | WEIGHT: 121 LBS | RESPIRATION RATE: 20 BRPM | TEMPERATURE: 97.9 F | BODY MASS INDEX: 21.44 KG/M2 | SYSTOLIC BLOOD PRESSURE: 125 MMHG | HEART RATE: 82 BPM | DIASTOLIC BLOOD PRESSURE: 71 MMHG

## 2025-07-01 DIAGNOSIS — E55.9 VITAMIN D DEFICIENCY, UNSPECIFIED: ICD-10-CM

## 2025-07-01 DIAGNOSIS — M79.7 FIBROMYALGIA: ICD-10-CM

## 2025-07-01 DIAGNOSIS — R79.89 ELEVATED LFTS: ICD-10-CM

## 2025-07-01 DIAGNOSIS — M81.0 AGE-RELATED OSTEOPOROSIS WITHOUT CURRENT PATHOLOGICAL FRACTURE: ICD-10-CM

## 2025-07-01 DIAGNOSIS — R93.1 AGATSTON CAC SCORE, >400: ICD-10-CM

## 2025-07-01 DIAGNOSIS — E78.2 MIXED HYPERLIPIDEMIA: Primary | ICD-10-CM

## 2025-07-01 RX ORDER — OMEPRAZOLE 40 MG/1
40 CAPSULE, DELAYED RELEASE ORAL DAILY
Qty: 90 CAPSULE | Refills: 2 | Status: SHIPPED | OUTPATIENT
Start: 2025-07-01

## 2025-07-01 ASSESSMENT — PATIENT HEALTH QUESTIONNAIRE - PHQ9
SUM OF ALL RESPONSES TO PHQ QUESTIONS 1-9: 0
SUM OF ALL RESPONSES TO PHQ QUESTIONS 1-9: 0
4. FEELING TIRED OR HAVING LITTLE ENERGY: NOT AT ALL
1. LITTLE INTEREST OR PLEASURE IN DOING THINGS: NOT AT ALL
SUM OF ALL RESPONSES TO PHQ QUESTIONS 1-9: 0
8. MOVING OR SPEAKING SO SLOWLY THAT OTHER PEOPLE COULD HAVE NOTICED. OR THE OPPOSITE, BEING SO FIGETY OR RESTLESS THAT YOU HAVE BEEN MOVING AROUND A LOT MORE THAN USUAL: NOT AT ALL
5. POOR APPETITE OR OVEREATING: NOT AT ALL
9. THOUGHTS THAT YOU WOULD BE BETTER OFF DEAD, OR OF HURTING YOURSELF: NOT AT ALL
2. FEELING DOWN, DEPRESSED OR HOPELESS: NOT AT ALL
6. FEELING BAD ABOUT YOURSELF - OR THAT YOU ARE A FAILURE OR HAVE LET YOURSELF OR YOUR FAMILY DOWN: NOT AT ALL
3. TROUBLE FALLING OR STAYING ASLEEP: NOT AT ALL
SUM OF ALL RESPONSES TO PHQ QUESTIONS 1-9: 0
7. TROUBLE CONCENTRATING ON THINGS, SUCH AS READING THE NEWSPAPER OR WATCHING TELEVISION: NOT AT ALL
10. IF YOU CHECKED OFF ANY PROBLEMS, HOW DIFFICULT HAVE THESE PROBLEMS MADE IT FOR YOU TO DO YOUR WORK, TAKE CARE OF THINGS AT HOME, OR GET ALONG WITH OTHER PEOPLE: NOT DIFFICULT AT ALL

## 2025-07-01 NOTE — PROGRESS NOTES
Latoya Kim presents today for   Chief Complaint   Patient presents with    Hypertension     6 month follow up            \"Have you been to the ER, urgent care clinic since your last visit?  Hospitalized since your last visit?\"    Yes, Sonya Leung for a UTI    “Have you seen or consulted any other health care providers outside of [facility] since your last visit?”    Yes, Dr. Reyes pain management.

## 2025-07-08 ENCOUNTER — HOSPITAL ENCOUNTER (OUTPATIENT)
Facility: HOSPITAL | Age: 65
Discharge: HOME | End: 2025-07-11
Payer: MEDICARE

## 2025-07-08 DIAGNOSIS — R79.89 ELEVATED LFTS: ICD-10-CM

## 2025-07-08 PROCEDURE — 76705 ECHO EXAM OF ABDOMEN: CPT

## 2025-07-21 ENCOUNTER — HOSPITAL ENCOUNTER (OUTPATIENT)
Facility: HOSPITAL | Age: 65
Setting detail: SPECIMEN
Discharge: HOME | End: 2025-07-24
Payer: MEDICARE

## 2025-07-21 DIAGNOSIS — R79.89 ELEVATED LFTS: ICD-10-CM

## 2025-07-21 DIAGNOSIS — E78.2 MIXED HYPERLIPIDEMIA: ICD-10-CM

## 2025-07-21 LAB
ALBUMIN SERPL-MCNC: 3.9 G/DL (ref 3.4–5)
ALBUMIN/GLOB SERPL: 1.6 (ref 0.8–1.7)
ALP SERPL-CCNC: 28 U/L (ref 45–117)
ALT SERPL-CCNC: 93 U/L (ref 10–35)
ANION GAP SERPL CALC-SCNC: 12 MMOL/L (ref 3–18)
AST SERPL-CCNC: 48 U/L (ref 10–38)
BILIRUB SERPL-MCNC: 0.5 MG/DL (ref 0.2–1)
BUN SERPL-MCNC: 13 MG/DL (ref 6–23)
BUN/CREAT SERPL: 18 (ref 12–20)
CALCIUM SERPL-MCNC: 10.1 MG/DL (ref 8.5–10.1)
CHLORIDE SERPL-SCNC: 94 MMOL/L (ref 98–107)
CHOLEST SERPL-MCNC: 216 MG/DL
CO2 SERPL-SCNC: 28 MMOL/L (ref 21–32)
CREAT SERPL-MCNC: 0.71 MG/DL (ref 0.6–1.3)
GLOBULIN SER CALC-MCNC: 2.5 G/DL (ref 2–4)
GLUCOSE SERPL-MCNC: 92 MG/DL (ref 74–108)
HDLC SERPL-MCNC: 89 MG/DL (ref 40–60)
HDLC SERPL: 2.4 (ref 0–5)
LDLC SERPL CALC-MCNC: 109 MG/DL (ref 0–100)
POTASSIUM SERPL-SCNC: 4.3 MMOL/L (ref 3.5–5.5)
PROT SERPL-MCNC: 6.4 G/DL (ref 6.4–8.2)
SODIUM SERPL-SCNC: 133 MMOL/L (ref 136–145)
TRIGL SERPL-MCNC: 92 MG/DL (ref 0–150)
VLDLC SERPL CALC-MCNC: 18 MG/DL

## 2025-07-21 PROCEDURE — 80061 LIPID PANEL: CPT

## 2025-07-21 PROCEDURE — 36415 COLL VENOUS BLD VENIPUNCTURE: CPT

## 2025-07-21 PROCEDURE — 80053 COMPREHEN METABOLIC PANEL: CPT

## 2025-07-31 ENCOUNTER — OFFICE VISIT (OUTPATIENT)
Facility: CLINIC | Age: 65
End: 2025-07-31

## 2025-07-31 VITALS
HEIGHT: 63 IN | SYSTOLIC BLOOD PRESSURE: 125 MMHG | DIASTOLIC BLOOD PRESSURE: 68 MMHG | OXYGEN SATURATION: 98 % | TEMPERATURE: 98.7 F | BODY MASS INDEX: 21.62 KG/M2 | WEIGHT: 122 LBS | HEART RATE: 78 BPM | RESPIRATION RATE: 16 BRPM

## 2025-07-31 DIAGNOSIS — R79.89 ELEVATED LFTS: Primary | ICD-10-CM

## 2025-07-31 DIAGNOSIS — E78.2 MIXED HYPERLIPIDEMIA: ICD-10-CM

## 2025-07-31 DIAGNOSIS — R93.1 AGATSTON CAC SCORE, >400: ICD-10-CM

## 2025-07-31 DIAGNOSIS — E78.2 MIXED HYPERLIPIDEMIA: Primary | ICD-10-CM

## 2025-07-31 DIAGNOSIS — E55.9 VITAMIN D DEFICIENCY, UNSPECIFIED: ICD-10-CM

## 2025-07-31 DIAGNOSIS — E53.8 VITAMIN B 12 DEFICIENCY: ICD-10-CM

## 2025-07-31 NOTE — PROGRESS NOTES
Latoya Kim presents today for   Chief Complaint   Patient presents with    Follow-up     4 week follow up review lab work.           \"Have you been to the ER, urgent care clinic since your last visit?  Hospitalized since your last visit?\"    NO    “Have you seen or consulted any other health care providers outside of  since your last visit?”    NO

## 2025-07-31 NOTE — PROGRESS NOTES
Latoya Kim (:  1960) is a 65 y.o. female established patient, here for evaluation of the following chief complaint(s):  Follow-up (4 week follow up review lab work.)      Assessment & Plan   ASSESSMENT/PLAN:    ICD-10-CM    1. Elevated LFTs  R79.89 Most likely due to Zetia and is slowly improving and will recheck in 3 months      2. Mixed hyperlipidemia  E78.2 Uncontrolled on Crestor 40 mg daily.  Patient did not tolerate Zetia.  She is a good candidate for Repatha or similar medications in the class.      3. Agatston CAC score, >400  R93.1 Patient is being managed by cardiology and is on aspirin 81 mg and Crestor 40 mg.  She is a excellent candidate for Repatha but  will defer to cardiology             Return in about 3 months (around 10/31/2025) for labs 1 week before.         Subjective   SUBJECTIVE/OBJECTIVE:  Patient's LFTs became elevated when she started on Zetia.  They are slowly improving off of Zetia which is most likely the etiology.  Will recheck levels in 3 months and if back to normal would avoid Zetia in this patient.  Ultrasound of liver was within normal limits.  She does have coronary artery disease confirmed with coronary artery calcium score more than 400.  She is tolerating Crestor 40 mg but LDL is still not less than 70 so she is a good candidate for PCSK9 inhibitor like Repatha.  Will defer this to her cardiologist since most insurance companies needed to be prescribed by the specialist.    Respiratory ROS: negative for - shortness of breath  Cardiovascular ROS: negative for - chest pain       Objective   /68 (BP Site: Left Upper Arm, Patient Position: Sitting, BP Cuff Size: Small Adult)   Pulse 78   Temp 98.7 °F (37.1 °C) (Temporal)   Resp 16   Ht 1.6 m (5' 3\")   Wt 55.3 kg (122 lb)   LMP 2009   SpO2 98%   BMI 21.61 kg/m²          Current Outpatient Medications   Medication Sig    omeprazole (PRILOSEC) 40 MG delayed release capsule Take 1 capsule by mouth daily

## 2025-08-07 ENCOUNTER — OFFICE VISIT (OUTPATIENT)
Age: 65
End: 2025-08-07

## 2025-08-07 VITALS
WEIGHT: 123.8 LBS | DIASTOLIC BLOOD PRESSURE: 84 MMHG | HEART RATE: 92 BPM | BODY MASS INDEX: 21.93 KG/M2 | SYSTOLIC BLOOD PRESSURE: 130 MMHG | HEIGHT: 63 IN | OXYGEN SATURATION: 98 %

## 2025-08-07 DIAGNOSIS — E78.5 HYPERLIPIDEMIA LDL GOAL <160: ICD-10-CM

## 2025-08-07 DIAGNOSIS — R74.01 ELEVATED ALT MEASUREMENT: ICD-10-CM

## 2025-08-07 DIAGNOSIS — R74.01 ELEVATED AST (SGOT): ICD-10-CM

## 2025-08-07 DIAGNOSIS — R74.01 MATERNAL AST (ASPARTATE AMINOTRANSFERASE) ELEVATION: Primary | ICD-10-CM

## 2025-08-07 DIAGNOSIS — E78.2 MIXED HYPERLIPIDEMIA: ICD-10-CM

## 2025-08-21 ENCOUNTER — HOSPITAL ENCOUNTER (OUTPATIENT)
Age: 65
Discharge: HOME | End: 2025-08-21
Payer: MEDICARE

## 2025-08-21 DIAGNOSIS — R74.01 ELEVATED ALT MEASUREMENT: ICD-10-CM

## 2025-08-21 LAB
ALBUMIN SERPL-MCNC: 3.7 G/DL (ref 3.4–5)
ALBUMIN/GLOB SERPL: 1.5 (ref 0.8–1.7)
ALP SERPL-CCNC: 24 U/L (ref 45–117)
ALT SERPL-CCNC: 92 U/L (ref 10–35)
ANION GAP SERPL CALC-SCNC: 12 MMOL/L (ref 3–18)
AST SERPL-CCNC: 61 U/L (ref 10–38)
BILIRUB SERPL-MCNC: 0.3 MG/DL (ref 0.2–1)
BUN SERPL-MCNC: 14 MG/DL (ref 6–23)
BUN/CREAT SERPL: 22 (ref 12–20)
CALCIUM SERPL-MCNC: 9.5 MG/DL (ref 8.5–10.1)
CHLORIDE SERPL-SCNC: 99 MMOL/L (ref 98–107)
CO2 SERPL-SCNC: 27 MMOL/L (ref 21–32)
CREAT SERPL-MCNC: 0.65 MG/DL (ref 0.6–1.3)
GLOBULIN SER CALC-MCNC: 2.5 G/DL (ref 2–4)
GLUCOSE SERPL-MCNC: 102 MG/DL (ref 74–108)
POTASSIUM SERPL-SCNC: 4 MMOL/L (ref 3.5–5.5)
PROT SERPL-MCNC: 6.2 G/DL (ref 6.4–8.2)
SODIUM SERPL-SCNC: 138 MMOL/L (ref 136–145)

## 2025-08-21 PROCEDURE — 80053 COMPREHEN METABOLIC PANEL: CPT

## 2025-08-21 PROCEDURE — 36415 COLL VENOUS BLD VENIPUNCTURE: CPT

## 2025-08-22 ENCOUNTER — PATIENT MESSAGE (OUTPATIENT)
Age: 65
End: 2025-08-22

## 2025-08-22 DIAGNOSIS — R74.01 ELEVATED AST (SGOT): Primary | ICD-10-CM

## 2025-08-22 DIAGNOSIS — R74.01 ELEVATED ALT MEASUREMENT: ICD-10-CM
